# Patient Record
Sex: MALE | Race: WHITE | NOT HISPANIC OR LATINO
[De-identification: names, ages, dates, MRNs, and addresses within clinical notes are randomized per-mention and may not be internally consistent; named-entity substitution may affect disease eponyms.]

---

## 2017-01-19 ENCOUNTER — MEDICATION RENEWAL (OUTPATIENT)
Age: 77
End: 2017-01-19

## 2017-04-19 ENCOUNTER — APPOINTMENT (OUTPATIENT)
Dept: CARDIOLOGY | Facility: CLINIC | Age: 77
End: 2017-04-19

## 2017-04-19 VITALS
DIASTOLIC BLOOD PRESSURE: 78 MMHG | HEART RATE: 65 BPM | BODY MASS INDEX: 26.04 KG/M2 | WEIGHT: 186 LBS | SYSTOLIC BLOOD PRESSURE: 140 MMHG | HEIGHT: 71 IN

## 2017-06-06 ENCOUNTER — APPOINTMENT (OUTPATIENT)
Dept: VASCULAR SURGERY | Facility: CLINIC | Age: 77
End: 2017-06-06

## 2017-06-06 VITALS — WEIGHT: 185 LBS | HEIGHT: 71 IN | BODY MASS INDEX: 25.9 KG/M2

## 2017-09-14 ENCOUNTER — APPOINTMENT (OUTPATIENT)
Dept: CARDIOLOGY | Facility: CLINIC | Age: 77
End: 2017-09-14

## 2017-09-14 VITALS
HEART RATE: 69 BPM | DIASTOLIC BLOOD PRESSURE: 80 MMHG | SYSTOLIC BLOOD PRESSURE: 146 MMHG | BODY MASS INDEX: 26.04 KG/M2 | WEIGHT: 186 LBS | HEIGHT: 71 IN

## 2017-09-27 ENCOUNTER — APPOINTMENT (OUTPATIENT)
Dept: CARDIOLOGY | Facility: CLINIC | Age: 77
End: 2017-09-27

## 2017-10-02 ENCOUNTER — OUTPATIENT (OUTPATIENT)
Dept: OUTPATIENT SERVICES | Facility: HOSPITAL | Age: 77
LOS: 1 days | Discharge: HOME | End: 2017-10-02

## 2017-10-02 DIAGNOSIS — I25.10 ATHEROSCLEROTIC HEART DISEASE OF NATIVE CORONARY ARTERY WITHOUT ANGINA PECTORIS: ICD-10-CM

## 2017-11-27 ENCOUNTER — RX RENEWAL (OUTPATIENT)
Age: 77
End: 2017-11-27

## 2017-12-19 ENCOUNTER — APPOINTMENT (OUTPATIENT)
Dept: VASCULAR SURGERY | Facility: CLINIC | Age: 77
End: 2017-12-19
Payer: MEDICARE

## 2017-12-19 PROCEDURE — 99213 OFFICE O/P EST LOW 20 MIN: CPT

## 2017-12-19 PROCEDURE — 93925 LOWER EXTREMITY STUDY: CPT

## 2017-12-19 PROCEDURE — 93978 VASCULAR STUDY: CPT

## 2018-01-02 ENCOUNTER — RX RENEWAL (OUTPATIENT)
Age: 78
End: 2018-01-02

## 2018-01-18 ENCOUNTER — APPOINTMENT (OUTPATIENT)
Dept: CARDIOLOGY | Facility: CLINIC | Age: 78
End: 2018-01-18

## 2018-01-18 VITALS
BODY MASS INDEX: 26.46 KG/M2 | HEART RATE: 84 BPM | DIASTOLIC BLOOD PRESSURE: 84 MMHG | SYSTOLIC BLOOD PRESSURE: 156 MMHG | WEIGHT: 189 LBS | HEIGHT: 71 IN

## 2018-01-25 ENCOUNTER — OUTPATIENT (OUTPATIENT)
Dept: OUTPATIENT SERVICES | Facility: HOSPITAL | Age: 78
LOS: 1 days | Discharge: HOME | End: 2018-01-25

## 2018-01-25 DIAGNOSIS — Z01.818 ENCOUNTER FOR OTHER PREPROCEDURAL EXAMINATION: ICD-10-CM

## 2018-01-30 ENCOUNTER — OUTPATIENT (OUTPATIENT)
Dept: OUTPATIENT SERVICES | Facility: HOSPITAL | Age: 78
LOS: 1 days | Discharge: HOME | End: 2018-01-30

## 2018-01-30 DIAGNOSIS — I10 ESSENTIAL (PRIMARY) HYPERTENSION: ICD-10-CM

## 2018-01-30 DIAGNOSIS — I25.10 ATHEROSCLEROTIC HEART DISEASE OF NATIVE CORONARY ARTERY WITHOUT ANGINA PECTORIS: ICD-10-CM

## 2018-01-30 DIAGNOSIS — E11.9 TYPE 2 DIABETES MELLITUS WITHOUT COMPLICATIONS: ICD-10-CM

## 2018-02-07 DIAGNOSIS — E78.4 OTHER HYPERLIPIDEMIA: ICD-10-CM

## 2018-02-07 DIAGNOSIS — I25.118 ATHEROSCLEROTIC HEART DISEASE OF NATIVE CORONARY ARTERY WITH OTHER FORMS OF ANGINA PECTORIS: ICD-10-CM

## 2018-02-07 DIAGNOSIS — I25.82 CHRONIC TOTAL OCCLUSION OF CORONARY ARTERY: ICD-10-CM

## 2018-02-07 DIAGNOSIS — I20.8 OTHER FORMS OF ANGINA PECTORIS: ICD-10-CM

## 2018-02-07 DIAGNOSIS — I50.9 HEART FAILURE, UNSPECIFIED: ICD-10-CM

## 2018-02-07 DIAGNOSIS — I10 ESSENTIAL (PRIMARY) HYPERTENSION: ICD-10-CM

## 2018-03-01 ENCOUNTER — APPOINTMENT (OUTPATIENT)
Dept: CARDIOLOGY | Facility: CLINIC | Age: 78
End: 2018-03-01

## 2018-03-01 VITALS
HEART RATE: 80 BPM | WEIGHT: 192 LBS | SYSTOLIC BLOOD PRESSURE: 160 MMHG | BODY MASS INDEX: 26.88 KG/M2 | HEIGHT: 71 IN | DIASTOLIC BLOOD PRESSURE: 84 MMHG

## 2018-04-10 ENCOUNTER — MEDICATION RENEWAL (OUTPATIENT)
Age: 78
End: 2018-04-10

## 2018-06-05 ENCOUNTER — APPOINTMENT (OUTPATIENT)
Dept: VASCULAR SURGERY | Facility: CLINIC | Age: 78
End: 2018-06-05
Payer: MEDICARE

## 2018-06-05 VITALS
BODY MASS INDEX: 26.18 KG/M2 | DIASTOLIC BLOOD PRESSURE: 72 MMHG | WEIGHT: 187 LBS | SYSTOLIC BLOOD PRESSURE: 130 MMHG | HEIGHT: 71 IN

## 2018-06-05 PROCEDURE — 93925 LOWER EXTREMITY STUDY: CPT

## 2018-06-05 PROCEDURE — 99213 OFFICE O/P EST LOW 20 MIN: CPT

## 2018-06-05 PROCEDURE — 93978 VASCULAR STUDY: CPT

## 2018-06-19 ENCOUNTER — MEDICATION RENEWAL (OUTPATIENT)
Age: 78
End: 2018-06-19

## 2018-06-21 ENCOUNTER — APPOINTMENT (OUTPATIENT)
Dept: CARDIOLOGY | Facility: CLINIC | Age: 78
End: 2018-06-21

## 2018-08-02 ENCOUNTER — APPOINTMENT (OUTPATIENT)
Dept: CARDIOLOGY | Facility: CLINIC | Age: 78
End: 2018-08-02

## 2018-08-02 VITALS
HEIGHT: 71 IN | WEIGHT: 188 LBS | SYSTOLIC BLOOD PRESSURE: 142 MMHG | HEART RATE: 64 BPM | DIASTOLIC BLOOD PRESSURE: 80 MMHG | BODY MASS INDEX: 26.32 KG/M2

## 2018-08-13 ENCOUNTER — OUTPATIENT (OUTPATIENT)
Dept: OUTPATIENT SERVICES | Facility: HOSPITAL | Age: 78
LOS: 1 days | Discharge: HOME | End: 2018-08-13

## 2018-08-14 DIAGNOSIS — G47.33 OBSTRUCTIVE SLEEP APNEA (ADULT) (PEDIATRIC): ICD-10-CM

## 2018-10-29 ENCOUNTER — TRANSCRIPTION ENCOUNTER (OUTPATIENT)
Age: 78
End: 2018-10-29

## 2018-10-30 ENCOUNTER — TRANSCRIPTION ENCOUNTER (OUTPATIENT)
Age: 78
End: 2018-10-30

## 2018-10-30 ENCOUNTER — APPOINTMENT (OUTPATIENT)
Dept: SURGERY | Facility: CLINIC | Age: 78
End: 2018-10-30
Payer: MEDICARE

## 2018-10-30 VITALS — WEIGHT: 185 LBS | HEIGHT: 71 IN | BODY MASS INDEX: 25.9 KG/M2

## 2018-10-30 PROCEDURE — 99203 OFFICE O/P NEW LOW 30 MIN: CPT

## 2018-11-21 ENCOUNTER — TRANSCRIPTION ENCOUNTER (OUTPATIENT)
Age: 78
End: 2018-11-21

## 2018-12-04 ENCOUNTER — APPOINTMENT (OUTPATIENT)
Dept: VASCULAR SURGERY | Facility: CLINIC | Age: 78
End: 2018-12-04
Payer: MEDICARE

## 2018-12-04 VITALS — SYSTOLIC BLOOD PRESSURE: 140 MMHG | DIASTOLIC BLOOD PRESSURE: 70 MMHG

## 2018-12-04 PROCEDURE — 93925 LOWER EXTREMITY STUDY: CPT

## 2018-12-04 PROCEDURE — 99213 OFFICE O/P EST LOW 20 MIN: CPT

## 2018-12-04 PROCEDURE — 93978 VASCULAR STUDY: CPT

## 2018-12-21 ENCOUNTER — OUTPATIENT (OUTPATIENT)
Dept: OUTPATIENT SERVICES | Facility: HOSPITAL | Age: 78
LOS: 1 days | Discharge: HOME | End: 2018-12-21
Payer: MEDICARE

## 2018-12-21 VITALS
HEART RATE: 84 BPM | OXYGEN SATURATION: 97 % | WEIGHT: 184.97 LBS | HEIGHT: 71 IN | RESPIRATION RATE: 16 BRPM | TEMPERATURE: 98 F | SYSTOLIC BLOOD PRESSURE: 171 MMHG | DIASTOLIC BLOOD PRESSURE: 94 MMHG

## 2018-12-21 DIAGNOSIS — K40.20 BILATERAL INGUINAL HERNIA, WITHOUT OBSTRUCTION OR GANGRENE, NOT SPECIFIED AS RECURRENT: ICD-10-CM

## 2018-12-21 DIAGNOSIS — Z98.890 OTHER SPECIFIED POSTPROCEDURAL STATES: Chronic | ICD-10-CM

## 2018-12-21 DIAGNOSIS — Z01.818 ENCOUNTER FOR OTHER PREPROCEDURAL EXAMINATION: ICD-10-CM

## 2018-12-21 LAB
ALBUMIN SERPL ELPH-MCNC: 4.2 G/DL — SIGNIFICANT CHANGE UP (ref 3.5–5.2)
ALP SERPL-CCNC: 96 U/L — SIGNIFICANT CHANGE UP (ref 30–115)
ALT FLD-CCNC: 17 U/L — SIGNIFICANT CHANGE UP (ref 0–41)
ANION GAP SERPL CALC-SCNC: 15 MMOL/L — HIGH (ref 7–14)
APPEARANCE UR: CLEAR — SIGNIFICANT CHANGE UP
APTT BLD: 33.2 SEC — SIGNIFICANT CHANGE UP (ref 27–39.2)
AST SERPL-CCNC: 17 U/L — SIGNIFICANT CHANGE UP (ref 0–41)
BASOPHILS # BLD AUTO: 0.07 K/UL — SIGNIFICANT CHANGE UP (ref 0–0.2)
BASOPHILS NFR BLD AUTO: 1 % — SIGNIFICANT CHANGE UP (ref 0–1)
BILIRUB SERPL-MCNC: 0.7 MG/DL — SIGNIFICANT CHANGE UP (ref 0.2–1.2)
BILIRUB UR-MCNC: NEGATIVE — SIGNIFICANT CHANGE UP
BUN SERPL-MCNC: 16 MG/DL — SIGNIFICANT CHANGE UP (ref 10–20)
CALCIUM SERPL-MCNC: 9.3 MG/DL — SIGNIFICANT CHANGE UP (ref 8.5–10.1)
CHLORIDE SERPL-SCNC: 101 MMOL/L — SIGNIFICANT CHANGE UP (ref 98–110)
CO2 SERPL-SCNC: 26 MMOL/L — SIGNIFICANT CHANGE UP (ref 17–32)
COLOR SPEC: YELLOW — SIGNIFICANT CHANGE UP
CREAT SERPL-MCNC: 1.1 MG/DL — SIGNIFICANT CHANGE UP (ref 0.7–1.5)
DIFF PNL FLD: NEGATIVE — SIGNIFICANT CHANGE UP
EOSINOPHIL # BLD AUTO: 0.13 K/UL — SIGNIFICANT CHANGE UP (ref 0–0.7)
EOSINOPHIL NFR BLD AUTO: 1.9 % — SIGNIFICANT CHANGE UP (ref 0–8)
ESTIMATED AVERAGE GLUCOSE: 177 MG/DL — HIGH (ref 68–114)
GLUCOSE SERPL-MCNC: 166 MG/DL — HIGH (ref 70–99)
GLUCOSE UR QL: NEGATIVE MG/DL — SIGNIFICANT CHANGE UP
HBA1C BLD-MCNC: 7.8 % — HIGH (ref 4–5.6)
HCT VFR BLD CALC: 41.6 % — LOW (ref 42–52)
HGB BLD-MCNC: 14.1 G/DL — SIGNIFICANT CHANGE UP (ref 14–18)
IMM GRANULOCYTES NFR BLD AUTO: 0.1 % — SIGNIFICANT CHANGE UP (ref 0.1–0.3)
INR BLD: 0.92 RATIO — SIGNIFICANT CHANGE UP (ref 0.65–1.3)
KETONES UR-MCNC: NEGATIVE — SIGNIFICANT CHANGE UP
LEUKOCYTE ESTERASE UR-ACNC: NEGATIVE — SIGNIFICANT CHANGE UP
LYMPHOCYTES # BLD AUTO: 1.59 K/UL — SIGNIFICANT CHANGE UP (ref 1.2–3.4)
LYMPHOCYTES # BLD AUTO: 23.8 % — SIGNIFICANT CHANGE UP (ref 20.5–51.1)
MCHC RBC-ENTMCNC: 29.5 PG — SIGNIFICANT CHANGE UP (ref 27–31)
MCHC RBC-ENTMCNC: 33.9 G/DL — SIGNIFICANT CHANGE UP (ref 32–37)
MCV RBC AUTO: 87 FL — SIGNIFICANT CHANGE UP (ref 80–94)
MONOCYTES # BLD AUTO: 1.16 K/UL — HIGH (ref 0.1–0.6)
MONOCYTES NFR BLD AUTO: 17.3 % — HIGH (ref 1.7–9.3)
NEUTROPHILS # BLD AUTO: 3.73 K/UL — SIGNIFICANT CHANGE UP (ref 1.4–6.5)
NEUTROPHILS NFR BLD AUTO: 55.9 % — SIGNIFICANT CHANGE UP (ref 42.2–75.2)
NITRITE UR-MCNC: NEGATIVE — SIGNIFICANT CHANGE UP
NRBC # BLD: 0 /100 WBCS — SIGNIFICANT CHANGE UP (ref 0–0)
PH UR: 5.5 — SIGNIFICANT CHANGE UP (ref 5–8)
PLATELET # BLD AUTO: 266 K/UL — SIGNIFICANT CHANGE UP (ref 130–400)
POTASSIUM SERPL-MCNC: 4.6 MMOL/L — SIGNIFICANT CHANGE UP (ref 3.5–5)
POTASSIUM SERPL-SCNC: 4.6 MMOL/L — SIGNIFICANT CHANGE UP (ref 3.5–5)
PROT SERPL-MCNC: 7 G/DL — SIGNIFICANT CHANGE UP (ref 6–8)
PROT UR-MCNC: NEGATIVE MG/DL — SIGNIFICANT CHANGE UP
PROTHROM AB SERPL-ACNC: 10.6 SEC — SIGNIFICANT CHANGE UP (ref 9.95–12.87)
RBC # BLD: 4.78 M/UL — SIGNIFICANT CHANGE UP (ref 4.7–6.1)
RBC # FLD: 13.2 % — SIGNIFICANT CHANGE UP (ref 11.5–14.5)
SODIUM SERPL-SCNC: 142 MMOL/L — SIGNIFICANT CHANGE UP (ref 135–146)
SP GR SPEC: 1.02 — SIGNIFICANT CHANGE UP (ref 1.01–1.03)
UROBILINOGEN FLD QL: 0.2 MG/DL — SIGNIFICANT CHANGE UP (ref 0.2–0.2)
WBC # BLD: 6.69 K/UL — SIGNIFICANT CHANGE UP (ref 4.8–10.8)
WBC # FLD AUTO: 6.69 K/UL — SIGNIFICANT CHANGE UP (ref 4.8–10.8)

## 2018-12-21 PROCEDURE — 93010 ELECTROCARDIOGRAM REPORT: CPT

## 2018-12-21 NOTE — H&P PST ADULT - HISTORY OF PRESENT ILLNESS
77 yr old male with a history of bilateral inguinal hernias scheduled for repair with mesh on 1/7/19 with dr song.  denies cp sob cough uri palp dysuria  2 fos [id number] sob or cp 77 yr old male with a history of bilateral inguinal hernias scheduled for repair with mesh on 1/7/19 with dr song.  denies cp sob cough uri palp dysuria  2 fos without sob or cp

## 2018-12-21 NOTE — H&P PST ADULT - FAMILY HISTORY
Father  Still living? Unknown  Family history of heart disease, Age at diagnosis: Age Unknown  DM (diabetes mellitus), Age at diagnosis: Age Unknown     Mother  Still living? Unknown  DM (diabetes mellitus), Age at diagnosis: Age Unknown

## 2018-12-21 NOTE — H&P PST ADULT - PMH
Cancer  prostate  DM (diabetes mellitus)    HTN (hypertension)    OA (osteoarthritis) CAD (coronary artery disease)  s/p 5 stents  2009  Cancer  prostate  DM (diabetes mellitus)    HTN (hypertension)    OA (osteoarthritis) AAA (abdominal aortic aneurysm)  monitored by dr barker  stable x 10 yrs  CAD (coronary artery disease)  s/p 5 stents  2009  Cancer  prostate  DM (diabetes mellitus)    HTN (hypertension)    OA (osteoarthritis) AAA (abdominal aortic aneurysm)  monitored by dr barker  stable x 10 yrs  Abnormal chest xray  monitored by fire dept  CAD (coronary artery disease)  s/p 5 stents  2009  Cancer  prostate  DM (diabetes mellitus)    HTN (hypertension)    OA (osteoarthritis)

## 2018-12-21 NOTE — H&P PST ADULT - NSANTHOSAYNRD_GEN_A_CORE
No. BHARATHI screening performed.  STOP BANG Legend: 0-2 = LOW Risk; 3-4 = INTERMEDIATE Risk; 5-8 = HIGH Risk

## 2018-12-27 ENCOUNTER — APPOINTMENT (OUTPATIENT)
Dept: CARDIOLOGY | Facility: CLINIC | Age: 78
End: 2018-12-27

## 2018-12-27 VITALS
HEIGHT: 71 IN | OXYGEN SATURATION: 97 % | HEART RATE: 80 BPM | SYSTOLIC BLOOD PRESSURE: 136 MMHG | WEIGHT: 187 LBS | DIASTOLIC BLOOD PRESSURE: 80 MMHG | BODY MASS INDEX: 26.18 KG/M2

## 2018-12-27 PROBLEM — I71.4 ABDOMINAL AORTIC ANEURYSM, WITHOUT RUPTURE: Chronic | Status: ACTIVE | Noted: 2018-12-21

## 2018-12-27 PROBLEM — E11.9 TYPE 2 DIABETES MELLITUS WITHOUT COMPLICATIONS: Chronic | Status: ACTIVE | Noted: 2018-12-21

## 2018-12-27 PROBLEM — I25.10 ATHEROSCLEROTIC HEART DISEASE OF NATIVE CORONARY ARTERY WITHOUT ANGINA PECTORIS: Chronic | Status: ACTIVE | Noted: 2018-12-21

## 2018-12-27 PROBLEM — I10 ESSENTIAL (PRIMARY) HYPERTENSION: Chronic | Status: ACTIVE | Noted: 2018-12-21

## 2018-12-27 PROBLEM — M19.90 UNSPECIFIED OSTEOARTHRITIS, UNSPECIFIED SITE: Chronic | Status: ACTIVE | Noted: 2018-12-21

## 2018-12-27 PROBLEM — C80.1 MALIGNANT (PRIMARY) NEOPLASM, UNSPECIFIED: Chronic | Status: ACTIVE | Noted: 2018-12-21

## 2018-12-27 PROBLEM — R93.89 ABNORMAL FINDINGS ON DIAGNOSTIC IMAGING OF OTHER SPECIFIED BODY STRUCTURES: Chronic | Status: ACTIVE | Noted: 2018-12-21

## 2018-12-27 NOTE — PHYSICAL EXAM
[General Appearance - Well Developed] : well developed [Normal Appearance] : normal appearance [Well Groomed] : well groomed [General Appearance - Well Nourished] : well nourished [No Deformities] : no deformities [General Appearance - In No Acute Distress] : no acute distress [Normal Conjunctiva] : the conjunctiva exhibited no abnormalities [Eyelids - No Xanthelasma] : the eyelids demonstrated no xanthelasmas [Normal Oral Mucosa] : normal oral mucosa [No Oral Pallor] : no oral pallor [No Oral Cyanosis] : no oral cyanosis [Normal Jugular Venous A Waves Present] : normal jugular venous A waves present [Normal Jugular Venous V Waves Present] : normal jugular venous V waves present [No Jugular Venous Knight A Waves] : no jugular venous knight A waves [Respiration, Rhythm And Depth] : normal respiratory rhythm and effort [Exaggerated Use Of Accessory Muscles For Inspiration] : no accessory muscle use [Auscultation Breath Sounds / Voice Sounds] : lungs were clear to auscultation bilaterally [Heart Rate And Rhythm] : heart rate and rhythm were normal [Heart Sounds] : normal S1 and S2 [Murmurs] : no murmurs present [Abdomen Soft] : soft [Abdomen Tenderness] : non-tender [Abdomen Mass (___ Cm)] : no abdominal mass palpated [Abnormal Walk] : normal gait [Gait - Sufficient For Exercise Testing] : the gait was sufficient for exercise testing [Nail Clubbing] : no clubbing of the fingernails [Cyanosis, Localized] : no localized cyanosis [Petechial Hemorrhages (___cm)] : no petechial hemorrhages [Skin Color & Pigmentation] : normal skin color and pigmentation [] : no rash [No Venous Stasis] : no venous stasis [Skin Lesions] : no skin lesions [No Skin Ulcers] : no skin ulcer [No Xanthoma] : no  xanthoma was observed [Oriented To Time, Place, And Person] : oriented to person, place, and time [Affect] : the affect was normal [Mood] : the mood was normal [No Anxiety] : not feeling anxious

## 2018-12-27 NOTE — REASON FOR VISIT
[Follow-Up - Clinic] : a clinic follow-up of [Coronary Artery Disease] : coronary artery disease [FreeTextEntry2] : and pre-op for hernia repair [FreeTextEntry1] : cad\par pci of lad and diagonal in 2009\par 2/2006\par echo showed ef of 60%\par stress test 2014 was negative\par class 1 angina \par class 1 sob\par  no chf\par \par no new complaints\par no sob\par no chest pain\par douing well\par stable\par doing well\par \par thallium shows a perfusion defect of the rca\par cath showed mild non obstructive cad inn all arteries\par medical therapy\par patent stents\par \par he is pre-op for hernia repair\par no further testing is necessary and this is low risk procedure\par class1 angina, no chf or malignant ventricular arrythmias

## 2018-12-27 NOTE — DISCUSSION/SUMMARY
[FreeTextEntry1] : cleared for hernia surgery at low risk\par d/c plavix seven days before surgery\par rv 6 mos.

## 2019-01-04 NOTE — ASU PATIENT PROFILE, ADULT - PMH
AAA (abdominal aortic aneurysm)  monitored by dr barker  stable x 10 yrs  Abnormal chest xray  monitored by fire dept  CAD (coronary artery disease)  s/p 5 stents  2009  Cancer  prostate  DM (diabetes mellitus)    HTN (hypertension)    OA (osteoarthritis)

## 2019-01-07 ENCOUNTER — OUTPATIENT (OUTPATIENT)
Dept: OUTPATIENT SERVICES | Facility: HOSPITAL | Age: 79
LOS: 1 days | Discharge: HOME | End: 2019-01-07

## 2019-01-07 ENCOUNTER — APPOINTMENT (OUTPATIENT)
Dept: SURGERY | Facility: AMBULATORY SURGERY CENTER | Age: 79
End: 2019-01-07
Payer: MEDICARE

## 2019-01-07 VITALS
WEIGHT: 184.97 LBS | SYSTOLIC BLOOD PRESSURE: 144 MMHG | OXYGEN SATURATION: 97 % | HEART RATE: 91 BPM | DIASTOLIC BLOOD PRESSURE: 78 MMHG | TEMPERATURE: 98 F | HEIGHT: 71 IN | RESPIRATION RATE: 18 BRPM

## 2019-01-07 VITALS
RESPIRATION RATE: 16 BRPM | HEART RATE: 65 BPM | SYSTOLIC BLOOD PRESSURE: 150 MMHG | DIASTOLIC BLOOD PRESSURE: 83 MMHG | OXYGEN SATURATION: 98 %

## 2019-01-07 DIAGNOSIS — Z98.890 OTHER SPECIFIED POSTPROCEDURAL STATES: Chronic | ICD-10-CM

## 2019-01-07 PROCEDURE — 49505 PRP I/HERN INIT REDUC >5 YR: CPT | Mod: 50

## 2019-01-07 RX ORDER — HYDROMORPHONE HYDROCHLORIDE 2 MG/ML
0.5 INJECTION INTRAMUSCULAR; INTRAVENOUS; SUBCUTANEOUS
Qty: 0 | Refills: 0 | Status: DISCONTINUED | OUTPATIENT
Start: 2019-01-07 | End: 2019-01-07

## 2019-01-07 RX ORDER — ONDANSETRON 8 MG/1
4 TABLET, FILM COATED ORAL ONCE
Qty: 0 | Refills: 0 | Status: DISCONTINUED | OUTPATIENT
Start: 2019-01-07 | End: 2019-01-22

## 2019-01-07 RX ORDER — SODIUM CHLORIDE 9 MG/ML
1000 INJECTION, SOLUTION INTRAVENOUS
Qty: 0 | Refills: 0 | Status: DISCONTINUED | OUTPATIENT
Start: 2019-01-07 | End: 2019-01-22

## 2019-01-07 RX ORDER — OXYCODONE AND ACETAMINOPHEN 5; 325 MG/1; MG/1
1 TABLET ORAL ONCE
Qty: 0 | Refills: 0 | Status: DISCONTINUED | OUTPATIENT
Start: 2019-01-07 | End: 2019-01-07

## 2019-01-07 RX ORDER — TRAMADOL HYDROCHLORIDE 50 MG/1
1 TABLET ORAL
Qty: 30 | Refills: 0
Start: 2019-01-07 | End: 2019-01-11

## 2019-01-07 RX ADMIN — SODIUM CHLORIDE 100 MILLILITER(S): 9 INJECTION, SOLUTION INTRAVENOUS at 14:06

## 2019-01-07 RX ADMIN — SODIUM CHLORIDE 100 MILLILITER(S): 9 INJECTION, SOLUTION INTRAVENOUS at 14:13

## 2019-01-07 RX ADMIN — SODIUM CHLORIDE 100 MILLILITER(S): 9 INJECTION, SOLUTION INTRAVENOUS at 14:08

## 2019-01-07 NOTE — PRE-ANESTHESIA EVALUATION ADULT - NSANTHSNORERD_ENT_A_CORE
[facility] Progress Note    Date: 2017    Name: Boom Moore    MRN: [id number]         : 1947     Procrit/labs    Mr. Kenia Young arrived to [facility] at [id number]. Mr. Kenia Young was assessed and education was provided. Mr. Davila Scales vitals were reviewed. Visit Vitals    /87 (BP 1 Location: Right arm, BP Patient Position: Sitting)    Pulse 73    Temp 98 °F (36.7 °C)    Resp 18    SpO2 97%       Blood drawn for labs via right antecubital venipuncture x1 attempt. Lab results were obtained and reviewed. HGB 8.1 and HCT 25.3    Procrit 79450 units was administered as ordered SQ in patient's right upper arm. Mr. Kenia Young tolerated well without complaints. Mr. Kenia Young was discharged from [place] [address] in stable condition at 1515. He is to return on 17 at  for his next appointment.     Daren Saint, RN  2017 No

## 2019-01-07 NOTE — CHART NOTE - NSCHARTNOTEFT_GEN_A_CORE
PACU ANESTHESIA ADMISSION NOTE      Procedure: Repair of bilateral inguinal hernias with mesh    Post op diagnosis:  Bilateral inguinal hernia      ____  Intubated  TV:______       Rate: ______      FiO2: ______    _x___  Patent Airway    _x___  Full return of protective reflexes    __x__  Full recovery from anesthesia / back to baseline     Vitals:   T:  97.6         R:   16               BP:   120/67               Sat:   97               P: 82      Mental Status:  ___x_ Awake   ____x_ Alert   _____ Drowsy   _____ Sedated    Nausea/Vomiting:  __x__ NO  ______Yes,   See Post - Op Orders          Pain Scale (0-10):  _0___    Treatment: ____ None    ____ See Post - Op/PCA Orders    Post - Operative Fluids:   ____ Oral   _x___ See Post - Op Orders    Plan: Discharge:   __x__Home       _____Floor     _____Critical Care    _____  Other:_________________    Comments: No anesthesia complications noted

## 2019-01-07 NOTE — BRIEF OPERATIVE NOTE - PROCEDURE
<<-----Click on this checkbox to enter Procedure Repair of bilateral inguinal hernias with mesh  01/07/2019    Active  Syed Lacy

## 2019-01-10 DIAGNOSIS — I25.10 ATHEROSCLEROTIC HEART DISEASE OF NATIVE CORONARY ARTERY WITHOUT ANGINA PECTORIS: ICD-10-CM

## 2019-01-10 DIAGNOSIS — I10 ESSENTIAL (PRIMARY) HYPERTENSION: ICD-10-CM

## 2019-01-10 DIAGNOSIS — K40.20 BILATERAL INGUINAL HERNIA, WITHOUT OBSTRUCTION OR GANGRENE, NOT SPECIFIED AS RECURRENT: ICD-10-CM

## 2019-01-10 DIAGNOSIS — E11.9 TYPE 2 DIABETES MELLITUS WITHOUT COMPLICATIONS: ICD-10-CM

## 2019-01-15 ENCOUNTER — APPOINTMENT (OUTPATIENT)
Dept: SURGERY | Facility: CLINIC | Age: 79
End: 2019-01-15
Payer: MEDICARE

## 2019-01-15 PROCEDURE — 99024 POSTOP FOLLOW-UP VISIT: CPT

## 2019-01-15 NOTE — ASSESSMENT
[FreeTextEntry1] : Tyrel underwent the repair of his very large bilateral direct inguinal hernias on January 7, 2019 under local with IV sedation without any problems or complications. His wound is clean, dry and intact. There is no evidence of erythema, seroma formation or infection. He has no ecchymosis despite his perioperative Plavix use. He is tolerating a diet and having normal bowel movements. He denies any significant postoperative pain or discomfort after his procedure and he did not take any narcotic medication.\par \par Tyrel and his wife were counseled and reassured. He was discharged from the office with no specific followup necessary, but he knows to avoid any heavy lifting or strenuous activity for the next several weeks.

## 2019-01-15 NOTE — CONSULT LETTER
[FreeTextEntry1] : Dear Dr. Marlyn Peralta, \par \par I had the pleasure of seeing your patient, WILFREDO RIVAS, in my office today. Please see my note below. \par \par Thank you very much for allowing me to participate in the care of this patient. If you have any questions, please do not hesitate to contact me. \par \par \par Respectfully,\par \par Syed Lacy M.D., FACS\par  \par \par \par cc: Dr. Adriano Chairez \par Dr. Gary Begum

## 2019-02-04 ENCOUNTER — TRANSCRIPTION ENCOUNTER (OUTPATIENT)
Age: 79
End: 2019-02-04

## 2019-03-02 ENCOUNTER — TRANSCRIPTION ENCOUNTER (OUTPATIENT)
Age: 79
End: 2019-03-02

## 2019-03-21 ENCOUNTER — APPOINTMENT (OUTPATIENT)
Dept: SURGERY | Facility: CLINIC | Age: 79
End: 2019-03-21
Payer: MEDICARE

## 2019-03-21 VITALS — BODY MASS INDEX: 26.18 KG/M2 | HEIGHT: 71 IN | WEIGHT: 187 LBS

## 2019-03-21 DIAGNOSIS — K40.20 BILATERAL INGUINAL HERNIA, W/OUT OBSTRUCTION OR GANGRENE, NOT SPECIFIED AS RECURRENT: ICD-10-CM

## 2019-03-21 PROCEDURE — 99024 POSTOP FOLLOW-UP VISIT: CPT

## 2019-03-21 NOTE — ASSESSMENT
[FreeTextEntry1] : Tyrel presents back to the office with his wife approximately 2-1/2 months after the repair of his very large direct bilateral inguinal hernias with mesh with intermittent discomfort in the right groin causing him some concern. He believes his symptoms had been resolving over time but are still present and he was concerned enough to followup with me for a reevaluation.\par \par Physical examination demonstrates well healed scars in both groins with no evidence of hernia recurrence or delayed wound complications.\par \par Tyrel and his wife were counseled and reassured. I believe the majority of his symptoms will resolve within the next several weeks to months and he was encouraged to avoid activities which exacerbate his symptoms and to use ice and anti-inflammatories whenever necessary. He may return to me in the future if his symptoms persist or worsen, of course.

## 2019-04-03 ENCOUNTER — MEDICATION RENEWAL (OUTPATIENT)
Age: 79
End: 2019-04-03

## 2019-06-04 ENCOUNTER — APPOINTMENT (OUTPATIENT)
Dept: VASCULAR SURGERY | Facility: CLINIC | Age: 79
End: 2019-06-04
Payer: MEDICARE

## 2019-06-04 VITALS — BODY MASS INDEX: 25.8 KG/M2 | SYSTOLIC BLOOD PRESSURE: 140 MMHG | WEIGHT: 185 LBS | DIASTOLIC BLOOD PRESSURE: 80 MMHG

## 2019-06-04 PROCEDURE — 93925 LOWER EXTREMITY STUDY: CPT

## 2019-06-04 PROCEDURE — 99213 OFFICE O/P EST LOW 20 MIN: CPT

## 2019-06-04 PROCEDURE — 93978 VASCULAR STUDY: CPT

## 2019-06-04 NOTE — ASSESSMENT
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 He offers no new complaints today. I performed the aortic and duplex of his lower extremities. He has stable aneurysmal dilatation of his aorta common femoral superficial femoral and popliteal arteries. His abdominal aorta measures 4.2 cm right common femorals 2.0 cm  SFA 1.2 cm and  popliteal artery 1.9 cm. Left common femoral artery is 2.0cm superficial femoral artery 1.2 cm popliteal artery  is 1.8 cm in size. His aneurysmal disease is minimally unchanged from his previous duplex exam 6 months ago. I would like to see him back in my office in 6 months time or sooner if any new symptoms develop.

## 2019-06-04 NOTE — HISTORY OF PRESENT ILLNESS
[FreeTextEntry1] : the patient is a 76-year-old gentleman with a known history for a 4 cm abdominal aortic aneurysm and bilateral common femoral popliteal artery aneurysms. Today he presents for followup and offers no new complaints.

## 2019-06-04 NOTE — DATA REVIEWED
[FreeTextEntry1] : duplex abdominal aorta 4.2 cm right common iliac artery 1.6 cm left common iliac artery 1.1 cm\par Arterial duplex \par right common femoral artery 1.8 cm of superficial femoral artery 1.5 cm popliteal artery 1.9 cm\par Left common femoral artery 1.7 cm left superficial femoral artery 1.5 cm left popliteal artery 1.8 cm

## 2019-06-04 NOTE — REASON FOR VISIT
[Follow-Up: _____] : a [unfilled] follow-up visit [FreeTextEntry1] : for an AAA and bilateral popliteal artery aneurysms.

## 2019-06-04 NOTE — CONSULT LETTER
[Courtesy Letter:] : I had the pleasure of seeing your patient, [unfilled], in my office today. [Dear  ___] : Dear  [unfilled], [Please see my note below.] : Please see my note below.

## 2019-06-05 ENCOUNTER — MEDICATION RENEWAL (OUTPATIENT)
Age: 79
End: 2019-06-05

## 2019-06-27 ENCOUNTER — APPOINTMENT (OUTPATIENT)
Dept: CARDIOLOGY | Facility: CLINIC | Age: 79
End: 2019-06-27
Payer: MEDICARE

## 2019-06-27 VITALS
SYSTOLIC BLOOD PRESSURE: 150 MMHG | WEIGHT: 189 LBS | HEIGHT: 71 IN | HEART RATE: 90 BPM | DIASTOLIC BLOOD PRESSURE: 78 MMHG | BODY MASS INDEX: 26.46 KG/M2

## 2019-06-27 PROCEDURE — 93000 ELECTROCARDIOGRAM COMPLETE: CPT

## 2019-06-27 PROCEDURE — 99214 OFFICE O/P EST MOD 30 MIN: CPT

## 2019-06-27 NOTE — PHYSICAL EXAM
[General Appearance - Well Developed] : well developed [Well Groomed] : well groomed [Normal Appearance] : normal appearance [No Deformities] : no deformities [General Appearance - Well Nourished] : well nourished [General Appearance - In No Acute Distress] : no acute distress [Eyelids - No Xanthelasma] : the eyelids demonstrated no xanthelasmas [Normal Conjunctiva] : the conjunctiva exhibited no abnormalities [Normal Oral Mucosa] : normal oral mucosa [No Oral Pallor] : no oral pallor [No Oral Cyanosis] : no oral cyanosis [Normal Jugular Venous A Waves Present] : normal jugular venous A waves present [No Jugular Venous Knight A Waves] : no jugular venous knight A waves [Normal Jugular Venous V Waves Present] : normal jugular venous V waves present [Heart Sounds] : normal S1 and S2 [Heart Rate And Rhythm] : heart rate and rhythm were normal [Murmurs] : no murmurs present [Respiration, Rhythm And Depth] : normal respiratory rhythm and effort [Exaggerated Use Of Accessory Muscles For Inspiration] : no accessory muscle use [Auscultation Breath Sounds / Voice Sounds] : lungs were clear to auscultation bilaterally [Abdomen Tenderness] : non-tender [Abdomen Soft] : soft [Abnormal Walk] : normal gait [Abdomen Mass (___ Cm)] : no abdominal mass palpated [Nail Clubbing] : no clubbing of the fingernails [Gait - Sufficient For Exercise Testing] : the gait was sufficient for exercise testing [Petechial Hemorrhages (___cm)] : no petechial hemorrhages [Cyanosis, Localized] : no localized cyanosis [Skin Color & Pigmentation] : normal skin color and pigmentation [] : no rash [Skin Lesions] : no skin lesions [No Venous Stasis] : no venous stasis [No Xanthoma] : no  xanthoma was observed [No Skin Ulcers] : no skin ulcer [Mood] : the mood was normal [No Anxiety] : not feeling anxious [Oriented To Time, Place, And Person] : oriented to person, place, and time [Affect] : the affect was normal

## 2019-06-27 NOTE — REASON FOR VISIT
[Follow-Up - Clinic] : a clinic follow-up of [Coronary Artery Disease] : coronary artery disease [FreeTextEntry2] : and pre-op for hernia repair [FreeTextEntry1] : cad\par pci of lad and diagonal in 2009\par 2/2006\par echo showed ef of 60%\par stress test 2014 was negative\par class 1 angina \par class 1 sob\par  no chf\par \par no new complaints\par no sob\par no chest pain\par douing well\par stable\par doing well\par \par thallium shows a perfusion defect of the rca\par cath showed mild non obstructive cad inn all arteries\par medical therapy\par patent stents\par \par he is pre-op for hernia repair\par no further testing is necessary and this is low risk procedure\par class1 angina, no chf or malignant ventricular arrythmias\par \par doing well\par no new complaints

## 2019-09-04 ENCOUNTER — APPOINTMENT (OUTPATIENT)
Dept: VASCULAR SURGERY | Facility: CLINIC | Age: 79
End: 2019-09-04
Payer: MEDICARE

## 2019-09-04 PROCEDURE — 93880 EXTRACRANIAL BILAT STUDY: CPT

## 2019-12-20 ENCOUNTER — APPOINTMENT (OUTPATIENT)
Dept: VASCULAR SURGERY | Facility: CLINIC | Age: 79
End: 2019-12-20
Payer: MEDICARE

## 2019-12-20 VITALS
SYSTOLIC BLOOD PRESSURE: 138 MMHG | DIASTOLIC BLOOD PRESSURE: 78 MMHG | WEIGHT: 189 LBS | BODY MASS INDEX: 26.46 KG/M2 | HEIGHT: 71 IN

## 2019-12-20 PROCEDURE — 93978 VASCULAR STUDY: CPT

## 2019-12-20 PROCEDURE — 93925 LOWER EXTREMITY STUDY: CPT

## 2019-12-20 PROCEDURE — 99213 OFFICE O/P EST LOW 20 MIN: CPT

## 2019-12-20 NOTE — HISTORY OF PRESENT ILLNESS
[FreeTextEntry1] : 79 year-old gentleman with a known history for a 4 cm abdominal aortic aneurysm and bilateral common femoral popliteal artery aneurysms. Today he presents for followup and offers no new complaints.

## 2019-12-20 NOTE — DATA REVIEWED
[FreeTextEntry1] : Duplex abdominal aorta 4.2 cm right common iliac artery 2.1 cm left common iliac artery 2.0 cm\par Arterial duplex \par Right common femoral artery 2 cm and popliteal artery 1.9 cm\par Left common femoral artery 1.8 cm and left popliteal artery 1.5 cm

## 2019-12-20 NOTE — ASSESSMENT
[FreeTextEntry1] : 79 year-old gentleman with a known history for a 4 cm abdominal aortic aneurysm and bilateral common femoral popliteal artery aneurysms. Today he presents for followup and offers no new complaints.\par Duplex abdominal aorta 4.2 cm right common iliac artery 2.1 cm left common iliac artery 2.0 cm\par Arterial duplex: Right common femoral artery 2 cm and popliteal artery 1.9 cm. Left common femoral artery 1.8 cm and left popliteal artery 1.5 cm. His aneurysmal disease is minimally unchanged from his previous duplex exam 6 months ago. \par I would like to see him back in my office in 6 months time or sooner if any new symptoms develop.

## 2019-12-26 ENCOUNTER — APPOINTMENT (OUTPATIENT)
Dept: CARDIOLOGY | Facility: CLINIC | Age: 79
End: 2019-12-26
Payer: MEDICARE

## 2019-12-26 VITALS
HEIGHT: 71 IN | SYSTOLIC BLOOD PRESSURE: 130 MMHG | BODY MASS INDEX: 26.6 KG/M2 | DIASTOLIC BLOOD PRESSURE: 70 MMHG | HEART RATE: 83 BPM | WEIGHT: 190 LBS

## 2019-12-26 PROCEDURE — 99214 OFFICE O/P EST MOD 30 MIN: CPT

## 2019-12-26 PROCEDURE — 93000 ELECTROCARDIOGRAM COMPLETE: CPT

## 2019-12-26 NOTE — PHYSICAL EXAM
[General Appearance - Well Developed] : well developed [Normal Appearance] : normal appearance [Well Groomed] : well groomed [General Appearance - Well Nourished] : well nourished [No Deformities] : no deformities [General Appearance - In No Acute Distress] : no acute distress [Normal Conjunctiva] : the conjunctiva exhibited no abnormalities [Eyelids - No Xanthelasma] : the eyelids demonstrated no xanthelasmas [Normal Oral Mucosa] : normal oral mucosa [No Oral Pallor] : no oral pallor [No Oral Cyanosis] : no oral cyanosis [Normal Jugular Venous A Waves Present] : normal jugular venous A waves present [Normal Jugular Venous V Waves Present] : normal jugular venous V waves present [No Jugular Venous Knight A Waves] : no jugular venous knight A waves [Heart Rate And Rhythm] : heart rate and rhythm were normal [Heart Sounds] : normal S1 and S2 [Murmurs] : no murmurs present [Respiration, Rhythm And Depth] : normal respiratory rhythm and effort [Auscultation Breath Sounds / Voice Sounds] : lungs were clear to auscultation bilaterally [Exaggerated Use Of Accessory Muscles For Inspiration] : no accessory muscle use [Abdomen Soft] : soft [Abdomen Tenderness] : non-tender [Abnormal Walk] : normal gait [Abdomen Mass (___ Cm)] : no abdominal mass palpated [Gait - Sufficient For Exercise Testing] : the gait was sufficient for exercise testing [Nail Clubbing] : no clubbing of the fingernails [Cyanosis, Localized] : no localized cyanosis [Petechial Hemorrhages (___cm)] : no petechial hemorrhages [Skin Color & Pigmentation] : normal skin color and pigmentation [] : no rash [Skin Lesions] : no skin lesions [No Venous Stasis] : no venous stasis [No Skin Ulcers] : no skin ulcer [No Xanthoma] : no  xanthoma was observed [Oriented To Time, Place, And Person] : oriented to person, place, and time [Mood] : the mood was normal [Affect] : the affect was normal [No Anxiety] : not feeling anxious

## 2020-04-23 ENCOUNTER — APPOINTMENT (OUTPATIENT)
Dept: CARDIOLOGY | Facility: CLINIC | Age: 80
End: 2020-04-23

## 2020-06-19 ENCOUNTER — APPOINTMENT (OUTPATIENT)
Dept: VASCULAR SURGERY | Facility: CLINIC | Age: 80
End: 2020-06-19

## 2020-06-26 ENCOUNTER — APPOINTMENT (OUTPATIENT)
Dept: VASCULAR SURGERY | Facility: CLINIC | Age: 80
End: 2020-06-26
Payer: MEDICARE

## 2020-06-26 VITALS — DIASTOLIC BLOOD PRESSURE: 74 MMHG | SYSTOLIC BLOOD PRESSURE: 133 MMHG

## 2020-06-26 PROCEDURE — 93925 LOWER EXTREMITY STUDY: CPT

## 2020-06-26 PROCEDURE — 93978 VASCULAR STUDY: CPT

## 2020-06-26 PROCEDURE — 99213 OFFICE O/P EST LOW 20 MIN: CPT

## 2020-06-26 NOTE — DATA REVIEWED
[FreeTextEntry1] : Duplex abdominal aorta 4.1 cm right common iliac artery 1.9 cm left common iliac artery 1.8 cm\par Arterial duplex \par Right common femoral artery 1.8 cm and popliteal artery 1.8 cm\par Left common femoral artery 1.8 cm and left popliteal artery 1.5 cm

## 2020-06-26 NOTE — ASSESSMENT
[FreeTextEntry1] : 79 year-old gentleman with a known history for a 4 cm abdominal aortic aneurysm and bilateral common femoral popliteal artery aneurysms. Today he presents for followup and offers no new complaints. He has arteriomegaly.\par Duplex abdominal aorta 4.1 cm right common iliac artery 1.9 cm left common iliac artery 1.8 cm\par Arterial duplex \par Right common femoral artery 1.8 cm and popliteal artery 1.8 cm\par Left common femoral artery 1.8 cm and left popliteal artery 1.5 cm\par I would like to see him back in my office in 6 months time or sooner if any new symptoms develop.

## 2020-09-10 ENCOUNTER — APPOINTMENT (OUTPATIENT)
Dept: CARDIOLOGY | Facility: CLINIC | Age: 80
End: 2020-09-10
Payer: MEDICARE

## 2020-09-10 VITALS
WEIGHT: 187 LBS | TEMPERATURE: 96.8 F | HEART RATE: 82 BPM | BODY MASS INDEX: 26.08 KG/M2 | DIASTOLIC BLOOD PRESSURE: 70 MMHG | SYSTOLIC BLOOD PRESSURE: 132 MMHG

## 2020-09-10 PROCEDURE — 93000 ELECTROCARDIOGRAM COMPLETE: CPT

## 2020-09-10 PROCEDURE — 99214 OFFICE O/P EST MOD 30 MIN: CPT

## 2020-09-29 ENCOUNTER — APPOINTMENT (OUTPATIENT)
Dept: UROLOGY | Facility: CLINIC | Age: 80
End: 2020-09-29
Payer: MEDICARE

## 2020-09-29 DIAGNOSIS — C61 MALIGNANT NEOPLASM OF PROSTATE: ICD-10-CM

## 2020-09-29 DIAGNOSIS — R31.0 GROSS HEMATURIA: ICD-10-CM

## 2020-09-29 LAB
BILIRUB UR QL STRIP: NORMAL
COLLECTION METHOD: NORMAL
GLUCOSE UR-MCNC: 150
HCG UR QL: NORMAL EU/DL
HGB UR QL STRIP.AUTO: NORMAL
KETONES UR-MCNC: NORMAL
LEUKOCYTE ESTERASE UR QL STRIP: NORMAL
NITRITE UR QL STRIP: NORMAL
PH UR STRIP: 6
PROT UR STRIP-MCNC: NORMAL
SP GR UR STRIP: 1.01

## 2020-09-29 PROCEDURE — 99204 OFFICE O/P NEW MOD 45 MIN: CPT

## 2020-09-29 PROCEDURE — 81003 URINALYSIS AUTO W/O SCOPE: CPT | Mod: QW

## 2020-09-29 NOTE — REVIEW OF SYSTEMS
[Fever] : no fever [Feeling Poorly] : not feeling poorly [Feeling Tired] : not feeling tired [Nasal Discharge] : no nasal discharge [Chest Pain] : no chest pain [Cough] : no cough [Constipation] : no constipation [Diarrhea] : no diarrhea [Dysuria] : no dysuria [Arthralgias] : no arthralgias [Skin Wound] : no skin wound [Confused] : no confusion [Change In Personality] : no personality change [Muscle Weakness] : no muscle weakness [Easy Bleeding] : no tendency for easy bleeding

## 2020-09-29 NOTE — HISTORY OF PRESENT ILLNESS
[FreeTextEntry1] : 79-year-old with history of 3 episodes of hematuria recently. He saw , and CT scan showed no significant urinary lesion. Cystoscopy showed multiple small papillary lesions. He also has a known AAA been followed by vascular.  Cytology shows atypia.\par \par He has a history of prostate cancer status post seed implantation and radiation in 2000 by doctors Gabriella and Ondina. PSA < .1.\par \par He is on Plavix by cardiology who okayed stopping in one week prior to any TURBT.\par \par He has nocturia x1, rare urgency, occasional frequency but normal stream. Symptom score is 4/35\par \par No significant family history

## 2020-09-29 NOTE — PHYSICAL EXAM
[General Appearance - In No Acute Distress] : no acute distress [] : no respiratory distress [Respiration, Rhythm And Depth] : normal respiratory rhythm and effort [Normal Station and Gait] : the gait and station were normal for the patient's age [Oriented To Time, Place, And Person] : oriented to person, place, and time

## 2020-09-29 NOTE — ASSESSMENT
[FreeTextEntry1] : Recent gross hematuria\par Cystoscopy bladder tumors and history of prostate cancer. Recommend TURBT. Risks benefits and alternatives discussed including bleeding, perforation, catheter, sepsis. We'll need to stop Plavix one week prior

## 2020-10-01 ENCOUNTER — TRANSCRIPTION ENCOUNTER (OUTPATIENT)
Age: 80
End: 2020-10-01

## 2020-10-01 NOTE — DISCUSSION/SUMMARY
[FreeTextEntry1] : cleared for  surgery at low risk\par d/c plavix seven days before surgery\par rv 6 mos.\par no further cardiac work up needed

## 2020-10-01 NOTE — REASON FOR VISIT
[Follow-Up - Clinic] : a clinic follow-up of [Coronary Artery Disease] : coronary artery disease [FreeTextEntry2] : and cystoscopy for bladder polps [FreeTextEntry1] : cad\par pci of lad and diagonal in 2009\par 2/2006\par echo showed ef of 60%\par stress test 2014 was negative\par class 1 angina \par class 1 sob\par  no chf\par \par no new complaints\par patient may d/c plavix 5-7 days before cystoscopy\par no sob\par no chest pain\par douing well\par stable\par doing well\par \par thallium shows a perfusion defect of the rca\par cath showed mild non obstructive cad inn all arteries\par medical therapy\par patent stents\par \par he is pre-op for hernia repair\par no further testing is necessary and this is low risk procedure\par class1 angina, no chf or malignant ventricular arrythmias\par \par doing well\par no new complaints\par \par patient now scheduled for repeat cystoscopy\par no new cardiac symptoms\par low risk procedure

## 2020-10-08 ENCOUNTER — RESULT REVIEW (OUTPATIENT)
Age: 80
End: 2020-10-08

## 2020-10-08 ENCOUNTER — OUTPATIENT (OUTPATIENT)
Dept: OUTPATIENT SERVICES | Facility: HOSPITAL | Age: 80
LOS: 1 days | Discharge: HOME | End: 2020-10-08
Payer: MEDICARE

## 2020-10-08 VITALS
RESPIRATION RATE: 20 BRPM | SYSTOLIC BLOOD PRESSURE: 178 MMHG | DIASTOLIC BLOOD PRESSURE: 80 MMHG | HEIGHT: 71 IN | OXYGEN SATURATION: 98 % | WEIGHT: 186.07 LBS | HEART RATE: 66 BPM | TEMPERATURE: 97 F

## 2020-10-08 DIAGNOSIS — Z98.890 OTHER SPECIFIED POSTPROCEDURAL STATES: Chronic | ICD-10-CM

## 2020-10-08 DIAGNOSIS — C67.9 MALIGNANT NEOPLASM OF BLADDER, UNSPECIFIED: ICD-10-CM

## 2020-10-08 DIAGNOSIS — Z01.818 ENCOUNTER FOR OTHER PREPROCEDURAL EXAMINATION: ICD-10-CM

## 2020-10-08 DIAGNOSIS — C61 MALIGNANT NEOPLASM OF PROSTATE: ICD-10-CM

## 2020-10-08 LAB
ALBUMIN SERPL ELPH-MCNC: 4.5 G/DL — SIGNIFICANT CHANGE UP (ref 3.5–5.2)
ALP SERPL-CCNC: 86 U/L — SIGNIFICANT CHANGE UP (ref 30–115)
ALT FLD-CCNC: 17 U/L — SIGNIFICANT CHANGE UP (ref 0–41)
ANION GAP SERPL CALC-SCNC: 14 MMOL/L — SIGNIFICANT CHANGE UP (ref 7–14)
APPEARANCE UR: CLEAR — SIGNIFICANT CHANGE UP
APTT BLD: 32.6 SEC — SIGNIFICANT CHANGE UP (ref 27–39.2)
AST SERPL-CCNC: 17 U/L — SIGNIFICANT CHANGE UP (ref 0–41)
BASOPHILS # BLD AUTO: 0.07 K/UL — SIGNIFICANT CHANGE UP (ref 0–0.2)
BASOPHILS NFR BLD AUTO: 1 % — SIGNIFICANT CHANGE UP (ref 0–1)
BILIRUB SERPL-MCNC: 0.8 MG/DL — SIGNIFICANT CHANGE UP (ref 0.2–1.2)
BILIRUB UR-MCNC: NEGATIVE — SIGNIFICANT CHANGE UP
BUN SERPL-MCNC: 16 MG/DL — SIGNIFICANT CHANGE UP (ref 10–20)
CALCIUM SERPL-MCNC: 9.5 MG/DL — SIGNIFICANT CHANGE UP (ref 8.5–10.1)
CHLORIDE SERPL-SCNC: 103 MMOL/L — SIGNIFICANT CHANGE UP (ref 98–110)
CO2 SERPL-SCNC: 26 MMOL/L — SIGNIFICANT CHANGE UP (ref 17–32)
COLOR SPEC: YELLOW — SIGNIFICANT CHANGE UP
CREAT SERPL-MCNC: 1.2 MG/DL — SIGNIFICANT CHANGE UP (ref 0.7–1.5)
DIFF PNL FLD: NEGATIVE — SIGNIFICANT CHANGE UP
EOSINOPHIL # BLD AUTO: 0.15 K/UL — SIGNIFICANT CHANGE UP (ref 0–0.7)
EOSINOPHIL NFR BLD AUTO: 2.2 % — SIGNIFICANT CHANGE UP (ref 0–8)
GLUCOSE SERPL-MCNC: 126 MG/DL — HIGH (ref 70–99)
GLUCOSE UR QL: ABNORMAL
HCT VFR BLD CALC: 42.2 % — SIGNIFICANT CHANGE UP (ref 42–52)
HGB BLD-MCNC: 13.8 G/DL — LOW (ref 14–18)
IMM GRANULOCYTES NFR BLD AUTO: 0.1 % — SIGNIFICANT CHANGE UP (ref 0.1–0.3)
INR BLD: 0.95 RATIO — SIGNIFICANT CHANGE UP (ref 0.65–1.3)
KETONES UR-MCNC: NEGATIVE — SIGNIFICANT CHANGE UP
LEUKOCYTE ESTERASE UR-ACNC: NEGATIVE — SIGNIFICANT CHANGE UP
LYMPHOCYTES # BLD AUTO: 2.17 K/UL — SIGNIFICANT CHANGE UP (ref 1.2–3.4)
LYMPHOCYTES # BLD AUTO: 32.3 % — SIGNIFICANT CHANGE UP (ref 20.5–51.1)
MCHC RBC-ENTMCNC: 30.2 PG — SIGNIFICANT CHANGE UP (ref 27–31)
MCHC RBC-ENTMCNC: 32.7 G/DL — SIGNIFICANT CHANGE UP (ref 32–37)
MCV RBC AUTO: 92.3 FL — SIGNIFICANT CHANGE UP (ref 80–94)
MONOCYTES # BLD AUTO: 0.9 K/UL — HIGH (ref 0.1–0.6)
MONOCYTES NFR BLD AUTO: 13.4 % — HIGH (ref 1.7–9.3)
NEUTROPHILS # BLD AUTO: 3.41 K/UL — SIGNIFICANT CHANGE UP (ref 1.4–6.5)
NEUTROPHILS NFR BLD AUTO: 51 % — SIGNIFICANT CHANGE UP (ref 42.2–75.2)
NITRITE UR-MCNC: NEGATIVE — SIGNIFICANT CHANGE UP
NRBC # BLD: 0 /100 WBCS — SIGNIFICANT CHANGE UP (ref 0–0)
PH UR: 5.5 — SIGNIFICANT CHANGE UP (ref 5–8)
PLATELET # BLD AUTO: 317 K/UL — SIGNIFICANT CHANGE UP (ref 130–400)
POTASSIUM SERPL-MCNC: 4.6 MMOL/L — SIGNIFICANT CHANGE UP (ref 3.5–5)
POTASSIUM SERPL-SCNC: 4.6 MMOL/L — SIGNIFICANT CHANGE UP (ref 3.5–5)
PROT SERPL-MCNC: 7.1 G/DL — SIGNIFICANT CHANGE UP (ref 6–8)
PROT UR-MCNC: SIGNIFICANT CHANGE UP
PROTHROM AB SERPL-ACNC: 10.9 SEC — SIGNIFICANT CHANGE UP (ref 9.95–12.87)
RBC # BLD: 4.57 M/UL — LOW (ref 4.7–6.1)
RBC # FLD: 13.2 % — SIGNIFICANT CHANGE UP (ref 11.5–14.5)
SODIUM SERPL-SCNC: 143 MMOL/L — SIGNIFICANT CHANGE UP (ref 135–146)
SP GR SPEC: 1.02 — SIGNIFICANT CHANGE UP (ref 1.01–1.03)
UROBILINOGEN FLD QL: SIGNIFICANT CHANGE UP
WBC # BLD: 6.71 K/UL — SIGNIFICANT CHANGE UP (ref 4.8–10.8)
WBC # FLD AUTO: 6.71 K/UL — SIGNIFICANT CHANGE UP (ref 4.8–10.8)

## 2020-10-08 PROCEDURE — 71046 X-RAY EXAM CHEST 2 VIEWS: CPT | Mod: 26

## 2020-10-08 PROCEDURE — 93010 ELECTROCARDIOGRAM REPORT: CPT

## 2020-10-08 NOTE — H&P PST ADULT - NSICDXPASTMEDICALHX_GEN_ALL_CORE_FT
PAST MEDICAL HISTORY:  AAA (abdominal aortic aneurysm) monitored by dr barker  stable x 10 yrs    Abnormal chest xray monitored by fire dept    CAD (coronary artery disease) s/p 5 stents  2009    Cancer prostate    DM (diabetes mellitus)     HTN (hypertension)     OA (osteoarthritis)

## 2020-10-08 NOTE — H&P PST ADULT - NSICDXFAMILYHX_GEN_ALL_CORE_FT
FAMILY HISTORY:  Father  Still living? Unknown  DM (diabetes mellitus), Age at diagnosis: Age Unknown  Family history of heart disease, Age at diagnosis: Age Unknown    Mother  Still living? Unknown  DM (diabetes mellitus), Age at diagnosis: Age Unknown

## 2020-10-08 NOTE — H&P PST ADULT - NSICDXPASTSURGICALHX_GEN_ALL_CORE_FT
PAST SURGICAL HISTORY:  H/O heart surgery 5 stents placed    H/O hernia repair x's 2 2019    History of surgery s/p radiation and seed implants 2000

## 2020-10-08 NOTE — H&P PST ADULT - NS PRO TALK SOMEONE YN
Patient asking for refill of Atorvastatin until her appointment in February.  She only wants 30 day supply in case there is a change in dose.  Refill per MD protocol.  LV:9-15-16  Next visit:2-6-17   no

## 2020-10-08 NOTE — H&P PST ADULT - HISTORY OF PRESENT ILLNESS
80 yo male presents to [facility] for cystoscopy, transurethral resection of bladder tumor. Pt reports having "polyps and tumors in my bladder for about a month and half". Had hematuria for three days prior to diagnosis. Denies dysuria, hematuria, urinary frequency, pain.     PATIENT CURRENTLY DENIES CHEST PAIN  SHORTNESS OF BREATH  PALPITATIONS,  DYSURIA, OR UPPER RESPIRATORY INFECTION IN PAST 2 WEEKS  EXERCISE  TOLERANCE  1-2 FLIGHT OF STAIRS  WITHOUT SHORTNESS OF BREATH     denies travel outside the [place] in the past 30 days     pt denies any covid s/s, or tested positive in the past 2 weeks     Anesthesia Alert  NO--Difficult Airway  NO--History of neck surgery or radiation  NO--Limited ROM of neck  NO--History of Malignant hyperthermia  NO--Personal or family history of Pseudocholinesterase deficiency  NO--Prior Anesthesia Complication  NO--Latex Allergy  NO--Loose teeth  NO--History of Rheumatoid Arthritis  NO--BHARATHI  NO--Other_____

## 2020-10-08 NOTE — H&P PST ADULT - ATTENDING COMMENTS
For cysto, turbt.  risks, benefits, alternatives discussed including bleeding, infection, catheter, perforation

## 2020-10-08 NOTE — H&P PST ADULT - HEIGHT IN INCHES
What Type Of Note Output Would You Prefer (Optional)?: Standard Output
What Is The Reason For Today's Visit?: Full Body Skin Examination with No Concerns
What Is The Reason For Today's Visit? (Being Monitored For X): surveillance against the recurrence of atypical nevi
How Severe Are Your Spot(S)?: mild
11

## 2020-10-09 LAB
A1C WITH ESTIMATED AVERAGE GLUCOSE RESULT: 8.3 % — HIGH (ref 4–5.6)
ESTIMATED AVERAGE GLUCOSE: 192 MG/DL — HIGH (ref 68–114)

## 2020-10-14 ENCOUNTER — APPOINTMENT (OUTPATIENT)
Dept: CARDIOLOGY | Facility: CLINIC | Age: 80
End: 2020-10-14
Payer: MEDICARE

## 2020-10-14 DIAGNOSIS — I71.2 THORACIC AORTIC ANEURYSM, W/OUT RUPTURE: ICD-10-CM

## 2020-10-14 PROCEDURE — 93306 TTE W/DOPPLER COMPLETE: CPT

## 2020-10-19 ENCOUNTER — LABORATORY RESULT (OUTPATIENT)
Age: 80
End: 2020-10-19

## 2020-10-19 ENCOUNTER — OUTPATIENT (OUTPATIENT)
Dept: OUTPATIENT SERVICES | Facility: HOSPITAL | Age: 80
LOS: 1 days | Discharge: HOME | End: 2020-10-19

## 2020-10-19 DIAGNOSIS — Z98.890 OTHER SPECIFIED POSTPROCEDURAL STATES: Chronic | ICD-10-CM

## 2020-10-19 DIAGNOSIS — Z11.59 ENCOUNTER FOR SCREENING FOR OTHER VIRAL DISEASES: ICD-10-CM

## 2020-10-21 NOTE — ASU PATIENT PROFILE, ADULT - FALL HARM RISK
Received Reimbursement Support Program form for Contour Next strips. Form release signed by patient.     Form filled out, signed by Dr. Ricardo and faxed back to [phone].   anesthesia/other

## 2020-10-21 NOTE — ASU PATIENT PROFILE, ADULT - PMH
AAA (abdominal aortic aneurysm)  monitored by dr barkre  stable x 10 yrs  Abnormal chest xray  monitored by fire dept  CAD (coronary artery disease)  s/p 5 stents  2009  Cancer  prostate  DM (diabetes mellitus)    HTN (hypertension)    OA (osteoarthritis)     AAA (abdominal aortic aneurysm)  monitored by dr barker  stable x 10 yrs  Abnormal chest xray  monitored by fire dept  CAD (coronary artery disease)  s/p 5 stents  2009  Cancer  prostate  DM (diabetes mellitus)    History of medical problems  hypercholesteremia, b/l inguinal hernias, pvd, popliteal artery aneruysm, b/l catararacts.  HTN (hypertension)    OA (osteoarthritis)

## 2020-10-21 NOTE — ASU PATIENT PROFILE, ADULT - PSH
H/O heart surgery  5 stents placed  H/O hernia repair  x's 2 2019  History of surgery  s/p radiation and seed implants 2000   H/O cataract extraction  b/l with iol's  H/O heart surgery  5 stents placed 2009  H/O hernia repair  x's 2 2019 b/l inguinal  History of surgery  s/p radiation and seed implants 2000

## 2020-10-22 ENCOUNTER — APPOINTMENT (OUTPATIENT)
Dept: UROLOGY | Facility: HOSPITAL | Age: 80
End: 2020-10-22

## 2020-10-22 ENCOUNTER — RESULT REVIEW (OUTPATIENT)
Age: 80
End: 2020-10-22

## 2020-10-22 ENCOUNTER — OUTPATIENT (OUTPATIENT)
Dept: OUTPATIENT SERVICES | Facility: HOSPITAL | Age: 80
LOS: 1 days | Discharge: HOME | End: 2020-10-22
Payer: MEDICARE

## 2020-10-22 VITALS — HEART RATE: 72 BPM | DIASTOLIC BLOOD PRESSURE: 78 MMHG | RESPIRATION RATE: 18 BRPM | SYSTOLIC BLOOD PRESSURE: 175 MMHG

## 2020-10-22 VITALS
SYSTOLIC BLOOD PRESSURE: 180 MMHG | HEART RATE: 78 BPM | WEIGHT: 182.1 LBS | HEIGHT: 71 IN | DIASTOLIC BLOOD PRESSURE: 82 MMHG | TEMPERATURE: 98 F | OXYGEN SATURATION: 98 % | RESPIRATION RATE: 18 BRPM

## 2020-10-22 DIAGNOSIS — Z98.890 OTHER SPECIFIED POSTPROCEDURAL STATES: Chronic | ICD-10-CM

## 2020-10-22 DIAGNOSIS — Z98.49 CATARACT EXTRACTION STATUS, UNSPECIFIED EYE: Chronic | ICD-10-CM

## 2020-10-22 PROCEDURE — 52234 CYSTOSCOPY AND TREATMENT: CPT

## 2020-10-22 PROCEDURE — 88307 TISSUE EXAM BY PATHOLOGIST: CPT | Mod: 26

## 2020-10-22 RX ORDER — SODIUM CHLORIDE 9 MG/ML
1000 INJECTION, SOLUTION INTRAVENOUS
Refills: 0 | Status: DISCONTINUED | OUTPATIENT
Start: 2020-10-22 | End: 2020-11-05

## 2020-10-22 RX ORDER — HYDROMORPHONE HYDROCHLORIDE 2 MG/ML
1 INJECTION INTRAMUSCULAR; INTRAVENOUS; SUBCUTANEOUS
Refills: 0 | Status: DISCONTINUED | OUTPATIENT
Start: 2020-10-22 | End: 2020-10-22

## 2020-10-22 RX ORDER — ONDANSETRON 8 MG/1
4 TABLET, FILM COATED ORAL ONCE
Refills: 0 | Status: DISCONTINUED | OUTPATIENT
Start: 2020-10-22 | End: 2020-11-05

## 2020-10-22 RX ORDER — OXYCODONE HYDROCHLORIDE 5 MG/1
5 TABLET ORAL ONCE
Refills: 0 | Status: DISCONTINUED | OUTPATIENT
Start: 2020-10-22 | End: 2020-10-22

## 2020-10-22 RX ORDER — ACETAMINOPHEN 500 MG
325 TABLET ORAL ONCE
Refills: 0 | Status: DISCONTINUED | OUTPATIENT
Start: 2020-10-22 | End: 2020-11-05

## 2020-10-22 RX ORDER — PHENAZOPYRIDINE HCL 100 MG
100 TABLET ORAL ONCE
Refills: 0 | Status: COMPLETED | OUTPATIENT
Start: 2020-10-22 | End: 2020-10-22

## 2020-10-22 RX ORDER — CEPHALEXIN 500 MG
1 CAPSULE ORAL
Qty: 12 | Refills: 0
Start: 2020-10-22 | End: 2020-10-24

## 2020-10-22 RX ORDER — HYDROMORPHONE HYDROCHLORIDE 2 MG/ML
0.5 INJECTION INTRAMUSCULAR; INTRAVENOUS; SUBCUTANEOUS
Refills: 0 | Status: DISCONTINUED | OUTPATIENT
Start: 2020-10-22 | End: 2020-10-22

## 2020-10-22 RX ORDER — PHENAZOPYRIDINE HCL 100 MG
1 TABLET ORAL
Qty: 21 | Refills: 0
Start: 2020-10-22 | End: 2020-10-28

## 2020-10-22 RX ADMIN — SODIUM CHLORIDE 100 MILLILITER(S): 9 INJECTION, SOLUTION INTRAVENOUS at 09:48

## 2020-10-22 RX ADMIN — Medication 100 MILLIGRAM(S): at 09:47

## 2020-10-22 NOTE — ASU PREOP CHECKLIST - INTERNAL PROSTHESES
yes(specify)/5 cardiac stents, prostate seeds, b/l inguinal mesh, b/l iol, vascular stents/ clip Lad

## 2020-10-22 NOTE — ASU DISCHARGE PLAN (ADULT/PEDIATRIC) - CARE PROVIDER_API CALL
Skyler Thomas  UROLOGY  [address] [place] [address], [facility] [address]  [place] [address]  Phone: [phone]  Fax: [phone]  Follow Up Time: 1-3 days

## 2020-10-22 NOTE — BRIEF OPERATIVE NOTE - ASSISTANT(S)
None [FreeTextEntry1] : Plan:\par 1) HTN: BP acceptable today on current regimen and therefore will not adjust patient's antihypertensive medications. Will reassess pressure and regimen at next evaluation. \par 2) CRF: last eGFR of 55; Have evaluated patient's renal function, blood pressure, and fluid volume status which do not necessitate changes to medications at this time and will thus maintain current therapy. Will continue to monitor function with CMP due to risk for progression of renal failure.\par 3) Hypothyroidism: thyroid panel within normal limits on recent labs and well-controlled on Synthroid 25mcg QD. In view of tolerance of current dosage and acceptability of lab results we will not alter therapy but will continue to monitor on blood work today.\par 4) HLD: Recent lipid profile elevated on current therapy of atorvastatin 20mg QD. Will not adjust medication at this time, but will continue to monitor with lipid profile on blood work today. \par 5) Rheumatology: Patient will f/u with Dr. Rizzo next week, and have advised patient to ask him about Plaquenil.\par 6) Referred patient to Dr. Harrison for a urogynecology evaluation for her report of ovarian cyst and urinary incontinence. \par \par Changes to Medications: none\par \par Labs were drawn and patient will return in 6 weeks for a follow-up appointment.

## 2020-10-22 NOTE — BRIEF OPERATIVE NOTE - NSICDXBRIEFPROCEDURE_GEN_ALL_CORE_FT
PROCEDURES:  Cystoscopy with fulguration and resection of bladder tumor 22-Oct-2020 09:16:44  Skyler Thomas

## 2020-10-23 ENCOUNTER — APPOINTMENT (OUTPATIENT)
Dept: UROLOGY | Facility: CLINIC | Age: 80
End: 2020-10-23
Payer: MEDICARE

## 2020-10-23 PROBLEM — Z87.898 PERSONAL HISTORY OF OTHER SPECIFIED CONDITIONS: Chronic | Status: ACTIVE | Noted: 2020-10-22

## 2020-10-23 LAB
GLUCOSE BLDC GLUCOMTR-MCNC: 174 MG/DL — HIGH (ref 70–99)
SURGICAL PATHOLOGY STUDY: SIGNIFICANT CHANGE UP

## 2020-10-23 PROCEDURE — 99213 OFFICE O/P EST LOW 20 MIN: CPT

## 2020-10-23 NOTE — HISTORY OF PRESENT ILLNESS
[FreeTextEntry1] : One day post removal of bladder tumors. Pathology pending. Urine remains clear via Lancaster. Lancaster removed for trial of voiding. [Flank Pain] : no flank pain

## 2020-10-23 NOTE — PHYSICAL EXAM
[General Appearance - In No Acute Distress] : no acute distress [Urethral Meatus] : meatus normal [Scrotum] : the scrotum was normal [Edema] : no peripheral edema [] : no respiratory distress [Respiration, Rhythm And Depth] : normal respiratory rhythm and effort [Oriented To Time, Place, And Person] : oriented to person, place, and time [Not Anxious] : not anxious [Normal Station and Gait] : the gait and station were normal for the patient's age

## 2020-10-26 DIAGNOSIS — Z79.84 LONG TERM (CURRENT) USE OF ORAL HYPOGLYCEMIC DRUGS: ICD-10-CM

## 2020-10-26 DIAGNOSIS — C67.2 MALIGNANT NEOPLASM OF LATERAL WALL OF BLADDER: ICD-10-CM

## 2020-10-26 DIAGNOSIS — I50.9 HEART FAILURE, UNSPECIFIED: ICD-10-CM

## 2020-10-26 DIAGNOSIS — N32.89 OTHER SPECIFIED DISORDERS OF BLADDER: ICD-10-CM

## 2020-10-26 DIAGNOSIS — N40.0 BENIGN PROSTATIC HYPERPLASIA WITHOUT LOWER URINARY TRACT SYMPTOMS: ICD-10-CM

## 2020-10-26 DIAGNOSIS — E11.9 TYPE 2 DIABETES MELLITUS WITHOUT COMPLICATIONS: ICD-10-CM

## 2020-10-26 DIAGNOSIS — M19.90 UNSPECIFIED OSTEOARTHRITIS, UNSPECIFIED SITE: ICD-10-CM

## 2020-10-26 DIAGNOSIS — I11.0 HYPERTENSIVE HEART DISEASE WITH HEART FAILURE: ICD-10-CM

## 2020-10-26 DIAGNOSIS — D49.4 NEOPLASM OF UNSPECIFIED BEHAVIOR OF BLADDER: ICD-10-CM

## 2020-10-26 DIAGNOSIS — I71.4 ABDOMINAL AORTIC ANEURYSM, WITHOUT RUPTURE: ICD-10-CM

## 2020-10-26 DIAGNOSIS — E78.00 PURE HYPERCHOLESTEROLEMIA, UNSPECIFIED: ICD-10-CM

## 2020-10-26 DIAGNOSIS — Z85.46 PERSONAL HISTORY OF MALIGNANT NEOPLASM OF PROSTATE: ICD-10-CM

## 2020-10-26 DIAGNOSIS — I25.10 ATHEROSCLEROTIC HEART DISEASE OF NATIVE CORONARY ARTERY WITHOUT ANGINA PECTORIS: ICD-10-CM

## 2020-10-26 DIAGNOSIS — Z95.5 PRESENCE OF CORONARY ANGIOPLASTY IMPLANT AND GRAFT: ICD-10-CM

## 2020-10-28 ENCOUNTER — APPOINTMENT (OUTPATIENT)
Dept: UROLOGY | Facility: CLINIC | Age: 80
End: 2020-10-28
Payer: MEDICARE

## 2020-10-28 PROCEDURE — 99213 OFFICE O/P EST LOW 20 MIN: CPT

## 2020-10-28 NOTE — PHYSICAL EXAM
[General Appearance - In No Acute Distress] : no acute distress [Abdomen Soft] : soft [Abdomen Tenderness] : non-tender [] : no respiratory distress [Respiration, Rhythm And Depth] : normal respiratory rhythm and effort [Oriented To Time, Place, And Person] : oriented to person, place, and time [Affect] : the affect was normal [Mood] : the mood was normal [Not Anxious] : not anxious [Normal Station and Gait] : the gait and station were normal for the patient's age

## 2020-10-28 NOTE — HISTORY OF PRESENT ILLNESS
[FreeTextEntry1] : 6 days post removal bladder tumors. No dysuria, no hematuria, no abdominal pain. Mild urinary frequency as expected. Pathology showed TA grade 1

## 2020-10-28 NOTE — REVIEW OF SYSTEMS
[Feeling Poorly] : not feeling poorly [Feeling Tired] : not feeling tired [Nasal Discharge] : no nasal discharge [Sore Throat] : no sore throat [Cough] : no cough [Constipation] : no constipation [Diarrhea] : no diarrhea [Dysuria] : no dysuria

## 2020-10-28 NOTE — ASSESSMENT
[FreeTextEntry1] : Discussed fully with the patient low-grade superficial bladder cancer low risk for progression but risk for recurrence. Recommend flexible cystoscopy surveillance in 3 months

## 2021-01-05 ENCOUNTER — APPOINTMENT (OUTPATIENT)
Dept: VASCULAR SURGERY | Facility: CLINIC | Age: 81
End: 2021-01-05
Payer: MEDICARE

## 2021-01-05 VITALS — SYSTOLIC BLOOD PRESSURE: 130 MMHG | TEMPERATURE: 97.8 F | DIASTOLIC BLOOD PRESSURE: 70 MMHG

## 2021-01-05 PROCEDURE — 99213 OFFICE O/P EST LOW 20 MIN: CPT

## 2021-01-05 PROCEDURE — 93925 LOWER EXTREMITY STUDY: CPT

## 2021-01-05 PROCEDURE — 93978 VASCULAR STUDY: CPT

## 2021-01-29 ENCOUNTER — APPOINTMENT (OUTPATIENT)
Dept: UROLOGY | Facility: CLINIC | Age: 81
End: 2021-01-29
Payer: COMMERCIAL

## 2021-01-29 VITALS — BODY MASS INDEX: 26.18 KG/M2 | HEIGHT: 71 IN | WEIGHT: 187 LBS | TEMPERATURE: 97 F

## 2021-01-29 PROCEDURE — 52000 CYSTOURETHROSCOPY: CPT

## 2021-03-12 NOTE — ASU PREOP CHECKLIST - LOOSE TEETH
PCP Yanique Richmond, DO    Rooming documentation of social history includes tobacco screening only.      Last eye exam - Per pt last year and pt doesn't remember name.     No glucometer    Today's visit was performed with the assistance of  Services.   Name of : Renetta   Service used: Video: Third Party        no

## 2021-03-18 ENCOUNTER — APPOINTMENT (OUTPATIENT)
Dept: CARDIOLOGY | Facility: CLINIC | Age: 81
End: 2021-03-18
Payer: MEDICARE

## 2021-03-18 VITALS
TEMPERATURE: 97.2 F | WEIGHT: 188 LBS | HEIGHT: 71 IN | HEART RATE: 82 BPM | SYSTOLIC BLOOD PRESSURE: 136 MMHG | DIASTOLIC BLOOD PRESSURE: 70 MMHG | BODY MASS INDEX: 26.32 KG/M2

## 2021-03-18 PROCEDURE — 93000 ELECTROCARDIOGRAM COMPLETE: CPT

## 2021-03-18 PROCEDURE — 99214 OFFICE O/P EST MOD 30 MIN: CPT

## 2021-03-18 NOTE — REASON FOR VISIT
[Follow-Up - Clinic] : a clinic follow-up of [Coronary Artery Disease] : coronary artery disease [FreeTextEntry2] : and cystoscopy for bladder polps [FreeTextEntry1] : cad\par pci of lad and diagonal in 2009\par 2/2006\par echo showed ef of 60%\par stress test 2014 was negative\par class 1 angina \par class 1 sob\par  no chf\par \par no new complaints\par patient may d/c plavix 5-7 days before cystoscopy\par no sob\par no chest pain\par douing well\par stable\par doing well\par \par thallium shows a perfusion defect of the rca\par cath showed mild non obstructive cad inn all arteries\par medical therapy\par patent stents\par \par he is pre-op for hernia repair\par no further testing is necessary and this is low risk procedure\par class1 angina, no chf or malignant ventricular arrythmias\par \par doing well\par no new complaints\par \par patient now scheduled for repeat cystoscopy\par no new cardiac symptoms\par low risk procedure\par \par no new complaints\par doing well

## 2021-04-30 ENCOUNTER — APPOINTMENT (OUTPATIENT)
Dept: UROLOGY | Facility: CLINIC | Age: 81
End: 2021-04-30
Payer: MEDICARE

## 2021-04-30 PROCEDURE — 52000 CYSTOURETHROSCOPY: CPT

## 2021-07-04 ENCOUNTER — INPATIENT (INPATIENT)
Facility: HOSPITAL | Age: 81
LOS: 3 days | Discharge: HOME | End: 2021-07-08
Attending: STUDENT IN AN ORGANIZED HEALTH CARE EDUCATION/TRAINING PROGRAM | Admitting: STUDENT IN AN ORGANIZED HEALTH CARE EDUCATION/TRAINING PROGRAM
Payer: MEDICARE

## 2021-07-04 VITALS
OXYGEN SATURATION: 98 % | HEIGHT: 71 IN | WEIGHT: 190.04 LBS | HEART RATE: 96 BPM | SYSTOLIC BLOOD PRESSURE: 184 MMHG | RESPIRATION RATE: 16 BRPM | TEMPERATURE: 99 F | DIASTOLIC BLOOD PRESSURE: 83 MMHG

## 2021-07-04 DIAGNOSIS — Z98.890 OTHER SPECIFIED POSTPROCEDURAL STATES: Chronic | ICD-10-CM

## 2021-07-04 DIAGNOSIS — Z98.49 CATARACT EXTRACTION STATUS, UNSPECIFIED EYE: Chronic | ICD-10-CM

## 2021-07-04 LAB
ALBUMIN SERPL ELPH-MCNC: 4.7 G/DL — SIGNIFICANT CHANGE UP (ref 3.5–5.2)
ALP SERPL-CCNC: 79 U/L — SIGNIFICANT CHANGE UP (ref 30–115)
ALT FLD-CCNC: 17 U/L — SIGNIFICANT CHANGE UP (ref 0–41)
ANION GAP SERPL CALC-SCNC: 11 MMOL/L — SIGNIFICANT CHANGE UP (ref 7–14)
AST SERPL-CCNC: 19 U/L — SIGNIFICANT CHANGE UP (ref 0–41)
BASOPHILS # BLD AUTO: 0.05 K/UL — SIGNIFICANT CHANGE UP (ref 0–0.2)
BASOPHILS NFR BLD AUTO: 0.7 % — SIGNIFICANT CHANGE UP (ref 0–1)
BILIRUB SERPL-MCNC: 1.2 MG/DL — SIGNIFICANT CHANGE UP (ref 0.2–1.2)
BUN SERPL-MCNC: 15 MG/DL — SIGNIFICANT CHANGE UP (ref 10–20)
CALCIUM SERPL-MCNC: 9.6 MG/DL — SIGNIFICANT CHANGE UP (ref 8.5–10.1)
CHLORIDE SERPL-SCNC: 103 MMOL/L — SIGNIFICANT CHANGE UP (ref 98–110)
CO2 SERPL-SCNC: 25 MMOL/L — SIGNIFICANT CHANGE UP (ref 17–32)
CREAT SERPL-MCNC: 1.1 MG/DL — SIGNIFICANT CHANGE UP (ref 0.7–1.5)
EOSINOPHIL # BLD AUTO: 0.05 K/UL — SIGNIFICANT CHANGE UP (ref 0–0.7)
EOSINOPHIL NFR BLD AUTO: 0.7 % — SIGNIFICANT CHANGE UP (ref 0–8)
GLUCOSE BLDC GLUCOMTR-MCNC: 78 MG/DL — SIGNIFICANT CHANGE UP (ref 70–99)
GLUCOSE SERPL-MCNC: 206 MG/DL — HIGH (ref 70–99)
HCT VFR BLD CALC: 41.5 % — LOW (ref 42–52)
HGB BLD-MCNC: 14 G/DL — SIGNIFICANT CHANGE UP (ref 14–18)
IMM GRANULOCYTES NFR BLD AUTO: 0.1 % — SIGNIFICANT CHANGE UP (ref 0.1–0.3)
LYMPHOCYTES # BLD AUTO: 1.64 K/UL — SIGNIFICANT CHANGE UP (ref 1.2–3.4)
LYMPHOCYTES # BLD AUTO: 22.9 % — SIGNIFICANT CHANGE UP (ref 20.5–51.1)
MCHC RBC-ENTMCNC: 30 PG — SIGNIFICANT CHANGE UP (ref 27–31)
MCHC RBC-ENTMCNC: 33.7 G/DL — SIGNIFICANT CHANGE UP (ref 32–37)
MCV RBC AUTO: 88.9 FL — SIGNIFICANT CHANGE UP (ref 80–94)
MONOCYTES # BLD AUTO: 0.79 K/UL — HIGH (ref 0.1–0.6)
MONOCYTES NFR BLD AUTO: 11 % — HIGH (ref 1.7–9.3)
NEUTROPHILS # BLD AUTO: 4.62 K/UL — SIGNIFICANT CHANGE UP (ref 1.4–6.5)
NEUTROPHILS NFR BLD AUTO: 64.6 % — SIGNIFICANT CHANGE UP (ref 42.2–75.2)
NRBC # BLD: 0 /100 WBCS — SIGNIFICANT CHANGE UP (ref 0–0)
PLATELET # BLD AUTO: 301 K/UL — SIGNIFICANT CHANGE UP (ref 130–400)
POTASSIUM SERPL-MCNC: 4.2 MMOL/L — SIGNIFICANT CHANGE UP (ref 3.5–5)
POTASSIUM SERPL-SCNC: 4.2 MMOL/L — SIGNIFICANT CHANGE UP (ref 3.5–5)
PROT SERPL-MCNC: 7.2 G/DL — SIGNIFICANT CHANGE UP (ref 6–8)
RBC # BLD: 4.67 M/UL — LOW (ref 4.7–6.1)
RBC # FLD: 13.2 % — SIGNIFICANT CHANGE UP (ref 11.5–14.5)
SARS-COV-2 RNA SPEC QL NAA+PROBE: SIGNIFICANT CHANGE UP
SODIUM SERPL-SCNC: 139 MMOL/L — SIGNIFICANT CHANGE UP (ref 135–146)
TROPONIN T SERPL-MCNC: <0.01 NG/ML — SIGNIFICANT CHANGE UP
WBC # BLD: 7.16 K/UL — SIGNIFICANT CHANGE UP (ref 4.8–10.8)
WBC # FLD AUTO: 7.16 K/UL — SIGNIFICANT CHANGE UP (ref 4.8–10.8)

## 2021-07-04 PROCEDURE — 70498 CT ANGIOGRAPHY NECK: CPT | Mod: 26,MA

## 2021-07-04 PROCEDURE — 70450 CT HEAD/BRAIN W/O DYE: CPT | Mod: 26,59,MA

## 2021-07-04 PROCEDURE — 99285 EMERGENCY DEPT VISIT HI MDM: CPT

## 2021-07-04 PROCEDURE — 0042T: CPT

## 2021-07-04 PROCEDURE — 70496 CT ANGIOGRAPHY HEAD: CPT | Mod: 26,MA

## 2021-07-04 PROCEDURE — 71045 X-RAY EXAM CHEST 1 VIEW: CPT | Mod: 26

## 2021-07-04 PROCEDURE — 93010 ELECTROCARDIOGRAM REPORT: CPT

## 2021-07-04 RX ORDER — INSULIN LISPRO 100/ML
VIAL (ML) SUBCUTANEOUS
Refills: 0 | Status: DISCONTINUED | OUTPATIENT
Start: 2021-07-04 | End: 2021-07-08

## 2021-07-04 RX ORDER — LOSARTAN POTASSIUM 100 MG/1
100 TABLET, FILM COATED ORAL DAILY
Refills: 0 | Status: DISCONTINUED | OUTPATIENT
Start: 2021-07-04 | End: 2021-07-08

## 2021-07-04 RX ORDER — ATORVASTATIN CALCIUM 80 MG/1
80 TABLET, FILM COATED ORAL AT BEDTIME
Refills: 0 | Status: DISCONTINUED | OUTPATIENT
Start: 2021-07-04 | End: 2021-07-08

## 2021-07-04 RX ORDER — DEXTROSE 50 % IN WATER 50 %
15 SYRINGE (ML) INTRAVENOUS ONCE
Refills: 0 | Status: DISCONTINUED | OUTPATIENT
Start: 2021-07-04 | End: 2021-07-08

## 2021-07-04 RX ORDER — HEPARIN SODIUM 5000 [USP'U]/ML
5000 INJECTION INTRAVENOUS; SUBCUTANEOUS EVERY 8 HOURS
Refills: 0 | Status: DISCONTINUED | OUTPATIENT
Start: 2021-07-04 | End: 2021-07-08

## 2021-07-04 RX ORDER — CLOPIDOGREL BISULFATE 75 MG/1
75 TABLET, FILM COATED ORAL DAILY
Refills: 0 | Status: DISCONTINUED | OUTPATIENT
Start: 2021-07-04 | End: 2021-07-08

## 2021-07-04 RX ORDER — DEXTROSE 50 % IN WATER 50 %
12.5 SYRINGE (ML) INTRAVENOUS ONCE
Refills: 0 | Status: DISCONTINUED | OUTPATIENT
Start: 2021-07-04 | End: 2021-07-08

## 2021-07-04 RX ORDER — INSULIN LISPRO 100/ML
4 VIAL (ML) SUBCUTANEOUS
Refills: 0 | Status: DISCONTINUED | OUTPATIENT
Start: 2021-07-04 | End: 2021-07-08

## 2021-07-04 RX ORDER — SODIUM CHLORIDE 9 MG/ML
1000 INJECTION, SOLUTION INTRAVENOUS
Refills: 0 | Status: DISCONTINUED | OUTPATIENT
Start: 2021-07-04 | End: 2021-07-08

## 2021-07-04 RX ORDER — INSULIN GLARGINE 100 [IU]/ML
12 INJECTION, SOLUTION SUBCUTANEOUS AT BEDTIME
Refills: 0 | Status: DISCONTINUED | OUTPATIENT
Start: 2021-07-04 | End: 2021-07-08

## 2021-07-04 RX ORDER — DEXTROSE 50 % IN WATER 50 %
25 SYRINGE (ML) INTRAVENOUS ONCE
Refills: 0 | Status: DISCONTINUED | OUTPATIENT
Start: 2021-07-04 | End: 2021-07-08

## 2021-07-04 RX ORDER — GLUCAGON INJECTION, SOLUTION 0.5 MG/.1ML
1 INJECTION, SOLUTION SUBCUTANEOUS ONCE
Refills: 0 | Status: DISCONTINUED | OUTPATIENT
Start: 2021-07-04 | End: 2021-07-08

## 2021-07-04 RX ORDER — ASPIRIN/CALCIUM CARB/MAGNESIUM 324 MG
81 TABLET ORAL DAILY
Refills: 0 | Status: DISCONTINUED | OUTPATIENT
Start: 2021-07-04 | End: 2021-07-08

## 2021-07-04 RX ADMIN — Medication 81 MILLIGRAM(S): at 19:06

## 2021-07-04 NOTE — ED ADULT NURSE NOTE - OBJECTIVE STATEMENT
pt reports that last night at approx 2200 he stood up from couch and fell, had weakness to left leg. denies any head trauma. wife noticed left sided facial droop. Earlier in the day patient was on hands and knees sanding the deck. denies any confusion, slurred speech. no arm drift noticed. hand grasp 3+ on left and 4+ on right. pt reports that last night at approx 2200 he stood up from couch and fell, had weakness to left leg. denies any head trauma. wife noticed left sided facial droop this am. Earlier in the day patient was on hands and knees sanding the deck. denies any confusion, slurred speech. no arm drift noticed. decrease strength to left arm during hand grasp. pt c/o weakness to bilat LE currently

## 2021-07-04 NOTE — H&P ADULT - NSHPPHYSICALEXAM_GEN_ALL_CORE
GENERAL: NAD, speaks in full sentences, no signs of respiratory distress  EYES: EOMI, PERRLA,   CHEST/LUNG: Clear to auscultation bilaterally; No wheeze; No crackles; No accessory muscles used  HEART: Regular rate and rhythm; No murmurs;   ABDOMEN: Soft, Nontender, Nondistended; Bowel sounds present; No guarding  EXTREMITIES:  2+ Peripheral Pulses, No cyanosis or edema  PSYCH: AAOx3  NEUROLOGY: GENERAL: NAD, speaks in full sentences, no signs of respiratory distress  CHEST/LUNG: Clear to auscultation bilaterally; No wheeze; No crackles; No accessory muscles used  HEART: Regular rate and rhythm; No murmurs;   ABDOMEN: Soft, Nontender, Nondistended; Bowel sounds present; No guarding  EXTREMITIES:  2+ Peripheral Pulses, No cyanosis or edema  PSYCH: AAOx3  NEUROLOGY: No focal signs

## 2021-07-04 NOTE — H&P ADULT - NSICDXPASTSURGICALHX_GEN_ALL_CORE_FT
PAST SURGICAL HISTORY:  H/O cataract extraction b/l with iol's    H/O heart surgery 5 stents placed 2009    H/O hernia repair x's 2 2019 b/l inguinal    History of surgery s/p radiation and seed implants 2000

## 2021-07-04 NOTE — ED ADULT TRIAGE NOTE - CHIEF COMPLAINT QUOTE
at 10 PM last night, pt fell to his knee d/t left leg weakness. Upon waking this morning, pt friend noticed left facial droop. TOday, pt continues to have left sided weakness. As per MD Delaney, no stroke code d/t outside 12 hours since first onset of continuous stroke symptoms.

## 2021-07-04 NOTE — H&P ADULT - NSHPSOCIALHISTORY_GEN_ALL_CORE
Marital Status:  (   )    (   ) Single    (   )    (  )   Lives with: (  ) alone  (  ) children   (  ) spouse   (  ) parents  (  ) other  Recent Travel:  Occupation:    Substance Use (street drugs): (  ) never used  (  ) other:  Tobacco Usage:  (  ) never smoked   (   ) former smoker   (   ) current smoker  (     ) pack year  Alcohol Usage: Tobacco Usage:  ( x ) never smoked   (   ) former smoker   (   ) current smoker  (     ) pack year  Alcohol Usage: Denies

## 2021-07-04 NOTE — CONSULT NOTE ADULT - ATTENDING COMMENTS
I have personally seen and examined this patient on 7/5. I have fully participated in the care of this patient.  I have reviewed all pertinent clinical information, including history, physical exam, plan and note. Patient with multiple cerebrovascular risk factors presents with acute left arm and leg weakness and numbness. Acute stroke code managed by CTC. No tpa or intervention. This morning reports most of the numbness has improved. Has left facial weakness and mild left arm on my exam. MRI showed acute punctate infarct in the right medulla. CTA showed short segment stenosis in right V4, AICA, right M2 and moderate in left ICA, Stroke Likely A-A thromboembolism. Endovascular consult for diagnostic angiogram. High dose statin, plavix and ASA 81.  Follow up on TTE. Cardiac monitoring. I have reviewed all pertinent clinical information and reviewed all relevant imaging and diagnostic studies personally.  Recommendations as above.  Agree with above assessment except as noted.

## 2021-07-04 NOTE — H&P ADULT - NSICDXPASTMEDICALHX_GEN_ALL_CORE_FT
PAST MEDICAL HISTORY:  AAA (abdominal aortic aneurysm) monitored by dr barker  stable x 10 yrs    Abnormal chest xray monitored by fire dept    Basal cell carcinoma     Bladder cancer     CAD (coronary artery disease) s/p 5 stents  2009    Cancer prostate    DM (diabetes mellitus)     History of medical problems hypercholesteremia, b/l inguinal hernias, pvd, popliteal artery aneruysm, b/l catararacts.    HTN (hypertension)     OA (osteoarthritis)     Prostate cancer

## 2021-07-04 NOTE — ED PROVIDER NOTE - PROGRESS NOTE DETAILS
no ans on neuro spectra, will reattempt once imaging is back 2 attempts to reach PMD myself, once through - no response. admit to hospitalist.

## 2021-07-04 NOTE — ED PROVIDER NOTE - OBJECTIVE STATEMENT
pt with pmhx CAD/stents, DM, HTN, bladder and prostate CA, stable AAA presents to ED for eval of left sided weakness. reports was working outdoors yesterday and around returned home to have dinner, shower, etc. when getting ready for bed, around 8pm fell on his left side presumed 2/2 left leg weakness. he thought this was due to muscle fatigue from working outdoors, so did not come in for eval. could not sleep at night and when his wife woke, she noticed his left face was drooping. he admits his left side has felt weak since his fall last night. denies head injury, loc or other complaints. Denies fever/chill/HA/dizziness/chest pain/palpitation/sob/abd pain/n/v/d/ black stool/bloody stool/urinary sxs

## 2021-07-04 NOTE — ED ADULT NURSE REASSESSMENT NOTE - NS ED NURSE REASSESS COMMENT FT1
pt verbalized that he developed left hand weakness at 0100. stroke code activated. pt brought to CT. on cardiac monitor. VSS/NAD

## 2021-07-04 NOTE — ED ADULT NURSE REASSESSMENT NOTE - NS ED NURSE REASSESS COMMENT FT1
received pt at change of shift. pt assessed. denies pain or discomfort at this time. vitals recorded. cardiac monitoring continued. will continue to monitor.

## 2021-07-04 NOTE — ED PROVIDER NOTE - NS ED ROS FT
Constitutional: no fever, chills, no recent weight loss, change in appetite or malaise  Eyes: no redness/discharge/pain/vision changes  ENT: no rhinorrhea/ear pain/sore throat  Cardiac: No chest pain, SOB or edema.  Respiratory: No cough or respiratory distress  GI: No nausea, vomiting, diarrhea or abdominal pain.  : No dysuria, frequency, urgency or hematuria  MS: no pain to back or extremities, no loss of ROM  Neuro: No headache. No LOC.  Skin: No skin rash.  Except as documented in the HPI, all other systems are negative.

## 2021-07-04 NOTE — CONSULT NOTE ADULT - ASSESSMENT
Assessment: 80y Male with PMHx of HTN, DM, HLD, CAD s/p stents, stable AAA (4cm), prostate/bladder CA presents for left sided weakness. LKW 1930 yesterday evening. CTH negative. CTA showed severe stenosis R vertebral artery. No perfusion abnormality on CTP.    Plan:  - Admit to Stroke Unit  - Closely follow neuro checks every 4 hours   - Repeat HCT for acute changes in neuro exam  - Obtain MRI brain without contrast  - Obtain TTE with bubble  - Consult neuroendovascular regarding vertebral artery stenosis  - Consult vascular surgery for AAA surveillance  - Goal -180  - PT/OT/SLP   - Obtain Hemoglobin A1C, Lipid Profile Panel, and TSH  - Check Plavix sensitivity assay (PRU)  - Start Aspirin 81 mg  - Continue Plavix 75 mg once daily  - Start Atorvastatin 80 mg once daily

## 2021-07-04 NOTE — ED PROVIDER NOTE - CLINICAL SUMMARY MEDICAL DECISION MAKING FREE TEXT BOX
79 yo M presented to Ed for L sided weakness. Pt was found to have L sided facial droop and L arm and L leg weakness. Stoke code called and pt admitted to stroke unit

## 2021-07-04 NOTE — H&P ADULT - ATTENDING COMMENTS
HPI as above.  Interval history: Pt seen and examined at bedside. No cp or sob.  weakness improved  Vital Signs (24 Hrs):  T(C): 36.1 (07-05-21 @ 08:20), Max: 37.4 (07-04-21 @ 17:09)  HR: 73 (07-05-21 @ 08:20) (62 - 96)  BP: 154/72 (07-05-21 @ 08:20) (144/67 - 184/83)  RR: 18 (07-05-21 @ 08:20) (16 - 18)  SpO2: 97% (07-05-21 @ 08:20) (97% - 99%)  Wt(kg): --  Daily Height in cm: 180.34 (04 Jul 2021 15:27)    Daily     I&O's Summary    PHYSICAL EXAM:  GENERAL: NAD, well-developed  HEAD:  Atraumatic, Normocephalic  EYES: EOMI, PERRLA, conjunctiva and sclera clear  NECK: Supple, No JVD  CHEST/LUNG: Clear to auscultation bilaterally; No wheeze  HEART: Regular rate and rhythm; No murmurs, rubs, or gallops  ABDOMEN: Soft, Nontender, Nondistended; Bowel sounds present  EXTREMITIES:  2+ Peripheral Pulses, No clubbing, cyanosis, or edema  PSYCH: AAOx3  NEUROLOGY: left sided power 3/5  SKIN: No rashes or lesions    Labs reviewed  Imaging reviewed: < from: CT Angio Head w/ IV Cont (07.04.21 @ 17:19) >    ATTENDING ADDENDUM:    CT PERFUSION:  There are regions of hypoperfusion measuring approximately 28 mL in the bilateral cerebellum and left occipital lobe utilizing standard threshold. No perfusion evidence of core infarct.    CTA HEAD/NECK:  The body of the report has been modified.    High-grade short segmental atherosclerotic stenosis in the V4 segment of the right vertebral artery. Stenosis in the right anterior inferior cerebellar artery.    Moderate atherosclerotic stenosis in the cavernous and supraclinoid segments of the left ICA. Mild atherosclerotic stenosis in the right supraclinoid ICA.    Short segmental stenosis of the proximal inferior division of the right MCA.    < end of copied text >      EKG reviewed: < from: 12 Lead ECG (07.04.21 @ 16:11) >    Diagnosis Line Normal sinus rhythm  Voltage criteria for left ventricular hypertrophy  Inferior infarct , age undetermined  Abnormal ECG    < end of copied text >        Plan  #left sided weakness 2/2 High-grade short segmental atherosclerotic stenosis in the V4 segment of the right vertebral artery- neuo onboard, follow up echo, MRI, keep -180, ASA and lipitor, also on plavix, Stroke unit    #rest as above    #Progress Note Handoff  Pending (specify):  MRI, Echo, neuro w/u,   Family discussion: lisa pt and agreed to plan   Disposition: stroke unit

## 2021-07-04 NOTE — ED PROVIDER NOTE - PHYSICAL EXAMINATION
CONSTITUTIONAL: Well-appearing; well-nourished; in no apparent distress.   EYES: PERRL; EOM intact.   CARDIOVASCULAR: Normal S1, S2; no murmurs, rubs, or gallops.   RESPIRATORY: Normal chest excursion with respiration; breath sounds clear and equal bilaterally; no wheezes, rhonchi, or rales.  MS: Normal ROM in all four extremities; non-tender to palpation; distal pulses are normal. see below for neuro  SKIN: Normal for age and race; warm; dry; good turgor; no apparent lesions or exudate.   NEURO/PSYCH: left side of mouth drooping, +left sided weakness/decreased strength as compared to right both upper and lower extremities; speaking coherently. no nuchal rigidity. negative Kernig’s and Brudzinski’s signs. mood and manner are appropriate. Grooming and personal hygiene are appropriate. No apparent thoughts of harm to self or others.

## 2021-07-04 NOTE — H&P ADULT - CONVERSATION DETAILS
Pt has new CVA. Goals of Care Conversation done with pt. Discussed FULL CODE VS DNR/DNI. CPR and intubation discussed with Pt and agreed to Both. Pt wishes to be FULL code.  All questions answered.

## 2021-07-04 NOTE — ED PROVIDER NOTE - ATTENDING CONTRIBUTION TO CARE
81 yo M with PMH of DM, CAD sp stents, HTN presents to ED for L sided weakness. Pt states around 10pm last night he noticed that his L leg was weak and it caused him to fall. Then around 1am pt noticed his L arm was weak. This morning when he got up his wife noticed that the L side of his face was droopy. No numbness, tingling, SOB, CP, palpitations.     Const: Well nourished, well developed, appears stated age  Eyes: PERRL, no conjunctival injection  HENT:  Neck supple without meningismus   CV: RRR, Warm, well-perfused extremities  RESP: CTA B/L, no tachypnea   GI: soft, non-tender, non-distended  MSK: No gross deformities appreciated  Skin: Warm, dry. No rashes  Neuro: Alert, CNs II-XII grossly intact. L sided facial droop. R UE 5/5. L UE 4/5, R LE 5/5, L UE 4/5  Psych: Appropriate mood and affect.    Code stroke

## 2021-07-04 NOTE — ED ADULT NURSE NOTE - PSH
H/O cataract extraction  b/l with iol's  H/O heart surgery  5 stents placed 2009  H/O hernia repair  x's 2 2019 b/l inguinal  History of surgery  s/p radiation and seed implants 2000

## 2021-07-04 NOTE — ED ADULT NURSE NOTE - PMH
AAA (abdominal aortic aneurysm)  monitored by dr barker  stable x 10 yrs  Abnormal chest xray  monitored by fire dept  Basal cell carcinoma    Bladder cancer    CAD (coronary artery disease)  s/p 5 stents  2009  Cancer  prostate  DM (diabetes mellitus)    History of medical problems  hypercholesteremia, b/l inguinal hernias, pvd, popliteal artery aneruysm, b/l catararacts.  HTN (hypertension)    OA (osteoarthritis)    Prostate cancer

## 2021-07-04 NOTE — H&P ADULT - NSHPLABSRESULTS_GEN_ALL_CORE
14.0   7.16  )-----------( 301      ( 04 Jul 2021 15:50 )             41.5       07-04    139  |  103  |  15  ----------------------------<  206<H>  4.2   |  25  |  1.1    Ca    9.6      04 Jul 2021 15:50    TPro  7.2  /  Alb  4.7  /  TBili  1.2  /  DBili  x   /  AST  19  /  ALT  17  /  AlkPhos  79  07-04                      Lactate Trend      CARDIAC MARKERS ( 04 Jul 2021 15:50 )  x     / <0.01 ng/mL / x     / x     / x            CAPILLARY BLOOD GLUCOSE  212 (04 Jul 2021 17:23)      POCT Blood Glucose.: 212 mg/dL (04 Jul 2021 15:39)

## 2021-07-04 NOTE — H&P ADULT - HISTORY OF PRESENT ILLNESS
80 Y M PMHx of  CAD/stents, DM, HTN, bladder and prostate CA, stable AAA presents to ED for eval of left sided weakness. reports was working outdoors yesterday and around returned home to have dinner, shower, etc. when getting ready for bed, around 8pm fell on his left side presumed 2/2 left leg weakness. he thought this was due to muscle fatigue from working outdoors, so did not come in for eval. could not sleep at night and when his wife woke, she noticed his left face was drooping. he admits his left side has felt weak since his fall last night. denies head injury, loc or other complaints. Denies fever/chill/HA/dizziness/chest pain/palpitation/sob/abd pain/n/v/d/ black stool/bloody stool/urinary sxs    CTH performed did not show any acute changes. CTA showed severe stenosis of V4 segment of R vertebral artery. No perfusion abnormality on CTP.    T(C): 37.4 (07-04-21 @ 17:09), Max: 37.4 (07-04-21 @ 17:09)  HR: 91 (07-04-21 @ 17:09) (91 - 96)  BP: 163/74 (07-04-21 @ 17:09) (163/74 - 184/83)  RR: 16 (07-04-21 @ 17:09) (16 - 16)  SpO2: 98% (07-04-21 @ 17:09) (98% - 98%)   80 Y M PMHx of  CAD/stents, DM, HTN, bladder and prostate CA, stable AAA presents to ED for eval of left sided weakness. Pt reports that he was working outdoors yesterday and he was fine, he got home, and few hours later tried to get up from his couch, he felt his left leg become weak and fell on his left side. He thought this was due to muscle fatigue from working outdoors, so did not come in for eval. His wife noticed this AM that his left face was drooping. Pt also noted that his left arm started feeling numb. Pt admits to feeling weak for the last week, always having to support himself when getting up. On my interview, all focal symptoms now resolved and pt back to baseline.  Denies head injury, loc or other complaints. Denies fever/chill/HA/dizziness/chest pain/palpitation/sob/abd pain/n/v/d/ black stool/bloody stool/urinary sxs.    S/P stroke code in the ED. CTH performed did not show any acute changes. CTA showed severe stenosis of V4 segment of R vertebral artery. No perfusion abnormality on CTP.    pt hemodynamically stable, labs wnl

## 2021-07-04 NOTE — H&P ADULT - ASSESSMENT
80 Y M PMHx of  CAD/stents, DM, HTN, bladder and prostate CA, stable AAA presents to ED for eval of left sided weakness.    - Admit to Stroke Unit  - Closely follow neuro checks every 4 hours   -CTH  -CTA  - Repeat HCT for acute changes in neuro exam  - Obtain MRI brain without contrast  - Obtain TTE with bubble  - Consult neuroendovascular regarding vertebral artery stenosis  - Consult vascular surgery for AAA surveillance  - Goal -180  - PT/OT/SLP   - Obtain Hemoglobin A1C, Lipid Profile Panel, and TSH  - Check Plavix sensitivity assay (PRU)  - Start Aspirin 81 mg  - Continue Plavix 75 mg once daily  - Start Atorvastatin 80 mg once daily      #CAD s/p Stent      #HTN      #Bladder and prostate cancer       #AAA Stable       #DVT PPX   #Diet  #GI PPX  #Activity  CODE STATUS 80 Y M PMHx of  CAD/stents, DM, HTN, bladder and prostate CA, stable AAA presents to ED for eval of left sided weakness.      #Left upper and lower extremity weakness  Resolved   - Admit to Stroke Unit  -CTH performed did not show any acute changes. CTA showed severe stenosis of V4 segment of R vertebral artery. No perfusion abnormality on CTP.  -Repeat HCT for acute changes in neuro exam  -neuro checks q4h  -Neuro on board- recs noted   -f/u MRI brain without contrast  -f/u TTE with bubble  -f/u neuroendovascular regarding vertebral artery stenosis  - Goal -180  - PT/OT/SLP   - Obtain Hemoglobin A1C, Lipid Profile Panel, and TSH  - Check Plavix sensitivity assay (PRU)  - Start Aspirin 81 mg  - Continue Plavix 75 mg once daily  - Start Atorvastatin 80 mg once daily      #CAD s/p Stent  cont plavix     #HTN  cont Losartan 100mg qD    #DM   on Metformin 1000mg @ home and Glipizide 5mg BID   Hold home DM meds   start lantus + Lispro + SS  F/U HbA1c in AM      #Bladder and prostate cancer       #AAA Stable (diagnosed 20 years ago)  - Consult vascular surgery for AAA surveillance  -f/u with Dr. Begum for q2xpfuh eval  -has an appt on Teusday with Dr. Teran      #DVT PPX   #Diet:DASH/TLC  #GI PPX: Not indicated  #Activity:Ambulate as tolerated  PT/OT eval   CODE STATUS 80 Y M PMHx of  CAD/stents, DM, HTN, bladder and prostate CA, stable AAA presents to ED for eval of left sided weakness.      #Left upper and lower extremity weakness  w/ numbness in LUE and left facial droop  Resolved   - Admit to Stroke Unit  -CTH performed did not show any acute changes. CTA showed severe stenosis of V4 segment of R vertebral artery. No perfusion abnormality on CTP.  -Repeat HCT for acute changes in neuro exam  -neuro checks q4h  -Neuro on board- recs noted   -f/u MRI brain without contrast  -f/u TTE with bubble  -f/u neuroendovascular regarding vertebral artery stenosis  - Goal -180  - PT/OT/SLP   - Obtain Hemoglobin A1C, Lipid Profile Panel, and TSH  - Check Plavix sensitivity assay (PRU)  - Start Aspirin 81 mg  - Continue Plavix 75 mg once daily  - Start Atorvastatin 80 mg once daily      #CAD s/p Stent  cont plavix     #HTN  cont Losartan 100mg qD    #DM   on Metformin 1000mg @ home and Glipizide 5mg BID   Hold home DM meds   start lantus + Lispro + SS  F/U HbA1c in AM      #Bladder cancer diagnosed in Nov 2020  -s/p cystoscopy, transurethral resection   -f/u with Dr Thomas on [facility] [id number]      #prostate cancer 2009 s/p radiation      #AAA Stable (diagnosed 20 years ago)  - Consult vascular surgery for AAA surveillance  -f/u with Dr. Begum for [id number] eval  -has an appt on Teusday with Dr. Teran      #DVT PPX : Hep SubQ  #Diet:DASH/TLC  #GI PPX: Not indicated  #Activity:Ambulate as tolerated  PT/OT eval   FULL CODE  80 Y M PMHx of  CAD/stents, DM, HTN, bladder and prostate CA, stable AAA presents to ED for eval of left sided weakness.      #Left upper and lower extremity weakness  w/ numbness in LUE and left facial droop  Resolved   - Admit to Stroke Unit  -CTH performed did not show any acute changes. CTA showed severe stenosis of V4 segment of R vertebral artery. No perfusion abnormality on CTP.  -Repeat HCT for acute changes in neuro exam  -neuro checks q4h  -Neuro on board- recs noted   -f/u MRI brain without contrast  -f/u TTE with bubble  -f/u neuroendovascular regarding vertebral artery stenosis  - Goal -180  - PT/OT/SLP   - Obtain Hemoglobin A1C, Lipid Profile Panel, and TSH  - Check Plavix sensitivity assay (PRU)  - Start Aspirin 81 mg  - Continue Plavix 75 mg once daily  - Start Atorvastatin 80 mg once daily        #CAD s/p Stent  cont plavix     #HTN  cont Losartan 100mg qD    #DM   on Metformin 1000mg @ home and Glipizide 5mg BID   Hold home DM meds   start lantus + Lispro + SS  F/U HbA1c in AM      #Bladder cancer diagnosed in Nov 2020  -s/p cystoscopy, transurethral resection   -f/u with Dr Thomas on [facility] [id number]      #prostate cancer 2009 s/p radiation      #AAA Stable (diagnosed 20 years ago)  - Consult vascular surgery for AAA surveillance  -f/u with Dr. Begum for [id number] eval  -has an appt on Teusday with Dr. Teran      #DVT PPX : Hep SubQ  #Diet:DASH/TLC  #GI PPX: Not indicated  #Activity:Ambulate as tolerated  PT/OT eval   FULL CODE

## 2021-07-05 LAB
A1C WITH ESTIMATED AVERAGE GLUCOSE RESULT: 7.8 % — HIGH (ref 4–5.6)
ALBUMIN SERPL ELPH-MCNC: 4 G/DL — SIGNIFICANT CHANGE UP (ref 3.5–5.2)
ALBUMIN SERPL ELPH-MCNC: 4.4 G/DL — SIGNIFICANT CHANGE UP (ref 3.5–5.2)
ALP SERPL-CCNC: 69 U/L — SIGNIFICANT CHANGE UP (ref 30–115)
ALP SERPL-CCNC: 75 U/L — SIGNIFICANT CHANGE UP (ref 30–115)
ALT FLD-CCNC: 15 U/L — SIGNIFICANT CHANGE UP (ref 0–41)
ALT FLD-CCNC: 17 U/L — SIGNIFICANT CHANGE UP (ref 0–41)
ANION GAP SERPL CALC-SCNC: 10 MMOL/L — SIGNIFICANT CHANGE UP (ref 7–14)
ANION GAP SERPL CALC-SCNC: 11 MMOL/L — SIGNIFICANT CHANGE UP (ref 7–14)
AST SERPL-CCNC: 18 U/L — SIGNIFICANT CHANGE UP (ref 0–41)
AST SERPL-CCNC: 21 U/L — SIGNIFICANT CHANGE UP (ref 0–41)
BILIRUB SERPL-MCNC: 1 MG/DL — SIGNIFICANT CHANGE UP (ref 0.2–1.2)
BILIRUB SERPL-MCNC: 1.2 MG/DL — SIGNIFICANT CHANGE UP (ref 0.2–1.2)
BUN SERPL-MCNC: 12 MG/DL — SIGNIFICANT CHANGE UP (ref 10–20)
BUN SERPL-MCNC: 13 MG/DL — SIGNIFICANT CHANGE UP (ref 10–20)
CALCIUM SERPL-MCNC: 9.2 MG/DL — SIGNIFICANT CHANGE UP (ref 8.5–10.1)
CALCIUM SERPL-MCNC: 9.7 MG/DL — SIGNIFICANT CHANGE UP (ref 8.5–10.1)
CHLORIDE SERPL-SCNC: 104 MMOL/L — SIGNIFICANT CHANGE UP (ref 98–110)
CHLORIDE SERPL-SCNC: 104 MMOL/L — SIGNIFICANT CHANGE UP (ref 98–110)
CHOLEST SERPL-MCNC: 191 MG/DL — SIGNIFICANT CHANGE UP
CO2 SERPL-SCNC: 24 MMOL/L — SIGNIFICANT CHANGE UP (ref 17–32)
CO2 SERPL-SCNC: 28 MMOL/L — SIGNIFICANT CHANGE UP (ref 17–32)
COVID-19 SPIKE DOMAIN AB INTERP: POSITIVE
COVID-19 SPIKE DOMAIN ANTIBODY RESULT: 175 U/ML — HIGH
CREAT SERPL-MCNC: 1 MG/DL — SIGNIFICANT CHANGE UP (ref 0.7–1.5)
CREAT SERPL-MCNC: 1.1 MG/DL — SIGNIFICANT CHANGE UP (ref 0.7–1.5)
ESTIMATED AVERAGE GLUCOSE: 177 MG/DL — HIGH (ref 68–114)
GLUCOSE BLDC GLUCOMTR-MCNC: 142 MG/DL — HIGH (ref 70–99)
GLUCOSE BLDC GLUCOMTR-MCNC: 191 MG/DL — HIGH (ref 70–99)
GLUCOSE BLDC GLUCOMTR-MCNC: 215 MG/DL — HIGH (ref 70–99)
GLUCOSE BLDC GLUCOMTR-MCNC: 98 MG/DL — SIGNIFICANT CHANGE UP (ref 70–99)
GLUCOSE SERPL-MCNC: 149 MG/DL — HIGH (ref 70–99)
GLUCOSE SERPL-MCNC: 70 MG/DL — SIGNIFICANT CHANGE UP (ref 70–99)
HCT VFR BLD CALC: 41 % — LOW (ref 42–52)
HDLC SERPL-MCNC: 39 MG/DL — LOW
HGB BLD-MCNC: 13.6 G/DL — LOW (ref 14–18)
LIPID PNL WITH DIRECT LDL SERPL: 137 MG/DL — HIGH
MCHC RBC-ENTMCNC: 29.1 PG — SIGNIFICANT CHANGE UP (ref 27–31)
MCHC RBC-ENTMCNC: 33.2 G/DL — SIGNIFICANT CHANGE UP (ref 32–37)
MCV RBC AUTO: 87.8 FL — SIGNIFICANT CHANGE UP (ref 80–94)
NON HDL CHOLESTEROL: 152 MG/DL — HIGH
NRBC # BLD: 0 /100 WBCS — SIGNIFICANT CHANGE UP (ref 0–0)
PLATELET # BLD AUTO: 260 K/UL — SIGNIFICANT CHANGE UP (ref 130–400)
POTASSIUM SERPL-MCNC: 4 MMOL/L — SIGNIFICANT CHANGE UP (ref 3.5–5)
POTASSIUM SERPL-MCNC: 4.2 MMOL/L — SIGNIFICANT CHANGE UP (ref 3.5–5)
POTASSIUM SERPL-SCNC: 4 MMOL/L — SIGNIFICANT CHANGE UP (ref 3.5–5)
POTASSIUM SERPL-SCNC: 4.2 MMOL/L — SIGNIFICANT CHANGE UP (ref 3.5–5)
PROT SERPL-MCNC: 6.3 G/DL — SIGNIFICANT CHANGE UP (ref 6–8)
PROT SERPL-MCNC: 6.8 G/DL — SIGNIFICANT CHANGE UP (ref 6–8)
RBC # BLD: 4.67 M/UL — LOW (ref 4.7–6.1)
RBC # FLD: 13.2 % — SIGNIFICANT CHANGE UP (ref 11.5–14.5)
SARS-COV-2 IGG+IGM SERPL QL IA: 175 U/ML — HIGH
SARS-COV-2 IGG+IGM SERPL QL IA: POSITIVE
SODIUM SERPL-SCNC: 139 MMOL/L — SIGNIFICANT CHANGE UP (ref 135–146)
SODIUM SERPL-SCNC: 142 MMOL/L — SIGNIFICANT CHANGE UP (ref 135–146)
TRIGL SERPL-MCNC: 120 MG/DL — SIGNIFICANT CHANGE UP
TSH SERPL-MCNC: 0.64 UIU/ML — SIGNIFICANT CHANGE UP (ref 0.27–4.2)
WBC # BLD: 6.44 K/UL — SIGNIFICANT CHANGE UP (ref 4.8–10.8)
WBC # FLD AUTO: 6.44 K/UL — SIGNIFICANT CHANGE UP (ref 4.8–10.8)

## 2021-07-05 PROCEDURE — 99497 ADVNCD CARE PLAN 30 MIN: CPT | Mod: 25

## 2021-07-05 PROCEDURE — 99223 1ST HOSP IP/OBS HIGH 75: CPT

## 2021-07-05 PROCEDURE — 99221 1ST HOSP IP/OBS SF/LOW 40: CPT

## 2021-07-05 PROCEDURE — 70551 MRI BRAIN STEM W/O DYE: CPT | Mod: 26

## 2021-07-05 RX ADMIN — HEPARIN SODIUM 5000 UNIT(S): 5000 INJECTION INTRAVENOUS; SUBCUTANEOUS at 12:44

## 2021-07-05 RX ADMIN — Medication 81 MILLIGRAM(S): at 12:41

## 2021-07-05 RX ADMIN — HEPARIN SODIUM 5000 UNIT(S): 5000 INJECTION INTRAVENOUS; SUBCUTANEOUS at 21:22

## 2021-07-05 RX ADMIN — CLOPIDOGREL BISULFATE 75 MILLIGRAM(S): 75 TABLET, FILM COATED ORAL at 12:41

## 2021-07-05 RX ADMIN — ATORVASTATIN CALCIUM 80 MILLIGRAM(S): 80 TABLET, FILM COATED ORAL at 21:23

## 2021-07-05 RX ADMIN — Medication 4 UNIT(S): at 17:08

## 2021-07-05 RX ADMIN — Medication 4: at 12:42

## 2021-07-05 RX ADMIN — Medication 2: at 08:02

## 2021-07-05 RX ADMIN — INSULIN GLARGINE 12 UNIT(S): 100 INJECTION, SOLUTION SUBCUTANEOUS at 21:22

## 2021-07-05 RX ADMIN — Medication 4 UNIT(S): at 12:42

## 2021-07-05 RX ADMIN — Medication 4 UNIT(S): at 08:01

## 2021-07-05 NOTE — PROGRESS NOTE ADULT - ASSESSMENT
80 Y M PMHx of  CAD/stents, DM, HTN, bladder and prostate CA, stable AAA presents to ED for eval of left sided weakness.      #Left upper and lower extremity weakness  w/ numbness in LUE and left facial droop  Resolved   -Admit to Stroke Unit  -CTH performed did not show any acute changes. CTA showed severe stenosis of V4 segment of R vertebral artery. No perfusion abnormality on CTP.  -Repeat HCT for acute changes in neuro exam  -neuro checks q4h  -Neuro on board- recs noted   -f/u MRI brain without contrast  -f/u TTE with bubble  -f/u neuroendovascular regarding vertebral artery stenosis  -Goal -180  -PT/OT/SLP   -follow Hemoglobin A1C and TSH. lipid profile noted.  -Check Plavix sensitivity assay (PRU)  -c/w Aspirin 81 mg  -Continue Plavix 75 mg once daily  -c/w Atorvastatin 80 mg once daily        #CAD s/p Stent  cont plavix     #HTN  cont Losartan 100mg qD.  Goal  - 180 for now.    #DM   on Metformin 1000mg @ home and Glipizide 5mg BID   Hold home DM meds   c/w lantus + Lispro + SS  F/U HbA1c in AM  monitor FS      #Bladder cancer diagnosed in Nov 2020  -s/p cystoscopy, transurethral resection   -f/u with Dr Thomas on [facility] [id number]      #prostate cancer 2009 s/p radiation      #AAA Stable (diagnosed 20 years ago)  - Consult vascular surgery for AAA surveillance  -f/u with Dr. Begum for r0ibmgd eval  -has an appt on Teusday with Dr. Teran      #DVT PPX : Hep SubQ  #Diet: DASH/TLC  #GI PPX: Not indicated  #Activity: Ambulate as tolerated  PT/OT eval   FULL CODE

## 2021-07-05 NOTE — PROGRESS NOTE ADULT - SUBJECTIVE AND OBJECTIVE BOX
SUBJECTIVE:    Patient is a 80y old Male who presents with a chief complaint of Left sided weakness and Facial Droop (04 Jul 2021 20:39)    Currently admitted to medicine with the primary diagnosis of Stroke    Today is hospital day 1d. Overnight events noted.    Admit Diagnosis:  STROKE        PAST MEDICAL & SURGICAL HISTORY  DM (diabetes mellitus)    HTN (hypertension)    OA (osteoarthritis)    Cancer  prostate    CAD (coronary artery disease)  s/p 5 stents  2009    AAA (abdominal aortic aneurysm)  monitored by dr barker  stable x 10 yrs    Abnormal chest xray  monitored by fire dept    History of medical problems  hypercholesteremia, b/l inguinal hernias, pvd, popliteal artery aneruysm, b/l catararacts.    Bladder cancer    Prostate cancer    Basal cell carcinoma    History of surgery  s/p radiation and seed implants 2000    H/O hernia repair  x&#x27;s 2 2019 b/l inguinal    H/O heart surgery  5 stents placed 2009    H/O cataract extraction  b/l with iol&#x27;s    DM (diabetes mellitus)    HTN (hypertension)    OA (osteoarthritis)    Cancer    CAD (coronary artery disease)    AAA (abdominal aortic aneurysm)    Abnormal chest xray    History of medical problems    Bladder cancer    Prostate cancer    Basal cell carcinoma    History of surgery    H/O hernia repair    H/O heart surgery    H/O cataract extraction        SOCIAL HISTORY:  Negative for smoking/alcohol/drug use.     ALLERGIES:  No Known Allergies    MEDICATIONS:  STANDING MEDICATIONS  aspirin enteric coated 81 milliGRAM(s) Oral daily  atorvastatin 80 milliGRAM(s) Oral at bedtime  clopidogrel Tablet 75 milliGRAM(s) Oral daily  dextrose 40% Gel 15 Gram(s) Oral once  dextrose 5%. 1000 milliLiter(s) IV Continuous <Continuous>  dextrose 5%. 1000 milliLiter(s) IV Continuous <Continuous>  dextrose 50% Injectable 25 Gram(s) IV Push once  dextrose 50% Injectable 12.5 Gram(s) IV Push once  dextrose 50% Injectable 25 Gram(s) IV Push once  glucagon  Injectable 1 milliGRAM(s) IntraMuscular once  heparin   Injectable 5000 Unit(s) SubCutaneous every 8 hours  insulin glargine Injectable (LANTUS) 12 Unit(s) SubCutaneous at bedtime  insulin lispro (ADMELOG) corrective regimen sliding scale   SubCutaneous Before meals and at bedtime  insulin lispro Injectable (ADMELOG) 4 Unit(s) SubCutaneous three times a day before meals  losartan 100 milliGRAM(s) Oral daily    PRN MEDICATIONS    VITALS:   T(F): 97, Max: 99.3 (07-04-21 @ 17:09)  HR: 73 (62 - 96)  BP: 154/72 (144/67 - 184/83)  RR: 18 (16 - 18)  SpO2: 97% (97% - 99%)    I&Os:    PHYSICAL EXAM:  GEN: No acute distress  LUNGS: Clear to auscultation bilaterally   HEART: S1/S2  ABD: Soft, NT/ND. BS +  EXT: no cyanosis/edema  NEURO: AAOX3. non-focal. moving all extremities.    LABS:                        13.6   6.44  )-----------( 260      ( 05 Jul 2021 05:59 )             41.0     Hgb: 13.6 <--, 14.0 <--    07-05    139  |  104  |  12  ----------------------------<  149<H>  4.0   |  24  |  1.0    Ca    9.2      05 Jul 2021 05:59    TPro  6.3  /  Alb  4.0  /  TBili  1.2  /  DBili  x   /  AST  18  /  ALT  15  /  AlkPhos  69  07-05    Creatinine trend: 1.0<--, 1.1<--      CARDIAC MARKERS ( 04 Jul 2021 15:50 )  x     / <0.01 ng/mL / x     / x     / x          COVID-19 PCR: NotDetec (07-04-21 @ 18:46)      RADIOLOGY:  < from: Xray Chest 1 View- PORTABLE-Urgent (07.04.21 @ 18:22) >    No radiographic evidence of acute cardiopulmonary disease.    < end of copied text >    < from: CT Angio Neck w/ IV Cont (07.04.21 @ 17:20) >    CT brain perfusion: 28 mL penumbra involving the bilateral cerebellar hemispheres.    CTA neck: Right the V4 segment severe stenosis. No dissection.    CTA brain: No stenosis or aneurysm.    Right thyroid lobe 1.7 cm nodule. Nonemergent follow-up with ultrasound may be obtained for further evaluation.    < end of copied text >    < from: CT Brain Stroke Protocol (07.04.21 @ 16:41) >    No CT evidence for acute intracranial pathology.    Motion artifact and streak artifact limit evaluation of the skull base.    < end of copied text >

## 2021-07-05 NOTE — OCCUPATIONAL THERAPY INITIAL EVALUATION ADULT - SPECIFY REASON(S)
Attempt made to see pt for OT eval. OT eval on hold 2/2 pt with no activity orders placed at this time. Will follow up.

## 2021-07-06 ENCOUNTER — APPOINTMENT (OUTPATIENT)
Dept: VASCULAR SURGERY | Facility: CLINIC | Age: 81
End: 2021-07-06

## 2021-07-06 LAB
ANION GAP SERPL CALC-SCNC: 11 MMOL/L — SIGNIFICANT CHANGE UP (ref 7–14)
BUN SERPL-MCNC: 14 MG/DL — SIGNIFICANT CHANGE UP (ref 10–20)
CALCIUM SERPL-MCNC: 9.1 MG/DL — SIGNIFICANT CHANGE UP (ref 8.5–10.1)
CHLORIDE SERPL-SCNC: 105 MMOL/L — SIGNIFICANT CHANGE UP (ref 98–110)
CO2 SERPL-SCNC: 23 MMOL/L — SIGNIFICANT CHANGE UP (ref 17–32)
CREAT SERPL-MCNC: 1.1 MG/DL — SIGNIFICANT CHANGE UP (ref 0.7–1.5)
GLUCOSE BLDC GLUCOMTR-MCNC: 126 MG/DL — HIGH (ref 70–99)
GLUCOSE BLDC GLUCOMTR-MCNC: 149 MG/DL — HIGH (ref 70–99)
GLUCOSE BLDC GLUCOMTR-MCNC: 165 MG/DL — HIGH (ref 70–99)
GLUCOSE BLDC GLUCOMTR-MCNC: 240 MG/DL — HIGH (ref 70–99)
GLUCOSE SERPL-MCNC: 156 MG/DL — HIGH (ref 70–99)
HCT VFR BLD CALC: 42.1 % — SIGNIFICANT CHANGE UP (ref 42–52)
HGB BLD-MCNC: 14.2 G/DL — SIGNIFICANT CHANGE UP (ref 14–18)
MCHC RBC-ENTMCNC: 29.8 PG — SIGNIFICANT CHANGE UP (ref 27–31)
MCHC RBC-ENTMCNC: 33.7 G/DL — SIGNIFICANT CHANGE UP (ref 32–37)
MCV RBC AUTO: 88.4 FL — SIGNIFICANT CHANGE UP (ref 80–94)
NRBC # BLD: 0 /100 WBCS — SIGNIFICANT CHANGE UP (ref 0–0)
PLATELET # BLD AUTO: 287 K/UL — SIGNIFICANT CHANGE UP (ref 130–400)
POTASSIUM SERPL-MCNC: 4.3 MMOL/L — SIGNIFICANT CHANGE UP (ref 3.5–5)
POTASSIUM SERPL-SCNC: 4.3 MMOL/L — SIGNIFICANT CHANGE UP (ref 3.5–5)
RBC # BLD: 4.76 M/UL — SIGNIFICANT CHANGE UP (ref 4.7–6.1)
RBC # FLD: 13.2 % — SIGNIFICANT CHANGE UP (ref 11.5–14.5)
SODIUM SERPL-SCNC: 139 MMOL/L — SIGNIFICANT CHANGE UP (ref 135–146)
WBC # BLD: 7.62 K/UL — SIGNIFICANT CHANGE UP (ref 4.8–10.8)
WBC # FLD AUTO: 7.62 K/UL — SIGNIFICANT CHANGE UP (ref 4.8–10.8)

## 2021-07-06 PROCEDURE — 99231 SBSQ HOSP IP/OBS SF/LOW 25: CPT

## 2021-07-06 PROCEDURE — 93306 TTE W/DOPPLER COMPLETE: CPT | Mod: 26

## 2021-07-06 RX ADMIN — Medication 81 MILLIGRAM(S): at 11:44

## 2021-07-06 RX ADMIN — ATORVASTATIN CALCIUM 80 MILLIGRAM(S): 80 TABLET, FILM COATED ORAL at 21:17

## 2021-07-06 RX ADMIN — HEPARIN SODIUM 5000 UNIT(S): 5000 INJECTION INTRAVENOUS; SUBCUTANEOUS at 13:27

## 2021-07-06 RX ADMIN — Medication 2: at 08:05

## 2021-07-06 RX ADMIN — Medication 4: at 11:43

## 2021-07-06 RX ADMIN — HEPARIN SODIUM 5000 UNIT(S): 5000 INJECTION INTRAVENOUS; SUBCUTANEOUS at 05:39

## 2021-07-06 RX ADMIN — HEPARIN SODIUM 5000 UNIT(S): 5000 INJECTION INTRAVENOUS; SUBCUTANEOUS at 21:17

## 2021-07-06 RX ADMIN — CLOPIDOGREL BISULFATE 75 MILLIGRAM(S): 75 TABLET, FILM COATED ORAL at 11:44

## 2021-07-06 RX ADMIN — INSULIN GLARGINE 12 UNIT(S): 100 INJECTION, SOLUTION SUBCUTANEOUS at 21:17

## 2021-07-06 RX ADMIN — Medication 4 UNIT(S): at 08:05

## 2021-07-06 RX ADMIN — Medication 4 UNIT(S): at 17:11

## 2021-07-06 RX ADMIN — LOSARTAN POTASSIUM 100 MILLIGRAM(S): 100 TABLET, FILM COATED ORAL at 05:39

## 2021-07-06 RX ADMIN — Medication 4 UNIT(S): at 11:43

## 2021-07-06 NOTE — PHYSICAL THERAPY INITIAL EVALUATION ADULT - PERTINENT HX OF CURRENT PROBLEM, REHAB EVAL
80 Y M PMHx of  CAD/stents, DM, HTN, bladder and prostate CA, stable AAA presents to ED for eval of left sided weakness. Pt reports that he was working outdoors yesterday and he was fine, he got home, and few hours later tried to get up from his couch, he felt his left leg become weak and fell on his left side.

## 2021-07-06 NOTE — OCCUPATIONAL THERAPY INITIAL EVALUATION ADULT - RANGE OF MOTION EXAMINATION, UPPER EXTREMITY
left shoulder flexion 0-165/Left UE Active ROM was WFL (within functional limits)/Right UE Active ROM was WNL (within normal limits)

## 2021-07-06 NOTE — OCCUPATIONAL THERAPY INITIAL EVALUATION ADULT - PERTINENT HX OF CURRENT PROBLEM, REHAB EVAL
Pt is an 79yo, right hand dominant man with PMHx of HTN, DM, HLD, CAD s/p stents, stable AAA (4cm), prostate/bladder CA presents for left sided weakness. CTH negative. CTA showed severe stenosis R vertebral artery. No perfusion abnormality on CTP.

## 2021-07-06 NOTE — OCCUPATIONAL THERAPY INITIAL EVALUATION ADULT - GENERAL OBSERVATIONS, REHAB EVAL
OT eval: 13:20-13:52 Pt received seated in b/s chair in NAD +BP cuff +tele +pulse ox monitor. /70 HR 72 Pt pleasant and agreeable to OT eval.

## 2021-07-06 NOTE — PROGRESS NOTE ADULT - ASSESSMENT
79 y/o M with PMH of CAD s/p stents, DM, HTN, Ca bladder and prostate, Stable AAA presented to ED for left sided weakness started day before the admission. s/p stroke code in ED. CT head showed no acute changes. CTA head and neck:  CT PERFUSION: There are regions of hypoperfusion measuring approximately 28 mL in the bilateral cerebellum and left occipital lobe utilizing standard threshold. No perfusion evidence of core infarct.  CTA HEAD/NECK:The body of the report has been modified.High-grade short segmental atherosclerotic stenosis in the V4 segment of the right vertebral artery. Stenosis in the right anterior inferior cerebellar artery.Moderate atherosclerotic stenosis in the cavernous and supraclinoid segments of the left ICA. Mild atherosclerotic stenosis in the right supraclinoid ICA.Short segmental stenosis of the proximal inferior division of the right MCA.  MRI of brain showed:Punctate acute infarct in the ventral right medulla. No intracranial hemorrhage, midline shift, or hydrocephalus.Mild chronic microvascular ischemic changes.  Clinically patient's symptom has improved. Patient is on DAPT ( ASA+Clopidogrel), LIpitor 80 mg. LDL:152, HBA1c:7.8,   Plan:  ·	Patient will be evaluated by endovascular team tomorrow for abnormal CTA findings as mentioned above.  ·	F/U Transthoracic Echocardiogram with bubble study  ·	F/U Rehab consult, disposition pending. Order for OOB has been placed.  ·	Monitor BP and blood sugar.            79 y/o M with PMH of CAD s/p stents, DM, HTN, Ca bladder and prostate, Stable AAA presented to ED for left sided weakness started day before the admission. s/p stroke code in ED. CT head showed no acute changes. CTA head and neck:  CT PERFUSION: There are regions of hypoperfusion measuring approximately 28 mL in the bilateral cerebellum and left occipital lobe utilizing standard threshold. No perfusion evidence of core infarct.  CTA HEAD/NECK:The body of the report has been modified.High-grade short segmental atherosclerotic stenosis in the V4 segment of the right vertebral artery. Stenosis in the right anterior inferior cerebellar artery.Moderate atherosclerotic stenosis in the cavernous and supraclinoid segments of the left ICA. Mild atherosclerotic stenosis in the right supraclinoid ICA.Short segmental stenosis of the proximal inferior division of the right MCA.  MRI of brain showed:Punctate acute infarct in the ventral right medulla. No intracranial hemorrhage, midline shift, or hydrocephalus.Mild chronic microvascular ischemic changes.  Clinically patient's symptom has improved. Patient is on DAPT ( ASA+Clopidogrel), LIpitor 80 mg. LDL:152, HBA1c:7.8,   Plan:  ·	Patient will be evaluated by endovascular team tomorrow for abnormal CTA findings as mentioned above.  ·	F/U Transthoracic Echocardiogram with bubble study  ·	F/U Rehab consult, disposition pending. Order for OOB has been placed.  ·	Monitor BP and blood sugar.    Case has been discussed with attending who agreed to the plan           81 y/o M with PMH of CAD s/p stents, DM, HTN, Ca bladder and prostate, Stable AAA presented to ED for left sided weakness started day before the admission. s/p stroke code in ED. CT head showed no acute changes. CTA head and neck:  CT PERFUSION: There are regions of hypoperfusion measuring approximately 28 mL in the bilateral cerebellum and left occipital lobe utilizing standard threshold. No perfusion evidence of core infarct.  CTA HEAD/NECK:The body of the report has been modified. High-grade short segmental atherosclerotic stenosis in the V4 segment of the right vertebral artery. Stenosis in the right anterior inferior cerebellar artery.Moderate atherosclerotic stenosis in the cavernous and supraclinoid segments of the left ICA. Mild atherosclerotic stenosis in the right supraclinoid ICA.Short segmental stenosis of the proximal inferior division of the right MCA.  MRI of brain showed:Punctate acute infarct in the ventral right medulla. No intracranial hemorrhage, midline shift, or hydrocephalus.Mild chronic microvascular ischemic changes.  Clinically patient's symptom has improved. Patient is on DAPT ( ASA+Clopidogrel), LIpitor 80 mg. LDL:152, HBA1c:7.8,   Plan:    ·	Patient will be evaluated by endovascular team tomorrow for abnormal CTA findings as mentioned above.  ·	ASA and Plavix.  ·	F/U Transthoracic Echocardiogram with bubble study  ·	F/U Rehab consult, disposition pending. Order for OOB has been placed.  ·	Monitor BP and blood sugar.     #CAD s/p Stent  cont plavix     #HTN  cont Losartan 100mg qD.  Goal  - 180 for now.    #DM   on Metformin 1000mg @ home and Glipizide 5mg BID   Hold home DM meds   c/w lantus + Lispro + SS  monitor FS    #DVT PPX : Hep SubQ  #Diet: DASH/TLC  #GI PPX: Not indicated  #Activity: Ambulate as tolerated  DISPO; Home with outpatient PT after finishing the work up.

## 2021-07-06 NOTE — PROGRESS NOTE ADULT - SUBJECTIVE AND OBJECTIVE BOX
Neurology Progress Note    Interval History:  Patient has been feeling better since came to stroke unit.    HPI:  80 Y M PMHx of  CAD/stents, DM, HTN, bladder and prostate CA, stable AAA presents to ED for eval of left sided weakness. Pt reports that he was working outdoors yesterday and he was fine, he got home, and few hours later tried to get up from his couch, he felt his left leg become weak and fell on his left side. He thought this was due to muscle fatigue from working outdoors, so did not come in for eval. His wife noticed this AM that his left face was drooping. Pt also noted that his left arm started feeling numb. Pt admits to feeling weak for the last week, always having to support himself when getting up. On my interview, all focal symptoms now resolved and pt back to baseline.  Denies head injury, loc or other complaints. Denies fever/chill/HA/dizziness/chest pain/palpitation/sob/abd pain/n/v/d/ black stool/bloody stool/urinary sxs.    S/P stroke code in the ED. CTH performed did not show any acute changes. CTA showed severe stenosis of V4 segment of R vertebral artery. No perfusion abnormality on CTP.    pt hemodynamically stable, labs wnl           (04 Jul 2021 20:39)      PAST MEDICAL & SURGICAL HISTORY:  DM (diabetes mellitus)    HTN (hypertension)    OA (osteoarthritis)    Cancer  prostate    CAD (coronary artery disease)  s/p 5 stents  2009    AAA (abdominal aortic aneurysm)  monitored by dr barker  stable x 10 yrs    Abnormal chest xray  monitored by fire dept    History of medical problems  hypercholesteremia, b/l inguinal hernias, pvd, popliteal artery aneruysm, b/l catararacts.    Bladder cancer    Prostate cancer    Basal cell carcinoma    History of surgery  s/p radiation and seed implants 2000    H/O hernia repair  x&#x27;s 2 2019 b/l inguinal    H/O heart surgery  5 stents placed 2009    H/O cataract extraction  b/l with iol&#x27;s            Medications:  aspirin enteric coated 81 milliGRAM(s) Oral daily  atorvastatin 80 milliGRAM(s) Oral at bedtime  clopidogrel Tablet 75 milliGRAM(s) Oral daily  dextrose 40% Gel 15 Gram(s) Oral once  dextrose 5%. 1000 milliLiter(s) IV Continuous <Continuous>  dextrose 5%. 1000 milliLiter(s) IV Continuous <Continuous>  dextrose 50% Injectable 25 Gram(s) IV Push once  dextrose 50% Injectable 12.5 Gram(s) IV Push once  dextrose 50% Injectable 25 Gram(s) IV Push once  glucagon  Injectable 1 milliGRAM(s) IntraMuscular once  heparin   Injectable 5000 Unit(s) SubCutaneous every 8 hours  insulin glargine Injectable (LANTUS) 12 Unit(s) SubCutaneous at bedtime  insulin lispro (ADMELOG) corrective regimen sliding scale   SubCutaneous Before meals and at bedtime  insulin lispro Injectable (ADMELOG) 4 Unit(s) SubCutaneous three times a day before meals  losartan 100 milliGRAM(s) Oral daily      Vital Signs Last 24 Hrs  T(C): 35.9 (06 Jul 2021 14:03), Max: 36.8 (05 Jul 2021 21:30)  T(F): 96.7 (06 Jul 2021 14:03), Max: 98.2 (05 Jul 2021 21:30)  HR: 75 (06 Jul 2021 14:03) (66 - 85)  BP: 140/70 (06 Jul 2021 14:03) (135/68 - 163/72)  BP(mean): 94 (06 Jul 2021 11:46) (83 - 104)  RR: 18 (06 Jul 2021 14:03) (18 - 18)  SpO2: 98% (06 Jul 2021 14:03) (97% - 99%)  Neurologic:  -Mental status: Awake, alert, oriented to person, place, and time. Speech is fluent, no dysarthria. Follows commands. Attention/concentration intact.  -Cranial nerves:   II: Visual fields are full to confrontation.  III, IV, VI: Extraocular movements are intact without nystagmus. Pupils equally round and reactive to light  V:  Facial sensation V1-V3 equal and intact   VII: Slight flattening L nasolabial fold  XII: Tongue protrudes midline  Motor: pronation without drift :LUE, LLE. 5/5 strength RUE, RLE  Sensation: Intact to light touch bilaterally. No neglect or extinction on double simultaneous testing.  Coordination: No dysmetria on finger-to-nose and heel-to-shin bilaterally    ·  1a Level of Consciousness	 0  ·  1b Level of Consciousness	 0	  ·  1c Level of Consciousness	 0	  ·  2 Best Gaze	 	 0    ·  3 Visual	 	                  0  ·  4 Facial Palsy	 	 1  ·  5a Motor Left Arm	                  0  ·  5b Motor Right Arm	 0	  ·  6a Motor Left Leg	                   1  ·  6b Motor Right Leg	 0   ·  7 Limb Ataxia	 	0   ·  8 Sensory	 	0   ·  9 Best language	 	0  ·  10 Dysarthria	 	0   ·  11 Extinction and Inattention 0 	    Total NIHSS Score = 2  Labs:  CBC Full  -  ( 06 Jul 2021 06:18 )  WBC Count : 7.62 K/uL  RBC Count : 4.76 M/uL  Hemoglobin : 14.2 g/dL  Hematocrit : 42.1 %  Platelet Count - Automated : 287 K/uL  Mean Cell Volume : 88.4 fL  Mean Cell Hemoglobin : 29.8 pg  Mean Cell Hemoglobin Concentration : 33.7 g/dL  Auto Neutrophil # : x  Auto Lymphocyte # : x  Auto Monocyte # : x  Auto Eosinophil # : x  Auto Basophil # : x  Auto Neutrophil % : x  Auto Lymphocyte % : x  Auto Monocyte % : x  Auto Eosinophil % : x  Auto Basophil % : x    07-06    139  |  105  |  14  ----------------------------<  156<H>  4.3   |  23  |  1.1    Ca    9.1      06 Jul 2021 06:18    TPro  6.8  /  Alb  4.4  /  TBili  1.0  /  DBili  x   /  AST  21  /  ALT  17  /  AlkPhos  75  07-05    LIVER FUNCTIONS - ( 05 Jul 2021 16:07 )  Alb: 4.4 g/dL / Pro: 6.8 g/dL / ALK PHOS: 75 U/L / ALT: 17 U/L / AST: 21 U/L / GGT: x

## 2021-07-07 ENCOUNTER — TRANSCRIPTION ENCOUNTER (OUTPATIENT)
Age: 81
End: 2021-07-07

## 2021-07-07 LAB
ALBUMIN SERPL ELPH-MCNC: 3.8 G/DL — SIGNIFICANT CHANGE UP (ref 3.5–5.2)
ALP SERPL-CCNC: 72 U/L — SIGNIFICANT CHANGE UP (ref 30–115)
ALT FLD-CCNC: 18 U/L — SIGNIFICANT CHANGE UP (ref 0–41)
ANION GAP SERPL CALC-SCNC: 9 MMOL/L — SIGNIFICANT CHANGE UP (ref 7–14)
AST SERPL-CCNC: 22 U/L — SIGNIFICANT CHANGE UP (ref 0–41)
BASOPHILS # BLD AUTO: 0.06 K/UL — SIGNIFICANT CHANGE UP (ref 0–0.2)
BASOPHILS NFR BLD AUTO: 0.8 % — SIGNIFICANT CHANGE UP (ref 0–1)
BILIRUB SERPL-MCNC: 1.5 MG/DL — HIGH (ref 0.2–1.2)
BUN SERPL-MCNC: 15 MG/DL — SIGNIFICANT CHANGE UP (ref 10–20)
CALCIUM SERPL-MCNC: 9.1 MG/DL — SIGNIFICANT CHANGE UP (ref 8.5–10.1)
CHLORIDE SERPL-SCNC: 105 MMOL/L — SIGNIFICANT CHANGE UP (ref 98–110)
CO2 SERPL-SCNC: 25 MMOL/L — SIGNIFICANT CHANGE UP (ref 17–32)
CREAT SERPL-MCNC: 1.2 MG/DL — SIGNIFICANT CHANGE UP (ref 0.7–1.5)
EOSINOPHIL # BLD AUTO: 0.29 K/UL — SIGNIFICANT CHANGE UP (ref 0–0.7)
EOSINOPHIL NFR BLD AUTO: 3.7 % — SIGNIFICANT CHANGE UP (ref 0–8)
GLUCOSE BLDC GLUCOMTR-MCNC: 113 MG/DL — HIGH (ref 70–99)
GLUCOSE BLDC GLUCOMTR-MCNC: 139 MG/DL — HIGH (ref 70–99)
GLUCOSE BLDC GLUCOMTR-MCNC: 160 MG/DL — HIGH (ref 70–99)
GLUCOSE BLDC GLUCOMTR-MCNC: 162 MG/DL — HIGH (ref 70–99)
GLUCOSE BLDC GLUCOMTR-MCNC: 216 MG/DL — HIGH (ref 70–99)
GLUCOSE SERPL-MCNC: 167 MG/DL — HIGH (ref 70–99)
HCT VFR BLD CALC: 42.3 % — SIGNIFICANT CHANGE UP (ref 42–52)
HGB BLD-MCNC: 14.2 G/DL — SIGNIFICANT CHANGE UP (ref 14–18)
IMM GRANULOCYTES NFR BLD AUTO: 0.3 % — SIGNIFICANT CHANGE UP (ref 0.1–0.3)
LYMPHOCYTES # BLD AUTO: 2.27 K/UL — SIGNIFICANT CHANGE UP (ref 1.2–3.4)
LYMPHOCYTES # BLD AUTO: 28.8 % — SIGNIFICANT CHANGE UP (ref 20.5–51.1)
MAGNESIUM SERPL-MCNC: 2 MG/DL — SIGNIFICANT CHANGE UP (ref 1.8–2.4)
MCHC RBC-ENTMCNC: 29.5 PG — SIGNIFICANT CHANGE UP (ref 27–31)
MCHC RBC-ENTMCNC: 33.6 G/DL — SIGNIFICANT CHANGE UP (ref 32–37)
MCV RBC AUTO: 87.8 FL — SIGNIFICANT CHANGE UP (ref 80–94)
MONOCYTES # BLD AUTO: 0.95 K/UL — HIGH (ref 0.1–0.6)
MONOCYTES NFR BLD AUTO: 12.1 % — HIGH (ref 1.7–9.3)
NEUTROPHILS # BLD AUTO: 4.29 K/UL — SIGNIFICANT CHANGE UP (ref 1.4–6.5)
NEUTROPHILS NFR BLD AUTO: 54.3 % — SIGNIFICANT CHANGE UP (ref 42.2–75.2)
NRBC # BLD: 0 /100 WBCS — SIGNIFICANT CHANGE UP (ref 0–0)
PLATELET # BLD AUTO: 292 K/UL — SIGNIFICANT CHANGE UP (ref 130–400)
POTASSIUM SERPL-MCNC: 4.6 MMOL/L — SIGNIFICANT CHANGE UP (ref 3.5–5)
POTASSIUM SERPL-SCNC: 4.6 MMOL/L — SIGNIFICANT CHANGE UP (ref 3.5–5)
PROT SERPL-MCNC: 6.3 G/DL — SIGNIFICANT CHANGE UP (ref 6–8)
RBC # BLD: 4.82 M/UL — SIGNIFICANT CHANGE UP (ref 4.7–6.1)
RBC # FLD: 13.2 % — SIGNIFICANT CHANGE UP (ref 11.5–14.5)
SODIUM SERPL-SCNC: 139 MMOL/L — SIGNIFICANT CHANGE UP (ref 135–146)
WBC # BLD: 7.88 K/UL — SIGNIFICANT CHANGE UP (ref 4.8–10.8)
WBC # FLD AUTO: 7.88 K/UL — SIGNIFICANT CHANGE UP (ref 4.8–10.8)

## 2021-07-07 PROCEDURE — 99239 HOSP IP/OBS DSCHRG MGMT >30: CPT

## 2021-07-07 PROCEDURE — 99231 SBSQ HOSP IP/OBS SF/LOW 25: CPT

## 2021-07-07 RX ADMIN — HEPARIN SODIUM 5000 UNIT(S): 5000 INJECTION INTRAVENOUS; SUBCUTANEOUS at 21:24

## 2021-07-07 RX ADMIN — Medication 81 MILLIGRAM(S): at 11:18

## 2021-07-07 RX ADMIN — Medication 4 UNIT(S): at 16:28

## 2021-07-07 RX ADMIN — Medication 4: at 08:40

## 2021-07-07 RX ADMIN — HEPARIN SODIUM 5000 UNIT(S): 5000 INJECTION INTRAVENOUS; SUBCUTANEOUS at 05:43

## 2021-07-07 RX ADMIN — LOSARTAN POTASSIUM 100 MILLIGRAM(S): 100 TABLET, FILM COATED ORAL at 05:42

## 2021-07-07 RX ADMIN — Medication 2: at 16:28

## 2021-07-07 RX ADMIN — INSULIN GLARGINE 12 UNIT(S): 100 INJECTION, SOLUTION SUBCUTANEOUS at 21:24

## 2021-07-07 RX ADMIN — HEPARIN SODIUM 5000 UNIT(S): 5000 INJECTION INTRAVENOUS; SUBCUTANEOUS at 14:00

## 2021-07-07 RX ADMIN — ATORVASTATIN CALCIUM 80 MILLIGRAM(S): 80 TABLET, FILM COATED ORAL at 21:24

## 2021-07-07 RX ADMIN — Medication 4 UNIT(S): at 08:39

## 2021-07-07 RX ADMIN — CLOPIDOGREL BISULFATE 75 MILLIGRAM(S): 75 TABLET, FILM COATED ORAL at 11:18

## 2021-07-07 NOTE — PROGRESS NOTE ADULT - REASON FOR ADMISSION
Left sided weakness and Facial Droop

## 2021-07-07 NOTE — DISCHARGE NOTE PROVIDER - HOSPITAL COURSE
80 Y M PMHx of  CAD/stents, DM, HTN, bladder and prostate CA, stable AAA presents to ED for eval of left sided weakness. Pt reports that he was working outdoors yesterday and he was fine, he got home, and few hours later tried to get up from his couch, he felt his left leg become weak and fell on his left side. He thought this was due to muscle fatigue from working outdoors, so did not come in for eval. His wife noticed this AM that his left face was drooping. Pt also noted that his left arm started feeling numb. Pt admits to feeling weak for the last week, always having to support himself when getting up. s/p stroke code in ED. CT head showed no acute changes.   CT PERFUSION: There are regions of hypoperfusion measuring approximately 28 mL in the bilateral cerebellum and left occipital lobe utilizing standard threshold. No perfusion evidence of core infarct.  CTA HEAD/NECK:The body of the report has been modified. High-grade short segmental atherosclerotic stenosis in the V4 segment of the right vertebral artery. Stenosis in the right anterior inferior cerebellar artery.Moderate atherosclerotic stenosis in the cavernous and supraclinoid segments of the left ICA. Mild atherosclerotic stenosis in the right supraclinoid ICA.Short segmental stenosis of the proximal inferior division of the right MCA.  MRI of brain showed:Punctate acute infarct in the ventral right medulla. No intracranial hemorrhage, midline shift, or hydrocephalus.Mild chronic microvascular ischemic changes.  Clinically patient's symptom has improved. Patient is on DAPT ( ASA+Clopidogrel), LIpitor 80 mg. LDL:152, HBA1c:7.8, Echo shows EF 46%, GIDD, aortic root dilation, no PFO  Patient evaluated by Endovascular, who recommended.....       80 Y M PMHx of  CAD/stents, DM, HTN, bladder and prostate CA, stable AAA presents to ED for eval of left sided weakness. Pt reports that he was working outdoors yesterday and he was fine, he got home, and few hours later tried to get up from his couch, he felt his left leg become weak and fell on his left side. He thought this was due to muscle fatigue from working outdoors, so did not come in for eval. His wife noticed this AM that his left face was drooping. Pt also noted that his left arm started feeling numb. Pt admits to feeling weak for the last week, always having to support himself when getting up. s/p stroke code in ED. CT head showed no acute changes.   CT PERFUSION: There are regions of hypoperfusion measuring approximately 28 mL in the bilateral cerebellum and left occipital lobe utilizing standard threshold. No perfusion evidence of core infarct.  CTA HEAD/NECK:The body of the report has been modified. High-grade short segmental atherosclerotic stenosis in the V4 segment of the right vertebral artery. Stenosis in the right anterior inferior cerebellar artery.Moderate atherosclerotic stenosis in the cavernous and supraclinoid segments of the left ICA. Mild atherosclerotic stenosis in the right supraclinoid ICA.Short segmental stenosis of the proximal inferior division of the right MCA.  MRI of brain showed:Punctate acute infarct in the ventral right medulla. No intracranial hemorrhage, midline shift, or hydrocephalus.Mild chronic microvascular ischemic changes.  Clinically patient's symptom has improved. Patient is on DAPT ( ASA+Clopidogrel), LIpitor 80 mg. LDL:152, HBA1c:7.8, Echo shows EF 46%, GIDD, aortic root dilation, no PFO  Patient evaluated by Endovascular, who recommended no acute neurointervention at this time, recommendation is for medical management.

## 2021-07-07 NOTE — DISCHARGE NOTE PROVIDER - NSDCCPCAREPLAN_GEN_ALL_CORE_FT
PRINCIPAL DISCHARGE DIAGNOSIS  Diagnosis: Stroke  Assessment and Plan of Treatment: You were admitted for likely stroke. Your brain imaging had showed hypoperfusion of the brain. Imaging of your vessels shows severe to moderate narrowing of the vessels. You were evaulated by endovascular surgery. Please continue to take your dual anti-platelet therapy and statin, this are mortality reducing medications that are important in preventing future strokes. You will need to follow up with Neurology and your primary care doctor as an outpatient       PRINCIPAL DISCHARGE DIAGNOSIS  Diagnosis: Stroke  Assessment and Plan of Treatment: You were admitted for likely stroke. Your brain imaging had showed hypoperfusion of the brain. Imaging of your vessels shows severe to moderate narrowing of the vessels. You were evaulated by endovascular surgery which recommended no intervention at this time. Please continue to take your dual anti-platelet therapy and statin, this are mortality reducing medications that are important in preventing future strokes. Please continue to make lifestyle changes to lower your cholesterol and blood sugar levels as this will lower your risks of reoccurence. You will need to follow up with Neurology and your primary care doctor as an outpatient as indicated in these documents

## 2021-07-07 NOTE — DISCHARGE NOTE PROVIDER - CARE PROVIDERS DIRECT ADDRESSES
,DirectAddress_Unknown,[email or web address] ,DirectAddress_Unknown,DirectAddress_Unknown

## 2021-07-07 NOTE — PROGRESS NOTE ADULT - ASSESSMENT
Patient is a 80y old Male with PMHx of  CAD/stents, DM, HTN, bladder and prostate CA, stable AAA who presents with a chief complaint of Left sided weakness and Facial Droop (07 Jul 2021 14:55).   Currently admitted to medicine with the primary diagnosis of Stroke  Today is day 3 of admission    1. Acute Stroke  - most likely secondary to plaque burden & small vessel disease  - LDL:152, HBA1c:7.8  - Symptoms improving, NIHSS 1 (flattening of nasolabial fold with slight left deviation)  - Currently on DAPT, Aspirin 81mg, Plavix 75mg  - Also on high dose statin, Atorvastatin 80mg  - Currently on Insulin for high A1c  - Endovascular consult tommorow  - Echo: EF 46%    2. HTN  - controlled, currently on losartan 100mg  - continue to monitor   Patient is a 80y old Male with PMHx of  CAD/stents, DM, HTN, bladder and prostate CA, stable AAA who presents with a chief complaint of Left sided weakness and Facial Droop (07 Jul 2021 14:55).   Currently admitted to medicine with the primary diagnosis of Stroke  Today is day 3 of admission    1. Acute Stroke  - most likely secondary to plaque burden & small vessel disease  - LDL:152, HBA1c:7.8  - Symptoms improving, NIHSS 1 (flattening of nasolabial fold with slight left deviation)  - Currently on DAPT, Aspirin 81mg, Plavix 75mg  - Also on high dose statin, Atorvastatin 80mg  - Currently on Insulin for high A1c  - Endovascular consult tommorow  - Echo: EF 46%  - PT/OT    2. HTN  - controlled, currently on losartan 100mg    Activity: Increase As tolerated, PT/OT  Diet: DASH  DVT ppx: Heparin   GI ppx:  Code Status:  DISPO: From home

## 2021-07-07 NOTE — DISCHARGE NOTE PROVIDER - CARE PROVIDER_API CALL
Wayne Colvin)  Neurology  [address] [place] [address]  Phone: [phone]  Fax: [phone]  Follow Up Time: 2 weeks    Ike Barrios)  EEGEpilepsy; Neurology  [address], [address]  [place] [address]  Phone: [phone]  Fax: [phone]  Follow Up Time: 1 week   Wayne Colvin)  Neurology  [address] [place] [address]  Phone: [phone]  Fax: [phone]  Follow Up Time: 2 weeks    Ike Barrios  [address] [place] [address]  Phone: [phone]  Fax: (   )    -  Follow Up Time: 2 weeks   Wayne Colvin)  Neurology  [address] [place] [address]  Phone: [phone]  Fax: [phone]  Follow Up Time: 2 weeks    Ike Barrios  [address] [place] [address]  Phone: [phone]  Fax: (   )    -  Follow Up Time: 1 month   Ike Barrios  [address] [place] [address]  Phone: [phone]  Fax: (   )    -  Follow Up Time: 1 month    Marlyn Peralta  INTERNAL MEDICINE  [address] [place] [address]  Phone: [phone]  Fax: [phone]  Follow Up Time: 1 month

## 2021-07-07 NOTE — DISCHARGE NOTE PROVIDER - NSDCMRMEDTOKEN_GEN_ALL_CORE_FT
clopidogrel 75 mg oral tablet: 1 tab(s) orally once a day  glipiZIDE 5 mg oral tablet, extended release: 1 tab(s) orally 2 times a day  losartan 100 mg oral tablet: 1 tab(s) orally once a day  metFORMIN 1000 mg oral tablet: 1 tab(s) orally 2 times a day  Pyridium 100 mg oral tablet: 1 tab(s) orally 3 times a day   simvastatin 10 mg oral tablet: 1 tab(s) orally once a day (at bedtime)   aspirin 81 mg oral delayed release tablet: 1 tab(s) orally once a day  atorvastatin 80 mg oral tablet: 1 tab(s) orally once a day (at bedtime)  clopidogrel 75 mg oral tablet: 1 tab(s) orally once a day  glipiZIDE 5 mg oral tablet, extended release: 1 tab(s) orally 2 times a day  losartan 100 mg oral tablet: 1 tab(s) orally once a day  Pyridium 100 mg oral tablet: 1 tab(s) orally 3 times a day

## 2021-07-07 NOTE — DISCHARGE NOTE PROVIDER - NSDCFUSCHEDAPPT_GEN_ALL_CORE_FT
WILFREDO RIVAS ; 07/15/2021 ; NPP Cardio [facility]  WILFREDO RIVAS ; 07/30/2021 ; NPP Urology [facility]

## 2021-07-07 NOTE — PROGRESS NOTE ADULT - ATTENDING COMMENTS
I have personally seen and examined this patient on 7/6.    I have fully participated in the care of this patient.  I have reviewed all pertinent clinical information, including history, physical exam, plan and note. Acute right medulla infarct. Right vert stenosis. Multilevel plaques. DAPT. Endovascular consult.   I have reviewed all pertinent clinical information and reviewed all relevant imaging and diagnostic studies personally.  Recommendations as above.  Agree with above assessment except as noted.
I have personally seen and examined this patient.  I have fully participated in the care of this patient.  I have reviewed all pertinent clinical information, including history, physical exam, plan and note. Patient is stable. Exam shows left nasolabial flattening and mild left arm pronation with no drift. Pending discharge after endovascular consult tomorrow.   I have reviewed all pertinent clinical information and reviewed all relevant imaging and diagnostic studies personally.  Recommendations as above.  Agree with above assessment except as noted.

## 2021-07-07 NOTE — PROGRESS NOTE ADULT - SUBJECTIVE AND OBJECTIVE BOX
WILFREDO RIVAS 80y Male  MRN#: [id number]   Hospital Day: 3d    SUBJECTIVE  Patient is a 80y old Male with PMHx of  CAD/stents, DM, HTN, bladder and prostate CA, stable AAA who presents with a chief complaint of Left sided weakness and Facial Droop (07 Jul 2021 14:55).   Currently admitted to medicine with the primary diagnosis of Stroke  Today is day 3 of admission    INTERVAL HPI AND OVERNIGHT EVENTS:  Patient was examined and seen at bedside. This morning he is resting comfortably in bed and reports no issues or overnight events. Denies any residual left sided weakness. Patient feels he's back to his baseline. He denies any vision changes, chest pain, SOB, abdominal pain. Patient is ambulating and moving his bowel.     OBJECTIVE  PAST MEDICAL & SURGICAL HISTORY  DM (diabetes mellitus)    HTN (hypertension)    OA (osteoarthritis)    Cancer  prostate    CAD (coronary artery disease)  s/p 5 stents  2009    AAA (abdominal aortic aneurysm)  monitored by dr barker  stable x 10 yrs    Abnormal chest xray  monitored by fire dept    History of medical problems  hypercholesteremia, b/l inguinal hernias, pvd, popliteal artery aneruysm, b/l catararacts.    Bladder cancer    Prostate cancer    Basal cell carcinoma    History of surgery  s/p radiation and seed implants 2000    H/O hernia repair  x&#x27;s 2 2019 b/l inguinal    H/O heart surgery  5 stents placed 2009    H/O cataract extraction  b/l with iol&#x27;s      ALLERGIES:  No Known Allergies    MEDICATIONS:  STANDING MEDICATIONS  aspirin enteric coated 81 milliGRAM(s) Oral daily  atorvastatin 80 milliGRAM(s) Oral at bedtime  clopidogrel Tablet 75 milliGRAM(s) Oral daily  dextrose 40% Gel 15 Gram(s) Oral once  dextrose 5%. 1000 milliLiter(s) IV Continuous <Continuous>  dextrose 5%. 1000 milliLiter(s) IV Continuous <Continuous>  dextrose 50% Injectable 25 Gram(s) IV Push once  dextrose 50% Injectable 12.5 Gram(s) IV Push once  dextrose 50% Injectable 25 Gram(s) IV Push once  glucagon  Injectable 1 milliGRAM(s) IntraMuscular once  heparin   Injectable 5000 Unit(s) SubCutaneous every 8 hours  insulin glargine Injectable (LANTUS) 12 Unit(s) SubCutaneous at bedtime  insulin lispro (ADMELOG) corrective regimen sliding scale   SubCutaneous Before meals and at bedtime  insulin lispro Injectable (ADMELOG) 4 Unit(s) SubCutaneous three times a day before meals  losartan 100 milliGRAM(s) Oral daily    PRN MEDICATIONS      VITAL SIGNS: Last 24 Hours  T(C): 36.5 (07 Jul 2021 14:15), Max: 36.8 (06 Jul 2021 21:30)  T(F): 97.7 (07 Jul 2021 14:15), Max: 98.2 (06 Jul 2021 21:30)  HR: 79 (07 Jul 2021 14:15) (67 - 88)  BP: 139/61 (07 Jul 2021 14:15) (121/69 - 168/68)  BP(mean): 84 (07 Jul 2021 12:54) (83 - 112)  RR: 18 (07 Jul 2021 14:15) (17 - 18)  SpO2: 96% (07 Jul 2021 12:54) (96% - 98%)    LABS:                        14.2   7.88  )-----------( 292      ( 07 Jul 2021 06:59 )             42.3     07-07    139  |  105  |  15  ----------------------------<  167<H>  4.6   |  25  |  1.2    Ca    9.1      07 Jul 2021 06:59  Mg     2.0     07-07    TPro  6.3  /  Alb  3.8  /  TBili  1.5<H>  /  DBili  x   /  AST  22  /  ALT  18  /  AlkPhos  72  07-07      RADIOLOGY:  < from: MR Head No Cont (07.05.21 @ 10:04) >  Punctate acute infarct in the ventral right medulla. No intracranial hemorrhage, midline shift, or hydrocephalus.    Mild chronic microvascular ischemic changes.    < end of copied text >  < from: Xray Chest 1 View- PORTABLE-Urgent (07.04.21 @ 18:22) >  No radiographic evidence of acute cardiopulmonary disease.    < end of copied text >  < from: CT Angio Neck w/ IV Cont (07.04.21 @ 17:20) >  CT PERFUSION:  There are regions of hypoperfusion measuring approximately 28 mL in the bilateral cerebellum and left occipital lobe utilizing standard threshold. No perfusion evidence of core infarct.    CTA HEAD/NECK:  The body of the report has been modified.    High-grade short segmental atherosclerotic stenosis in the V4 segment of the right vertebral artery. Stenosis in the right anterior inferior cerebellar artery.    Moderate atherosclerotic stenosis in the cavernous and supraclinoid segments of the left ICA. Mild atherosclerotic stenosis in the right supraclinoid ICA.    Short segmental stenosis of the proximal inferior division of the right MCA.    < end of copied text >        PHYSICAL EXAM:  CONSTITUTIONAL: No acute distress, well-developed, well-groomed, AAOx3  HEAD: Atraumatic, normocephalic  EYES: EOM intact, PERRLA, conjunctiva and sclera clear  ENT: Supple, no masses, no thyromegaly, no bruits, no JVD; moist mucous membranes  PULMONARY: Clear to auscultation bilaterally; no wheezes, rales, or rhonchi  CARDIOVASCULAR: Regular rate and rhythm; no murmurs, rubs, or gallops  GASTROINTESTINAL: Soft, non-tender, non-distended; bowel sounds present  MUSCULOSKELETAL: 2+ peripheral pulses; no clubbing, no cyanosis, no edema  NEUROLOGY: non-focal  SKIN: No rashes or lesions; warm and dry    ASSESSMENT & PLAN  #    PAST MEDICAL & SURGICAL HISTORY:  DM (diabetes mellitus)    HTN (hypertension)    OA (osteoarthritis)    Cancer  prostate    CAD (coronary artery disease)  s/p 5 stents  2009    AAA (abdominal aortic aneurysm)  monitored by dr barker  stable x 10 yrs    Abnormal chest xray  monitored by fire dept    History of medical problems  hypercholesteremia, b/l inguinal hernias, pvd, popliteal artery aneruysm, b/l catararacts.    Bladder cancer    Prostate cancer    Basal cell carcinoma    History of surgery  s/p radiation and seed implants 2000    H/O hernia repair  x&#x27;s 2 2019 b/l inguinal    H/O heart surgery  5 stents placed 2009    H/O cataract extraction  b/l with iol&#x27;s        #Misc  - DVT Prophylaxis:  - Diet:  - GI Prophylaxis:  - Activity:  - IV Fluids:  - Code Status:    Dispo: WILFREDO RIVAS 80y Male  MRN#: [id number]   Hospital Day: 3d    SUBJECTIVE  Patient is a 80y old Male with PMHx of  CAD/stents, DM, HTN, bladder and prostate CA, stable AAA who presents with a chief complaint of Left sided weakness and Facial Droop (07 Jul 2021 14:55).   Currently admitted to medicine with the primary diagnosis of Stroke  Today is day 3 of admission    INTERVAL HPI AND OVERNIGHT EVENTS:  Patient was examined and seen at bedside. This morning he is resting comfortably in bed and reports no issues or overnight events. Denies any residual left sided weakness. Patient feels he's back to his baseline. He denies any vision changes, chest pain, SOB, abdominal pain. Patient is ambulating and moving his bowel.     OBJECTIVE  PAST MEDICAL & SURGICAL HISTORY  DM (diabetes mellitus)    HTN (hypertension)    OA (osteoarthritis)    Cancer  prostate    CAD (coronary artery disease)  s/p 5 stents  2009    AAA (abdominal aortic aneurysm)  monitored by dr barker  stable x 10 yrs    Abnormal chest xray  monitored by fire dept    History of medical problems  hypercholesteremia, b/l inguinal hernias, pvd, popliteal artery aneruysm, b/l catararacts.    Bladder cancer    Prostate cancer    Basal cell carcinoma    History of surgery  s/p radiation and seed implants 2000    H/O hernia repair  x&#x27;s 2 2019 b/l inguinal    H/O heart surgery  5 stents placed 2009    H/O cataract extraction  b/l with iol&#x27;s      ALLERGIES:  No Known Allergies    VITAL SIGNS: Last 24 Hours  T(C): 36.5 (07 Jul 2021 14:15), Max: 36.8 (06 Jul 2021 21:30)  T(F): 97.7 (07 Jul 2021 14:15), Max: 98.2 (06 Jul 2021 21:30)  HR: 79 (07 Jul 2021 14:15) (67 - 88)  BP: 139/61 (07 Jul 2021 14:15) (121/69 - 168/68)  BP(mean): 84 (07 Jul 2021 12:54) (83 - 112)  RR: 18 (07 Jul 2021 14:15) (17 - 18)  SpO2: 96% (07 Jul 2021 12:54) (96% - 98%)    LABS:                        14.2   7.88  )-----------( 292      ( 07 Jul 2021 06:59 )             42.3     07-07    139  |  105  |  15  ----------------------------<  167<H>  4.6   |  25  |  1.2    Ca    9.1      07 Jul 2021 06:59  Mg     2.0     07-07    TPro  6.3  /  Alb  3.8  /  TBili  1.5<H>  /  DBili  x   /  AST  22  /  ALT  18  /  AlkPhos  72  07-07      RADIOLOGY:  1. MR Head No Cont (07.05.21 @ 10:04)  Punctate acute infarct in the ventral right medulla. No intracranial hemorrhage, midline shift, or hydrocephalus.    Mild chronic microvascular ischemic changes.    2. Xray Chest 1 View- PORTABLE-Urgent (07.04.21 @ 18:22)   No radiographic evidence of acute cardiopulmonary disease.    3. CTA HEAD/NECK:< from: CT Angio Neck w/ IV Cont (07.04.21 @ 17:20)   CT PERFUSION:  - There are regions of hypoperfusion measuring approximately 28 mL in the bilateral cerebellum and left occipital lobe utilizing standard threshold. No perfusion evidence of core infarct.    CTA HEAD/NECK:  The body of the report has been modified.    -High-grade short segmental atherosclerotic stenosis in the V4 segment of the right vertebral artery. Stenosis in the right anterior inferior cerebellar artery.  -Moderate atherosclerotic stenosis in the cavernous and supraclinoid segments of the left ICA. Mild atherosclerotic stenosis in the right supraclinoid ICA.  -Short segmental stenosis of the proximal inferior division of the right MCA.    4. Echo 7.6.21  - Mildly decreased global left ventricular systolic function.   -Multiple left ventricular regional wall motion abnormalities exist. See wall motion findings.   -LV Ejection Fraction by Garcia's Method with a biplane EF of 46 %.   -Mild eccentric left ventricular hypertrophy.   -Mild to moderately increased left ventricular internal cavity size.   -Spectral Doppler shows impaired relaxation pattern of left ventricular myocardial filling (Grade I diastolic dysfunction).  -Mild mitral valve regurgitation.   -Mild tricuspid regurgitation.   -Mild aortic regurgitation.  -Mild pulmonic valve regurgitation.  -The aortic root and ascending aorta are dilated. The aortic root at the level of the Sinuses of Valsalva measures 5.0 cm in diameter. The ascending aorta is 4.0 cm. in diameter.  -Color flow doppler and intravenous injection of agitated saline demonstrates the presence of an intact intra atrial septum.      PHYSICAL EXAM:  CONSTITUTIONAL: No acute distress,AAOx3  HEAD: Atraumatic, normocephalic  PULMONARY: Clear to auscultation bilaterally; no wheezes  CARDIOVASCULAR: Regular rate and rhythm; no murmurs  GASTROINTESTINAL: Soft, non-tender, non-distended; bowel sounds present  MUSCULOSKELETAL: no edema  NEUROLOGY: Strength +5 BL UE+LE, Sensory BL UE +LE intact, Cerebellar exam normal, (NIHSS 1 flattening of nasolabial fold with slight left deviation)

## 2021-07-07 NOTE — DISCHARGE NOTE PROVIDER - PROVIDER TOKENS
PROVIDER:[TOKEN:[[id number]],FOLLOWUP:[2 weeks]],PROVIDER:[TOKEN:[[id number]],FOLLOWUP:[1 week]] PROVIDER:[TOKEN:[[id number]],FOLLOWUP:[2 weeks]],FREE:[LAST:[ElSherif],FIRST:[Ike],PHONE:[phone]],FAX:[(   )    -],ADDRESS:[[address] [place] [address]],FOLLOWUP:[2 weeks]] PROVIDER:[TOKEN:[[id number]],FOLLOWUP:[2 weeks]],FREE:[LAST:[ElSherif],FIRST:[Ike],PHONE:[phone]],FAX:[(   )    -],ADDRESS:[[address] [place] [address]],FOLLOWUP:[1 month]] FREE:[LAST:[ElSherif],FIRST:[Ike],PHONE:[phone]],FAX:[(   )    -],ADDRESS:[[address] [place] [address]],FOLLOWUP:[1 month]],PROVIDER:[TOKEN:[[id number]],FOLLOWUP:[1 month]]

## 2021-07-07 NOTE — DISCHARGE NOTE PROVIDER - NSDCQMSTROKERISK_NEU_ALL_CORE
Diabetes/High blood pressure/High cholesterol/History of a stroke or TIA Diabetes/High blood pressure/High cholesterol

## 2021-07-08 ENCOUNTER — TRANSCRIPTION ENCOUNTER (OUTPATIENT)
Age: 81
End: 2021-07-08

## 2021-07-08 VITALS
SYSTOLIC BLOOD PRESSURE: 141 MMHG | TEMPERATURE: 97 F | HEART RATE: 73 BPM | RESPIRATION RATE: 18 BRPM | DIASTOLIC BLOOD PRESSURE: 67 MMHG | OXYGEN SATURATION: 98 %

## 2021-07-08 LAB
GLUCOSE BLDC GLUCOMTR-MCNC: 170 MG/DL — HIGH (ref 70–99)
GLUCOSE BLDC GLUCOMTR-MCNC: 216 MG/DL — HIGH (ref 70–99)

## 2021-07-08 PROCEDURE — 99223 1ST HOSP IP/OBS HIGH 75: CPT

## 2021-07-08 PROCEDURE — 99239 HOSP IP/OBS DSCHRG MGMT >30: CPT

## 2021-07-08 RX ORDER — SIMVASTATIN 20 MG/1
1 TABLET, FILM COATED ORAL
Qty: 0 | Refills: 0 | DISCHARGE

## 2021-07-08 RX ORDER — METFORMIN HYDROCHLORIDE 850 MG/1
1 TABLET ORAL
Qty: 0 | Refills: 0 | DISCHARGE

## 2021-07-08 RX ORDER — ATORVASTATIN CALCIUM 80 MG/1
1 TABLET, FILM COATED ORAL
Qty: 30 | Refills: 0
Start: 2021-07-08 | End: 2021-08-06

## 2021-07-08 RX ORDER — ASPIRIN/CALCIUM CARB/MAGNESIUM 324 MG
1 TABLET ORAL
Qty: 30 | Refills: 0
Start: 2021-07-08 | End: 2021-08-06

## 2021-07-08 RX ADMIN — Medication 2: at 11:43

## 2021-07-08 RX ADMIN — HEPARIN SODIUM 5000 UNIT(S): 5000 INJECTION INTRAVENOUS; SUBCUTANEOUS at 06:09

## 2021-07-08 RX ADMIN — Medication 4 UNIT(S): at 07:58

## 2021-07-08 RX ADMIN — HEPARIN SODIUM 5000 UNIT(S): 5000 INJECTION INTRAVENOUS; SUBCUTANEOUS at 13:27

## 2021-07-08 RX ADMIN — Medication 81 MILLIGRAM(S): at 11:43

## 2021-07-08 RX ADMIN — Medication 4 UNIT(S): at 11:42

## 2021-07-08 RX ADMIN — Medication 4: at 07:58

## 2021-07-08 RX ADMIN — LOSARTAN POTASSIUM 100 MILLIGRAM(S): 100 TABLET, FILM COATED ORAL at 06:09

## 2021-07-08 RX ADMIN — CLOPIDOGREL BISULFATE 75 MILLIGRAM(S): 75 TABLET, FILM COATED ORAL at 11:43

## 2021-07-08 NOTE — CONSULT NOTE ADULT - SUBJECTIVE AND OBJECTIVE BOX
Neuroendovascular consult:     HPI:  80 Y M PMHx of  CAD/stents, DM, HTN, bladder and prostate CA, stable AAA presents to ED for eval of left sided weakness. Pt reports that he was working outdoors yesterday and he was fine, he got home, and few hours later tried to get up from his couch, he felt his left leg become weak and fell on his left side. He thought this was due to muscle fatigue from working outdoors, so did not come in for eval. His wife noticed this AM that his left face was drooping. Pt also noted that his left arm started feeling numb. Pt admits to feeling weak for the last week, always having to support himself when getting up. On my interview, all focal symptoms now resolved and pt back to baseline.  Denies head injury, loc or other complaints. Denies fever/chill/HA/dizziness/chest pain/palpitation/sob/abd pain/n/v/d/ black stool/bloody stool/urinary sxs.  S/P stroke code in the ED. CTH performed did not show any acute changes. CTA showed severe stenosis of V4 segment of R vertebral artery. No perfusion abnormality on CTP.  pt hemodynamically stable, labs wnl  (04 Jul 2021 20:39)    Neuroendovascular team consulted for evaluation of severe R vertebral artery stenosis. Patient seen sitting upright in chair this AM, fully responsive and cooperative to questions and commands. Patient is without complaints.   Denies history of smoking, alcohol consumption. Denies history of stroke in the past, denies family history of stroke.     PAST MEDICAL & SURGICAL HISTORY:  DM (diabetes mellitus)  HTN (hypertension)  OA (osteoarthritis)  Cancer  prostate  CAD (coronary artery disease)  s/p 5 stents  2009  AAA (abdominal aortic aneurysm)  monitored by dr barker  stable x 10 yrs  Abnormal chest xray  monitored by fire dept  hypercholesteremia  b/l inguinal hernias  pvd  popliteal artery aneruysm  b/l catararacts.  Bladder cancer  Prostate cancer  Basal cell carcinoma    History of surgery  s/p radiation and seed implants 2000  H/O hernia repair  x&#x27;s 2 2019 b/l inguinal  H/O heart surgery  5 stents placed 2009  H/O cataract extraction  b/l with iol&#x27;s    MEDICATIONS  (STANDING):  aspirin enteric coated 81 milliGRAM(s) Oral daily  atorvastatin 80 milliGRAM(s) Oral at bedtime  clopidogrel Tablet 75 milliGRAM(s) Oral daily  dextrose 40% Gel 15 Gram(s) Oral once  dextrose 5%. 1000 milliLiter(s) (50 mL/Hr) IV Continuous <Continuous>  dextrose 5%. 1000 milliLiter(s) (100 mL/Hr) IV Continuous <Continuous>  dextrose 50% Injectable 25 Gram(s) IV Push once  dextrose 50% Injectable 12.5 Gram(s) IV Push once  dextrose 50% Injectable 25 Gram(s) IV Push once  glucagon  Injectable 1 milliGRAM(s) IntraMuscular once  heparin   Injectable 5000 Unit(s) SubCutaneous every 8 hours  insulin glargine Injectable (LANTUS) 12 Unit(s) SubCutaneous at bedtime  insulin lispro (ADMELOG) corrective regimen sliding scale   SubCutaneous Before meals and at bedtime  insulin lispro Injectable (ADMELOG) 4 Unit(s) SubCutaneous three times a day before meals  losartan 100 milliGRAM(s) Oral daily    Allergies  No Known Allergies  Intolerances    Social History:   Smoking: Yes [ ]  No [x ]   ETOH  Yes [ ]  No [x ]      FAMILY HISTORY:  Family history of heart disease (Father)  DM (diabetes mellitus) (Father, Mother)    Physical Exam:   Vital Signs Last 24 Hrs  T(C): 35.8 (08 Jul 2021 03:54), Max: 36.5 (07 Jul 2021 14:15)  T(F): 96.4 (08 Jul 2021 03:54), Max: 97.7 (07 Jul 2021 14:15)  HR: 73 (08 Jul 2021 09:35) (59 - 89)  BP: 141/67 (08 Jul 2021 09:35) (119/65 - 159/89)  BP(mean): 88 (08 Jul 2021 09:35) (84 - 111)  RR: 18 (08 Jul 2021 09:35) (18 - 19)  SpO2: 98% (08 Jul 2021 09:35) (96% - 98%)    General : NAD   Neuro : NIHSS 1 for mild L facial; AAOx3, no motor or sensory deficits, holding all extremities to antigravity, no neglect, no aphasisa or dysarthria, no dysmetria.     Labs:                         14.2   7.88  )-----------( 292      ( 07 Jul 2021 06:59 )             42.3     07-07    139  |  105  |  15  ----------------------------<  167<H>  4.6   |  25  |  1.2    Ca    9.1      07 Jul 2021 06:59  Mg     2.0     07-07    TPro  6.3  /  Alb  3.8  /  TBili  1.5<H>  /  DBili  x   /  AST  22  /  ALT  18  /  AlkPhos  72  07-07    Radiology & Additional Studies:   Radiology imaging reviewed.     < from: CT Angio Neck w/ IV Cont (07.04.21 @ 17:20) >    IMPRESSION:    CT brain perfusion: 28 mL penumbra involving the bilateral cerebellar hemispheres.    CTA neck: Right the V4 segment severe stenosis. No dissection.    CTA brain: No stenosis or aneurysm.    Right thyroid lobe 1.7 cm nodule. Nonemergent follow-up with ultrasound may be obtained for further evaluation.      < end of copied text >  < from: CT Angio Neck w/ IV Cont (07.04.21 @ 17:20) >  There is high-grade focal stenosis in the V4 segment of the right vertebral artery due to underlying noncalcified/calcified plaques. The intradural left vertebral artery is patent. The basilar is patent. There is stenosis of the right anterior inferior cerebellar artery.    The bilateral posterior cerebral arteries are patent.    < end of copied text >      ASSESSMENT/ PLAN:     Patient is with R small medullary infarct, with R vertebral atherosclerotic plaque, L vertebral and L PCOM are patent.     No acute neurointervention is warranted, recommending medical management.   Management per stroke team and medicine.   Patient advised to avoid dehydration and hypotension. Also recommending healthy lifestyle of 30 min light aerobic exercise per day, Mediterranean diet, increased PO hydration, avoiding caffeinated beverages such as coffee or energy drinks, avoidance of tobacco products and alcohol.     Risks, benefits, and alternatives to treatment discussed. All questions answered with understanding.    Thank you for the courtesy of this consult, please call KATIA LYON [phone] with any further questions.   
 **STROKE CODE CONSULT NOTE**    Last known well time/Time of onset of symptoms: LKW 7/3 1930    HPI: 80y Male with PMHx of HTN, DM, HLD, CAD s/p stents, stable AAA (4cm), prostate/bladder CA presents for left sided weakness. Pt states yesterday evening he sat down to watch TV at 1930 after doing yard work all day, was at his baseline at that time. Later he stood up at 2200 and his left leg gave out from under him. States that he attributed this to straining muscle during yard work and went to bed. Today he noticed that his L hand was numb, also noticed that his LUE was weak. States he was told by his friend and his wife that he had a left facial droop, prompting him to come to ED. Pt denies HA, dizziness, speech changes, vision changes. Pt is on Plavix, which he did take today. CTH performed did not show any acute changes. CTA showed severe stenosis of V4 segment of R vertebral artery. No perfusion abnormality on CTP.      T(C): 37.4 (07-04-21 @ 17:09), Max: 37.4 (07-04-21 @ 17:09)  HR: 91 (07-04-21 @ 17:09) (91 - 96)  BP: 163/74 (07-04-21 @ 17:09) (163/74 - 184/83)  RR: 16 (07-04-21 @ 17:09) (16 - 16)  SpO2: 98% (07-04-21 @ 17:09) (98% - 98%)    PAST MEDICAL & SURGICAL HISTORY:  DM (diabetes mellitus)    HTN (hypertension)    OA (osteoarthritis)    Cancer  prostate    CAD (coronary artery disease)  s/p 5 stents  2009    AAA (abdominal aortic aneurysm)  monitored by dr barker  stable x 10 yrs    Abnormal chest xray  monitored by fire dept    History of medical problems  hypercholesteremia, b/l inguinal hernias, pvd, popliteal artery aneruysm, b/l catararacts.    Bladder cancer    Prostate cancer    Basal cell carcinoma    History of surgery  s/p radiation and seed implants 2000    H/O hernia repair  x&#x27;s 2 2019 b/l inguinal    H/O heart surgery  5 stents placed 2009    H/O cataract extraction  b/l with iol&#x27;s        FAMILY HISTORY:  Family history of heart disease (Father)    DM (diabetes mellitus) (Father, Mother)        SOCIAL HISTORY:    ROS: 10-point ROS reviewed and negative except as noted above.      MEDICATIONS  (STANDING):  aspirin enteric coated 81 milliGRAM(s) Oral daily    MEDICATIONS  (PRN):    Allergies    No Known Allergies    Intolerances      Vital Signs Last 24 Hrs  T(C): 37.4 (04 Jul 2021 17:09), Max: 37.4 (04 Jul 2021 17:09)  T(F): 99.3 (04 Jul 2021 17:09), Max: 99.3 (04 Jul 2021 17:09)  HR: 91 (04 Jul 2021 17:09) (91 - 96)  BP: 163/74 (04 Jul 2021 17:09) (163/74 - 184/83)  BP(mean): --  RR: 16 (04 Jul 2021 17:09) (16 - 16)  SpO2: 98% (04 Jul 2021 17:09) (98% - 98%)    Physical exam:  Constitutional: WN/WD elderly male resting comfortably in bed.    Neurologic:  -Mental status: Awake, alert, oriented to person, place, and time. Speech is fluent with intact naming, repetition, and comprehension, no dysarthria. Follows commands. Attention/concentration intact.  -Cranial nerves:   II: Visual fields are full to confrontation.  III, IV, VI: Extraocular movements are intact without nystagmus. Pupils equally round and reactive to light  V:  Facial sensation V1-V3 equal and intact   VII: Slight flattening L nasolabial fold  XII: Tongue protrudes midline  Motor: Mild drift LUE, LLE. 5/5 strength RUE, RLE  Sensation: Intact to light touch bilaterally. No neglect or extinction on double simultaneous testing.  Coordination: No dysmetria on finger-to-nose and heel-to-shin bilaterally    ·  1a Level of Consciousness	 0  ·  1b Level of Consciousness	 0	  ·  1c Level of Consciousness	 0	  ·  2 Best Gaze	 	 0    ·  3 Visual	 	                  0  ·  4 Facial Palsy	 	 1  ·  5a Motor Left Arm	                  1  ·  5b Motor Right Arm	 0	  ·  6a Motor Left Leg	                   1  ·  6b Motor Right Leg	 0   ·  7 Limb Ataxia	 	0   ·  8 Sensory	 	0   ·  9 Best language	 	0  ·  10 Dysarthria	 	0   ·  11 Extinction and Inattention 0 	    Total NIHSS Score = 3      Fingerstick Blood Glucose: CAPILLARY BLOOD GLUCOSE  212 (04 Jul 2021 17:23)      POCT Blood Glucose.: 212 mg/dL (04 Jul 2021 15:39)    LABS:                        14.0   7.16  )-----------( 301      ( 04 Jul 2021 15:50 )             41.5     07-04    139  |  103  |  15  ----------------------------<  206<H>  4.2   |  25  |  1.1    Ca    9.6      04 Jul 2021 15:50    TPro  7.2  /  Alb  4.7  /  TBili  1.2  /  DBili  x   /  AST  19  /  ALT  17  /  AlkPhos  79  07-04      CARDIAC MARKERS ( 04 Jul 2021 15:50 )  x     / <0.01 ng/mL / x     / x     / x              RADIOLOGY & ADDITIONAL STUDIES:  EXAM:  CT BRAIN STROKE PROTOCOL          PROCEDURE DATE:  07/04/2021      IMPRESSION:    No CT evidence for acute intracranial pathology.    Motion artifact and streak artifact limit evaluation of the skull base.      Dr. Claudio Michele discussed preliminary findings with MYRA MARTINEZ on 7/4/20214:56 PM with readback.      ******PRELIMINARY REPORT******    ******PRELIMINARY REPORT******          EXAM:  CT ANGIO BRAIN (W)AW IC        EXAM:  CT ANGIO NECK (W)AW IC        EXAM:  CT PERFUSION W MAPS IC          PROCEDURE DATE:  07/04/2021      ******PRELIMINARY REPORT******    ******PRELIMINARY REPORT******         IMPRESSION:    CT brain perfusion: 28 mL penumbra involving the bilateral cerebellar hemispheres.    CTA neck: Right the V4 segment severe stenosis. No dissection.    CTA brain: No stenosis or aneurysm.    Right thyroid lobe 1.7 cm nodule. Nonemergent follow-up with ultrasound may be obtained for further evaluation.      -----------------------------------------------------------------------------------------------------------------  IV-tPA (Y/N):   N                           Reason IV-tPA not given: Out of window

## 2021-07-09 ENCOUNTER — APPOINTMENT (OUTPATIENT)
Dept: CARE COORDINATION | Facility: HOME HEALTH | Age: 81
End: 2021-07-09
Payer: MEDICARE

## 2021-07-09 DIAGNOSIS — E11.9 TYPE 2 DIABETES MELLITUS W/OUT COMPLICATIONS: ICD-10-CM

## 2021-07-09 PROBLEM — C61 MALIGNANT NEOPLASM OF PROSTATE: Chronic | Status: ACTIVE | Noted: 2021-07-04

## 2021-07-09 PROBLEM — C44.91 BASAL CELL CARCINOMA OF SKIN, UNSPECIFIED: Chronic | Status: ACTIVE | Noted: 2021-07-04

## 2021-07-09 PROBLEM — C67.9 MALIGNANT NEOPLASM OF BLADDER, UNSPECIFIED: Chronic | Status: ACTIVE | Noted: 2021-07-04

## 2021-07-09 PROCEDURE — 99348 HOME/RES VST EST LOW MDM 30: CPT | Mod: 95

## 2021-07-09 RX ORDER — SULFAMETHOXAZOLE AND TRIMETHOPRIM 800; 160 MG/1; MG/1
800-160 TABLET ORAL
Qty: 6 | Refills: 0 | Status: DISCONTINUED | COMMUNITY
Start: 2021-01-29 | End: 2021-07-09

## 2021-07-09 RX ORDER — SULFAMETHOXAZOLE AND TRIMETHOPRIM 800; 160 MG/1; MG/1
800-160 TABLET ORAL
Qty: 6 | Refills: 0 | Status: DISCONTINUED | COMMUNITY
Start: 2021-04-30 | End: 2021-07-09

## 2021-07-09 RX ORDER — SIMVASTATIN 10 MG/1
10 TABLET, FILM COATED ORAL DAILY
Qty: 90 | Refills: 3 | Status: DISCONTINUED | COMMUNITY
End: 2021-07-09

## 2021-07-09 NOTE — PLAN
[FreeTextEntry1] : CVA- c/w ASA, statin, plavix , heart healthy diet , neurology follow up \par HTN- c/w current regimen\par DM- low carb diet , bs monitoring \par neurology /pcp follow up

## 2021-07-09 NOTE — PHYSICAL EXAM
[No Acute Distress] : no acute distress [Well-Appearing] : well-appearing [No Respiratory Distress] : no respiratory distress  [No Accessory Muscle Use] : no accessory muscle use [Non-distended] : non-distended [Normal Gait] : normal gait [Alert and Oriented x3] : oriented to person, place, and time [de-identified] : left sided facial droop noted

## 2021-07-09 NOTE — HISTORY OF PRESENT ILLNESS
[Home] : at home, [unfilled] , at the time of the visit. [Other Location: e.g. Home (Enter Location, City,State)___] : at [unfilled] [Verbal consent obtained from patient] : the patient, [unfilled] [FreeTextEntry1] : follow up  discharge CVA  [de-identified] : Patient is an 79 y/o M PMH enrolled in the [facility] program  with a PMH of, DM, HTN, bladder and prostate CA, stable AAA presented to ED for evaluation of left sided weakness, MRI of brain showed:Punctate acute infarct in the ventral right medulla. No intracranial hemorrhage, midline shift, or hydrocephalus, left sided residual deficit to hand and left sided droop. Patient observed via tele health alert in NAD denies c/p, palpitations , headache, dizziness , NVD , or confusion .

## 2021-07-09 NOTE — COUNSELING
[de-identified] :  Continue to optimize  medical treatment for stroke prevention, including all prescribed medication regimen, provider follow up, and continue to maximize behavioral modifications, such as heart healthy, low fat diet rich in fruits and vegetables. TCM reviewed and all yellow card contact provided. Patient encouraged to reach out to NP with any complaints or concerns, will continue to monitor. \par \par

## 2021-07-12 ENCOUNTER — INPATIENT (INPATIENT)
Facility: HOSPITAL | Age: 81
LOS: 1 days | Discharge: NURSING, REHAB OR LONG-TERM CARE | End: 2021-07-14
Attending: INTERNAL MEDICINE | Admitting: INTERNAL MEDICINE
Payer: MEDICARE

## 2021-07-12 VITALS
SYSTOLIC BLOOD PRESSURE: 152 MMHG | OXYGEN SATURATION: 100 % | DIASTOLIC BLOOD PRESSURE: 62 MMHG | RESPIRATION RATE: 17 BRPM | TEMPERATURE: 98 F | HEIGHT: 71 IN | HEART RATE: 89 BPM

## 2021-07-12 DIAGNOSIS — Z98.890 OTHER SPECIFIED POSTPROCEDURAL STATES: Chronic | ICD-10-CM

## 2021-07-12 DIAGNOSIS — Z85.46 PERSONAL HISTORY OF MALIGNANT NEOPLASM OF PROSTATE: ICD-10-CM

## 2021-07-12 DIAGNOSIS — Z85.51 PERSONAL HISTORY OF MALIGNANT NEOPLASM OF BLADDER: ICD-10-CM

## 2021-07-12 DIAGNOSIS — Z79.4 LONG TERM (CURRENT) USE OF INSULIN: ICD-10-CM

## 2021-07-12 DIAGNOSIS — Z95.5 PRESENCE OF CORONARY ANGIOPLASTY IMPLANT AND GRAFT: ICD-10-CM

## 2021-07-12 DIAGNOSIS — I10 ESSENTIAL (PRIMARY) HYPERTENSION: ICD-10-CM

## 2021-07-12 DIAGNOSIS — I63.411 CEREBRAL INFARCTION DUE TO EMBOLISM OF RIGHT MIDDLE CEREBRAL ARTERY: ICD-10-CM

## 2021-07-12 DIAGNOSIS — Z98.49 CATARACT EXTRACTION STATUS, UNSPECIFIED EYE: Chronic | ICD-10-CM

## 2021-07-12 DIAGNOSIS — I25.10 ATHEROSCLEROTIC HEART DISEASE OF NATIVE CORONARY ARTERY WITHOUT ANGINA PECTORIS: ICD-10-CM

## 2021-07-12 DIAGNOSIS — G81.94 HEMIPLEGIA, UNSPECIFIED AFFECTING LEFT NONDOMINANT SIDE: ICD-10-CM

## 2021-07-12 DIAGNOSIS — R29.715 NIHSS SCORE 15: ICD-10-CM

## 2021-07-12 DIAGNOSIS — E11.9 TYPE 2 DIABETES MELLITUS WITHOUT COMPLICATIONS: ICD-10-CM

## 2021-07-12 DIAGNOSIS — I71.4 ABDOMINAL AORTIC ANEURYSM, WITHOUT RUPTURE: ICD-10-CM

## 2021-07-12 LAB
ALBUMIN SERPL ELPH-MCNC: 4.4 G/DL — SIGNIFICANT CHANGE UP (ref 3.5–5.2)
ALP SERPL-CCNC: 90 U/L — SIGNIFICANT CHANGE UP (ref 30–115)
ALT FLD-CCNC: 24 U/L — SIGNIFICANT CHANGE UP (ref 0–41)
ANION GAP SERPL CALC-SCNC: 12 MMOL/L — SIGNIFICANT CHANGE UP (ref 7–14)
APTT BLD: 32.2 SEC — SIGNIFICANT CHANGE UP (ref 27–39.2)
AST SERPL-CCNC: 29 U/L — SIGNIFICANT CHANGE UP (ref 0–41)
BASOPHILS # BLD AUTO: 0.05 K/UL — SIGNIFICANT CHANGE UP (ref 0–0.2)
BASOPHILS NFR BLD AUTO: 0.6 % — SIGNIFICANT CHANGE UP (ref 0–1)
BILIRUB SERPL-MCNC: 1.3 MG/DL — HIGH (ref 0.2–1.2)
BUN SERPL-MCNC: 20 MG/DL — SIGNIFICANT CHANGE UP (ref 10–20)
CALCIUM SERPL-MCNC: 9.6 MG/DL — SIGNIFICANT CHANGE UP (ref 8.5–10.1)
CHLORIDE SERPL-SCNC: 105 MMOL/L — SIGNIFICANT CHANGE UP (ref 98–110)
CO2 SERPL-SCNC: 22 MMOL/L — SIGNIFICANT CHANGE UP (ref 17–32)
CREAT SERPL-MCNC: 1.2 MG/DL — SIGNIFICANT CHANGE UP (ref 0.7–1.5)
EOSINOPHIL # BLD AUTO: 0.05 K/UL — SIGNIFICANT CHANGE UP (ref 0–0.7)
EOSINOPHIL NFR BLD AUTO: 0.6 % — SIGNIFICANT CHANGE UP (ref 0–8)
GLUCOSE SERPL-MCNC: 117 MG/DL — HIGH (ref 70–99)
HCT VFR BLD CALC: 43.4 % — SIGNIFICANT CHANGE UP (ref 42–52)
HGB BLD-MCNC: 14.6 G/DL — SIGNIFICANT CHANGE UP (ref 14–18)
IMM GRANULOCYTES NFR BLD AUTO: 0.2 % — SIGNIFICANT CHANGE UP (ref 0.1–0.3)
INR BLD: 1.03 RATIO — SIGNIFICANT CHANGE UP (ref 0.65–1.3)
LYMPHOCYTES # BLD AUTO: 1.97 K/UL — SIGNIFICANT CHANGE UP (ref 1.2–3.4)
LYMPHOCYTES # BLD AUTO: 24.1 % — SIGNIFICANT CHANGE UP (ref 20.5–51.1)
MCHC RBC-ENTMCNC: 30 PG — SIGNIFICANT CHANGE UP (ref 27–31)
MCHC RBC-ENTMCNC: 33.6 G/DL — SIGNIFICANT CHANGE UP (ref 32–37)
MCV RBC AUTO: 89.3 FL — SIGNIFICANT CHANGE UP (ref 80–94)
MONOCYTES # BLD AUTO: 1.01 K/UL — HIGH (ref 0.1–0.6)
MONOCYTES NFR BLD AUTO: 12.4 % — HIGH (ref 1.7–9.3)
NEUTROPHILS # BLD AUTO: 5.06 K/UL — SIGNIFICANT CHANGE UP (ref 1.4–6.5)
NEUTROPHILS NFR BLD AUTO: 62.1 % — SIGNIFICANT CHANGE UP (ref 42.2–75.2)
NRBC # BLD: 0 /100 WBCS — SIGNIFICANT CHANGE UP (ref 0–0)
PLATELET # BLD AUTO: 304 K/UL — SIGNIFICANT CHANGE UP (ref 130–400)
POTASSIUM SERPL-MCNC: 4.9 MMOL/L — SIGNIFICANT CHANGE UP (ref 3.5–5)
POTASSIUM SERPL-SCNC: 4.9 MMOL/L — SIGNIFICANT CHANGE UP (ref 3.5–5)
PROT SERPL-MCNC: 7 G/DL — SIGNIFICANT CHANGE UP (ref 6–8)
PROTHROM AB SERPL-ACNC: 11.8 SEC — SIGNIFICANT CHANGE UP (ref 9.95–12.87)
RBC # BLD: 4.86 M/UL — SIGNIFICANT CHANGE UP (ref 4.7–6.1)
RBC # FLD: 13.4 % — SIGNIFICANT CHANGE UP (ref 11.5–14.5)
SARS-COV-2 RNA SPEC QL NAA+PROBE: SIGNIFICANT CHANGE UP
SODIUM SERPL-SCNC: 139 MMOL/L — SIGNIFICANT CHANGE UP (ref 135–146)
WBC # BLD: 8.16 K/UL — SIGNIFICANT CHANGE UP (ref 4.8–10.8)
WBC # FLD AUTO: 8.16 K/UL — SIGNIFICANT CHANGE UP (ref 4.8–10.8)

## 2021-07-12 PROCEDURE — 99222 1ST HOSP IP/OBS MODERATE 55: CPT

## 2021-07-12 PROCEDURE — 70496 CT ANGIOGRAPHY HEAD: CPT | Mod: 26,MA

## 2021-07-12 PROCEDURE — 70450 CT HEAD/BRAIN W/O DYE: CPT | Mod: 26,MA

## 2021-07-12 PROCEDURE — 70498 CT ANGIOGRAPHY NECK: CPT | Mod: 26,MA

## 2021-07-12 PROCEDURE — 99285 EMERGENCY DEPT VISIT HI MDM: CPT

## 2021-07-12 PROCEDURE — 70551 MRI BRAIN STEM W/O DYE: CPT | Mod: 26

## 2021-07-12 PROCEDURE — 93010 ELECTROCARDIOGRAM REPORT: CPT

## 2021-07-12 RX ORDER — PHENAZOPYRIDINE HCL 100 MG
100 TABLET ORAL EVERY 8 HOURS
Refills: 0 | Status: DISCONTINUED | OUTPATIENT
Start: 2021-07-12 | End: 2021-07-12

## 2021-07-12 RX ORDER — LOSARTAN POTASSIUM 100 MG/1
100 TABLET, FILM COATED ORAL DAILY
Refills: 0 | Status: DISCONTINUED | OUTPATIENT
Start: 2021-07-12 | End: 2021-07-14

## 2021-07-12 RX ORDER — TICAGRELOR 90 MG/1
90 TABLET ORAL EVERY 12 HOURS
Refills: 0 | Status: DISCONTINUED | OUTPATIENT
Start: 2021-07-12 | End: 2021-07-14

## 2021-07-12 RX ORDER — ENOXAPARIN SODIUM 100 MG/ML
40 INJECTION SUBCUTANEOUS DAILY
Refills: 0 | Status: DISCONTINUED | OUTPATIENT
Start: 2021-07-12 | End: 2021-07-14

## 2021-07-12 RX ORDER — ASPIRIN/CALCIUM CARB/MAGNESIUM 324 MG
81 TABLET ORAL DAILY
Refills: 0 | Status: DISCONTINUED | OUTPATIENT
Start: 2021-07-12 | End: 2021-07-12

## 2021-07-12 RX ORDER — AMLODIPINE BESYLATE 2.5 MG/1
5 TABLET ORAL DAILY
Refills: 0 | Status: DISCONTINUED | OUTPATIENT
Start: 2021-07-12 | End: 2021-07-13

## 2021-07-12 RX ORDER — LABETALOL HCL 100 MG
10 TABLET ORAL ONCE
Refills: 0 | Status: COMPLETED | OUTPATIENT
Start: 2021-07-12 | End: 2021-07-12

## 2021-07-12 RX ORDER — ASPIRIN/CALCIUM CARB/MAGNESIUM 324 MG
81 TABLET ORAL ONCE
Refills: 0 | Status: DISCONTINUED | OUTPATIENT
Start: 2021-07-12 | End: 2021-07-13

## 2021-07-12 RX ORDER — CHLORHEXIDINE GLUCONATE 213 G/1000ML
1 SOLUTION TOPICAL
Refills: 0 | Status: DISCONTINUED | OUTPATIENT
Start: 2021-07-12 | End: 2021-07-14

## 2021-07-12 RX ORDER — ATORVASTATIN CALCIUM 80 MG/1
80 TABLET, FILM COATED ORAL AT BEDTIME
Refills: 0 | Status: DISCONTINUED | OUTPATIENT
Start: 2021-07-12 | End: 2021-07-14

## 2021-07-12 RX ADMIN — ATORVASTATIN CALCIUM 80 MILLIGRAM(S): 80 TABLET, FILM COATED ORAL at 21:42

## 2021-07-12 RX ADMIN — ENOXAPARIN SODIUM 40 MILLIGRAM(S): 100 INJECTION SUBCUTANEOUS at 21:42

## 2021-07-12 NOTE — ED ADULT NURSE NOTE - OBJECTIVE STATEMENT
Pt c/o worsening L sided numbness and weakness x 4days. Pt recently was admitted to stroke unit for L facial droop and L sided numbness and was d'cd on 7/8. Denies pain or any other symptoms.

## 2021-07-12 NOTE — H&P ADULT - ASSESSMENT
80 Y M PMHx of  CAD/stents, DM, HTN, bladder and prostate CA, stable AAA, recent acute stroke (discharged 7/7/2021) presents to [facility] with recurrent and transient left sided upper and lower extremity weakness.     #) Left upper and lower extremity weakness in setting of recent acute stroke   - Neurology following; likely  80 Y M PMHx of  CAD/stents, DM, HTN, bladder and prostate CA, stable AAA, recent acute stroke (discharged 7/7/2021) presents to [facility] with recurrent and transient left sided upper and lower extremity weakness.     #) Left upper and lower extremity weakness in setting of recent acute stroke   - Neurology following; symptoms likely related to vertebral artery disease  - Stopped plavix, started Brilinta (90mg tonight, then BID)   - Will stop aspirin after today   - Continuing Atorvastatin 80mg  - BP goal 130-160; will give stat labetalol 10mg; continue losartan and will add amlodipine 5mg for now   - Will order MRI Brain (non-con)   - PT/OT/Speech eval    #) HTN   - BP goal as above (130-160 systolic)        --> Ordering stat labetalol as BP >180 systolic        --> Continue losartan        --> adding amlodipine 5mg  - Adjust medication as necessary     #) DM  - Hemoglobin A1C 7.8 on 7/5  - Follow-up finger-stick glucose   - Add insulin regimen as needed (goal 140-180)     #) Bladder/prostate CA  - Outpatient management     #) AAA  - Stable at this time  - Outpatient management    #) Diet - DASH/consistent carbohydrate  #) DVT prophylaxis- lovenox 40mg sub-Q QD  #) Disposition- stroke unit   #) Activity- increase as tolerated  #) Code status - Full  80 Y M PMHx of CAD/stents, DM, HTN, bladder and prostate CA, stable AAA, recent acute stroke (discharged 7/7/2021) presents to [facility] with recurrent and transient left sided upper and lower extremity weakness.     #) Left upper and lower extremity weakness in setting of recent acute stroke   - Neurology following; symptoms likely related to vertebral artery disease  - Stopped plavix, started Brilinta (90mg tonight, then BID)   - Will stop aspirin after today   - Continuing Atorvastatin 80mg  - BP goal 130-160; will give stat labetalol 10mg; continue losartan and will add amlodipine 5mg for now   - Will order MRI Brain (non-con)   - PT/OT/Speech eval    #) HTN   - BP goal as above (130-160 systolic)        --> Ordering stat labetalol as BP >180 systolic        --> Continue losartan        --> adding amlodipine 5mg  - Adjust medication as necessary     #) DM  - Hemoglobin A1C 7.8 on 7/5  - Follow-up finger-stick glucose   - Add insulin regimen as needed (goal 140-180)     #) Bladder/prostate CA  - Outpatient management     #) AAA  - Stable at this time  - Outpatient management    #) HFrEF  - Echo taken 7/6/2021 shows decreased left ventricular systolic function   - EF 46%  - No overt symptoms at this time, can follow-up outpatient.     #) Diet - DASH/consistent carbohydrate  #) DVT prophylaxis- lovenox 40mg sub-Q QD  #) Disposition- stroke unit   #) Activity- increase as tolerated  #) Code status - Full

## 2021-07-12 NOTE — ED PROVIDER NOTE - ATTENDING CONTRIBUTION TO CARE
79 y/o male h/o HTN, DM, CAD, AAA, DC 7/8 s/p dx of CVA with sx of left facial droop (persisted) and LLE weakness (resolved) now p/w RUE and LLE weakness and numbness x 7/9, persistent, denies modifying factors, denies associated complaints at present.    Old chart reviewed.  I have reviewed and agree with the initial nursing note, except as documented in my note.    VSS, awake, alert, non-toxic appearing, lying comfortably in stretcher, in NAD, oropharynx clear, mmm, no JVD or bruit, no skin rash or lesions, chest CTAB, non-labored breathing, no w/r/r, +S1/S2, RRR, no m/r/g, abdomen soft, NT, ND, +BS, no peripheral edema or deformities, alert and oriented to  person, place and time, clear speech, left facial droop, otherwise cranial nerves II-XII are intact, upper and lower extremity strength is 4/5 on left, normal on rt.

## 2021-07-12 NOTE — H&P ADULT - NSHPLABSRESULTS_GEN_ALL_CORE
LABS:                        14.6   8.16  )-----------( 304      ( 12 Jul 2021 14:31 )             43.4     07-12    139  |  105  |  20  ----------------------------<  117<H>  4.9   |  22  |  1.2    Ca    9.6      12 Jul 2021 14:31    TPro  7.0  /  Alb  4.4  /  TBili  1.3<H>  /  DBili  x   /  AST  29  /  ALT  24  /  AlkPhos  90  07-12    PT/INR - ( 12 Jul 2021 14:31 )   PT: 11.80 sec;   INR: 1.03 ratio         PTT - ( 12 Jul 2021 14:31 )  PTT:32.2 sec      RADIOLOGY:  7/12 CT Head non-con  IMPRESSION:    In comparison with the prior CT scan of the brain dated July 4, 2021:    No acute intracranial hemorrhage or acute large territorial infarct, stableexamination.    Small acute infarct was reported on the MRI of the brain dated July 5, 2021, please see that report for further information.    If symptoms persist, follow-up MRI of the brain is suggested if not contraindicated in this patient.    7/12 CTA Head/Neck   IMPRESSION:    No significant change since recent CTA of the head and neck dated 7/4/2021.    CTA HEAD:  Severe stenoses involving the right V4 segment of the vertebral artery due to calcified and noncalcified atherosclerotic plaque.    Moderate stenosis of the supraclinoid segment of the left ICA due to atherosclerotic calcification.    Short segment mild/moderate stenosis involving proximal inferior division of the right MCA.    CTA NECK:  No evidence of carotid or vertebral artery stenosis.    Similar bilateral heterogeneous thyroid lobe nodules which can be further evaluated with dedicated nonemergent thyroid ultrasound.

## 2021-07-12 NOTE — H&P ADULT - ATTENDING COMMENTS
HPI: agree with past medical history taken by redsient below   80 Y gentleman PMHx of  CAD/stents, DM, HTN, bladder and prostate CA, stable AAA, recent acute stroke (discharged 7/7/2021) presents to [facility] with left sided upper and lower extremity weakness. 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 Patient had been evaluated by neuroendovascular who recommended no acute intervention. He was discharged for outpatient management. The day after discharge, patient started to feel recurrent left upper extremity weakness. He did not seek medical attention and his weakness continued to worsen through the weekend. Patient decided to come in today for further management as he can hardly move his left arm and his left leg is very weak. Patient does note, however, that his left arm was quite strong intermittently today, but 30 minutes later his weakness returned. Repeat CTH shows same territory infarct. CTA head and neck with redemonstration of moderate stenosis involving the proximal M2 inferior division of the right MCA and severe multifocal stenoses involving the V4 segment of the right vertebral artery due to calcified atherosclerotic plaque. Seen by neurology who suspect fluctuating symptoms may be due to high grade vertebral artery vessel disease. Being admitted for further management. Patient denies any fevers, chills, chest pain, SOB, nausea, vomiting, abdominal pain.       REVIEW OF SYSTEMS:  CONSTITUTIONAL:  No weakness, fevers, chills, night sweats, weight loss  EYES/ENT: No visual changes. No vertigo or dysphagia  NECK: No neck pain or stiffness  RESPIRATORY: No cough, wheezing, hemoptysis. No shortness of breath  CARDIOVASCULAR: No chest pain or palpitations. No lower extremity edema  GASTROINTESTINAL: No abdominal pain. No nausea, vomiting, diarrhea, or hematemesis  GENITOURINARY: No dysuria or hematuria   NEUROLOGICAL: No focal numbness. + L upper and lower extremity weakness  SKIN: No rashes or itching  HEMATOLOGIC: No easy bruising or prolonged bleeding.      PHYSICAL EXAM:  GENERAL: NAD, well-developed, Non-toxic, stated age   HEAD:  Atraumatic, Normocephalic  EYES: EOMI, Sclera White   NECK: Supple, No JVD  CHEST/LUNG: Clear to auscultation bilaterally; No wheezing, rhonchi, or crackles  HEART: Regular rate and rhythm; s1, s2, No murmurs, rubs, or gallops  ABDOMEN: Soft, Nontender, Nondistended; Bowel sounds present, No rebound or guarding noted   EXTREMITIES:  No lower extremity edema or calf tenderness to palpation.  No clubbing or cyanosis  PSYCH: AAOx3, pleasant, cooperative, not anxious  NEUROLOGY: non-focal  SKIN: No rashes or lesions      ASSESSMENT AND PLAN:        My note supersedes the residents in the event of a discrepancy. HPI: agree with past medical history taken by redsient below   80 Y gentleman PMHx of  CAD/stents, DM, HTN, bladder and prostate CA, stable AAA, recent acute stroke (discharged 7/7/2021) presents to [facility] with left sided upper and lower extremity weakness. 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 Patient had been evaluated by neuroendovascular who recommended no acute intervention. He was discharged for outpatient management. The day after discharge, patient started to feel recurrent left upper extremity weakness. He did not seek medical attention and his weakness continued to worsen through the weekend. Patient decided to come in today for further management as he can hardly move his left arm and his left leg is very weak. Patient does note, however, that his left arm was quite strong intermittently today, but 30 minutes later his weakness returned. Repeat CTH shows same territory infarct. CTA head and neck with redemonstration of moderate stenosis involving the proximal M2 inferior division of the right MCA and severe multifocal stenoses involving the V4 segment of the right vertebral artery due to calcified atherosclerotic plaque. Seen by neurology who suspect fluctuating symptoms may be due to high grade vertebral artery vessel disease. Being admitted for further management. Patient denies any fevers, chills, chest pain, SOB, nausea, vomiting, abdominal pain.       REVIEW OF SYSTEMS:  CONSTITUTIONAL:  No weakness, fevers, chills, night sweats, weight loss  EYES/ENT: No visual changes. No vertigo or dysphagia  NECK: No neck pain or stiffness  RESPIRATORY: No cough, wheezing, hemoptysis. No shortness of breath  CARDIOVASCULAR: No chest pain or palpitations. No lower extremity edema  GASTROINTESTINAL: No abdominal pain. No nausea, vomiting, diarrhea, or hematemesis  GENITOURINARY: No dysuria or hematuria   NEUROLOGICAL: No focal numbness. + L upper and lower extremity weakness  SKIN: No rashes or itching  HEMATOLOGIC: No easy bruising or prolonged bleeding.      PHYSICAL EXAM:  GENERAL: NAD, well-developed, Non-toxic, stated age   HEAD:  Atraumatic, Normocephalic  EYES: EOMI, Sclera White   NECK: Supple, No JVD  CHEST/LUNG: Clear to auscultation bilaterally; No wheezing, rhonchi, or crackles  HEART: Regular rate and rhythm; s1, s2, No murmurs, rubs, or gallops  ABDOMEN: Soft, Nontender, Nondistended; Bowel sounds present, No rebound or guarding noted   EXTREMITIES:  No lower extremity edema or calf tenderness to palpation.  No clubbing or cyanosis  PSYCH: AAOx3, pleasant, cooperative, not anxious  NEUROLOGY: left sided facial droop, left uper extremity and lower extremity weakness. Otherwise exam unremarkable   SKIN: No rashes or lesions      ASSESSMENT AND PLAN:  Recurrent L sided weakness, L facial droop:  Neuro following: Check MRI H non-contrast  -On high dose statin, starting brilinta  BID. d/c asa and Plavix   -PT eval/OT eval     HTN: cont home medications   -Avoid hypotension, keep 130-160     CAD s/p PCI: COnt with brilinta, lipitor, and losartan     Diabetes: Basal bolus insulin, keep fingerstick glucose <180.     CKD III at baseline     AAA and bladder cancer: out patient follow up     DVT ppx: Lovenox/Heparin  GI ppx: Not indicated  GOC: Full code.      My note supersedes the residents in the event of a discrepancy.

## 2021-07-12 NOTE — ED CLERICAL - NS ED CLERK NOTE PRE-ARRIVAL INFORMATION; ADDITIONAL PRE-ARRIVAL INFORMATION
This patient is enrolled in the STARS readmission reduction initiative and has active care navigation. This patient can be followed up by the care navigation team within 24 hours.  To arrange close follow-up or to obtain additional clinical information, please call the contact number above. The on call [facility] Hospitalist has been notified and will coordinate care in concert with the ED Physician including consults as necessary. Call [phone] (North), [phone] (South) to reach the hospitalist. You may also call the Hospitalist Division at  at either site. Consider CDU for management per guidelines

## 2021-07-12 NOTE — CONSULT NOTE ADULT - ATTENDING COMMENTS
Patient seen and examined and agree with above except as noted.  Patients history, notes, labs, imaging, vitals and meds reviewed personally.  Patient presents with worsening of left sided symptoms starting 1 day following discharge last week for right medullary stroke.  Found to have high grade stenosis in the right vertebral artery thought ot be the etiology of the stroke.  He was on aspirin and plavix and compliant however symptoms progressed.  Plan was to switch to Brilinta (Thales trial data) due too progression of disease.    Plan as above

## 2021-07-12 NOTE — H&P ADULT - NSHPPHYSICALEXAM_GEN_ALL_CORE
VITALS:   T(F): 98.2  HR: 93  BP: 180/79  RR: 18  SpO2: 100%    PHYSICAL EXAM:  GENERAL: NAD, speaks in full sentences, no signs of respiratory distress  HEAD: Atraumatic  NECK: Supple  CHEST/LUNG: Clear to auscultation bilaterally; No wheeze or crackles  HEART: S1, S2; RRR; No murmurs, rubs, or gallops  ABDOMEN: BS+; Soft, Non-tender, Non-distended  EXTREMITIES:  2+ Peripheral Pulses, No clubbing, cyanosis, or edema  PSYCH: AAOx3  NEUROLOGY: Left facial droop. Left upper and lower extremity weakness.   SKIN: No rashes or lesions

## 2021-07-12 NOTE — ED PROVIDER NOTE - OBJECTIVE STATEMENT
80 year old male with pmhx of CAD with stents, on aspirin and plavix,  DM, HTN, bladder and prostate CA, stable AAA, recent acute stroke (discharged 7/7/2021) presents with left sided upper and lower extremity weakness. Pt admits was discharged on Thursday after having an acute infarct in the ventral right medulla. Pt admits has been having intermittent weakness since discharge, did not want to come to hospital. pt went to outpatient rehab today and was sent to ED. pt denies ha, dizziness, chest pain, sob, abd pain, or nausea, vomiting, diarrhea. no fever or chills.

## 2021-07-12 NOTE — ED PROVIDER NOTE - PHYSICAL EXAMINATION
CONST: Well appearing in NAD  EYES: PERRL, EOMI, Sclera and conjunctiva clear.   ENT: No nasal discharge. Oropharynx normal appearing  NECK: Non-tender, no meningeal signs. normal ROM. supple   CARD: Normal S1 S2; Normal rate and rhythm  RESP: Equal BS B/L, No wheezes, rhonchi or rales. No distress  GI: Soft, non-tender, non-distended. no cva tenderness. normal BS  MS: Normal ROM in all extremities. No midline spinal tenderness. pulses 2 +. no calf tenderness or swelling  SKIN: Warm, dry, no acute rashes. Good turgor  NEURO: A&Ox3,  Left facial droop. Left upper and lower extremity weakness

## 2021-07-12 NOTE — ED ADULT TRIAGE NOTE - CHIEF COMPLAINT QUOTE
Pt was d/c from stroke unit Thursday. Pt at rehab facility and states he has been experiencing numbness to left arm and leg since Friday.

## 2021-07-12 NOTE — ED ADULT NURSE NOTE - AS SC BRADEN FRICTION
Attending Note   The Resident's history was reviewed and patient interviewed.  The History is confirmed    Brief history: Pt presents seeking opiate detox.     The Resident's physical exam was reviewed and patient examined.  Physical confirmed    Brief Physical: Anxious and restless. HRRR    The Assessment and plan were reviewed with the resident.  I confirm the clinical impression.  I have reviewed the residents care plan and agree with the planned approach.  I was physically present during the key portions of the patients History, Physical and Procedures.      EKG Interpretation  Rate: 92  Rhythm: NSR  Abnormality: Normal ECG  When compared with ECG of 11/21/16, no significant change, other than rate.    EKG interpreted by ED physician      Vitals:    01/24/18 1422 01/24/18 1537 01/24/18 1811 01/24/18 1930   BP: (!) 136/100 (!) 138/98 (!) 144/98 134/77   Pulse: 102 84 101 92   Resp: 18 18 16   Temp:       TempSrc:       SpO2: 98% 96% 98% 98%   Weight:       Height:         Results for orders placed or performed during the hospital encounter of 01/24/18   CBC & Auto Differential   Result Value    WBC 13.0 (H)    RBC 4.48    HGB 11.4 (L)    HCT 35.5 (L)    MCV 79.2    MCH 25.4 (L)    MCHC 32.1    RDW-CV 16.9 (H)        DIFF TYPE AUTOMATED DIFFERENTIAL    Neutrophil 81    LYMPH 13    MONO 5    EOSIN 1    BASO 0    Absolute Neutrophil 10.6 (H)    Absolute Lymph 1.7    Absolute Mono 0.6    Absolute Eos 0.1    Absolute Baso 0.0   Comprehensive Metabolic Panel   Result Value    Sodium 140    Potassium 3.9    Chloride 107    Carbon Dioxide 21    Anion Gap 16    Glucose 100 (H)    BUN 12    Creatinine 0.82    GFR Estimate,  >90     Comment: eGFR results = or >90 mL/min/1.73m2 = Normal kidney function.    GFR Estimate, Non  >90     Comment: eGFR results = or >90 mL/min/1.73m2 = Normal kidney function.    BUN/Creatinine Ratio 15    CALCIUM 9.1    TOTAL BILIRUBIN 0.3    AST/SGOT 15     ALT/SGPT 17    ALK PHOSPHATASE 83    TOTAL PROTEIN 7.0    Albumin 3.9    GLOBULIN 3.1    A/G Ratio, Serum 1.3   Drug Abuse Screen Basic Urine   Result Value    U-Amphetamines NEGATIVE     Comment: Cutoff = 500ng/ml    U-Barbiturates NEGATIVE     Comment: Cutoff = 200 ng/mL    U-Benzodiazepines NEGATIVE     Comment: Cutoff = 200 ng/mL    Cannabinoids (THC) UA NEGATIVE     Comment: cutoff = 50 ng/mL    U-Cocaine/Metabolite POSITIVE (A)     Comment: cutoff = 150 ng/ml    Opiates UA POSITIVE (A)     Comment: cutoff = 300 ng/mL    U-PHENCYCLIDINE NEGATIVE     Comment: Cutoff = 25 ng/mL  Drug screening is for medical purposes only.  A confirmation by GC/MS may be ordered to verify clinically questionable false positive results.     Alcohol Level Serum   Result Value    Alcohol Ethyl None detected.   Urinalysis & Reflex Micro with Culture if Indicated   Result Value    COLOR YELLOW    APPEARANCE CLEAR    GLUCOSE(URINE) NEGATIVE    BILIRUBIN NEGATIVE    KETONES NEGATIVE    SPECIFIC GRAVITY 1.020    BLOOD SMALL (A)    pH 7.0    PROTEIN(URINE) TRACE (A)    UROBILINOGEN 0.2    NITRITE NEGATIVE    LEUKOCYTE ESTERASE NEGATIVE    SPECIMEN TYPE URINE, CLEAN CATCH/MIDSTREAM   Urinalysis Microscopic   Result Value    Squamous EPI'S 6 to 10    RBC 11 to 25    WBC 1 to 5    BACTERIA NONE SEEN    Hyaline Casts NONE SEEN   Urine pregnancy POC   Result Value    URINE PREGNANCY,QUAL negative     Imaging Results          CT Head Brain (Final result)  Result time 01/24/18 15:53:54    Final result                 Impression:    IMPRESSION:  No acute intracranial findings. Incidental small retention cyst or polyp in  the right maxillary sinus.               Narrative:    CT HEAD WO CONTRAST    HISTORY:  HEAD INJURY MILD OR MODERATE ACUTE, NO NEUROLOGICAL DEFICIT.         COMPARISON:  February 3, 2013    TECHNIQUE:  Axial, nonenhanced images. Sagittal and coronal reformats.    FINDINGS:  No intracranial hemorrhage, mass effect, or midline  shift. The  gray/white junction is well preserved. No focal extraaxial fluid  collection. No CT evidence for evolving infarct in a major intracranial  vascular territory, although MRI is more sensitive for detection of acute  ischemia.         No calvarial fracture. Small retention cyst or polyp in  the right maxillary sinus. No other significant sinus disease at skull  base. Mastoid air cells are well aerated.                                   ED Medication Orders     Start Ordered     Status Ordering Provider    01/24/18 1428 01/24/18 1427  DIAZepam (VALIUM) tablet 5 mg  ONCE      Last MAR action:  Given ÓSCAR BOYD    01/24/18 1318 01/24/18 1317  cloNIDine (CATAPRES) tablet 0.1 mg  ONCE      Last MAR action:  Given LEVY THACKER    01/24/18 1318 01/24/18 1317  acetaminophen (TYLENOL) tablet 1,000 mg  ONCE      Last MAR action:  Given LEVY THACKER          ED COURSE    3:39 PM I spoke with the . They state the pt is interested in inpatient treatment and will require labs.     5:20 PM I called and spoke with the . Discussed that the pt is medically cleared.     7:43 PM I saw and examined the pt and discussed that I am Dr. Thacker's attending physician. Pt provided a brief history and I performed a brief exam as noted above.     MDM    Critical Care time spent on this patient outside of billable procedures:  None       ED Diagnoses        Final diagnoses    Syncope, unspecified syncope type     Opiate abuse, continuous     Cocaine abuse           I have reviewed the information recorded by the scribe for accuracy and agree with its contents.     Arthur Larios acting as scribe for Óscar Boyd MD.     Physician: Óscar Boyd MD  Dictation #: [id number]  Scribe: Arthur Boyd MD  01/24/18 4587     (2) potential problem

## 2021-07-12 NOTE — ED ADULT NURSE NOTE - NSIMPLEMENTINTERV_GEN_ALL_ED
Implemented All Fall with Harm Risk Interventions:  [place] to call system. Call bell, personal items and telephone within reach. Instruct patient to call for assistance. Room bathroom lighting operational. Non-slip footwear when patient is off stretcher. Physically safe environment: no spills, clutter or unnecessary equipment. Stretcher in lowest position, wheels locked, appropriate side rails in place. Provide visual cue, wrist band, yellow gown, etc. Monitor gait and stability. Monitor for mental status changes and reorient to person, place, and time. Review medications for side effects contributing to fall risk. Reinforce activity limits and safety measures with patient and family. Provide visual clues: red socks.

## 2021-07-12 NOTE — ED PROVIDER NOTE - PROGRESS NOTE DETAILS
Neuro recommends admission to stroke unit, approved by dr fercho diallo BH: The patient was reassessed prior to admission and the deficits remain stable.

## 2021-07-12 NOTE — H&P ADULT - HISTORY OF PRESENT ILLNESS
80 Y M PMHx of  CAD/stents, DM, HTN, bladder and prostate CA, stable AAA, recent acute stroke (discharged 7/7/2021) presents to [facility] with left sided upper and lower extremity weakness. 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  80 Y M PMHx of  CAD/stents, DM, HTN, bladder and prostate CA, stable AAA, recent acute stroke (discharged 7/7/2021) presents to [facility] with left sided upper and lower extremity weakness. 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 Patient had been evaluated by neuroendovascular who recommended no acute intervention. He was discharged for outpatient management. The day after discharge, patient started to feel recurrent left upper extremity weakness. He did not seek medical attention and his weakness continued to worsen through the weekend. Patient decided to come in today for further management as he can hardly move his left arm and his left leg is very weak. Patient does note, however, that his left arm was quite strong intermittently today, but 30 minutes later his weakness returned. Repeat CTH shows same territory infarct. CTA head and neck with redemonstration of moderate stenosis involving the proximal M2 inferior division of the right MCA and severe multifocal stenoses involving the V4 segment of the right vertebral artery due to calcified atherosclerotic plaque. Seen by neurology who suspect fluctuating symptoms may be due to high grade vertebral artery vessel disease. Being admitted for further management. Patient denies any fevers, chills, chest pain, SOB, nausea, vomiting, abdominal pain.  80 Y M PMHx of  CAD/stents, DM, HTN, bladder and prostate CA, stable AAA, recent acute stroke (discharged 7/7/2021) presents to [facility] with left sided upper and lower extremity weakness. 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 Patient had been evaluated by neuroendovascular who recommended no acute intervention. He was discharged for outpatient management. The day after discharge, patient started to feel recurrent left upper extremity weakness. He did not seek medical attention and his weakness continued to worsen through the weekend. Patient decided to come in today for further management as he can hardly move his left arm and his left leg is very weak. Patient does note, however, that his left arm was quite strong intermittently today, but 30 minutes later his weakness returned. Repeat CTH shows same territory infarct. CTA head and neck with redemonstration of moderate stenosis involving the proximal M2 inferior division of the right MCA and severe multifocal stenoses involving the V4 segment of the right vertebral artery due to calcified atherosclerotic plaque. Seen by neurology who suspect fluctuating symptoms may be due to high grade vertebral artery vessel disease. Being admitted for further management. Patient denies any fevers, chills, chest pain, SOB, nausea, vomiting, abdominal pain.

## 2021-07-13 LAB
ALBUMIN SERPL ELPH-MCNC: 4 G/DL — SIGNIFICANT CHANGE UP (ref 3.5–5.2)
ALP SERPL-CCNC: 82 U/L — SIGNIFICANT CHANGE UP (ref 30–115)
ALT FLD-CCNC: 20 U/L — SIGNIFICANT CHANGE UP (ref 0–41)
ANION GAP SERPL CALC-SCNC: 10 MMOL/L — SIGNIFICANT CHANGE UP (ref 7–14)
AST SERPL-CCNC: 21 U/L — SIGNIFICANT CHANGE UP (ref 0–41)
BASOPHILS # BLD AUTO: 0.07 K/UL — SIGNIFICANT CHANGE UP (ref 0–0.2)
BASOPHILS NFR BLD AUTO: 1 % — SIGNIFICANT CHANGE UP (ref 0–1)
BILIRUB SERPL-MCNC: 1.3 MG/DL — HIGH (ref 0.2–1.2)
BUN SERPL-MCNC: 17 MG/DL — SIGNIFICANT CHANGE UP (ref 10–20)
CALCIUM SERPL-MCNC: 9.1 MG/DL — SIGNIFICANT CHANGE UP (ref 8.5–10.1)
CHLORIDE SERPL-SCNC: 106 MMOL/L — SIGNIFICANT CHANGE UP (ref 98–110)
CO2 SERPL-SCNC: 24 MMOL/L — SIGNIFICANT CHANGE UP (ref 17–32)
COVID-19 SPIKE DOMAIN AB INTERP: POSITIVE
COVID-19 SPIKE DOMAIN ANTIBODY RESULT: 174 U/ML — HIGH
CREAT SERPL-MCNC: 1.3 MG/DL — SIGNIFICANT CHANGE UP (ref 0.7–1.5)
EOSINOPHIL # BLD AUTO: 0.14 K/UL — SIGNIFICANT CHANGE UP (ref 0–0.7)
EOSINOPHIL NFR BLD AUTO: 1.9 % — SIGNIFICANT CHANGE UP (ref 0–8)
GLUCOSE SERPL-MCNC: 130 MG/DL — HIGH (ref 70–99)
HCT VFR BLD CALC: 41.1 % — LOW (ref 42–52)
HGB BLD-MCNC: 13.7 G/DL — LOW (ref 14–18)
IMM GRANULOCYTES NFR BLD AUTO: 0.1 % — SIGNIFICANT CHANGE UP (ref 0.1–0.3)
LYMPHOCYTES # BLD AUTO: 2.64 K/UL — SIGNIFICANT CHANGE UP (ref 1.2–3.4)
LYMPHOCYTES # BLD AUTO: 35.9 % — SIGNIFICANT CHANGE UP (ref 20.5–51.1)
MAGNESIUM SERPL-MCNC: 1.8 MG/DL — SIGNIFICANT CHANGE UP (ref 1.8–2.4)
MCHC RBC-ENTMCNC: 29.8 PG — SIGNIFICANT CHANGE UP (ref 27–31)
MCHC RBC-ENTMCNC: 33.3 G/DL — SIGNIFICANT CHANGE UP (ref 32–37)
MCV RBC AUTO: 89.3 FL — SIGNIFICANT CHANGE UP (ref 80–94)
MONOCYTES # BLD AUTO: 0.98 K/UL — HIGH (ref 0.1–0.6)
MONOCYTES NFR BLD AUTO: 13.3 % — HIGH (ref 1.7–9.3)
NEUTROPHILS # BLD AUTO: 3.52 K/UL — SIGNIFICANT CHANGE UP (ref 1.4–6.5)
NEUTROPHILS NFR BLD AUTO: 47.8 % — SIGNIFICANT CHANGE UP (ref 42.2–75.2)
NRBC # BLD: 0 /100 WBCS — SIGNIFICANT CHANGE UP (ref 0–0)
PLATELET # BLD AUTO: 297 K/UL — SIGNIFICANT CHANGE UP (ref 130–400)
POTASSIUM SERPL-MCNC: 4.4 MMOL/L — SIGNIFICANT CHANGE UP (ref 3.5–5)
POTASSIUM SERPL-SCNC: 4.4 MMOL/L — SIGNIFICANT CHANGE UP (ref 3.5–5)
PROT SERPL-MCNC: 6.4 G/DL — SIGNIFICANT CHANGE UP (ref 6–8)
RBC # BLD: 4.6 M/UL — LOW (ref 4.7–6.1)
RBC # FLD: 13.6 % — SIGNIFICANT CHANGE UP (ref 11.5–14.5)
SARS-COV-2 IGG+IGM SERPL QL IA: 174 U/ML — HIGH
SARS-COV-2 IGG+IGM SERPL QL IA: POSITIVE
SODIUM SERPL-SCNC: 140 MMOL/L — SIGNIFICANT CHANGE UP (ref 135–146)
WBC # BLD: 7.36 K/UL — SIGNIFICANT CHANGE UP (ref 4.8–10.8)
WBC # FLD AUTO: 7.36 K/UL — SIGNIFICANT CHANGE UP (ref 4.8–10.8)

## 2021-07-13 PROCEDURE — 99223 1ST HOSP IP/OBS HIGH 75: CPT

## 2021-07-13 PROCEDURE — 99232 SBSQ HOSP IP/OBS MODERATE 35: CPT

## 2021-07-13 RX ORDER — AMLODIPINE BESYLATE 2.5 MG/1
10 TABLET ORAL DAILY
Refills: 0 | Status: DISCONTINUED | OUTPATIENT
Start: 2021-07-14 | End: 2021-07-14

## 2021-07-13 RX ADMIN — AMLODIPINE BESYLATE 5 MILLIGRAM(S): 2.5 TABLET ORAL at 06:37

## 2021-07-13 RX ADMIN — CHLORHEXIDINE GLUCONATE 1 APPLICATION(S): 213 SOLUTION TOPICAL at 06:36

## 2021-07-13 RX ADMIN — LOSARTAN POTASSIUM 100 MILLIGRAM(S): 100 TABLET, FILM COATED ORAL at 06:37

## 2021-07-13 RX ADMIN — ATORVASTATIN CALCIUM 80 MILLIGRAM(S): 80 TABLET, FILM COATED ORAL at 21:11

## 2021-07-13 RX ADMIN — TICAGRELOR 90 MILLIGRAM(S): 90 TABLET ORAL at 17:32

## 2021-07-13 RX ADMIN — TICAGRELOR 90 MILLIGRAM(S): 90 TABLET ORAL at 06:37

## 2021-07-13 RX ADMIN — ENOXAPARIN SODIUM 40 MILLIGRAM(S): 100 INJECTION SUBCUTANEOUS at 12:09

## 2021-07-13 NOTE — SWALLOW BEDSIDE ASSESSMENT ADULT - SWALLOW EVAL: DIAGNOSIS
suspected pharyngeal dysphagia for thin liquids; +improved toleration for nectar-thick liquids, puree, and soft consistencies w/o overt s/s aspiration/penetration

## 2021-07-13 NOTE — OCCUPATIONAL THERAPY INITIAL EVALUATION ADULT - DIAGNOSIS, OT EVAL
[facility] - SAUL Rojas / SAUL Denton / YEN Montero/ SAUL Villegas/ SAUL Beard/ KARON Mullen / PINO Felix / SHAKEEL Webster  ---------------------------------------------------------------------------------------------------------------  seen and examined today for PHU on CKD  Interval : NAD  VITALS:  T(F): 97.1 (03-23-19 @ 05:20), Max: 97.5 (03-22-19 @ 17:56)  HR: 71 (03-23-19 @ 05:20)  BP: 109/59 (03-23-19 @ 05:20)  RR: 20 (03-23-19 @ 05:20)  SpO2: 99% (03-23-19 @ 05:20)  Wt(kg): --    03-22 @ 07:01  -  03-23 @ 07:00  --------------------------------------------------------  IN: 950 mL / OUT: 1350 mL / NET: -400 mL      Physical Exam :-  Constitutional: NAD  Neck: Supple.  Respiratory: Bilateral equal breath sounds,  Cardiovascular: S1, S2 normal,  Gastrointestinal: Bowel Sounds present, soft, non tender.  Extremities: No edema  Neurological: Alert and Oriented x 1, no focal deficits  Psychiatric: Normal mood, normal affect  Data:-  Allergies :   No Known Allergies    Hospital Medications:   MEDICATIONS  (STANDING):  ampicillin/sulbactam  IVPB      ampicillin/sulbactam  IVPB 3 Gram(s) IV Intermittent every 12 hours  enoxaparin Injectable 30 milliGRAM(s) SubCutaneous daily  ergocalciferol 53087 Unit(s) Oral every week  methylPREDNISolone sodium succinate Injectable 60 milliGRAM(s) IV Push daily  sodium chloride 0.45%. 1000 milliLiter(s) (50 mL/Hr) IV Continuous <Continuous>  sodium chloride 0.9%. 1000 milliLiter(s) (100 mL/Hr) IV Continuous <Continuous>  ursodiol Capsule 300 milliGRAM(s) Oral two times a day    03-23    144  |  116<H>  |  27<H>  ----------------------------<  78  3.5   |  17<L>  |  2.07<H>    Ca    8.8      23 Mar 2019 05:33  Phos  3.4     03-22  Mg     1.7     03-23    TPro  5.7<L>  /  Alb  2.2<L>  /  TBili  20.2<H>  /  DBili      /  AST  287<H>  /  ALT  194<H>  /  AlkPhos  255<H>  03-23    Creatinine Trend: 2.07 <--, 1.91 <--, 1.89 <--, 1.97 <--, 1.88 <--, 1.64 <--, 1.57 <--                        8.5    5.74  )-----------( 110      ( 23 Mar 2019 05:33 )             23.6 rehab of r/o CVA

## 2021-07-13 NOTE — PHYSICAL THERAPY INITIAL EVALUATION ADULT - GAIT DEVIATIONS NOTED, PT EVAL
decreased L  hip flexion / knee flexion - decreased foot clearance , decreased even weight shifting/decreased step length

## 2021-07-13 NOTE — OCCUPATIONAL THERAPY INITIAL EVALUATION ADULT - LUE MMT, REHAB EVAL
shoulder flexion 2-/5, abduction 2+/5, elbow flexion: 2+/5, extension 3-/5, wrist: 3-/5, gross grasp 3+/5 MMT

## 2021-07-13 NOTE — OCCUPATIONAL THERAPY INITIAL EVALUATION ADULT - LEVEL OF INDEPENDENCE: EATING, OT EVAL
Pt bottom dentures misplaced in ER which makes eating difficult for pt. RN Devaughn informed and diet changed

## 2021-07-13 NOTE — PHYSICAL THERAPY INITIAL EVALUATION ADULT - GENERAL OBSERVATIONS, REHAB EVAL
11:20-11:40 Pt encountered reclined in b/s chair in NAD. Agreeable for PT.  Pt c/o of LUE/ LLE weakness. Denies blurry vision/double vision, no c/o of decreased sensation.

## 2021-07-13 NOTE — OCCUPATIONAL THERAPY INITIAL EVALUATION ADULT - ADDITIONAL COMMENTS
Pt resides in  with 1 step to enter from back door, full flight to bedroom and walk-in shower. Prior to recent stroke pt was fully indep and active (enjoyed working on house projects). Pt reports d/c home from hospital on 7/8 pt felt "back to normal" and on 7/9 pt reports the left sided weakness returned and worsened.

## 2021-07-13 NOTE — CONSULT NOTE ADULT - ASSESSMENT
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 R/O dysphagia, dysphonia.    PRECAUTIONS: [  ] Cardiac  [  ] Respiratory  [  ] Seizures [  ] Contact Isolation  [  ] Droplet Isolation  [  ] Other    Weight Bearing Status: wbat    RECOMMENDATION:    Out of Bed to Chair     DVT/Decubiti Prophylaxis    REHAB PLAN:     [  x ] Bedside P/T 3-5 times a week   [ x  ]   Bedside O/T  2-3 times a week             [   ] No Rehab Therapy Indicated                   [ x  ]  Speech Therapy   Conditioning/ROM                                    ADL  Bed Mobility                                               Conditioning/ROM  Transfers                                                     Bed Mobility  Sitting /Standing Balance                         Transfers                                        Gait Training                                               Sitting/Standing Balance  Stair Training [   ]Applicable                    Home equipment Eval                                                                        Splinting  [   ] Only      GOALS:   ADL   [x   ]   Independent                    Transfers  [ x  ] Independent                          Ambulation  [ x  ] Independent     [  x  ] With device                            [   ]  CG                                                         [   ]  CG                                                                  [   ] CG                            [    ] Min A                                                   [   ] Min A                                                              [   ] Min  A          DISCHARGE PLAN:   [ x  ]  Good candidate for Intensive Rehabilitation/Hospital based-[facility]                                             Will tolerate 3hrs Intensive Rehab Daily                                       [    ]  Short Term Rehab in Skilled Nursing Facility                                       [    ]  Home with Outpatient or VN services                                         [    ]  Possible Candidate for Intensive Hospital based Rehab

## 2021-07-13 NOTE — OCCUPATIONAL THERAPY INITIAL EVALUATION ADULT - PLANNED THERAPY INTERVENTIONS, OT EVAL
ADL retraining/balance training/fine motor coordination training/motor coordination training/neuromuscular re-education/parent/caregiver training.../ROM/strengthening/transfer training

## 2021-07-13 NOTE — CONSULT NOTE ADULT - SUBJECTIVE AND OBJECTIVE BOX
HPI:  80 Y M PMHx of  CAD/stents, DM, HTN, bladder and prostate CA, stable AAA, recent acute stroke (discharged 7/7/2021) presents to [facility] with left sided upper and lower extremity weakness. 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 Patient had been evaluated by neuroendovascular who recommended no acute intervention. He was discharged for outpatient management. The day after discharge, patient started to feel recurrent left upper extremity weakness. He did not seek medical attention and his weakness continued to worsen through the weekend. Patient decided to come in today for further management as he can hardly move his left arm and his left leg is very weak. Patient does note, however, that his left arm was quite strong intermittently today, but 30 minutes later his weakness returned. Repeat CTH shows same territory infarct. CTA head and neck with redemonstration of moderate stenosis involving the proximal M2 inferior division of the right MCA and severe multifocal stenoses involving the V4 segment of the right vertebral artery due to calcified atherosclerotic plaque. Seen by neurology who suspect fluctuating symptoms may be due to high grade vertebral artery vessel disease. Being admitted for further management. Patient denies any fevers, chills, chest pain, SOB, nausea, vomiting, abdominal pain.  (12 Jul 2021 18:05)      PAST MEDICAL & SURGICAL HISTORY:  DM (diabetes mellitus)    HTN (hypertension)    OA (osteoarthritis)    Cancer  prostate    CAD (coronary artery disease)  s/p 5 stents  2009    AAA (abdominal aortic aneurysm)  monitored by dr barker  stable x 10 yrs    Abnormal chest xray  monitored by fire dept    History of medical problems  hypercholesteremia, b/l inguinal hernias, pvd, popliteal artery aneruysm, b/l catararacts.    Bladder cancer    Prostate cancer    Basal cell carcinoma    History of surgery  s/p radiation and seed implants 2000    H/O hernia repair  x&#x27;s 2 2019 b/l inguinal    H/O heart surgery  5 stents placed 2009    H/O cataract extraction  b/l with iol&#x27;s        Hospital Course: admitted for w/u. Stable. Seen by SLP. Placed on dysphagia 3 with nectar thick liquids. Seen by PT. Requires min assist for transfers and ambulation.    TODAY'S SUBJECTIVE & REVIEW OF SYMPTOMS:     Constitutional WNL   Cardio WNL   Resp WNL   GI WNL  Heme WNL  Endo WNL  Skin WNL  MSK WNL  Neuro per HPI  Cognitive WNL  Psych WNL      MEDICATIONS  (STANDING):  atorvastatin 80 milliGRAM(s) Oral at bedtime  chlorhexidine 4% Liquid 1 Application(s) Topical <User Schedule>  enoxaparin Injectable 40 milliGRAM(s) SubCutaneous daily  losartan 100 milliGRAM(s) Oral daily  ticagrelor 90 milliGRAM(s) Oral every 12 hours    MEDICATIONS  (PRN):      FAMILY HISTORY:  Family history of heart disease (Father)    DM (diabetes mellitus) (Father, Mother)        Allergies    No Known Allergies    Intolerances        SOCIAL HISTORY:    [ x] Etoh social  [  ] Smoking  [  ] Substance abuse     Home Environment:  [  ] Home Alone  [ x] Lives with Family- wife  [  ] Home Health Aid    Dwelling:  [  ] Apartment  [x ] Private House  [  ] Adult Home  [  ] Skilled Nursing Facility      [  ] Short Term  [  ] Long Term  [ x] Stairs       Elevator [  ]    FUNCTIONAL STATUS PTA: (Check all that apply)  Ambulation: [ x ]Independent   [   ] Requires Assistance   [  ] Dependent     [  ] Non-Ambulatory       Assistive Device: [  ] SA Cane  [  ]  Q Cane  [ x] Walker  [  ]  Wheelchair  ADL : [x ] Independent    [   ] Requires Assistance    [  ]  Dependent       Vital Signs Last 24 Hrs  T(C): 36.7 (13 Jul 2021 11:32), Max: 36.8 (12 Jul 2021 19:00)  T(F): 98.1 (13 Jul 2021 11:32), Max: 98.3 (12 Jul 2021 19:00)  HR: 83 (13 Jul 2021 11:32) (64 - 99)  BP: 141/67 (13 Jul 2021 11:32) (124/66 - 189/81)  BP(mean): --  RR: 16 (13 Jul 2021 11:32) (16 - 18)  SpO2: 100% (13 Jul 2021 11:32) (97% - 100%)      PHYSICAL EXAM: Alert & Oriented X3  GENERAL: NAD, well-groomed, well-developed  HEAD:  Atraumatic, Normocephalic  EYES: EOMI, PERRL  NECK: Supple  CHEST/LUNG: Clear to auscultation  HEART: Regular rate and rhythm  ABDOMEN: Soft, Nontender, Nondistended  EXTREMITIES:  No clubbing, cyanosis, or edema  PROM:  [ x ] WFL all extremities  [  ] Abnormal    NERVOUS SYSTEM:   Cranial Nerves 2-12: [   ] intact  [x  ] Abnormal - mild left facial weakness  Motor Strength: [   ] WFL all extremities   [ x ] Abnormal - LUE: 2+/5 prox, 3+/5 grasp, LLE: 4/5 prox, 3-/5 DF  Sensation: [  x ] intact to light touch  [  ] Abnormal   Reflexes: [  x ] Symmetric  [  ]  Abnormal     FUNCTIONAL STATUS:  Bed Mobility: [   ]Independent  [  ] Supervision  [  ] Needs Assistance   [  ] N/A   Transfers: [   ] Independent  [  ] Supervision  [ x ] Needs Assistance  [  ]  N/A   Ambulation: [   ] Independent  [  ] Supervision  [x  ] Needs Assistance  [  ] N/A   ADL: [   ] Independent  [x  ] Requires Assistance  [  ] N/A       LABS:                        13.7   7.36  )-----------( 297      ( 13 Jul 2021 05:33 )             41.1     07-13    140  |  106  |  17  ----------------------------<  130<H>  4.4   |  24  |  1.3    Ca    9.1      13 Jul 2021 05:33  Mg     1.8     07-13    TPro  6.4  /  Alb  4.0  /  TBili  1.3<H>  /  DBili  x   /  AST  21  /  ALT  20  /  AlkPhos  82  07-13    PT/INR - ( 12 Jul 2021 14:31 )   PT: 11.80 sec;   INR: 1.03 ratio         PTT - ( 12 Jul 2021 14:31 )  PTT:32.2 sec      RADIOLOGY & ADDITIONAL STUDIES:  < from: MR Head No Cont (07.12.21 @ 21:34) >  Redemonstrated punctate infarct in the right ventral medulla, minimally enlarged possibly due to difference in technique compared to the prior exam from 7/5/2021. Slightly increased mild edema.    No additional acute infarcts are identified. No intracranial hemorrhage or mass effect.    Stable mild chronic microvascular ischemic changes.      < end of copied text >            Assesment:
  Stroke Consult Note:    WILFREDO RIVAS    1. Chief Complaint: left sided weakness    HPI: 81 yo male with PMH of recent small R medullary stroke, CAD, HTN, DM, Bladder Ca, on aspirin and statin presents with worsening of his left sided weakness. As per patient symptoms started to fluctuate and get worse since Friday and today he cannot move his left arm, has more pronounced left facial droop and some dysarthria. NIHSS is 4. Repeat CTH shows same territory infarct. CTA head and neck with redemonstration of moderate stenosis involving the proximal M2 inferior division of the right MCA and severe multifocal stenoses involving the V4 segment of the right vertebral artery due to calcified atherosclerotic plaque.      2. Relevant PMH:   Prior ischemic stroke/TIA[x ], Afib [ ], CAD [x ], HTN [x ], DLD [ ], DM [x ], PVD [ ], Obesity [ ],   Sedentary lifestyle [ ], CHF [ ], BHARATHI [ ], Cancer Hx [x ].    3. Social History: Smoking [ ], Drug Use [ ], Alcohol Use [ ], Other [ ]    4. Possible Location of Stroke:    5. Relevant Brain Tissue Imaging: < from: CT Head No Cont (21 @ 14:58) >    EXAM:  CT BRAIN            PROCEDURE DATE:  2021            INTERPRETATION:  Clinical History / Reason for exam: Left leg and left arm numbness.    CT SCAN OF THE BRAIN WITHOUT CONTRAST    TECHNIQUE:    Multiple transaxial noncontrast CT images of the brain were obtained from base to vertex. Sagittal and coronal reformatted images were obtained.    Comparison:    Noncontrast CT scan of the brain dated 2021 and noncontrast MRI of the brain dated 2021.    FINDINGS:    The third and lateral ventricles are mildly enlarged as are the cortical sulci consistent with a mild degree of cortical atrophy.  The fourth ventricle is normal in size and position.    There is no shift of the midline structures, evidence of acute intracranial hemorrhage, or depressed skull fracture.    Vascular calcifications are noted.    IMPRESSION:    In comparison with the prior CT scan of the brain dated 2021:    No acute intracranial hemorrhage or acute large territorial infarct, stableexamination.    Small acute infarct was reported on the MRI of the brain dated 2021, please see that report for further information.    If symptoms persist, follow-up MRI of the brain is suggested if not contraindicated in this patient.    --- End of Report ---        < end of copied text >      6. Relevant Cerebrovascular Imaging:   CT Angio Neck w/ IV Cont:   EXAM:  CT ANGIO NECK (W)AW IC        EXAM:  CT ANGIO BRAIN (W)AW IC            PROCEDURE DATE:  2021            INTERPRETATION:  CLINICAL INDICATION: CVA. .    TECHNIQUE: CT angiography of the intracranial (head) and extracranial (neck) circulation was performed after contrast administration    Maximal intensity projection images were additionally included and reviewed.    100 mls of Omnipaque 350 was administered intravenously without complication and clear mls were discarded.    Using Blend workstation, 3-D shaded surface reformations were made of vasculature. 3-D reformations were reviewed and included in interpretation of the official report.    CAROTID STENOSIS REFERENCE: (NASCET = 100x1-(N/D)). N=greatest narrowing. D=normal distal diameter - MILD = <50% stenosis. - MODERATE = 50-69% stenosis. - SEVERE = 70-89% stenosis. - HAIRLINE/CRITICAL = 90-99% stenosis. - OCCLUDED = 100% stenosis.    COMPARISON: CTA of the head and neck dated 2021.    FINDINGS:      HEAD CTA:    The internal carotid arteries demonstrate normal enhancement to the intracranial circulation and [place]. There is atherosclerotic calcification of the bilateral cavernous, clinoid and supraclinoid segments of the ICA. There is mild stenosis in the right clinoid segment as well as moderate stenosis involving the left coronary segment.    Anterior and middle cerebral arteries demonstrate normal enhancement and symmetric arborization without intraluminal filling defect, , occlusion, aneurysm or vascular malformation. Redemonstration of moderate stenosis involving the proximal M2 inferior division of the right MCA. Distal branch vessels are otherwise patent.    There is severe multifocal stenoses involving the V4 segment of the right vertebral artery due to calcified atherosclerotic plaque. Branch vasculature of the posterior circulation are within normal limits. The posterior cerebral arteries demonstrate symmetric enhancement and arborization without evidence for aneurysm, stenosis, occlusion or vascular malformation.    Visualized dural venous sinuses and deep cerebral venous structures demonstrate normal enhancement without evidence for filling defect.    NECK CTA:    The aortic arch and origins of the great vessels are within normal limits.    Right:  The origin of the right common carotid artery is normal. The right common carotid artery is normal in course and caliber to the carotid bifurcation. There is calcified and noncalcified atherosclerotic plaque involving the carotid bulb and proximal internal carotid artery without stenosis. Origins of the internal and external carotid arteries are normal by NASCET criteria. The right internal carotid artery has normal course and caliber to the intracranial circulation.    The origin of the right vertebral artery is normal. The right vertebral artery is normal in course and caliber to the intracranial circulation.    Left: The origin of the left common carotid artery is normal. The left common carotid artery is normal in course and caliber to the carotid bifurcation. There is calcified and noncalcified atherosclerotic plaque involving the carotid bulb and proximal internal carotid artery without stenosis. The origins of the internal and external carotid arteriesare normal by NASCET criteria. The left internal carotid artery has normal course and caliber to the intracranial circulation.    The origin of the left vertebral artery is normal. The left vertebral artery is normal in course and caliber to the intracranial circulation.    Similar heterogeneous bilateral thyroid lobe nodules    IMPRESSION:    No significant change since recent CTA of the head and neck dated 2021.    CTA HEAD:  Severe stenoses involving the right V4 segment of the vertebral artery due to calcified and noncalcified atherosclerotic plaque.    Moderate stenosis of the supraclinoid segment of the left ICA due to atherosclerotic calcification.    Short segment mild/moderate stenosis involving proximal inferior division of the right MCA.    CTA NECK:  No evidence of carotid or vertebral artery stenosis.    Similar bilateral heterogeneous thyroid lobe nodules which can be further evaluated with dedicated nonemergent thyroid ultrasound.      --- End of Report ---              ANDRÉS CRUZ MD; Attending Radiologist  This document has been electronically signed. 2021  4:09PM (21 @ 15:25)     CT Angio Head w/ IV Cont:   EXAM:  CT ANGIO NECK (W)AW IC        EXAM:  CT ANGIO BRAIN (W)AW IC            PROCEDURE DATE:  2021            INTERPRETATION:  CLINICAL INDICATION: CVA. .    TECHNIQUE: CT angiography of the intracranial (head) and extracranial (neck) circulation was performed after contrast administration    Maximal intensity projection images were additionally included and reviewed.    100 mls of Omnipaque 350 was administered intravenously without complication and clear mls were discarded.    Using Blend workstation, 3-D shaded surface reformations were made of vasculature. 3-D reformations were reviewed and included in interpretation of the official report.    CAROTID STENOSIS REFERENCE: (NASCET = 100x1-(N/D)). N=greatest narrowing. D=normal distal diameter - MILD = <50% stenosis. - MODERATE = 50-69% stenosis. - SEVERE = 70-89% stenosis. - HAIRLINE/CRITICAL = 90-99% stenosis. - OCCLUDED = 100% stenosis.    COMPARISON: CTA of the head and neck dated 2021.    FINDINGS:      HEAD CTA:    The internal carotid arteries demonstrate normal enhancement to the intracranial circulation and [place] of [place]. There is atherosclerotic calcification of the bilateral cavernous, clinoid and supraclinoid segments of the ICA. There is mild stenosis in the right clinoid segment as well as moderate stenosis involving the left coronary segment.    Anterior and middle cerebral arteries demonstrate normal enhancement and symmetric arborization without intraluminal filling defect, , occlusion, aneurysm or vascular malformation. Redemonstration of moderate stenosis involving the proximal M2 inferior division of the right MCA. Distal branch vessels are otherwise patent.    There is severe multifocal stenoses involving the V4 segment of the right vertebral artery due to calcified atherosclerotic plaque. Branch vasculature of the posterior circulation are within normal limits. The posterior cerebral arteries demonstrate symmetric enhancement and arborization without evidence for aneurysm, stenosis, occlusion or vascular malformation.    Visualized dural venous sinuses and deep cerebral venous structures demonstrate normal enhancement without evidence for filling defect.    NECK CTA:    The aortic arch and origins of the great vessels are within normal limits.    Right:  The origin of the right common carotid artery is normal. The right common carotid artery is normal in course and caliber to the carotid bifurcation. There is calcified and noncalcified atherosclerotic plaque involving the carotid bulb and proximal internal carotid artery without stenosis. Origins of the internal and external carotid arteries are normal by NASCET criteria. The right internal carotid artery has normal course and caliber to the intracranial circulation.    The origin of the right vertebral artery is normal. The right vertebral artery is normal in course and caliber to the intracranial circulation.    Left: The origin of the left common carotid artery is normal. The left common carotid artery is normal in course and caliber to the carotid bifurcation. There is calcified and noncalcified atherosclerotic plaque involving the carotid bulb and proximal internal carotid artery without stenosis. The origins of the internal and external carotid arteriesare normal by NASCET criteria. The left internal carotid artery has normal course and caliber to the intracranial circulation.    The origin of the left vertebral artery is normal. The left vertebral artery is normal in course and caliber to the intracranial circulation.    Similar heterogeneous bilateral thyroid lobe nodules    IMPRESSION:    No significant change since recent CTA of the head and neck dated 2021.    CTA HEAD:  Severe stenoses involving the right V4 segment of the vertebral artery due to calcified and noncalcified atherosclerotic plaque.    Moderate stenosis of the supraclinoid segment of the left ICA due to atherosclerotic calcification.    Short segment mild/moderate stenosis involving proximal inferior division of the right MCA.    CTA NECK:  No evidence of carotid or vertebral artery stenosis.    Similar bilateral heterogeneous thyroid lobe nodules which can be further evaluated with dedicated nonemergent thyroid ultrasound.      --- End of Report ---          ANDRÉS CRUZ MD; Attending Radiologist  This document has been electronically signed. 2021  4:09PM (21 @ 15:25)     CT Perfusion w/ Maps w/ IV Cont:   EXAM:  CT ANGIO BRAIN (W)AW IC        EXAM:  CT ANGIO NECK (W)AW IC        EXAM:  CT PERFUSION W MAPS IC            PROCEDURE DATE:  2021            INTERPRETATION:  HISTORY: Stroke code. Left-sided weakness and facial droop.    TECHNIQUE: A CT angiogram study of the neck and brain were performed. The CT angiogram study of the neck and brain were performed with thin axial slices. Sagittal and coronal maximum intensity projection reformats were provided. CT brain perfusion study with RAPIDvolumetric infarct analysis was also performed. 50  cc intravenous Omnipaque 350 contrast was administered and 50  cc contrast was discarded for the perfusion study. 100 cc intravenous Omnipaque 350 contrast was administered and 0 cc contrast was discarded for the CTA head/neck study.    COMPARISON:  None    FINDINGS:    CT brain perfusion:    Tmax > 6s volume: 28 mL.  CBF < 30 percent volume: 0 mL. ?  Mismatch volume: 28 mL.  Mismatch ratio: Infant    There is 28 mL mismatch volume involving thebilateral cerebellum which may be compatible with penumbra in the cerebellum, particularly given stenotic right vertebral artery V4 segment.    CTA neck:  The visualized aorta and great vessels demonstrate no significant stenosis.  The common carotidarteries demonstrate no significant stenosis. There are mild noncalcified/calcified atherosclerotic plaques in the bilateral carotid bulbs without flow-limiting stenosis. The external carotid arteries demonstrate no significant stenosis.    The bilateral extracranial internal carotid arteries are patent without stenosis.    The bilateral cervical vertebral arteries are patent.    All measurements are performed using standard NASCET criteria.      CTA brain:  There is moderate calcified atherosclerotic plaque in the right carotid siphon resulting in mild stenosis in the supraclinoid segment.    There is moderate calcified atherosclerotic plaque in the carotid siphon resulting in moderate stenosis in the cavernous and supraclinoid segments.    The [place] is normal in appearance.    There is mild short segmental stenosis involving the proximal inferior division of the right middle cerebral artery. The remaining right middle cerebral artery branches are patent.    The right anteriorcerebral artery is patent.    The left middle and anterior cerebral arteries are patent.    There is high-grade focal stenosis in the V4 segment of the right vertebral artery due to underlying noncalcified/calcified plaques. The intradural left vertebral artery is patent. The basilar is patent. There is stenosis of the right anterior inferior cerebellar artery.    The bilateral posterior cerebral arteries are patent.      Others:  Right thyroid lobe 1.7 cm nodule.  Punctate granulomatous calcification in the right upper lobe.  There is mild multilevel endplate degenerative changes as evidenced by disc space narrowing, uncovertebral hypertrophy and facet arthropathy.        IMPRESSION:    CT brain perfusion: 28 mL penumbra involving the bilateral cerebellar hemispheres.    CTA neck: Right the V4 segment severe stenosis. No dissection.    CTA brain: No stenosis or aneurysm.    Right thyroid lobe 1.7 cm nodule. Nonemergent follow-up with ultrasound may be obtained for further evaluation.    COMMENT:  Reference per NASCET criteria for degree of stenosis: Mild: less than 50% stenosis. Moderate: 50-69% stenosis. Severe: 70-94% stenosis. Near occlusion: 95-99% stenosis.    Per RAPID assessment for acute infarct, threshold for ischemic tissuevolume is Tmax > 6 sec. Threshold for infarct core volume estimate is CBF < 30 percent. Threshold for poor collateral flow or severe delayed perfusion is Tmax > 10 sec.        Dr. Claudio Tellez discussed preliminary findings with TATO BURNS on 2021 6:08 PM with readback.      ATTENDING ADDENDUM:    CT PERFUSION:  There are regions of hypoperfusion measuring approximately 28 mL in the bilateral cerebellum and left occipital lobe utilizing standard threshold. No perfusion evidence of core infarct.    CTA HEAD/NECK:  The body of the report has been modified.    High-grade short segmental atherosclerotic stenosis in the V4 segment of the right vertebral artery. Stenosis in the right anterior inferior cerebellar artery.    Moderate atherosclerotic stenosis in the cavernous and supraclinoid segments of the left ICA. Mild atherosclerotic stenosis in the right supraclinoid ICA.    Short segmental stenosis of the proximal inferior division of the right MCA.            CLAUDIO TELLEZ MD; Resident Radiologist  This document has been electronically signed.  EB SALINAS MD; Attending Radiologist  This document has been electronically signed. 2021  8:45AM (21 @ 16:48)         7. Relevant blood tests:      8. Relevant cardiac rhythm monitorin. Relevant Cardiac Structure: (TTE/[place] +/-):[ ]No intracardiac thrombus/[ ] no vegetation/[ ]no [place]/EF:    Home Medications:  clopidogrel 75 mg oral tablet: 1 tab(s) orally once a day (22 Oct 2020 08:08)  glipiZIDE 5 mg oral tablet, extended release: 1 tab(s) orally 2 times a day (22 Oct 2020 08:08)  losartan 100 mg oral tablet: 1 tab(s) orally once a day (22 Oct 2020 08:08)      MEDICATIONS  (STANDING):      10. PT/OT/Speech/Rehab/S&Sw/ Cognitive eval results and recommendations:    11. Exam:    Vital Signs Last 24 Hrs  T(C): 36.8 (2021 13:04), Max: 36.8 (2021 13:04)  T(F): 98.2 (2021 13:04), Max: 98.2 (2021 13:04)  HR: 89 (2021 13:04) (89 - 89)  BP: 152/62 (2021 13:04) (152/62 - 152/62)  BP(mean): --  RR: 17 (2021 13:04) (17 - 17)  SpO2: 100% (2021 13:04) (100% - 100%)    12.   NIH STROKE SCALE  Item	                                                        Score  1 a.	Level of Consciousness	               	0  1 b. LOC Questions	                                0  1 c.	LOC Commands	                               	0  2.	Best Gaze	                                        0  3.	Visual	                                                0  4.	Facial Palsy	                                        1  5 a.	Motor Arm - Left	                                2  5 b.	Motor Arm - Right	                        0  6 a.	Motor Leg - Left	                                0  6 b.	Motor Leg - Right	                                0  7.	Limb Ataxia	                                        0  8.	Sensory	                                                0  9.	Language	                                        0  10.	Dysarthria	                                        1  11.	Extinction and Inattention  	        0  ______________________________________  TOTAL	                                                        0    Total NIHSS on admission:      NIHSS yesterday:      NIHSS today: 4    mRS:  0 No symptoms at all  1 No significant disability despite symptoms; able to carry out all usual duties and activities without assistance  2 Slight disability; unable to carry out all previous activities, but able to look after own affairs  3 Moderate disability; requiring some help, but able to walk without assistance  4 Moderately severe disability; unable to walk without assistance and unable to attend to own bodily needs without assistance  5 Severe disability; bedridden, incontinent and requiring constant nursing care and attention  6 Dead      13. Impression: 81 yo male with PMH of recent small R medullary stroke, CAD, HTN, DM, Bladder Ca, on aspirin and statin presents with worsening of his left sided weakness. As per patient symptoms started to fluctuate and get worse since Friday and today he cannot move his left arm, has more pronounced left facial droop and some dysarthria. NIHSS is 4. Repeat CTH shows same territory infarct. CTA head and neck with redemonstration of moderate stenosis involving the proximal M2 inferior division of the right MCA and severe multifocal stenoses involving the V4 segment of the right vertebral artery due to calcified atherosclerotic plaque.      15. Suggestions:   1. Stop plavix   2. Add Brilinta 90mg BID first dose tonight   3. Continue aspirin today and then can stop as well  4. Continue high intensity statin  5. Continue glycemic control  6. Keep -160  7. PT/OT/Rehab evaluation  8. Swallow evaluation  9. Repeat MRI brain w/o JENIFER.

## 2021-07-13 NOTE — PHYSICAL THERAPY INITIAL EVALUATION ADULT - PERTINENT HX OF CURRENT PROBLEM, REHAB EVAL
pt is an 80 Y M R handed male with PMHx of  CAD/stents, DM, HTN, bladder and prostate CA, stable AAA, recent acute stroke (discharged 7/7/2021) presents to [facility] with left sided upper and lower extremity weakness. Patient was determined to have acute infarct in the ventral right medulla on his last admission that resulted in left sided weakness and facial droop- ruled likely to be due to plaque burden and small vessel disease

## 2021-07-13 NOTE — OCCUPATIONAL THERAPY INITIAL EVALUATION ADULT - SITTING BALANCE: DYNAMIC
"Diabetes Self-Management Education & Support    Diabetes Education Self Management & Training    SUBJECTIVE/OBJECTIVE:  Presents for: Follow-up  Accompanied by: Self  Diabetes education in the past 24mo: Yes  Focus of Visit: Taking Medication, Being Active  Diabetes type: Type 2  Disease course: Improving  Transportation concerns: No  Other concerns:: None  Cultural Influences/Ethnic Background:  American      Diabetes Symptoms & Complications  Blurred vision: No  Fatigue: No  Neuropathy: No  Foot ulcerations: No  Polydipsia: No  Polyphagia: No  Polyuria: No  Visual change: No  Weakness: No  Weight loss: No  Slow healing wounds: No  Recent Infection(s): No  Symptom course: Stable  Weight trend: Stable  Autonomic neuropathy: No  CVA: No  Heart disease: No  Nephropathy: No  Peripheral neuropathy: No  Peripheral Vascular Disease: No  Retinopathy: No  Sexual dysfunction: No    Patient Problem List and Family Medical History reviewed for relevant medical history, current medical status, and diabetes risk factors.    Vitals:  There were no vitals taken for this visit.  Estimated body mass index is 35.5 kg/m  as calculated from the following:    Height as of 2/21/19: 1.775 m (5' 9.88\").    Weight as of 2/21/19: 111.9 kg (246 lb 9.6 oz).   Last 3 BP:   BP Readings from Last 3 Encounters:   12/18/18 127/82   10/19/18 132/72   10/08/18 153/80       History   Smoking Status     Never Smoker   Smokeless Tobacco     Never Used       Labs:  Lab Results   Component Value Date    A1C 8.0 03/11/2019     Lab Results   Component Value Date     10/01/2018     Lab Results   Component Value Date    LDL 77 10/01/2018     HDL Cholesterol   Date Value Ref Range Status   10/01/2018 42 >39 mg/dL Final   ]  GFR Estimate   Date Value Ref Range Status   10/01/2018 84 >60 mL/min/1.7m2 Final     Comment:     Non  GFR Calc     GFR Estimate If Black   Date Value Ref Range Status   10/01/2018 >90 >60 mL/min/1.7m2 Final     " Comment:      GFR Calc     Lab Results   Component Value Date    CR 0.95 10/01/2018     No results found for: MICROALBUMIN    Healthy Eating  Healthy Eating Assessed Today: Yes  Cultural/Nondenominational diet restrictions?: No  Patient on a regular basis: Eats 3 meals a day  Meal planning: Smaller portions  Meals include: Breakfast, Lunch, Dinner, Snacks  Breakfast: burrittos breakfast type with diet coke or water  Lunch: burgers, oranges or blueberries  Dinner: meat and carbs,   Snacks: not to often bedtime snack, cheese and crackers  Beverages: Water, Diet soda  Has patient met with a dietitian in the past?: No    Being Active  Being Active Assessed Today: Yes  Exercise:: Yes  Days per week of moderate to strenuous exercise (like a brisk walk): 5(steps)  How intense was your typical exercise? : Light (like stretching or slow walking)  Barrier to exercise: Physical limitation    Monitoring  Monitoring Assessed Today: Yes  Did patient bring glucose meter to appointment? : Yes  Blood Glucose Meter: Accu-check  Home Glucose (Sugar) Monitorin-2 times per day  Blood glucose trend: Decreasing steadily  Low Glucose Range (mg/dL): <70  High Glucose Range (mg/dL): 140-180  Overall Range (mg/dL): 130-140                  Taking Medications  Diabetes Medication(s)     Insulin       insulin degludec (TRESIBA) 100 UNIT/ML pen    14 units every morning     insulin glargine U-300 (TOUJEO MAX SOLOSTAR) 300 UNIT/ML injection    Inject 10 Units Subcutaneous At Bedtime    Incretin Mimetic Agents (GLP-1 Receptor Agonists)       semaglutide (OZEMPIC) 1 MG/DOSE pen    Inject 1 mg Subcutaneous every 7 days          Current Treatments: Non-insulin Injectables, Oral Agent (monotherapy)(, Ozempic 1.0 mg weekly, toujeo 14 units daily, will switch to Tresiba U100 at 14 units daily)  Problems taking diabetes medications regularly?: No  Diabetes medication side effects?: Yes(does get some diarrhea with Metformin)  Treatment  Compliance: All of the time    Problem Solving  Problem Solving Assessed Today: Yes  Hypoglycemia Frequency: Rarely  Hypoglycemia Treatment: Candy  Patient carries a carbohydrate source: No  Medical alert: No  Severe weather/disaster plan for diabetes management?: No  DKA prevention plan?: No    Hypoglycemia symptoms  Confusion: No  Dizziness or Light-Headedness: Yes  Headaches: No  Hunger: No  Mood changes: No  Nervousness/Anxiety: No  Sleepiness: No  Speech difficulty: No  Sweats: Yes  Tremors: No    Hypoglycemia Complications  Blackouts: No  Hospitalization: No  Nocturnal hypoglycemia: Yes  Required assistance: No  Required glucagon injection: No  Seizures: No    Reducing Risks  Reducing Risks Assessed Today: Yes  Diabetes Risks: Age over 45 years  CAD Risks: Diabetes Mellitus, Male sex  Has dilated eye exam at least once a year?: Yes  Sees dentist every 6 months?: Yes  Sees podiatrist (foot doctor)?: No    Healthy Coping  Healthy Coping Assessed Today: Yes  Emotional response to diabetes: Ready to learn  Informal Support system:: Spouse  Stage of change: ACTION (Actively working towards change)  Difficulty affording diabetes management supplies?: Yes  Patient Activation Measure Survey Score:  ANA Score (Last Two) 9/27/2011   ANA Raw Score 39   Activation Score 56.4   ANA Level 3       ASSESSMENT:  Wei is not tolerating Metformin, will discontinue this medication.  Tolerating Ozempic at 1 mg weekly and Toujeo at 12 units daily, will increase to 14 units, and then switch to Tresiba U100 at 14 units.  He is to send me his numbers to see if needing to decrease tresiba.    Goals Addressed as of 9/9/2019 at 9:03 AM       Medication 1 (pt-stated)     Added 3/11/19 by Lorena Borrego RN     My Goal: I will change my medications that are safe    What I need to meet my goal: 1.  Toujeo 14 units daily,   3. When finished with Toujeo then switch to Tresiba U100 @ 14 units  2. Ozempic 1.0 mg weekly  4. 2 wks after  switching to Tresiba, send me your numbers.   5. If having too low of mornings #'s then lower the dose to 12 units     I plan to meet my goal by this date: 2 months            Patient's most recent   Lab Results   Component Value Date    A1C 8.0 03/11/2019    is not meeting goal of <7.0    INTERVENTION:   Diabetes knowledge and skills assessment:     Patient is knowledgeable in diabetes management concepts related to: Healthy Eating, Being Active, Monitoring and Taking Medication    Patient needs further education on the following diabetes management concepts: Monitoring and Taking Medication    Based on learning assessment above, most appropriate setting for further diabetes education would be: Individual setting.    Education provided today on:  AADE Self-Care Behaviors:  Monitoring: purpose, proper technique, log and interpret results, individual blood glucose targets and frequency of monitoring  Taking Medication: action of prescribed medication, drawing up, administering and storing injectable diabetes medications, proper site selection and rotation for injections, side effects of prescribed medications and when to take medications  GLP-1 administration technique taught today. Patient verbalized understanding and was able to perform an accurate return demonstration of administration technique. Side effects were discussed, if patient has any abdominal pain, with or without nausea and/or vomiting, stop medication, call provider, clinic or go to the emergency room.    Opportunities for ongoing education and support in diabetes-self management were discussed.    Pt verbalized understanding of concepts discussed and recommendations provided today.       Education Materials Provided:  No new materials provided today    PLAN:  See Patient Instructions for co-developed, patient-stated behavior change goals.  AVS printed and provided to patient today. See Follow-Up section for recommended follow-up.    Luciana Borrego  RN/MALIE  Bob Diabetes Educator    Time Spent: 30 minutes  Encounter Type: Individual    Any diabetes medication dose changes were made via the CDE Protocol and Collaborative Practice Agreement with the patient's primary care provider. A copy of this encounter was shared with the provider.     fair balance

## 2021-07-13 NOTE — OCCUPATIONAL THERAPY INITIAL EVALUATION ADULT - TRANSFER TRAINING, PT EVAL
Pt will transfer from bed to chair and toilet with contact guard assist by discharge to work towards returning to PLOF

## 2021-07-13 NOTE — SWALLOW BEDSIDE ASSESSMENT ADULT - SLP PERTINENT HISTORY OF CURRENT PROBLEM
pt is a 81 y/o M w/ PMHx: CAD/stents, DM, HTN, bladder and prostate CA, stable AAA, recent acute stroke (discharged 7/7/2021) presents to [facility] with left sided upper and lower extremity weakness. pt is an 79 y/o M w/ PMHx: CAD/stents, DM, HTN, bladder and prostate CA, stable AAA, recent acute infarct in the ventral right medulla, d/c'ed 7/7/2021) presents to [facility] w/ L-sided UE+LE weakness. At that time, no neuroendovascular intervention was recommended. pt was d/c'ed w/ O/P management. Repeat CTH shows same territory infarct. CTA head and neck with redemonstration of moderate stenosis involving the proximal M2 inferior division of the right MCA and severe multifocal stenoses involving the V4 segment of the right vertebral artery due to calcified atherosclerotic plaque. Seen by neurology who suspect fluctuating symptoms may be due to high grade vertebral artery vessel disease and recommended switching from aspirin and Plavix to Brilinta. pt not seen by SLP during recent CVA w/u. pt endorsing some difficulty swallowing thin liquids at home

## 2021-07-13 NOTE — OCCUPATIONAL THERAPY INITIAL EVALUATION ADULT - GENERAL OBSERVATIONS, REHAB EVAL
OT eval: 7:35-8:15 Pt received semi lovett in bed in NAD +BP cuff, tele, pulse ox monitor. Pt pleasant and agreeable to OT eval. Vitals: 186/86 HR 83. RN Devaughn informed.

## 2021-07-13 NOTE — OCCUPATIONAL THERAPY INITIAL EVALUATION ADULT - RANGE OF MOTION EXAMINATION, UPPER EXTREMITY
left upper extremity  AROM: shoulder 0-35 flexion, 0-90 abduction, elbow 0-90 flexion, full pronation/only slight supination, wrist: 0-45 extension, full flexion, no radial/ulnar deviation, gross grasp/extension WFL/Left UE Passive ROM was WNL (within normal limits)/Right UE Active ROM was WNL (within normal limits)

## 2021-07-13 NOTE — PROGRESS NOTE ADULT - SUBJECTIVE AND OBJECTIVE BOX
Neurology Follow up note    Name  WILFREDO RIVAS    HPI:  80 Y M PMHx of  CAD/stents, DM, HTN, bladder and prostate CA, stable AAA, recent acute stroke (discharged 7/7/2021) presents to [facility] with left sided upper and lower extremity weakness. 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 Patient had been evaluated by neuroendovascular who recommended no acute intervention. He was discharged for outpatient management. The day after discharge, patient started to feel recurrent left upper extremity weakness. He did not seek medical attention and his weakness continued to worsen through the weekend. Patient decided to come in today for further management as he can hardly move his left arm and his left leg is very weak. Patient does note, however, that his left arm was quite strong intermittently today, but 30 minutes later his weakness returned. Repeat CTH shows same territory infarct. CTA head and neck with redemonstration of moderate stenosis involving the proximal M2 inferior division of the right MCA and severe multifocal stenoses involving the V4 segment of the right vertebral artery due to calcified atherosclerotic plaque. Seen by neurology who suspect fluctuating symptoms may be due to high grade vertebral artery vessel disease. Being admitted for further management. Patient denies any fevers, chills, chest pain, SOB, nausea, vomiting, abdominal pain.  (12 Jul 2021 18:05)      Interval History - Patient seen and examined and no new complaints          Vital Signs Last 24 Hrs  T(C): 36.2 (13 Jul 2021 16:21), Max: 36.8 (12 Jul 2021 19:00)  T(F): 97.2 (13 Jul 2021 16:21), Max: 98.3 (12 Jul 2021 19:00)  HR: 88 (13 Jul 2021 16:21) (64 - 99)  BP: 132/64 (13 Jul 2021 16:21) (124/66 - 189/81)  BP(mean): --  RR: 18 (13 Jul 2021 16:21) (16 - 18)  SpO2: 100% (13 Jul 2021 16:21) (97% - 100%)  ICU Vital Signs Last 24 Hrs  T(C): 36.2 (13 Jul 2021 16:21), Max: 36.8 (12 Jul 2021 19:00)  T(F): 97.2 (13 Jul 2021 16:21), Max: 98.3 (12 Jul 2021 19:00)  HR: 88 (13 Jul 2021 16:21) (64 - 99)  BP: 132/64 (13 Jul 2021 16:21) (124/66 - 189/81)  BP(mean): --  ABP: --  ABP(mean): --  RR: 18 (13 Jul 2021 16:21) (16 - 18)  SpO2: 100% (13 Jul 2021 16:21) (97% - 100%)          Neurological Exam:   a+Ox3 language and attention normal  Left facial  LUE 4+/5 proximally and 2/5 distally  LLE 5/5 proximally and 2/5 distally  Sensory symmetric  FTN NL on right and strength limited but not dysmmetric on left    NIHSS 2    Medications  atorvastatin 80 milliGRAM(s) Oral at bedtime  chlorhexidine 4% Liquid 1 Application(s) Topical <User Schedule>  enoxaparin Injectable 40 milliGRAM(s) SubCutaneous daily  losartan 100 milliGRAM(s) Oral daily  ticagrelor 90 milliGRAM(s) Oral every 12 hours      Lab                        13.7   7.36  )-----------( 297      ( 13 Jul 2021 05:33 )             41.1     07-13    140  |  106  |  17  ----------------------------<  130<H>  4.4   |  24  |  1.3    Ca    9.1      13 Jul 2021 05:33  Mg     1.8     07-13    TPro  6.4  /  Alb  4.0  /  TBili  1.3<H>  /  DBili  x   /  AST  21  /  ALT  20  /  AlkPhos  82  07-13    CAPILLARY BLOOD GLUCOSE      POCT Blood Glucose.: 139 mg/dL (13 Jul 2021 16:19)  POCT Blood Glucose.: 160 mg/dL (13 Jul 2021 11:12)    LIVER FUNCTIONS - ( 13 Jul 2021 05:33 )  Alb: 4.0 g/dL / Pro: 6.4 g/dL / ALK PHOS: 82 U/L / ALT: 20 U/L / AST: 21 U/L / GGT: x           PT/INR - ( 12 Jul 2021 14:31 )   PT: 11.80 sec;   INR: 1.03 ratio         PTT - ( 12 Jul 2021 14:31 )  PTT:32.2 sec    Radiology  < from: MR Head No Cont (07.12.21 @ 21:34) >  IMPRESSION:    Redemonstrated punctate infarct in the right ventral medulla, minimally enlarged possibly due to difference in technique compared to the prior exam from 7/5/2021. Slightly increased mild edema.    No additional acute infarcts are identified. No intracranial hemorrhage or mass effect.    Stable mild chronic microvascular ischemic changes.    < end of copied text >      < from: CT Angio Neck w/ IV Cont (07.12.21 @ 15:25) >  IMPRESSION:    No significant change since recent CTA of the head and neck dated 7/4/2021.    CTA HEAD:  Severe stenoses involving the right V4 segment of the vertebral artery due to calcified and noncalcified atherosclerotic plaque.    Moderate stenosis of the supraclinoid segment of the left ICA due to atherosclerotic calcification.    Short segment mild/moderate stenosis involving proximal inferior division of the right MCA.    CTA NECK:  No evidence of carotid or vertebral artery stenosis.    Similar bilateral heterogeneous thyroid lobe nodules which can be further evaluated with dedicated nonemergent thyroid ultrasound.    < end of copied text >      Other studies:     Assessment- This is a 80y year old Male presenting with worsening stroke symptoms 1 day post discharge from last week.  Likely completion of recent stroke as imaging shows same stroke location.  Meds changed to Brilinta due too progression.    Plan  1. Continue Brilinta 90mg BID and statin  2. Glycemic control  3. Rehab assessment for acute rehab  4. f/u in stroke clinic in 1-2 weeks

## 2021-07-13 NOTE — OCCUPATIONAL THERAPY INITIAL EVALUATION ADULT - LEVEL OF INDEPENDENCE: DRESS LOWER BODY, OT EVAL
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

## 2021-07-13 NOTE — OCCUPATIONAL THERAPY INITIAL EVALUATION ADULT - PERTINENT HX OF CURRENT PROBLEM, REHAB EVAL
Pt is an 81 yo, right hand dominant, man with PMH of recent small R medullary stroke (7/5/21), CAD, HTN, DM, Bladder Ca, on aspirin and statin presents with worsening of his left sided weakness. As per patient symptoms started to fluctuate and get worse since Friday and today he cannot move his left arm, has more pronounced left facial droop and some dysarthria. NIHSS is 4. Repeat CTH shows same territory infarct.

## 2021-07-13 NOTE — PROGRESS NOTE ADULT - SUBJECTIVE AND OBJECTIVE BOX
WILFREDO RIVAS 80y Male  MRN#: [id number]   CODE STATUS:________    Hospital Day: 1d    Pt is currently admitted with the primary diagnosis of CVA .    SUBJECTIVE  Hospital Course : 80 Y M PMHx of  CAD/stents, DM, HTN, bladder and prostate CA, stable AAA, recent acute stroke (discharged 7/7/2021) presents to [facility] with left sided upper and lower extremity weakness. 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 Patient had been evaluated by neuroendovascular who recommended no acute intervention. He was discharged for outpatient management.   The day after discharge, patient started to feel recurrent left upper extremity weakness. He did not seek medical attention and his weakness continued to worsen through the weekend.   Patient decided to come in today for further management as he can hardly move his left arm and his left leg is very weak. Patient does note, however, that his left arm was quite strong intermittently today, but 30 minutes later his weakness returned. Repeat CTH shows same territory infarct. CTA head and neck with redemonstration of moderate stenosis involving the proximal M2 inferior division of the right MCA and severe multifocal stenoses involving the V4 segment of the right vertebral artery due to calcified atherosclerotic plaque. Seen by neurology who suspect fluctuating symptoms may be due to high grade vertebral artery vessel disease. Being admitted for further management. Patient denies any fevers, chills, chest pain, SOB, nausea, vomiting, abdominal pain.     The patient is lying comfortable in bed ,denies pain , headache ,dizziness or vision changes.  Reports after his discharge his left arm was stronger , he noticed increased weakness before admission.     Present Today:   - [place]:  No [  ], Yes [   ] : Indication:     - Type of IV Access:       .. CVC/Piccline:  No [  ], Yes [   ] : Indication:       .. Midline: No [  ], Yes [   ] : Indication:                                             ----------------------------------------------------------  OBJECTIVE  PAST MEDICAL & SURGICAL HISTORY  DM (diabetes mellitus)    HTN (hypertension)    OA (osteoarthritis)    Cancer  prostate    CAD (coronary artery disease)  s/p 5 stents  2009    AAA (abdominal aortic aneurysm)  monitored by dr barker  stable x 10 yrs    Abnormal chest xray  monitored by fire dept    History of medical problems  hypercholesteremia, b/l inguinal hernias, pvd, popliteal artery aneruysm, b/l catararacts.    Bladder cancer    Prostate cancer    Basal cell carcinoma    History of surgery  s/p radiation and seed implants 2000    H/O hernia repair  x&#x27;s 2 2019 b/l inguinal    H/O heart surgery  5 stents placed 2009    H/O cataract extraction  b/l with iol&#x27;s                                              -----------------------------------------------------------  ALLERGIES:  No Known Allergies                                            ------------------------------------------------------------    HOME MEDICATIONS  Home Medications:  clopidogrel 75 mg oral tablet: 1 tab(s) orally once a day (12 Jul 2021 18:04)  glipiZIDE 5 mg oral tablet, extended release: 1 tab(s) orally 2 times a day (12 Jul 2021 18:04)  losartan 100 mg oral tablet: 1 tab(s) orally once a day (12 Jul 2021 18:04)                           MEDICATIONS:  STANDING MEDICATIONS  atorvastatin 80 milliGRAM(s) Oral at bedtime  chlorhexidine 4% Liquid 1 Application(s) Topical <User Schedule>  enoxaparin Injectable 40 milliGRAM(s) SubCutaneous daily  losartan 100 milliGRAM(s) Oral daily  ticagrelor 90 milliGRAM(s) Oral every 12 hours    PRN MEDICATIONS                                            ------------------------------------------------------------  VITAL SIGNS: Last 24 Hours  T(C): 36.7 (13 Jul 2021 11:32), Max: 36.8 (12 Jul 2021 19:00)  T(F): 98.1 (13 Jul 2021 11:32), Max: 98.3 (12 Jul 2021 19:00)  HR: 83 (13 Jul 2021 11:32) (64 - 99)  BP: 141/67 (13 Jul 2021 11:32) (124/66 - 189/81)  BP(mean): --  RR: 16 (13 Jul 2021 11:32) (16 - 18)  SpO2: 100% (13 Jul 2021 11:32) (97% - 100%)      07-12-21 @ 07:01  -  07-13-21 @ 07:00  --------------------------------------------------------  IN: 0 mL / OUT: 300 mL / NET: -300 mL    07-13-21 @ 07:01  -  07-13-21 @ 13:48  --------------------------------------------------------  IN: 0 mL / OUT: 300 mL / NET: -300 mL                                             --------------------------------------------------------------  LABS:                        13.7   7.36  )-----------( 297      ( 13 Jul 2021 05:33 )             41.1     07-13    140  |  106  |  17  ----------------------------<  130<H>  4.4   |  24  |  1.3    Ca    9.1      13 Jul 2021 05:33  Mg     1.8     07-13    TPro  6.4  /  Alb  4.0  /  TBili  1.3<H>  /  DBili  x   /  AST  21  /  ALT  20  /  AlkPhos  82  07-13    PT/INR - ( 12 Jul 2021 14:31 )   PT: 11.80 sec;   INR: 1.03 ratio         PTT - ( 12 Jul 2021 14:31 )  PTT:32.2 sec                                                          -------------------------------------------------------------  RADIOLOGY:                                            --------------------------------------------------------------    PHYSICAL EXAM:  General:   HEENT:  LUNGS:  HEART:  ABDOMEN:  EXT:  NEURO:  SKIN:                                           --------------------------------------------------------------    ASSESSMENT & PLAN    Past medical history and hospital course                                  WILFREDO RIVAS 80y Male  MRN#: [id number]   CODE STATUS:________    Hospital Day: 1d    Pt is currently admitted with the primary diagnosis of CVA .    SUBJECTIVE  Hospital Course : 80 Y M PMHx of  CAD/stents, DM, HTN, bladder and prostate CA, stable AAA, recent acute stroke (discharged 7/7/2021) presents to [facility] with left sided upper and lower extremity weakness. 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 Patient had been evaluated by neuroendovascular who recommended no acute intervention. He was discharged for outpatient management.   The day after discharge, patient started to feel recurrent left upper extremity weakness. He did not seek medical attention and his weakness continued to worsen through the weekend.   Patient decided to come in today for further management as he can hardly move his left arm and his left leg is very weak. Patient does note, however, that his left arm was quite strong intermittently today, but 30 minutes later his weakness returned. Repeat CTH shows same territory infarct. CTA head and neck with redemonstration of moderate stenosis involving the proximal M2 inferior division of the right MCA and severe multifocal stenoses involving the V4 segment of the right vertebral artery due to calcified atherosclerotic plaque. Seen by neurology who suspect fluctuating symptoms may be due to high grade vertebral artery vessel disease. Being admitted for further management. Patient denies any fevers, chills, chest pain, SOB, nausea, vomiting, abdominal pain.     The patient is lying comfortable in bed ,denies pain , headache ,dizziness or vision changes.  Reports after discharge his left arm was stronger , he noticed increased weakness .    No significant change since recent CTA of the head and neck dated 7/4/2021.  Rehab , physiatry .                                                ----------------------------------------------------------  OBJECTIVE  PAST MEDICAL & SURGICAL HISTORY  DM (diabetes mellitus)    HTN (hypertension)    OA (osteoarthritis)    Cancer  prostate    CAD (coronary artery disease)  s/p 5 stents  2009    AAA (abdominal aortic aneurysm)  monitored by dr barker  stable x 10 yrs    Abnormal chest xray  monitored by fire dept    History of medical problems  hypercholesteremia, b/l inguinal hernias, pvd, popliteal artery aneruysm, b/l catararacts.    Bladder cancer    Prostate cancer    Basal cell carcinoma    History of surgery  s/p radiation and seed implants 2000    H/O hernia repair  x&#x27;s 2 2019 b/l inguinal    H/O heart surgery  5 stents placed 2009    H/O cataract extraction  b/l with iol&#x27;s                                              -----------------------------------------------------------  ALLERGIES:  No Known Allergies                                            ------------------------------------------------------------    HOME MEDICATIONS  Home Medications:  clopidogrel 75 mg oral tablet: 1 tab(s) orally once a day (12 Jul 2021 18:04)  glipiZIDE 5 mg oral tablet, extended release: 1 tab(s) orally 2 times a day (12 Jul 2021 18:04)  losartan 100 mg oral tablet: 1 tab(s) orally once a day (12 Jul 2021 18:04)                           MEDICATIONS:  STANDING MEDICATIONS  atorvastatin 80 milliGRAM(s) Oral at bedtime  chlorhexidine 4% Liquid 1 Application(s) Topical <User Schedule>  enoxaparin Injectable 40 milliGRAM(s) SubCutaneous daily  losartan 100 milliGRAM(s) Oral daily  ticagrelor 90 milliGRAM(s) Oral every 12 hours    PRN MEDICATIONS                                            ------------------------------------------------------------  VITAL SIGNS: Last 24 Hours  T(C): 36.7 (13 Jul 2021 11:32), Max: 36.8 (12 Jul 2021 19:00)  T(F): 98.1 (13 Jul 2021 11:32), Max: 98.3 (12 Jul 2021 19:00)  HR: 83 (13 Jul 2021 11:32) (64 - 99)  BP: 141/67 (13 Jul 2021 11:32) (124/66 - 189/81)  BP(mean): --  RR: 16 (13 Jul 2021 11:32) (16 - 18)  SpO2: 100% (13 Jul 2021 11:32) (97% - 100%)      07-12-21 @ 07:01  -  07-13-21 @ 07:00  --------------------------------------------------------  IN: 0 mL / OUT: 300 mL / NET: -300 mL    07-13-21 @ 07:01  -  07-13-21 @ 13:48  --------------------------------------------------------  IN: 0 mL / OUT: 300 mL / NET: -300 mL                                             --------------------------------------------------------------  LABS:                        13.7   7.36  )-----------( 297      ( 13 Jul 2021 05:33 )             41.1     07-13    140  |  106  |  17  ----------------------------<  130<H>  4.4   |  24  |  1.3    Ca    9.1      13 Jul 2021 05:33  Mg     1.8     07-13    TPro  6.4  /  Alb  4.0  /  TBili  1.3<H>  /  DBili  x   /  AST  21  /  ALT  20  /  AlkPhos  82  07-13    PT/INR - ( 12 Jul 2021 14:31 )   PT: 11.80 sec;   INR: 1.03 ratio         PTT - ( 12 Jul 2021 14:31 )  PTT:32.2 sec                                                    -------------------------------------------------------------  RADIOLOGY:< from: CT Angio Neck w/ IV Cont (07.12.21 @ 15:25) >  No significant change since recent CTA of the head and neck dated 7/4/2021.    CTA HEAD:  Severe stenoses involving the right V4 segment of the vertebral artery due to calcified and noncalcified atherosclerotic plaque.    Moderate stenosis of the supraclinoid segment of the left ICA due to atherosclerotic calcification.    Short segment mild/moderate stenosis involving proximal inferior division of the right MCA.    CTA NECK:  No evidence of carotid or vertebral artery stenosis.    Similar bilateral heterogeneous thyroid lobe nodules which can be further evaluated with dedicated nonemergent thyroid ultrasound.    < end of copied text >  < from: CT Head No Cont (07.12.21 @ 14:58) >  In comparison with the prior CT scan of the brain dated July 4, 2021:    No acute intracranial hemorrhage or acute large territorial infarct, stableexamination.    Small acute infarct was reported on the MRI of the brain dated July 5, 2021, please see that report for further information.    If symptoms persist, follow-up MRI of the brain is suggested if not contraindicated in this patient.      < end of copied text >                                              --------------------------------------------------------------    PHYSICAL EXAM:  General: the patient is alert ,orientedx3 ,comfortable in bed  HEENT:  LUNGS: normal bilateral air entry  HEART: normal s1 s2 ,no added sounds.  ABDOMEN: soft ,nontender  EXT: no lower limb edema  NEURO: intact crainal nerves ,sensation intact ,rt upper and lower motor 5/5 , left upper 2/5 ,left lower 4/5  SKIN:                                           --------------------------------------------------------------    ASSESSMENT & PLAN    Past medical history and hospital course                                  WILFREDO RIVAS 80y Male  MRN#: [id number]   CODE STATUS:________    Hospital Day: 1d    Pt is currently admitted with the primary diagnosis of CVA .    SUBJECTIVE  Hospital Course : 80 Y M PMHx of  CAD/stents, DM, HTN, bladder and prostate CA, stable AAA, recent acute stroke (discharged 7/7/2021) presents to [facility] with left sided upper and lower extremity weakness. 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 Patient had been evaluated by neuroendovascular who recommended no acute intervention. He was discharged for outpatient management.   The day after discharge, patient started to feel recurrent left upper extremity weakness. He did not seek medical attention and his weakness continued to worsen through the weekend.   Patient decided to come in today for further management as he can hardly move his left arm and his left leg is very weak. Patient does note, however, that his left arm was quite strong intermittently today, but 30 minutes later his weakness returned. Repeat CTH shows same territory infarct. CTA head and neck with redemonstration of moderate stenosis involving the proximal M2 inferior division of the right MCA and severe multifocal stenoses involving the V4 segment of the right vertebral artery due to calcified atherosclerotic plaque. Seen by neurology who suspect fluctuating symptoms may be due to high grade vertebral artery vessel disease. Being admitted for further management. Patient denies any fevers, chills, chest pain, SOB, nausea, vomiting, abdominal pain.     The patient is lying comfortable in bed ,denies pain , headache ,dizziness or vision changes.  Reports after discharge his left arm was stronger , he noticed increased weakness .    No significant change since recent CTA of the head and neck dated 7/4/2021.  Rehab , physiatry .                                              ----------------------------------------------------------  OBJECTIVE  PAST MEDICAL & SURGICAL HISTORY  DM (diabetes mellitus)    HTN (hypertension)    OA (osteoarthritis)    Cancer  prostate    CAD (coronary artery disease)  s/p 5 stents  2009    AAA (abdominal aortic aneurysm)  monitored by dr barker  stable x 10 yrs    Abnormal chest xray  monitored by fire dept    History of medical problems  hypercholesteremia, b/l inguinal hernias, pvd, popliteal artery aneruysm, b/l catararacts.    Bladder cancer    Prostate cancer    Basal cell carcinoma    History of surgery  s/p radiation and seed implants 2000    H/O hernia repair  x&#x27;s 2 2019 b/l inguinal    H/O heart surgery  5 stents placed 2009    H/O cataract extraction  b/l with iol&#x27;s                                              -----------------------------------------------------------  ALLERGIES:  No Known Allergies                                            ------------------------------------------------------------    HOME MEDICATIONS  Home Medications:  clopidogrel 75 mg oral tablet: 1 tab(s) orally once a day (12 Jul 2021 18:04)  glipiZIDE 5 mg oral tablet, extended release: 1 tab(s) orally 2 times a day (12 Jul 2021 18:04)  losartan 100 mg oral tablet: 1 tab(s) orally once a day (12 Jul 2021 18:04)                           MEDICATIONS:  STANDING MEDICATIONS  atorvastatin 80 milliGRAM(s) Oral at bedtime  chlorhexidine 4% Liquid 1 Application(s) Topical <User Schedule>  enoxaparin Injectable 40 milliGRAM(s) SubCutaneous daily  losartan 100 milliGRAM(s) Oral daily  ticagrelor 90 milliGRAM(s) Oral every 12 hours    PRN MEDICATIONS                                            ------------------------------------------------------------  VITAL SIGNS: Last 24 Hours  T(C): 36.7 (13 Jul 2021 11:32), Max: 36.8 (12 Jul 2021 19:00)  T(F): 98.1 (13 Jul 2021 11:32), Max: 98.3 (12 Jul 2021 19:00)  HR: 83 (13 Jul 2021 11:32) (64 - 99)  BP: 141/67 (13 Jul 2021 11:32) (124/66 - 189/81)  BP(mean): --  RR: 16 (13 Jul 2021 11:32) (16 - 18)  SpO2: 100% (13 Jul 2021 11:32) (97% - 100%)      07-12-21 @ 07:01  -  07-13-21 @ 07:00  --------------------------------------------------------  IN: 0 mL / OUT: 300 mL / NET: -300 mL    07-13-21 @ 07:01  -  07-13-21 @ 13:48  --------------------------------------------------------  IN: 0 mL / OUT: 300 mL / NET: -300 mL                                             --------------------------------------------------------------  LABS:                        13.7   7.36  )-----------( 297      ( 13 Jul 2021 05:33 )             41.1     07-13    140  |  106  |  17  ----------------------------<  130<H>  4.4   |  24  |  1.3    Ca    9.1      13 Jul 2021 05:33  Mg     1.8     07-13    TPro  6.4  /  Alb  4.0  /  TBili  1.3<H>  /  DBili  x   /  AST  21  /  ALT  20  /  AlkPhos  82  07-13    PT/INR - ( 12 Jul 2021 14:31 )   PT: 11.80 sec;   INR: 1.03 ratio         PTT - ( 12 Jul 2021 14:31 )  PTT:32.2 sec                                                    -------------------------------------------------------------  RADIOLOGY:< from: CT Angio Neck w/ IV Cont (07.12.21 @ 15:25) >  No significant change since recent CTA of the head and neck dated 7/4/2021.    CTA HEAD:  Severe stenoses involving the right V4 segment of the vertebral artery due to calcified and noncalcified atherosclerotic plaque.    Moderate stenosis of the supraclinoid segment of the left ICA due to atherosclerotic calcification.    Short segment mild/moderate stenosis involving proximal inferior division of the right MCA.    CTA NECK:  No evidence of carotid or vertebral artery stenosis.    Similar bilateral heterogeneous thyroid lobe nodules which can be further evaluated with dedicated nonemergent thyroid ultrasound.    < end of copied text >  < from: CT Head No Cont (07.12.21 @ 14:58) >  In comparison with the prior CT scan of the brain dated July 4, 2021:    No acute intracranial hemorrhage or acute large territorial infarct, stableexamination.    Small acute infarct was reported on the MRI of the brain dated July 5, 2021, please see that report for further information.    If symptoms persist, follow-up MRI of the brain is suggested if not contraindicated in this patient.      < end of copied text >                                              --------------------------------------------------------------    PHYSICAL EXAM:  General: the patient is alert ,orientedx3 ,comfortable in bed  HEENT:  LUNGS: normal bilateral air entry  HEART: normal s1 s2 ,no added sounds.  ABDOMEN: soft ,nontender  EXT: no lower limb edema  NEURO: intact crainal nerves ,sensation intact ,rt upper and lower motor 5/5 , left upper 2/5 ,left lower 4/5  SKIN:                                           --------------------------------------------------------------    ASSESSMENT & PLAN    Past medical history and hospital course   80 Y M PMHx of CAD/stents, DM, HTN, bladder and prostate CA, stable AAA, recent acute stroke (discharged 7/7/2021) presents to [facility] with recurrent and transient left sided upper and lower extremity weakness.     #) Left upper and lower extremity weakness in setting of recent acute stroke   - Neurology following; symptoms likely related to vertebral artery disease  - Stopped plavix, started Brilinta (90mg tonight, then BID)   - Will stop aspirin after today   - Continuing Atorvastatin 80mg  - BP goal 130-160; will give stat labetalol 10mg; continue losartan and will add amlodipine 5mg for now   - Will order MRI Brain (non-con)   - PT/OT/Speech eval    #) HTN   - BP goal as above (130-160 systolic)        --> Ordering stat labetalol as BP >180 systolic        --> Continue losartan        --> adding amlodipine 5mg  - Adjust medication as necessary     #) DM  - Hemoglobin A1C 7.8 on 7/5  - Follow-up finger-stick glucose   - Add insulin regimen as needed (goal 140-180)     #) Bladder/prostate CA  - Outpatient management     #) AAA  - Stable at this time  - Outpatient management    #) HFrEF  - Echo taken 7/6/2021 shows decreased left ventricular systolic function   - EF 46%  - No overt symptoms at this time, can follow-up outpatient.     #) Diet - DASH/consistent carbohydrate  #) DVT prophylaxis- lovenox 40mg sub-Q QD  #) Disposition- stroke unit   #) Activity- increase as tolerated  #) Code status - Full                                WILFREDO RIVAS 80y Male  MRN#: [id number]   CODE STATUS:________    Hospital Day: 1d    Pt is currently admitted with the primary diagnosis of CVA .    SUBJECTIVE  Hospital Course : 80 Y M PMHx of  CAD/stents, DM, HTN, bladder and prostate CA, stable AAA, recent acute stroke (discharged 7/7/2021) presents to [facility] with left sided upper and lower extremity weakness. 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 Patient had been evaluated by neuroendovascular who recommended no acute intervention. He was discharged for outpatient management.   The day after discharge, patient started to feel recurrent left upper extremity weakness. He did not seek medical attention and his weakness continued to worsen through the weekend.   Patient decided to come in today for further management as he can hardly move his left arm and his left leg is very weak. Patient does note, however, that his left arm was quite strong intermittently today, but 30 minutes later his weakness returned. Repeat CTH shows same territory infarct. CTA head and neck with redemonstration of moderate stenosis involving the proximal M2 inferior division of the right MCA and severe multifocal stenoses involving the V4 segment of the right vertebral artery due to calcified atherosclerotic plaque. Seen by neurology who suspect fluctuating symptoms may be due to high grade vertebral artery vessel disease. Being admitted for further management. Patient denies any fevers, chills, chest pain, SOB, nausea, vomiting, abdominal pain.     The patient is lying comfortable in bed ,denies pain , headache ,dizziness or vision changes.  Reports after discharge his left arm was stronger , he noticed increased weakness .    No significant change since recent CTA of the head and neck dated 7/4/2021.  Rehab , physiatry .                                              ----------------------------------------------------------  OBJECTIVE  PAST MEDICAL & SURGICAL HISTORY  DM (diabetes mellitus)    HTN (hypertension)    OA (osteoarthritis)    Cancer  prostate    CAD (coronary artery disease)  s/p 5 stents  2009    AAA (abdominal aortic aneurysm)  monitored by dr barker  stable x 10 yrs    Abnormal chest xray  monitored by fire dept    History of medical problems  hypercholesteremia, b/l inguinal hernias, pvd, popliteal artery aneruysm, b/l catararacts.    Bladder cancer    Prostate cancer    Basal cell carcinoma    History of surgery  s/p radiation and seed implants 2000    H/O hernia repair  x&#x27;s 2 2019 b/l inguinal    H/O heart surgery  5 stents placed 2009    H/O cataract extraction  b/l with iol&#x27;s                                              -----------------------------------------------------------  ALLERGIES:  No Known Allergies                                            ------------------------------------------------------------    HOME MEDICATIONS  Home Medications:  clopidogrel 75 mg oral tablet: 1 tab(s) orally once a day (12 Jul 2021 18:04)  glipiZIDE 5 mg oral tablet, extended release: 1 tab(s) orally 2 times a day (12 Jul 2021 18:04)  losartan 100 mg oral tablet: 1 tab(s) orally once a day (12 Jul 2021 18:04)                           MEDICATIONS:  STANDING MEDICATIONS  atorvastatin 80 milliGRAM(s) Oral at bedtime  chlorhexidine 4% Liquid 1 Application(s) Topical <User Schedule>  enoxaparin Injectable 40 milliGRAM(s) SubCutaneous daily  losartan 100 milliGRAM(s) Oral daily  ticagrelor 90 milliGRAM(s) Oral every 12 hours    PRN MEDICATIONS                                            ------------------------------------------------------------  VITAL SIGNS: Last 24 Hours  T(C): 36.7 (13 Jul 2021 11:32), Max: 36.8 (12 Jul 2021 19:00)  T(F): 98.1 (13 Jul 2021 11:32), Max: 98.3 (12 Jul 2021 19:00)  HR: 83 (13 Jul 2021 11:32) (64 - 99)  BP: 141/67 (13 Jul 2021 11:32) (124/66 - 189/81)  BP(mean): --  RR: 16 (13 Jul 2021 11:32) (16 - 18)  SpO2: 100% (13 Jul 2021 11:32) (97% - 100%)      07-12-21 @ 07:01  -  07-13-21 @ 07:00  --------------------------------------------------------  IN: 0 mL / OUT: 300 mL / NET: -300 mL    07-13-21 @ 07:01  -  07-13-21 @ 13:48  --------------------------------------------------------  IN: 0 mL / OUT: 300 mL / NET: -300 mL                                             --------------------------------------------------------------  LABS:                        13.7   7.36  )-----------( 297      ( 13 Jul 2021 05:33 )             41.1     07-13    140  |  106  |  17  ----------------------------<  130<H>  4.4   |  24  |  1.3    Ca    9.1      13 Jul 2021 05:33  Mg     1.8     07-13    TPro  6.4  /  Alb  4.0  /  TBili  1.3<H>  /  DBili  x   /  AST  21  /  ALT  20  /  AlkPhos  82  07-13    PT/INR - ( 12 Jul 2021 14:31 )   PT: 11.80 sec;   INR: 1.03 ratio         PTT - ( 12 Jul 2021 14:31 )  PTT:32.2 sec                                                    -------------------------------------------------------------  RADIOLOGY:< from: CT Angio Neck w/ IV Cont (07.12.21 @ 15:25) >  No significant change since recent CTA of the head and neck dated 7/4/2021.    CTA HEAD:  Severe stenoses involving the right V4 segment of the vertebral artery due to calcified and noncalcified atherosclerotic plaque.    Moderate stenosis of the supraclinoid segment of the left ICA due to atherosclerotic calcification.    Short segment mild/moderate stenosis involving proximal inferior division of the right MCA.    CTA NECK:  No evidence of carotid or vertebral artery stenosis.    Similar bilateral heterogeneous thyroid lobe nodules which can be further evaluated with dedicated nonemergent thyroid ultrasound.    < end of copied text >  < from: CT Head No Cont (07.12.21 @ 14:58) >  In comparison with the prior CT scan of the brain dated July 4, 2021:    No acute intracranial hemorrhage or acute large territorial infarct, stableexamination.    Small acute infarct was reported on the MRI of the brain dated July 5, 2021, please see that report for further information.    If symptoms persist, follow-up MRI of the brain is suggested if not contraindicated in this patient.      < end of copied text >                                              --------------------------------------------------------------    PHYSICAL EXAM:  General: the patient is alert ,orientedx3 ,comfortable in bed  HEENT:  LUNGS: normal bilateral air entry  HEART: normal s1 s2 ,no added sounds.  ABDOMEN: soft ,nontender  EXT: no lower limb edema  NEURO: intact crainal nerves ,sensation intact ,rt upper and lower motor 5/5 , left upper 2/5 ,left lower 4/5  SKIN:                                           --------------------------------------------------------------    ASSESSMENT & PLAN    Past medical history and hospital course   80 Y M PMHx of CAD/stents, DM, HTN, bladder and prostate CA, stable AAA, recent acute stroke (discharged 7/7/2021) presents to [facility] with recurrent and transient left sided upper and lower extremity weakness.     #) Acute infaract of rt medulla:  - MRI of brain redemonstrated the punctate infarct in the right ventral medulla, minimally enlarged possibly due to difference in technique compared to the prior exam from 7/5/2021. Slightly increased mild edema.  -Continue with  Brilinta 90 mg PO BID  - Discontinued ASpirin 81 mg for now.   - Continuing Atorvastatin 80mg daily  - BP goal 130-160, increased Amlodpinie to 10 mg PO daily, c/w Losartan  100 mg Daily.  - PT/OT/Speech eval    #) HTN   - BP goal as above (130-160 systolic)        --> Continue losartan 100 mg PO QAM.       --> Increased  amlodipine 10 mg  - Adjust medication as necessary     #) DM  - Hemoglobin A1C 7.8 on 7/5  - Follow-up finger-stick glucose   - Add insulin regimen as needed (goal 140-180)     #) Bladder/prostate CA  - Outpatient management     #) AAA  - Stable at this time  - Outpatient management    #) HFrEF  - Echo taken 7/6/2021 shows decreased left ventricular systolic function   - EF 46%  - No overt symptoms at this time, can follow-up outpatient.     #) Diet - f/u MBS as per speech and swallow, patient on mechanical soft diet as his dentures are lost.  #) DVT prophylaxis- lovenox 40mg sub-Q QD  #) Disposition- stroke unit   #) Activity- increase as tolerated  #) Code status - Full                                WILFREDO RIVAS 80y Male  MRN#: [id number]   CODE STATUS:________    Hospital Day: 1d    Pt is currently admitted with the primary diagnosis of CVA .    SUBJECTIVE  Hospital Course : 80 Y M PMHx of  CAD/stents, DM, HTN, bladder and prostate CA, stable AAA, recent acute stroke (discharged 7/7/2021) presents to [facility] with left sided upper and lower extremity weakness. 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 Patient had been evaluated by neuroendovascular who recommended no acute intervention. He was discharged for outpatient management.   The day after discharge, patient started to feel recurrent left upper extremity weakness. He did not seek medical attention and his weakness continued to worsen through the weekend.   Patient decided to come in today for further management as he can hardly move his left arm and his left leg is very weak. Patient does note, however, that his left arm was quite strong intermittently today, but 30 minutes later his weakness returned. Repeat CTH shows same territory infarct. CTA head and neck with redemonstration of moderate stenosis involving the proximal M2 inferior division of the right MCA and severe multifocal stenoses involving the V4 segment of the right vertebral artery due to calcified atherosclerotic plaque. Seen by neurology who suspect fluctuating symptoms may be due to high grade vertebral artery vessel disease. Being admitted for further management. Patient denies any fevers, chills, chest pain, SOB, nausea, vomiting, abdominal pain.     The patient is lying comfortable in bed ,denies pain , headache ,dizziness or vision changes.  Reports after discharge his left arm was stronger , he noticed increased weakness .    No significant change since recent CTA of the head and neck dated 7/4/2021.  Rehab , physiatry .                                              ----------------------------------------------------------  OBJECTIVE  PAST MEDICAL & SURGICAL HISTORY  DM (diabetes mellitus)    HTN (hypertension)    OA (osteoarthritis)    Cancer  prostate    CAD (coronary artery disease)  s/p 5 stents  2009    AAA (abdominal aortic aneurysm)  monitored by dr barker  stable x 10 yrs    Abnormal chest xray  monitored by fire dept    History of medical problems  hypercholesteremia, b/l inguinal hernias, pvd, popliteal artery aneruysm, b/l catararacts.    Bladder cancer    Prostate cancer    Basal cell carcinoma    History of surgery  s/p radiation and seed implants 2000    H/O hernia repair  x&#x27;s 2 2019 b/l inguinal    H/O heart surgery  5 stents placed 2009    H/O cataract extraction  b/l with iol&#x27;s                                              -----------------------------------------------------------  ALLERGIES:  No Known Allergies                                            ------------------------------------------------------------    HOME MEDICATIONS  Home Medications:  clopidogrel 75 mg oral tablet: 1 tab(s) orally once a day (12 Jul 2021 18:04)  glipiZIDE 5 mg oral tablet, extended release: 1 tab(s) orally 2 times a day (12 Jul 2021 18:04)  losartan 100 mg oral tablet: 1 tab(s) orally once a day (12 Jul 2021 18:04)                           MEDICATIONS:  STANDING MEDICATIONS  atorvastatin 80 milliGRAM(s) Oral at bedtime  chlorhexidine 4% Liquid 1 Application(s) Topical <User Schedule>  enoxaparin Injectable 40 milliGRAM(s) SubCutaneous daily  losartan 100 milliGRAM(s) Oral daily  ticagrelor 90 milliGRAM(s) Oral every 12 hours    PRN MEDICATIONS                                            ------------------------------------------------------------  VITAL SIGNS: Last 24 Hours  T(C): 36.7 (13 Jul 2021 11:32), Max: 36.8 (12 Jul 2021 19:00)  T(F): 98.1 (13 Jul 2021 11:32), Max: 98.3 (12 Jul 2021 19:00)  HR: 83 (13 Jul 2021 11:32) (64 - 99)  BP: 141/67 (13 Jul 2021 11:32) (124/66 - 189/81)  BP(mean): --  RR: 16 (13 Jul 2021 11:32) (16 - 18)  SpO2: 100% (13 Jul 2021 11:32) (97% - 100%)      07-12-21 @ 07:01  -  07-13-21 @ 07:00  --------------------------------------------------------  IN: 0 mL / OUT: 300 mL / NET: -300 mL    07-13-21 @ 07:01  -  07-13-21 @ 13:48  --------------------------------------------------------  IN: 0 mL / OUT: 300 mL / NET: -300 mL                                             --------------------------------------------------------------  LABS:                        13.7   7.36  )-----------( 297      ( 13 Jul 2021 05:33 )             41.1     07-13    140  |  106  |  17  ----------------------------<  130<H>  4.4   |  24  |  1.3    Ca    9.1      13 Jul 2021 05:33  Mg     1.8     07-13    TPro  6.4  /  Alb  4.0  /  TBili  1.3<H>  /  DBili  x   /  AST  21  /  ALT  20  /  AlkPhos  82  07-13    PT/INR - ( 12 Jul 2021 14:31 )   PT: 11.80 sec;   INR: 1.03 ratio         PTT - ( 12 Jul 2021 14:31 )  PTT:32.2 sec                                                    -------------------------------------------------------------  RADIOLOGY:< from: CT Angio Neck w/ IV Cont (07.12.21 @ 15:25) >  No significant change since recent CTA of the head and neck dated 7/4/2021.    CTA HEAD:  Severe stenoses involving the right V4 segment of the vertebral artery due to calcified and noncalcified atherosclerotic plaque.    Moderate stenosis of the supraclinoid segment of the left ICA due to atherosclerotic calcification.    Short segment mild/moderate stenosis involving proximal inferior division of the right MCA.    CTA NECK:  No evidence of carotid or vertebral artery stenosis.    Similar bilateral heterogeneous thyroid lobe nodules which can be further evaluated with dedicated nonemergent thyroid ultrasound.    < end of copied text >  < from: CT Head No Cont (07.12.21 @ 14:58) >  In comparison with the prior CT scan of the brain dated July 4, 2021:    No acute intracranial hemorrhage or acute large territorial infarct, stableexamination.    Small acute infarct was reported on the MRI of the brain dated July 5, 2021, please see that report for further information.    If symptoms persist, follow-up MRI of the brain is suggested if not contraindicated in this patient.      < end of copied text >                                              --------------------------------------------------------------    PHYSICAL EXAM:  General: the patient is alert ,orientedx3 ,comfortable in bed  HEENT:  LUNGS: normal bilateral air entry  HEART: normal s1 s2 ,no added sounds.  ABDOMEN: soft ,nontender  EXT: no lower limb edema  Mental status: Awake, alert and oriented x3.  Recent and remote memory intact.  Naming, repetition and comprehension intact.  Attention/concentration intact.  No dysarthria, no aphasia.  Fund of knowledge appropriate.    Cranial nerves:Left sided facial deviation noted. Pupils equally round and reactive to light, visual fields full, no nystagmus, extraocular muscles intact, hearing intact to finger rub, palate elevation symmetric, tongue was midline.  Motor:  MRC grading 5/5 Rt upper and lower ext. LUE:2/5, LLE:4/5   Normal tone and bulk.  No abnormal movements.    Sensation: Intact to light touch, proprioception, and pinprick.   Coordination: No dysmetria on finger-to-nose rt side, left side impaired due to weakness..  No dysdiadokinesia.  Reflexes: 2+ in bilateral UE/LE, downgoing toes bilaterally. (-) Cueva.  Gait: Narrow and steady. No ataxia.    NIH STROKE SCALE  Item	                                                        Score  1 a.	Level of Consciousness	               	0  1 b. LOC Questions	                                0  1 c.	LOC Commands	                               	0  2.	Best Gaze	                                        0  3.	Visual	                                                0  4.	Facial Palsy	                                        1  5 a.	Motor Arm - Left	                                2  5 b.	Motor Arm - Right	                        0  6 a.	Motor Leg - Left	                                0  6 b.	Motor Leg - Right	                                0  7.	Limb Ataxia	                                        0  8.	Sensory	                                                0  9.	Language	                                        0  10.	Dysarthria	                                        0  11.	Extinction and Inattention  	        0  ______________________________________  TOTAL	                                                        3                                               --------------------------------------------------------------    ASSESSMENT & PLAN    Past medical history and hospital course   80 Y M PMHx of CAD/stents, DM, HTN, bladder and prostate CA, stable AAA, recent acute stroke (discharged 7/7/2021) presents to [facility] with recurrent and transient left sided upper and lower extremity weakness.     #) Acute infaract of rt medulla:  - NIH score 3 today  - MRI of brain redemonstrated the punctate infarct in the right ventral medulla, minimally enlarged possibly due to difference in technique compared to the prior exam from 7/5/2021. Slightly increased mild edema.  -Continue with  Brilinta 90 mg PO BID  - Discontinued ASpirin 81 mg for now.   - Continuing Atorvastatin 80mg daily  - BP goal 130-160, increased Amlodpinie to 10 mg PO daily, c/w Losartan  100 mg Daily.  - PT/OT/Speech eval    #) HTN   - BP goal as above (130-160 systolic)        --> Continue losartan 100 mg PO QAM.       --> Increased  amlodipine 10 mg  - Adjust medication as necessary     #) DM  - Hemoglobin A1C 7.8 on 7/5  - Follow-up finger-stick glucose   - Add insulin regimen as needed (goal 140-180)     #) Bladder/prostate CA  - Outpatient management     #) AAA  - Stable at this time  - Outpatient management    #) HFrEF  - Echo taken 7/6/2021 shows decreased left ventricular systolic function   - EF 46%  - No overt symptoms at this time, can follow-up outpatient.     #) Diet - f/u MBS as per speech and swallow, patient on mechanical soft diet as his dentures are lost.  #) DVT prophylaxis- lovenox 40mg sub-Q QD  #) Disposition- stroke unit   #) Activity- increase as tolerated  #) Code status - Full

## 2021-07-14 ENCOUNTER — INPATIENT (INPATIENT)
Facility: HOSPITAL | Age: 81
LOS: 13 days | Discharge: HOME | End: 2021-07-28
Attending: PHYSICAL MEDICINE & REHABILITATION | Admitting: PHYSICAL MEDICINE & REHABILITATION
Payer: MEDICARE

## 2021-07-14 ENCOUNTER — TRANSCRIPTION ENCOUNTER (OUTPATIENT)
Age: 81
End: 2021-07-14

## 2021-07-14 VITALS
HEART RATE: 80 BPM | HEIGHT: 71 IN | WEIGHT: 178.57 LBS | DIASTOLIC BLOOD PRESSURE: 63 MMHG | TEMPERATURE: 98 F | RESPIRATION RATE: 18 BRPM | SYSTOLIC BLOOD PRESSURE: 143 MMHG

## 2021-07-14 VITALS
SYSTOLIC BLOOD PRESSURE: 133 MMHG | RESPIRATION RATE: 18 BRPM | DIASTOLIC BLOOD PRESSURE: 67 MMHG | HEART RATE: 92 BPM | TEMPERATURE: 97 F

## 2021-07-14 DIAGNOSIS — Z98.49 CATARACT EXTRACTION STATUS, UNSPECIFIED EYE: Chronic | ICD-10-CM

## 2021-07-14 DIAGNOSIS — Z98.890 OTHER SPECIFIED POSTPROCEDURAL STATES: Chronic | ICD-10-CM

## 2021-07-14 LAB
ALBUMIN SERPL ELPH-MCNC: 4 G/DL — SIGNIFICANT CHANGE UP (ref 3.5–5.2)
ALP SERPL-CCNC: 80 U/L — SIGNIFICANT CHANGE UP (ref 30–115)
ALT FLD-CCNC: 23 U/L — SIGNIFICANT CHANGE UP (ref 0–41)
ANION GAP SERPL CALC-SCNC: 11 MMOL/L — SIGNIFICANT CHANGE UP (ref 7–14)
AST SERPL-CCNC: 26 U/L — SIGNIFICANT CHANGE UP (ref 0–41)
BASOPHILS # BLD AUTO: 0.05 K/UL — SIGNIFICANT CHANGE UP (ref 0–0.2)
BASOPHILS NFR BLD AUTO: 0.6 % — SIGNIFICANT CHANGE UP (ref 0–1)
BILIRUB SERPL-MCNC: 1.6 MG/DL — HIGH (ref 0.2–1.2)
BUN SERPL-MCNC: 17 MG/DL — SIGNIFICANT CHANGE UP (ref 10–20)
CALCIUM SERPL-MCNC: 9 MG/DL — SIGNIFICANT CHANGE UP (ref 8.5–10.1)
CHLORIDE SERPL-SCNC: 108 MMOL/L — SIGNIFICANT CHANGE UP (ref 98–110)
CO2 SERPL-SCNC: 23 MMOL/L — SIGNIFICANT CHANGE UP (ref 17–32)
CREAT SERPL-MCNC: 1.1 MG/DL — SIGNIFICANT CHANGE UP (ref 0.7–1.5)
EOSINOPHIL # BLD AUTO: 0.19 K/UL — SIGNIFICANT CHANGE UP (ref 0–0.7)
EOSINOPHIL NFR BLD AUTO: 2.4 % — SIGNIFICANT CHANGE UP (ref 0–8)
GLUCOSE SERPL-MCNC: 149 MG/DL — HIGH (ref 70–99)
HCT VFR BLD CALC: 42.5 % — SIGNIFICANT CHANGE UP (ref 42–52)
HGB BLD-MCNC: 14.2 G/DL — SIGNIFICANT CHANGE UP (ref 14–18)
IMM GRANULOCYTES NFR BLD AUTO: 0.1 % — SIGNIFICANT CHANGE UP (ref 0.1–0.3)
LYMPHOCYTES # BLD AUTO: 2.45 K/UL — SIGNIFICANT CHANGE UP (ref 1.2–3.4)
LYMPHOCYTES # BLD AUTO: 31.1 % — SIGNIFICANT CHANGE UP (ref 20.5–51.1)
MAGNESIUM SERPL-MCNC: 1.8 MG/DL — SIGNIFICANT CHANGE UP (ref 1.8–2.4)
MCHC RBC-ENTMCNC: 29.4 PG — SIGNIFICANT CHANGE UP (ref 27–31)
MCHC RBC-ENTMCNC: 33.4 G/DL — SIGNIFICANT CHANGE UP (ref 32–37)
MCV RBC AUTO: 88 FL — SIGNIFICANT CHANGE UP (ref 80–94)
MONOCYTES # BLD AUTO: 1.06 K/UL — HIGH (ref 0.1–0.6)
MONOCYTES NFR BLD AUTO: 13.5 % — HIGH (ref 1.7–9.3)
NEUTROPHILS # BLD AUTO: 4.11 K/UL — SIGNIFICANT CHANGE UP (ref 1.4–6.5)
NEUTROPHILS NFR BLD AUTO: 52.3 % — SIGNIFICANT CHANGE UP (ref 42.2–75.2)
NRBC # BLD: 0 /100 WBCS — SIGNIFICANT CHANGE UP (ref 0–0)
PLATELET # BLD AUTO: 296 K/UL — SIGNIFICANT CHANGE UP (ref 130–400)
POTASSIUM SERPL-MCNC: 4.3 MMOL/L — SIGNIFICANT CHANGE UP (ref 3.5–5)
POTASSIUM SERPL-SCNC: 4.3 MMOL/L — SIGNIFICANT CHANGE UP (ref 3.5–5)
PROT SERPL-MCNC: 6.4 G/DL — SIGNIFICANT CHANGE UP (ref 6–8)
RBC # BLD: 4.83 M/UL — SIGNIFICANT CHANGE UP (ref 4.7–6.1)
RBC # FLD: 13.3 % — SIGNIFICANT CHANGE UP (ref 11.5–14.5)
SODIUM SERPL-SCNC: 142 MMOL/L — SIGNIFICANT CHANGE UP (ref 135–146)
WBC # BLD: 7.87 K/UL — SIGNIFICANT CHANGE UP (ref 4.8–10.8)
WBC # FLD AUTO: 7.87 K/UL — SIGNIFICANT CHANGE UP (ref 4.8–10.8)

## 2021-07-14 PROCEDURE — 99238 HOSP IP/OBS DSCHRG MGMT 30/<: CPT

## 2021-07-14 PROCEDURE — 74230 X-RAY XM SWLNG FUNCJ C+: CPT | Mod: 26

## 2021-07-14 PROCEDURE — 93010 ELECTROCARDIOGRAM REPORT: CPT

## 2021-07-14 RX ORDER — ATORVASTATIN CALCIUM 80 MG/1
80 TABLET, FILM COATED ORAL AT BEDTIME
Refills: 0 | Status: DISCONTINUED | OUTPATIENT
Start: 2021-07-14 | End: 2021-07-28

## 2021-07-14 RX ORDER — CLOPIDOGREL BISULFATE 75 MG/1
1 TABLET, FILM COATED ORAL
Qty: 0 | Refills: 0 | DISCHARGE

## 2021-07-14 RX ORDER — ACETAMINOPHEN 500 MG
650 TABLET ORAL EVERY 6 HOURS
Refills: 0 | Status: DISCONTINUED | OUTPATIENT
Start: 2021-07-14 | End: 2021-07-28

## 2021-07-14 RX ORDER — AMLODIPINE BESYLATE 2.5 MG/1
1 TABLET ORAL
Qty: 0 | Refills: 0 | DISCHARGE
Start: 2021-07-14

## 2021-07-14 RX ORDER — LANOLIN ALCOHOL/MO/W.PET/CERES
5 CREAM (GRAM) TOPICAL AT BEDTIME
Refills: 0 | Status: DISCONTINUED | OUTPATIENT
Start: 2021-07-14 | End: 2021-07-28

## 2021-07-14 RX ORDER — LOSARTAN POTASSIUM 100 MG/1
1 TABLET, FILM COATED ORAL
Qty: 0 | Refills: 0 | DISCHARGE
Start: 2021-07-14

## 2021-07-14 RX ORDER — AMLODIPINE BESYLATE 2.5 MG/1
10 TABLET ORAL DAILY
Refills: 0 | Status: DISCONTINUED | OUTPATIENT
Start: 2021-07-14 | End: 2021-07-19

## 2021-07-14 RX ORDER — TICAGRELOR 90 MG/1
90 TABLET ORAL EVERY 12 HOURS
Refills: 0 | Status: DISCONTINUED | OUTPATIENT
Start: 2021-07-14 | End: 2021-07-28

## 2021-07-14 RX ORDER — ATORVASTATIN CALCIUM 80 MG/1
1 TABLET, FILM COATED ORAL
Qty: 0 | Refills: 0 | DISCHARGE
Start: 2021-07-14

## 2021-07-14 RX ORDER — MAGNESIUM HYDROXIDE 400 MG/1
30 TABLET, CHEWABLE ORAL DAILY
Refills: 0 | Status: DISCONTINUED | OUTPATIENT
Start: 2021-07-14 | End: 2021-07-28

## 2021-07-14 RX ORDER — TICAGRELOR 90 MG/1
1 TABLET ORAL
Qty: 0 | Refills: 0 | DISCHARGE
Start: 2021-07-14

## 2021-07-14 RX ORDER — LOSARTAN POTASSIUM 100 MG/1
100 TABLET, FILM COATED ORAL DAILY
Refills: 0 | Status: DISCONTINUED | OUTPATIENT
Start: 2021-07-14 | End: 2021-07-28

## 2021-07-14 RX ORDER — ENOXAPARIN SODIUM 100 MG/ML
40 INJECTION SUBCUTANEOUS DAILY
Refills: 0 | Status: DISCONTINUED | OUTPATIENT
Start: 2021-07-14 | End: 2021-07-28

## 2021-07-14 RX ORDER — LOSARTAN POTASSIUM 100 MG/1
1 TABLET, FILM COATED ORAL
Qty: 0 | Refills: 0 | DISCHARGE

## 2021-07-14 RX ADMIN — ENOXAPARIN SODIUM 40 MILLIGRAM(S): 100 INJECTION SUBCUTANEOUS at 12:57

## 2021-07-14 RX ADMIN — ATORVASTATIN CALCIUM 80 MILLIGRAM(S): 80 TABLET, FILM COATED ORAL at 21:56

## 2021-07-14 RX ADMIN — TICAGRELOR 90 MILLIGRAM(S): 90 TABLET ORAL at 05:40

## 2021-07-14 RX ADMIN — LOSARTAN POTASSIUM 100 MILLIGRAM(S): 100 TABLET, FILM COATED ORAL at 05:40

## 2021-07-14 RX ADMIN — AMLODIPINE BESYLATE 10 MILLIGRAM(S): 2.5 TABLET ORAL at 05:40

## 2021-07-14 RX ADMIN — TICAGRELOR 90 MILLIGRAM(S): 90 TABLET ORAL at 17:05

## 2021-07-14 RX ADMIN — CHLORHEXIDINE GLUCONATE 1 APPLICATION(S): 213 SOLUTION TOPICAL at 05:39

## 2021-07-14 NOTE — SWALLOW VFSS/MBS ASSESSMENT ADULT - ROSENBEK'S PENETRATION ASPIRATION SCALE
(8) contrast passes glottis, visible subglottic residue remains, absent patient response (aspiration) (5) contrast contacts vocal cords, visible residue remains (penetration)

## 2021-07-14 NOTE — DISCHARGE NOTE PROVIDER - NSDCFUSCHEDAPPT_GEN_ALL_CORE_FT
WILFREDO RIVAS ; 07/19/2021 ; P Neuro [address] [facility]  WILFREDO RIVAS ; 07/30/2021 ; NPP Urology [address]

## 2021-07-14 NOTE — DISCHARGE NOTE PROVIDER - NSDCMRMEDTOKEN_GEN_ALL_CORE_FT
amLODIPine 10 mg oral tablet: 1 tab(s) orally once a day  atorvastatin 80 mg oral tablet: 1 tab(s) orally once a day (at bedtime)  glipiZIDE 5 mg oral tablet, extended release: 1 tab(s) orally 2 times a day  losartan 100 mg oral tablet: 1 tab(s) orally once a day  Pyridium 100 mg oral tablet: 1 tab(s) orally 3 times a day   ticagrelor 90 mg oral tablet: 1 tab(s) orally every 12 hours

## 2021-07-14 NOTE — SWALLOW VFSS/MBS ASSESSMENT ADULT - ORAL PHASE
Uncontrolled bolus / spillover in hypopharynx Delayed oral transit time/Uncontrolled bolus / spillover in hypopharynx/Aspiration before swallow - silent Residue in oral cavity/Uncontrolled bolus / spillover in hypopharynx

## 2021-07-14 NOTE — DISCHARGE NOTE PROVIDER - CARE PROVIDER_API CALL
Ike Barrios)  EEGEpilepsy; Neurology  [address] [place] [address], [address]  [place] [address]  Phone: [phone]  Fax: [phone]  Follow Up Time: 1 week    Marlyn Peralta  INTERNAL MEDICINE  [address] [place] [address]  Phone: [phone]  Fax: [phone]  Follow Up Time: 1 month

## 2021-07-14 NOTE — DISCHARGE NOTE NURSING/CASE MANAGEMENT/SOCIAL WORK - NSDCFUADDAPPT_GEN_ALL_CORE_FT
Please follow up with Dr Greenfield ,on the following address :   [facility] Candice Guajardo ,

## 2021-07-14 NOTE — DISCHARGE NOTE PROVIDER - PROVIDER TOKENS
PROVIDER:[TOKEN:[[id number]],FOLLOWUP:[1 week]],PROVIDER:[TOKEN:[[id number]],FOLLOWUP:[1 month]]

## 2021-07-14 NOTE — SWALLOW VFSS/MBS ASSESSMENT ADULT - PHARYNGEAL PHASE COMMENTS
pharyngeal impairments characterized by minimal superior movement of the thyroid cartilage w/ minimal approximation of arytenoids to epiglottic petiole; partial anterior hyoid movement, incomplete epiglottic deflection and laryngeal vestibular closure resulting in multiple instances of penetration w/ subsequent silent aspiration. Silent aspiration persisted w/ use of chin tuck and L-head turn combined w/ chin tuck; Diminished pharyngeal stripping wave, partial PES opening w/ wide column of contrast noted b/t tongue base and PPW w/ collection of residue t/o pharyngeal structures (base of tongue, valleculae, posterior pharyngeal wall, aryepiglottic folds, and pyriforms). pharyngeal impairments characterized by minimal superior movement of the thyroid cartilage w/ minimal approximation of arytenoids to epiglottic petiole; partial anterior hyoid movement, incomplete epiglottic deflection and laryngeal vestibular closure resulting in multiple instances of penetration of pharyngeal residue. Diminished pharyngeal stripping wave, partial PES opening w/ wide column of contrast noted b/t tongue base and PPW w/ collection of residue t/o pharyngeal structures (base of tongue, valleculae, posterior pharyngeal wall, aryepiglottic folds, and pyriforms).

## 2021-07-14 NOTE — DISCHARGE NOTE PROVIDER - CARE PROVIDERS DIRECT ADDRESSES
[email or web address]

## 2021-07-14 NOTE — SWALLOW VFSS/MBS ASSESSMENT ADULT - DIAGNOSTIC IMPRESSIONS
moderate oropharyngeal dysphagia for puree, nectar-thick, and mechanical soft consistencies; severe oropharyngeal dysphagia for thin liquids

## 2021-07-14 NOTE — H&P ADULT - ASSESSMENT
80 Y M PMHx of CAD/stents, DM, HTN, bladder and prostate CA, stable AAA, recent acute stroke (discharged 7/7/2021) presents to [facility] with recurrent and transient left sided upper and lower extremity weakness.   Admitted for rehab of  R medullary infarct and subsequent left sided weakness and facial droop.    # R medullary infarct  # Left sided weakness #Left sided facial droop   - MRI of brain redemonstrated the punctate infarct in the right ventral medulla, minimally enlarged possibly due to difference in technique compared to the prior exam from 7/5/2021. Slightly increased mild edema.  - Brilinta 90 mg PO BID and Atorvastatin 80mg daily  - PT/OT/SLT    # HTN   - BP goal as above (130-160 systolic)   - Losartan 100 mg PO QAM.  - Amlodipine 10 mg    # Comorbidities/ Miscellaneous   - IDDM: Hemoglobin A1C 7.8 on 7/5, insulin regimen as needed (goal 140-180)   - Bladder/prostate CA: Outpatient management   - AAA: Stable at this time, Outpatient management  - HFrEF: Echo taken 7/6/2021 shows decreased left ventricular systolic function (LVEF 46%); No overt symptoms at this time  - Pain control: currently not on pin regimen   - GI/Bowel Mgmt: patient currently has regular BM   - Skin: No active issues at this time  - FEN: replete as needed   - Diet: Dysphagia 3 mechanical soft diet     # Precautions / PROPHYLAXIS:    - Falls  - Ortho: Weight bearing status: WBAT   - DVT prophylaxis: SC heparin       MEDICAL PROGNOSIS: GOOD            REHAB POTENTIAL: GOOD             ESTIMATED DISPOSITION: HOME WITH HOME CARE       [ x ]  The goals of the IRF admission were discussed with the patient and or family member, who agreed             ELOS:  [     ] 7-14    [    ]  14-21    [    ]  Other    THERAPY ORDERS and INITIAL INDIVIDUALIZED PLAN OF CARE:  This initial individualized interdisciplinary plan of care, which was established by me (the attending physiatrist), is based on elements from the post admission evaluation. The interdisciplinary therapy program is to be at least 3 hrs a day, at least 5 days per week from from physical, occupational and/ or speech therapies as ordered by me below.      [ x  ] P.T. 90 mins. /day at least 5 out of 7 days:  [  x ] superficial  modalities prn, [ x  ] A/AAROM, [ x  ] PREs, [ x  ] transfer training,            [ x  ] progressive ambulation, [x   ] stairs                                               [ x  ] O.T. 90 mins. /day at least 5 out of 7 days::  [ x  ] modalities prn,  [ x  ]A/AAROM, [ x  ] PREs, [  x ] functional transfer training, [ x  ] ADL training           [   ] cognitive/ perceptual eval and training, [   ] splint eval                                                  [   ] S.L.P:  [   ] speech eval, [   ] swallow eval     [   ] Neuropsychology eval     [ x  ] Individualized rec. therapy      RATIONALE FOR INPATIENT ADMISSION - Patient demonstrates the following: (check all that apply)  [X] Medically appropriate for acute rehabilitation admission. Requires interdisiplinary therapy consisting of at least PT and OT, at least 3 hrs. a day at least 5 days a week  [X] Has attainable rehab goals with an appropriate initial discharge plan  [X] Has rehabilitation potential (expected to make a significant improvement within a reasonable period of time)  [X] Requires close medical management by a rehab physician, rehab nursing care,  and comprehensive interdisciplinary team (including PT, OT)    [X] Requires evaluation by a physiatrist at least 3 days a week to evaluate and manage and coordinate rehab and medical problems   80 Y M PMHx of CAD/stents, DM, HTN, bladder and prostate CA, stable AAA, recent acute stroke (discharged 7/7/2021) presents to [facility] with recurrent and transient left sided upper and lower extremity weakness.   Admitted for rehab of  R medullary infarct and subsequent left sided weakness and facial droop.    # R medullary infarct  # Left sided weakness #Left sided facial droop   - MRI of brain 7/12/21 redemonstrated the punctate infarct in the right ventral medulla, minimally enlarged possibly due to difference in technique compared to the prior exam from 7/5/2021. Slightly increased mild edema. No additional acute infarcts. No intracranial hemorrhage or mass effect. Stable mild chronic microvascular ischemic changes.   - Brilinta 90 mg PO BID and Atorvastatin 80mg daily  - PT/OT/SLT    # HTN   - BP goal as above (130-160 systolic)   - Losartan 100 mg PO QAM.  - Amlodipine 10 mg    # Comorbidities/ Miscellaneous   - IDDM: Hemoglobin A1C 7.8 on 7/5, insulin regimen as needed (goal 140-180)   - Bladder/prostate CA: Outpatient management   - AAA: Stable at this time, Outpatient management  - HFrEF: Echo taken 7/6/2021 shows decreased left ventricular systolic function (LVEF 46%); No overt symptoms at this time  - Pain control: currently not on pin regimen   - GI/Bowel Mgmt: patient currently has regular BM   - Skin: No active issues at this time  - FEN: replete as needed   - Diet: Dysphagia 3 mechanical soft diet     # Precautions / PROPHYLAXIS:    - Falls  - Ortho: Weight bearing status: WBAT   - DVT prophylaxis: SC heparin       MEDICAL PROGNOSIS: GOOD            REHAB POTENTIAL: GOOD             ESTIMATED DISPOSITION: HOME WITH HOME CARE       [ x ]  The goals of the IRF admission were discussed with the patient and or family member, who agreed             ELOS:  [     ] 7-14    [    ]  14-21    [    ]  Other    THERAPY ORDERS and INITIAL INDIVIDUALIZED PLAN OF CARE:  This initial individualized interdisciplinary plan of care, which was established by me (the attending physiatrist), is based on elements from the post admission evaluation. The interdisciplinary therapy program is to be at least 3 hrs a day, at least 5 days per week from from physical, occupational and/ or speech therapies as ordered by me below.      [ x  ] P.T. 90 mins. /day at least 5 out of 7 days:  [  x ] superficial  modalities prn, [ x  ] A/AAROM, [ x  ] PREs, [ x  ] transfer training,            [ x  ] progressive ambulation, [x   ] stairs                                               [ x  ] O.T. 90 mins. /day at least 5 out of 7 days::  [ x  ] modalities prn,  [ x  ]A/AAROM, [ x  ] PREs, [  x ] functional transfer training, [ x  ] ADL training           [   ] cognitive/ perceptual eval and training, [   ] splint eval                                                  [   ] S.L.P:  [   ] speech eval, [   ] swallow eval     [   ] Neuropsychology eval     [ x  ] Individualized rec. therapy      RATIONALE FOR INPATIENT ADMISSION - Patient demonstrates the following: (check all that apply)  [X] Medically appropriate for acute rehabilitation admission. Requires interdisiplinary therapy consisting of at least PT and OT, at least 3 hrs. a day at least 5 days a week  [X] Has attainable rehab goals with an appropriate initial discharge plan  [X] Has rehabilitation potential (expected to make a significant improvement within a reasonable period of time)  [X] Requires close medical management by a rehab physician, rehab nursing care,  and comprehensive interdisciplinary team (including PT, OT)    [X] Requires evaluation by a physiatrist at least 3 days a week to evaluate and manage and coordinate rehab and medical problems   80 Y M PMHx of CAD/stents, DM, HTN, bladder and prostate CA, stable AAA, recent acute stroke (discharged 7/7/2021) presents to [facility] with recurrent and transient left sided upper and lower extremity weakness.   Admitted for rehab of  R medullary infarct and subsequent left sided weakness and facial droop.    # R medullary infarct  # Left sided weakness #Left sided facial droop   - MRI of brain 7/12/21 redemonstrated the punctate infarct in the right ventral medulla, minimally enlarged possibly due to difference in technique compared to the prior exam from 7/5/2021. Slightly increased mild edema. No additional acute infarcts. No intracranial hemorrhage or mass effect. Stable mild chronic microvascular ischemic changes.   - Brilinta 90 mg PO BID and Atorvastatin 80mg daily  - PT/OT/SLT    # HTN   - BP goal as above (130-160 systolic)   - Losartan 100 mg PO QAM.  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: Hemoglobin A1C 7.8 on 7/5, insulin regimen as needed (goal 140-180)   - Bladder/prostate CA: Outpatient management   - AAA: Stable at this time, Outpatient management  - HFrEF: Echo taken 7/6/2021 shows decreased left ventricular systolic function (LVEF 46%); No overt symptoms at this time  - Pain control: currently not on pin regimen   - GI/Bowel Mgmt: patient currently has regular BM   - Skin: No active issues at this time  - FEN: replete as needed   - Diet: Dysphagia 3 mechanical soft diet     # Precautions / PROPHYLAXIS:    - Falls  - Ortho: Weight bearing status: WBAT   - DVT prophylaxis: SC heparin       MEDICAL PROGNOSIS: GOOD            REHAB POTENTIAL: GOOD             ESTIMATED DISPOSITION: HOME WITH HOME CARE       [ x ]  The goals of the IRF admission were discussed with the patient and or family member, who agreed             ELOS:  [     ] 7-14    [    ]  14-21    [    ]  Other    THERAPY ORDERS and INITIAL INDIVIDUALIZED PLAN OF CARE:  This initial individualized interdisciplinary plan of care, which was established by me (the attending physiatrist), is based on elements from the post admission evaluation. The interdisciplinary therapy program is to be at least 3 hrs a day, at least 5 days per week from from physical, occupational and/ or speech therapies as ordered by me below.      [ x  ] P.T. 90 mins. /day at least 5 out of 7 days:  [  x ] superficial  modalities prn, [ x  ] A/AAROM, [ x  ] PREs, [ x  ] transfer training,            [ x  ] progressive ambulation, [x   ] stairs                                               [ x  ] O.T. 90 mins. /day at least 5 out of 7 days::  [ x  ] modalities prn,  [ x  ]A/AAROM, [ x  ] PREs, [  x ] functional transfer training, [ x  ] ADL training           [   ] cognitive/ perceptual eval and training, [   ] splint eval                                                  [   ] S.L.P:  [   ] speech eval, [   ] swallow eval     [   ] Neuropsychology eval     [ x  ] Individualized rec. therapy      RATIONALE FOR INPATIENT ADMISSION - Patient demonstrates the following: (check all that apply)  [X] Medically appropriate for acute rehabilitation admission. Requires interdisiplinary therapy consisting of at least PT and OT, at least 3 hrs. 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   Admitted for rehab of  R medullary infarct and subsequent left sided weakness and facial droop.    # R medullary infarct  # Left sided weakness #Left sided facial droop, nondominant  - MRI of brain 7/12/21 redemonstrated the punctate infarct in the right ventral medulla, minimally enlarged possibly due to difference in technique compared to the prior exam from 7/5/2021. Slightly increased mild edema. No additional acute infarcts. No intracranial hemorrhage or mass effect. Stable mild chronic microvascular ischemic changes.   - Brilinta 90 mg PO BID and Atorvastatin 80mg daily  - PT/OT/SLT    Comorbidities    # HTN   - BP goal as above (130-160 systolic)   - Losartan 100 mg PO QAM.  - Amlodipine 10 mg      -#Type II DM on glypizide: Hemoglobin A1C 7.8 on 7/5, insulin regimen as needed (goal 140-180)     #Bladder/prostate CA: Outpatient management     #AAA: Stable at this time, Outpatient management    #HFrEF/ history of Systolic CHF: Echo taken 7/6/2021 shows decreased left ventricular systolic function (LVEF 46%); No overt symptoms at this time    - GI/Bowel Mgmt: patient currently has regular BM     - Diet: Dysphagia 3 mechanical soft diet     # Precautions / PROPHYLAXIS:    - Falls  - Ortho: Weight bearing status: WBAT   - DVT prophylaxis: SC heparin       MEDICAL PROGNOSIS: GOOD            REHAB POTENTIAL: GOOD             ESTIMATED DISPOSITION: HOME WITH HOME CARE       [ x ]  The goals of the IRF admission were discussed with the patient and or family member, who agreed             ELOS:  [  x   ] 7-14    [    ]  14-21    [    ]  Other    THERAPY ORDERS and INITIAL INDIVIDUALIZED PLAN OF CARE:  This initial individualized interdisciplinary plan of care, which was established by me (the attending physiatrist), is based on elements from the post admission evaluation. The interdisciplinary therapy program is to be at least 3 hrs a day, at least 5 days per week from from physical, occupational and/ or speech therapies as ordered by me below.      [ x  ] P.T. 90 mins. /day at least 5 out of 7 days:  [  x ] superficial  modalities prn, [ x  ] A/AAROM, [ x  ] PREs, [ x  ] transfer training,            [ x  ] progressive ambulation, [x   ] stairs                                               [ x  ] O.T. 90 mins. /day at least 5 out of 7 days::  [ x  ] modalities prn,  [ x  ]A/AAROM, [ x  ] PREs, [  x ] functional transfer training, [ x  ] ADL training           [  x ] cognitive/ perceptual eval and training, [   ] splint eval                                                  [ x  ] S.L.P:  [  x ] speech eval, [   ] swallow eval     [  x ] Neuropsychology eval     [ x  ] Individualized rec. therapy      RATIONALE FOR INPATIENT ADMISSION - Patient demonstrates the following: (check all that apply)  [X] Medically appropriate for acute rehabilitation admission. Requires interdisiplinary therapy consisting of at least PT and OT, at least 3 hrs. a day at least 5 days a week  [X] Has attainable rehab goals with an appropriate initial discharge plan  [X] Has rehabilitation potential (expected to make a significant improvement within a reasonable period of time)  [X] Requires close medical management by a rehab physician, rehab nursing care,  and comprehensive interdisciplinary team (including PT, OT)    [X] Requires evaluation by a physiatrist at least 3 days a week to evaluate and manage and coordinate rehab and medical problems

## 2021-07-14 NOTE — SWALLOW VFSS/MBS ASSESSMENT ADULT - SLP PERTINENT HISTORY OF CURRENT PROBLEM
pt is an 81 y/o M w/ PMHx: CAD/stents, DM, HTN, bladder and prostate CA, stable AAA, recent acute infarct in the ventral right medulla, d/c'ed 7/7/2021) presents to [facility] w/ L-sided UE+LE weakness. At that time, no neuroendovascular intervention was recommended. pt was d/c'ed w/ O/P management. Repeat CTH shows same territory infarct. CTA head and neck with redemonstration of moderate stenosis involving the proximal M2 inferior division of the right MCA and severe multifocal stenoses involving the V4 segment of the right vertebral artery due to calcified atherosclerotic plaque. Seen by neurology who suspect fluctuating symptoms may be due to high grade vertebral artery vessel disease and recommended switching from aspirin and Plavix to Brilinta. pt not seen by SLP during recent CVA w/u. pt endorsing some difficulty swallowing thin liquids at home

## 2021-07-14 NOTE — SWALLOW VFSS/MBS ASSESSMENT ADULT - SUCCESSFUL STRATEGIES TRIALED DURING PROCEDURE
intermittent cough/throat clear combined w/ dry swallow effective in clearing laryngeal vestibule./chin tuck/head turn to the left/productive volitional cough following clinician cue

## 2021-07-14 NOTE — H&P ADULT - NSHPREVIEWOFSYSTEMS_GEN_ALL_CORE
Constiutional:    [   ] WNL           [   ] poor appetite   [   ] insomnia   [   ] tired   Cardio:                [ x ] WNL           [   ] CP   [   ] TATE   [   ] palpitations               Resp:                   [ x ] WNL           [   ] SOB   [   ] cough   [   ] wheezing   GI:                        [ x ] WNL           [   ] constipation   [   ] diarrhea   [   ] abdominal pain   [   ] nausea   [   ] emesis                                :                      [ x ] WNL           [   ] RUFFIN  [   ] dusuria   [   ] difficulty voiding             Endo:                   [ x ] WNL          [   ] po;yuria   [   ] temperature intolerance                 Skin:                     [ x ] WNL          [   ] pain   [   ] wound   [   ] rash   MSK:                    [ x ] WNL          [   ] muscle pain   [   ] joint pain/ stiffness   [   ] muscle tenderness   [   ] swelling   Neuro:                 [   ] WNL          [   ] HA   [   ] change in vision   [   ] tremor   [   ] weakness   [   ]dysphagia   [ x ] dysphonia, dysarthria, left facial droop            Cognitive:           [ x ] WNL           [   ]confusion      Psych:                  [ x ] WNL           [   ] hallucinations   [   ]agitation   [   ] delusion   [   ]depression Constiutional:    [ x  ] WNL           [   ] poor appetite   [   ] insomnia   [   ] tired   Cardio:                [ x ] WNL           [   ] CP   [   ] TATE   [   ] palpitations               Resp:                   [ x ] WNL           [   ] SOB   [   ] cough   [   ] wheezing   GI:                        [ x ] WNL           [   ] constipation   [   ] diarrhea   [   ] abdominal pain   [   ] nausea   [   ] emesis                                :                      [ x ] WNL           [   ] RUFFIN  [   ] dusuria   [   ] difficulty voiding             Endo:                   [ x ] WNL          [   ] po;yuria   [   ] temperature intolerance                 Skin:                     [ x ] WNL          [   ] pain   [   ] wound   [   ] rash   MSK:                    [ x ] WNL          [   ] muscle pain   [   ] joint pain/ stiffness   [   ] muscle tenderness   [   ] swelling   Neuro:                 [   ] WNL          [   ] HA   [   ] change in vision   [   ] tremor   [   ] weakness   [   ]dysphagia   [ x ] dysphonia, dysarthria, left facial droop, left hemiparesis           Cognitive:           [ x ] WNL           [   ]confusion      Psych:                  [ x ] WNL           [   ] hallucinations   [   ]agitation   [   ] delusion   [   ]depression

## 2021-07-14 NOTE — H&P ADULT - ATTENDING COMMENTS
I reviewed the chart and examined the patient with the resident and we discussed the findings and treatment plan. I agree with the findings and treatment plan above, which I modified as indicated. The patient requires 3 hrs a day of acute inpatient rehab.    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   Admitted for rehab of  R medullary infarct and subsequent left sided weakness and facial droop.    # R medullary infarct  # Left sided weakness #Left sided facial droop, nondominant  - MRI of brain 7/12/21 redemonstrated the punctate infarct in the right ventral medulla, minimally enlarged possibly due to difference in technique compared to the prior exam from 7/5/2021. Slightly increased mild edema. No additional acute infarcts. No intracranial hemorrhage or mass effect. Stable mild chronic microvascular ischemic changes.   - Brilinta 90 mg PO BID and Atorvastatin 80mg daily  - PT/OT/SLT    Comorbidities    # HTN - avoid tight control given intracranial vertebral stensis  - BP goal as above (130-160 systolic)   - Losartan 100 mg PO QAM.  - Amlodipine 10 mg      -#Type II DM on glypizide: Hemoglobin A1C 7.8 on 7/5, insulin regimen as needed (goal 140-180)     #Bladder/prostate CA: Outpatient management     #AAA: Stable at this time, Outpatient management    #HFrEF/ history of Systolic CHF: Echo taken 7/6/2021 shows decreased left ventricular systolic function (LVEF 46%); No overt symptoms at this time    - GI/Bowel Mgmt: patient currently has regular BM     - Diet: Dysphagia 3 mechanical soft diet     # Precautions / PROPHYLAXIS:    - Falls  - Ortho: Weight bearing status: WBAT   - DVT prophylaxis: SC heparin I reviewed the chart and examined the patient with the resident and we discussed the findings and treatment plan. I agree with the findings and treatment plan above, which I modified as indicated. The patient requires 3 hrs a day of acute inpatient rehab.    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   Admitted for rehab of  R medullary infarct and subsequent left sided weakness and facial droop.    # R medullary infarct  # Left sided weakness #Left sided facial droop, nondominant  - MRI of brain 7/12/21 redemonstrated the punctate infarct in the right ventral medulla, minimally enlarged possibly due to difference in technique compared to the prior exam from 7/5/2021. Slightly increased mild edema. No additional acute infarcts. No intracranial hemorrhage or mass effect. Stable mild chronic microvascular ischemic changes.   - Brilinta 90 mg PO BID and Atorvastatin 80mg daily  - PT/OT/SLT    Comorbidities    # HTN - avoid tight control given intracranial vertebral stenosis  - BP goal as above (130-160 systolic)   - Losartan 100 mg PO QAM.  - Amlodipine 10 mg      -#Type II DM on Glypizide: Hemoglobin A1C 7.8 on 7/5, insulin regimen as needed (goal 140-180)     #Bladder/prostate CA: Outpatient management     #AAA: Stable at this time, Outpatient management    #HFrEF/ history of Systolic CHF: Echo taken 7/6/2021 shows decreased left ventricular systolic function (LVEF 46%); No overt symptoms at this time    - GI/Bowel Mgmt: patient currently has regular BM     - Diet: Dysphagia 3 mechanical soft diet     # Precautions / PROPHYLAXIS:    - Falls  - Ortho: Weight bearing status: WBAT   - DVT prophylaxis: SC heparin

## 2021-07-14 NOTE — H&P ADULT - NSHPSOCIALHISTORY_GEN_ALL_CORE
Patient lives with his wife in a two-story house. [address] and one 1 flight of stairs inside the house.

## 2021-07-14 NOTE — SWALLOW VFSS/MBS ASSESSMENT ADULT - NS SWALLOW VFSS REC ASPIR MON
changes in CXR or elevated WBC/change of breathing pattern/oral hygiene/position upright (90Y)/cough/gurgly voice/fever/pneumonia/throat clearing/upper respiratory infection

## 2021-07-14 NOTE — DISCHARGE NOTE PROVIDER - NSDCFUADDAPPT_GEN_ALL_CORE_FT
Please follow up with Dr Greenfield ,on the following address :   [facility] Candice Guajardo ,

## 2021-07-14 NOTE — DISCHARGE NOTE PROVIDER - HOSPITAL COURSE
80 Y M PMHx of  CAD/stents, DM, HTN, bladder and prostate CA, stable AAA, recent acute stroke (discharged 7/7/2021) presents to [facility] with left sided upper and lower extremity weakness. 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 Patient had been evaluated by neuroendovascular who recommended no acute intervention. He was discharged for outpatient management. The day after discharge, patient started to feel recurrent left upper extremity weakness. He did not seek medical attention and his weakness continued to worsen through the weekend. Patient decided to come in today for further management as he can hardly move his left arm and his left leg is very weak. Patient does note, however, that his left arm was quite strong intermittently today, but 30 minutes later his weakness returned. Repeat CTH shows same territory infarct. CTA head and neck with redemonstration of moderate stenosis involving the proximal M2 inferior division of the right MCA and severe multifocal stenoses involving the V4 segment of the right vertebral artery due to calcified atherosclerotic plaque. Seen by neurology who suspect fluctuating symptoms may be due to high grade vertebral artery vessel disease. Being admitted for further management. Patient denies any fevers, chills, chest pain, SOB, nausea, vomiting, abdominal pain.  (12 Jul 2021 18:05)  MRI was performed that showed the completion of the previous stroke in the right medulla.  The patient is lying comfortable in bed ,denies pain , headache ,dizziness or vision changes.   During his stay in stroke unit ,the patient felt improvement in left arm strength ,and mobility.   No significant change since recent CTA of the head and neck dated 7/4/2021.  Transfer to Rehab 4A.  Follow up with bilirubin level, as it was mildly elevated.  Follow up with Dr Greenfield outpatient in 1 weeks.  Continue with Brilinta therapy for now. 80 Y M PMHx of  CAD/stents, DM, HTN, bladder and prostate CA, stable AAA, recent acute stroke (discharged 7/7/2021) presents to [facility] with left sided upper and lower extremity weakness. 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 Patient had been evaluated by neuroendovascular who recommended no acute intervention. He was discharged for outpatient management. The day after discharge, patient started to feel recurrent left upper extremity weakness. He did not seek medical attention and his weakness continued to worsen through the weekend. Patient decided to come in today for further management as he can hardly move his left arm and his left leg is very weak. Patient does note, however, that his left arm was quite strong intermittently today, but 30 minutes later his weakness returned. Repeat CTH shows same territory infarct. CTA head and neck with redemonstration of moderate stenosis involving the proximal M2 inferior division of the right MCA and severe multifocal stenoses involving the V4 segment of the right vertebral artery due to calcified atherosclerotic plaque. Seen by neurology who suspect fluctuating symptoms may be due to high grade vertebral artery vessel disease. Being admitted for further management. Patient denies any fevers, chills, chest pain, SOB, nausea, vomiting, abdominal pain.  (12 Jul 2021 18:05)  MRI was performed that showed the completion of the previous stroke in the right medulla.  The patient is lying comfortable in bed ,denies pain , headache ,dizziness or vision changes.   During his stay in stroke unit ,the patient felt improvement in left arm strength ,and mobility.   No significant change since recent CTA of the head and neck dated 7/4/2021.  Transfer to Rehab 4A.  Follow up with bilirubin level, as it was mildly elevated.  Follow up with Dr Dillard outpatient in 1 week.  Continue with Brilinta monotherapy and atorvastatin therapy.     Attending Attestation:  Pt's history, notes, vitals, meds, imaging, labs and results were all reviewed personally by myself and Dr. Dillard. Agree with the resident note, assessment and plan as detailed above.

## 2021-07-14 NOTE — SWALLOW VFSS/MBS ASSESSMENT ADULT - RECOMMENDED FEEDING/EATING TECHNIQUES
intermittent cough/throat clear combined w/ dry swallow t/o meals/allow for swallow between intakes/oral hygiene/position upright (90 degrees)/small sips/bites/tuck chin/turn head left

## 2021-07-14 NOTE — DISCHARGE NOTE PROVIDER - NSDCCPCAREPLAN_GEN_ALL_CORE_FT
PRINCIPAL DISCHARGE DIAGNOSIS  Diagnosis: CVA (cerebral vascular accident)  Assessment and Plan of Treatment: You were admitted to the hospital for increased left sided weakness ,that was worse than your baseline from your previous stroke.  on CT the same old stroke was found .  You were started on a stronger blood thinner called Brilinta .  Please take all medication as perscribed.  Please seek medical attention if severe headache ,dizziness ,increased weakness or change in vision.  Please folow up with Dr Greenfield 7/19/21.  Please continue with rehab for restoration of strength and ability .

## 2021-07-14 NOTE — H&P ADULT - NSHPPHYSICALEXAM_GEN_ALL_CORE
PHYSICAL EXAMINATION   VItals: T(C): 36.2 (07-14-21 @ 13:33), Max: 36.6 (07-13-21 @ 21:13)  HR: 92 (07-14-21 @ 13:33) (67 - 92)  BP: 133/67 (07-14-21 @ 13:33) (116/62 - 143/67)  RR: 18 (07-14-21 @ 13:33) (17 - 18)  SpO2: 99% (07-14-21 @ 04:06) (97% - 100%)    General: [ x ] NAD, Resting Comfortable,   [   ] other:                                HEENT: [ x ] NC/AT, EOMI, PERRL , Normal Conjunctivae,   [   ] other:  Cardio: [ x ] RRR, no murmer,   [   ] other:                              Pulm: [ x ] No Respiratory Distress,  Lungs CTAB,   [   ] other:                       Abdomen: [ x ]ND/NT, Soft,   [   ] other:    : [ x ] NO RUFFIN CATHETER, [   ] RUFFIN CATHETER- no meatal tear, no discharge, [   ] other:                                            MSK: [   ] No joint swelling, Full ROM,   [ x ] other: limited ROM in LUE and LLE                                          Ext: [ x ]No C/C/E, No calf tenderness,   [   ]other:    Skin: [ x ]intact,   [   ] other:                                                                   Neurological Examination:  Cognitive: [ x ] AAO x 3,   [    ]  other:                                                                      Attention:  [ x ] intact,   [    ]  other:                            Memory: [ x ] intact,    [    ]  other:     Mood/Affect: [ x ] wnl,    [    ]  other:                                                                             Communication: [    ]Fluent, no dysarthria, following commands:  [ x ] other: dysarthria, dysphonia,    CN II - XII:  [    ] intact,  [ x ] other: CN7 deficits                                                                                         Motor:   RIGHT UE: [ x ] WNL,  [   ] other: 5/5   LEFT    UE: [   ] WNL,  [   ] other: limited active ROM upon shoulder flexion, arm flexion and extension, however 4-/5 strength in partial ROM  RIGHT LE: [ x ] WNL,  [   ] other: 5/5  LEFT    LE: [   ] WNL,  [   ] other: limited in dorsiflexion AROM, plantarflexion AROM, however 4-/5 strength in partial ROM    Tone: [ x ] wnl,   [    ]  other:  DTRs: [ x ]symmetric, [   ] other:  Coordination:   [ x ] intact,   [    ] other:                                                                           Sensory: [ x ] Intact to light touch,   [    ] other: PHYSICAL EXAMINATION   VItals: T(C): 36.2 (07-14-21 @ 13:33), Max: 36.6 (07-13-21 @ 21:13)  HR: 92 (07-14-21 @ 13:33) (67 - 92)  BP: 133/67 (07-14-21 @ 13:33) (116/62 - 143/67)  RR: 18 (07-14-21 @ 13:33) (17 - 18)  SpO2: 99% (07-14-21 @ 04:06) (97% - 100%)    General: [ x ] NAD, Resting Comfortable,   [   ] other:                                HEENT: [ x ] NC/AT, EOMI, PERRL , Normal Conjunctivae,   [   ] other:  Cardio: [ x ] RRR, no murmer,   [   ] other:                              Pulm: [ x ] No Respiratory Distress,  Lungs CTAB,   [   ] other:                       Abdomen: [ x ]ND/NT, Soft,   [   ] other:    : [ x ] NO RUFFIN CATHETER, [   ] RUFFIN CATHETER- no meatal tear, no discharge, [   ] other:                                            MSK: [   ] No joint swelling, Full ROM,   [ x ] other: limited ROM in LUE and LLE                                          Ext: [ x ]No C/C/E, No calf tenderness,   [   ]other:    Skin: [ x ]intact,   [   ] other:                                                                   Neurological Examination:  Cognitive: [ x ] AAO x 3,   [    ]  other:                                                                      Attention:  [ x ] intact,   [    ]  other:                            Memory: [ x ] intact,    [    ]  other:     Mood/Affect: [ x ] wnl,    [    ]  other:                                                                             Communication: [    ]Fluent, no dysarthria, following commands:  [ x ] other: dysarthria, dysphonia,    CN II - XII:  [    ] intact,  [ x ] other: CN7 deficits - mild left facial droop with mild dyarthria                                                                                        Motor:   RIGHT UE: [ x ] WNL,  [   ] other: 5/5   LEFT    UE: [   ] WNL,  [   ] other: shoulder flexion delayed, slow, however 4-/5 strength in partial ROM  RIGHT LE: [ x ] WNL,  [   ] other: 5/5  LEFT    LE: [   ] WNL,  [   ] other: limited in dorsiflexion AROM, plantarflexion AROM, however 4-/5 strength in partial ROM    Tone: [ x ] wnl,   [    ]  other:  DTRs: [ x ]symmetric, [   ] other:  Coordination:   [  ] intact,   [  x  ] other:     decreased KUE                                                                       Sensory: [ x ] Intact to light touch,   [    ] other:

## 2021-07-14 NOTE — H&P ADULT - NSHPLABSRESULTS_GEN_ALL_CORE
14.2   7.87  )-----------( 296      ( 14 Jul 2021 05:28 )             42.5     07-14    142  |  108  |  17  ----------------------------<  149<H>  4.3   |  23  |  1.1    Ca    9.0      14 Jul 2021 05:28  Mg     1.8     07-14    TPro  6.4  /  Alb  4.0  /  TBili  1.6<H>  /  DBili  x   /  AST  26  /  ALT  23  /  AlkPhos  80  07-14    PT/INR - ( 12 Jul 2021 14:31 )   PT: 11.80 sec;   INR: 1.03 ratio         PTT - ( 12 Jul 2021 14:31 )  PTT:32.2 sec    POCT Blood Glucose.: 221 mg/dL (07-14-21 @ 08:04)  POCT Blood Glucose.: 114 mg/dL (07-13-21 @ 21:24)  POCT Blood Glucose.: 139 mg/dL (07-13-21 @ 16:19)  POCT Blood Glucose.: 160 mg/dL (07-13-21 @ 11:12)

## 2021-07-14 NOTE — H&P ADULT - HISTORY OF PRESENT ILLNESS
80 year old male with a PmHx of CAD s/p 5 stents (2009), DM, HTN, bladder and prostate CA, stable AAA, recent acute stroke (discharged 7/7/2021) presents to [facility] with left sided upper and lower extremity weakness. 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 Patient had been evaluated by neuroendovascular who recommended no acute intervention. He was discharged for outpatient management. The day after discharge, patient started to feel recurrent left upper extremity weakness. He did not seek medical attention and his weakness continued to worsen through the weekend. Patient decided to come in today for further management as he can hardly move his left arm and his left leg is very weak. Patient does note, however, that his left arm was quite strong intermittently today, but 30 minutes later his weakness returned. Repeat CTH shows same territory infarct. CTA head and neck with redemonstration of moderate stenosis involving the proximal M2 inferior division of the right MCA and severe multifocal stenoses involving the V4 segment of the right vertebral artery due to calcified atherosclerotic plaque. Seen by neurology who suspect fluctuating symptoms may be due to high grade vertebral artery vessel disease. Being admitted for further management. Patient denies any fevers, chills, chest pain, SOB, nausea, vomiting, abdominal pain. MRI was performed that showed the completion of the previous stroke in the right medulla.    PLOF independent with ADLs, RW was used after acute stroke in early July   [facility] on admission: min assist with bed mobility and transfers, ambulates 30 ftx1 with a RW and min assist     The patient was evaluated by a physical medicine and rehabilitation specialist and was found to be a good candidate for acute rehabilitation.   The patient would benefit from 3 hours of interdisciplinary therapy per day.    80 year old male with a PmHx of CAD s/p 5 stents (2009), DM, HTN, bladder and prostate CA, stable AAA, recent acute stroke (discharged 7/7/2021) presents to [facility] with left sided upper and lower extremity weakness. 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 Patient had been evaluated by neuroendovascular who recommended no acute intervention. He was discharged for outpatient management. The day after discharge, patient started to feel recurrent left upper extremity weakness. He did not seek medical attention and his weakness continued to worsen through the weekend. Patient decided to come in today for further management as he can hardly move his left arm and his left leg is very weak. Patient does note, however, that his left arm was quite strong intermittently today, but 30 minutes later his weakness returned. Repeat CTH shows same territory infarct. CTA head and neck with redemonstration of moderate stenosis involving the proximal M2 inferior division of the right MCA and severe multifocal stenoses involving the V4 segment of the right vertebral artery due to calcified atherosclerotic plaque. Seen by neurology who suspect fluctuating symptoms may be due to high grade vertebral artery vessel disease. Being admitted for further management. Patient denies any fevers, chills, chest pain, SOB, nausea, vomiting, abdominal pain. MRI was performed that showed the completion of the previous stroke in the right medulla.    PLOF independent with ADLs, and with RW which was used after acute stroke in early July   [facility] on admission: min assist with bed mobility and transfers, ambulates 30 ftx1 with a RW and min assist     The patient was evaluated by a physical medicine and rehabilitation specialist and was found to be a good candidate for acute rehabilitation.   The patient would benefit from 3 hours of interdisciplinary therapy per day.

## 2021-07-14 NOTE — DISCHARGE NOTE NURSING/CASE MANAGEMENT/SOCIAL WORK - PATIENT PORTAL LINK FT
You can access the FollowMyHealth Patient Portal offered by [facility] by registering at the following website: [email or web address]. By joining [employer]’s FollowMyHealth portal, you will also be able to view your health information using other applications (apps) compatible with our system.

## 2021-07-14 NOTE — CHART NOTE - NSCHARTNOTEFT_GEN_A_CORE
Patient seen and full consult to follow  Fluctuating symptoms likely related to high grade vertebral artery vessel disease and likely artery-artery embolism.  Given progression of disease would switch from aspirin and plavix to Brilinta    Plan  1. Stop plavix   2. Add Brilinta 90mg BID first dose tonight   3. Continue aspirin today and then can stop as well  4. Continue high intensity statin  5. Continue glycemic control  6. Keep -160  7. PT/OT/Rehab evaluation  8. Swallow evaluation  9. Repeat MRI brain w/o JENIFER
Transfer Note    Transfer from: Stroke unit 3E  Transfer to:  Rehab 4A      HOSPITAL COURSE:Hospital Course : 80 Y M PMHx of  CAD/stents, DM, HTN, bladder and prostate CA, stable AAA, recent acute stroke (discharged 7/7/2021) presents to [facility] with left sided upper and lower extremity weakness. 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 Patient had been evaluated by neuroendovascular who recommended no acute intervention. He was discharged for outpatient management.   The day after discharge, patient started to feel recurrent left upper extremity weakness. He did not seek medical attention and his weakness continued to worsen through the weekend.   Patient decided to come in today for further management as he can hardly move his left arm and his left leg is very weak. Patient does note, however, that his left arm was quite strong intermittently today, but 30 minutes later his weakness returned. Repeat CTH shows same territory infarct. CTA head and neck with redemonstration of moderate stenosis involving the proximal M2 inferior division of the right MCA and severe multifocal stenoses involving the V4 segment of the right vertebral artery due to calcified atherosclerotic plaque. Seen by neurology who suspect fluctuating symptoms may be due to high grade vertebral artery vessel disease. Being admitted for further management. Patient denies any fevers, chills, chest pain, SOB, nausea, vomiting, abdominal pain.     The patient is lying comfortable in bed ,denies pain , headache ,dizziness or vision changes.  Reports after discharge his left arm was stronger , he noticed increased weakness .    No significant change since recent CTA of the head and neck dated 7/4/2021.  Rehab , physiatry .      ASSESSMENT & PLAN:   Continue with rehab   take all medication as perscribed      For Follow-Up: with Dr Greenfield outpatient in 1 week.

## 2021-07-14 NOTE — SWALLOW VFSS/MBS ASSESSMENT ADULT - ORAL PHASE COMMENTS
pt p/w oral impairments characterized by delayed initiation of tongue motion, posterior escape of greater than half the bolus and trace residue lining lingual surface. Bolus head in pyriforms at height of pharyngeal swallow. pt p/w oral impairments characterized by delayed initiation of tongue motion, slow/prolonged chewing/mashing, posterior escape of greater than half the bolus and trace residue lining lingual surface. Bolus head in pyriforms at height of pharyngeal swallow.

## 2021-07-15 LAB
ALBUMIN SERPL ELPH-MCNC: 4.3 G/DL — SIGNIFICANT CHANGE UP (ref 3.5–5.2)
ALP SERPL-CCNC: 92 U/L — SIGNIFICANT CHANGE UP (ref 30–115)
ALT FLD-CCNC: 22 U/L — SIGNIFICANT CHANGE UP (ref 0–41)
ANION GAP SERPL CALC-SCNC: 10 MMOL/L — SIGNIFICANT CHANGE UP (ref 7–14)
AST SERPL-CCNC: 23 U/L — SIGNIFICANT CHANGE UP (ref 0–41)
BILIRUB SERPL-MCNC: 2.1 MG/DL — HIGH (ref 0.2–1.2)
BUN SERPL-MCNC: 16 MG/DL — SIGNIFICANT CHANGE UP (ref 10–20)
CALCIUM SERPL-MCNC: 9.7 MG/DL — SIGNIFICANT CHANGE UP (ref 8.5–10.1)
CHLORIDE SERPL-SCNC: 104 MMOL/L — SIGNIFICANT CHANGE UP (ref 98–110)
CO2 SERPL-SCNC: 23 MMOL/L — SIGNIFICANT CHANGE UP (ref 17–32)
COVID-19 SPIKE DOMAIN AB INTERP: POSITIVE
COVID-19 SPIKE DOMAIN ANTIBODY RESULT: 191 U/ML — HIGH
CREAT SERPL-MCNC: 1 MG/DL — SIGNIFICANT CHANGE UP (ref 0.7–1.5)
GLUCOSE BLDC GLUCOMTR-MCNC: 188 MG/DL — HIGH (ref 70–99)
GLUCOSE BLDC GLUCOMTR-MCNC: 194 MG/DL — HIGH (ref 70–99)
GLUCOSE BLDC GLUCOMTR-MCNC: 203 MG/DL — HIGH (ref 70–99)
GLUCOSE BLDC GLUCOMTR-MCNC: 244 MG/DL — HIGH (ref 70–99)
GLUCOSE SERPL-MCNC: 301 MG/DL — HIGH (ref 70–99)
HCT VFR BLD CALC: 45.4 % — SIGNIFICANT CHANGE UP (ref 42–52)
HGB BLD-MCNC: 15.4 G/DL — SIGNIFICANT CHANGE UP (ref 14–18)
MAGNESIUM SERPL-MCNC: 1.7 MG/DL — LOW (ref 1.8–2.4)
MCHC RBC-ENTMCNC: 29.8 PG — SIGNIFICANT CHANGE UP (ref 27–31)
MCHC RBC-ENTMCNC: 33.9 G/DL — SIGNIFICANT CHANGE UP (ref 32–37)
MCV RBC AUTO: 88 FL — SIGNIFICANT CHANGE UP (ref 80–94)
NRBC # BLD: 0 /100 WBCS — SIGNIFICANT CHANGE UP (ref 0–0)
PLATELET # BLD AUTO: 345 K/UL — SIGNIFICANT CHANGE UP (ref 130–400)
POTASSIUM SERPL-MCNC: 4.1 MMOL/L — SIGNIFICANT CHANGE UP (ref 3.5–5)
POTASSIUM SERPL-SCNC: 4.1 MMOL/L — SIGNIFICANT CHANGE UP (ref 3.5–5)
PROT SERPL-MCNC: 7 G/DL — SIGNIFICANT CHANGE UP (ref 6–8)
RBC # BLD: 5.16 M/UL — SIGNIFICANT CHANGE UP (ref 4.7–6.1)
RBC # FLD: 13.2 % — SIGNIFICANT CHANGE UP (ref 11.5–14.5)
SARS-COV-2 IGG+IGM SERPL QL IA: 191 U/ML — HIGH
SARS-COV-2 IGG+IGM SERPL QL IA: POSITIVE
SODIUM SERPL-SCNC: 137 MMOL/L — SIGNIFICANT CHANGE UP (ref 135–146)
WBC # BLD: 8.84 K/UL — SIGNIFICANT CHANGE UP (ref 4.8–10.8)
WBC # FLD AUTO: 8.84 K/UL — SIGNIFICANT CHANGE UP (ref 4.8–10.8)

## 2021-07-15 RX ADMIN — AMLODIPINE BESYLATE 10 MILLIGRAM(S): 2.5 TABLET ORAL at 06:35

## 2021-07-15 RX ADMIN — ATORVASTATIN CALCIUM 80 MILLIGRAM(S): 80 TABLET, FILM COATED ORAL at 22:06

## 2021-07-15 RX ADMIN — ENOXAPARIN SODIUM 40 MILLIGRAM(S): 100 INJECTION SUBCUTANEOUS at 12:24

## 2021-07-15 RX ADMIN — LOSARTAN POTASSIUM 100 MILLIGRAM(S): 100 TABLET, FILM COATED ORAL at 06:35

## 2021-07-15 RX ADMIN — TICAGRELOR 90 MILLIGRAM(S): 90 TABLET ORAL at 17:23

## 2021-07-15 RX ADMIN — TICAGRELOR 90 MILLIGRAM(S): 90 TABLET ORAL at 06:35

## 2021-07-15 NOTE — PROGRESS NOTE ADULT - ASSESSMENT
Neuropsychology Family Contact Note:     Pertinent Social History:   Pt. 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 Discharge planning initiated.   Neuropsychology Family Contact Note:   Met with pt. and family (wife and son)    Pertinent Social History:   Pt. 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 Discharge planning initiated.

## 2021-07-15 NOTE — PROGRESS NOTE ADULT - SUBJECTIVE AND OBJECTIVE BOX
Patient is a 80y old  Male who presents with a chief complaint of Admitted for R medullary infarct; subsequent left sided weakness, facial droop (14 Jul 2021 14:04)      HPI:  80 year old male with a PmHx of CAD s/p 5 stents (2009), DM, HTN, bladder and prostate CA, stable AAA, recent acute stroke (discharged 7/7/2021) presents to [facility] with left sided upper and lower extremity weakness. 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 Patient had been evaluated by neuroendovascular who recommended no acute intervention. He was discharged for outpatient management. The day after discharge, patient started to feel recurrent left upper extremity weakness. He did not seek medical attention and his weakness continued to worsen through the weekend. Patient decided to come in today for further management as he can hardly move his left arm and his left leg is very weak. Patient does note, however, that his left arm was quite strong intermittently today, but 30 minutes later his weakness returned. Repeat CTH shows same territory infarct. CTA head and neck with redemonstration of moderate stenosis involving the proximal M2 inferior division of the right MCA and severe multifocal stenoses involving the V4 segment of the right vertebral artery due to calcified atherosclerotic plaque. Seen by neurology who suspect fluctuating symptoms may be due to high grade vertebral artery vessel disease. Being admitted for further management. Patient denies any fevers, chills, chest pain, SOB, nausea, vomiting, abdominal pain. MRI was performed that showed the completion of the previous stroke in the right medulla.    PLOF independent with ADLs, and with RW which was used after acute stroke in early July   CLO on admission: min assist with bed mobility and transfers, ambulates 30 ftx1 with a RW and min assist     The patient was evaluated by a physical medicine and rehabilitation specialist and was found to be a good candidate for acute rehabilitation.   The patient would benefit from 3 hours of interdisciplinary therapy per day.         I examined the patient and reviewed the chart. There have been no significant changes since my history and physical except where documented below.    TODAY'S SUBJECTIVE & REVIEW OF SYMPTOMS:    Patient seen and evaluated at bedside with Dr. Carolina. No acute events overnight.      Review of Systems:   Constiutional:    [ x  ] WNL           [   ] poor appetite   [   ] insomnia   [   ] tired   Cardio:                [ x ] WNL           [   ] CP   [   ] TATE   [   ] palpitations               Resp:                   [ x ] WNL           [   ] SOB   [   ] cough   [   ] wheezing   GI:                        [ x ] WNL           [   ] constipation   [   ] diarrhea   [   ] abdominal pain   [   ] nausea   [   ] emesis                                :                      [ x ] WNL           [   ] RUFFIN  [   ] dusuria   [   ] difficulty voiding             Endo:                   [ x ] WNL          [   ] po;yuria   [   ] temperature intolerance                 Skin:                     [ x ] WNL          [   ] pain   [   ] wound   [   ] rash   MSK:                    [ x ] WNL          [   ] muscle pain   [   ] joint pain/ stiffness   [   ] muscle tenderness   [   ] swelling   Neuro:                 [   ] WNL          [   ] HA   [   ] change in vision   [   ] tremor   [   ] weakness   [   ]dysphagia   [ x ] dysphonia, dysarthria, left facial droop, left hemiparesis           Cognitive:           [ x ] WNL           [   ]confusion      Psych:                  [ x ] WNL           [   ] hallucinations   [   ]agitation   [   ] delusion   [   ]depressionPHYSICAL EXAM    Vital Signs Last 24 Hrs  T(C): 35.8 (15 Jul 2021 05:43), Max: 36.4 (14 Jul 2021 10:14)  T(F): 96.5 (15 Jul 2021 05:43), Max: 97.6 (14 Jul 2021 10:14)  HR: 68 (15 Jul 2021 05:43) (61 - 92)  BP: 129/62 (15 Jul 2021 05:43) (129/62 - 151/70)  BP(mean): --  RR: 20 (15 Jul 2021 05:43) (18 - 20)  SpO2: --    Constitutional - [ x  ] NAD, Comfortable        [   ] other:  Chest - [  x  ] CTA     [   ] other:  Cardiovascular - [ x  ] RRR, no murmer     [   ] other:  Abdomen - [ x  ] Soft, NT/ND      [   ] other:        -  [ x ] NOFOLEY CATHETER   [   ] YES  if yes: [   ] NO MEATAL TEAR OR DISCHARGE [   ] other:  Extremities - [ x ] No C/C/E, No calf tenderness       [   ] other:  ROM - [ x  ] WFL     [   ] other:  Neurologic Exam -                 Cognitive - [ x  ]Awake, Alert, AAO to self, place, date, year, situation         [    ] other:      Communication - [   ]Fluent, No dysarthria       [  x ] other: dysarthria, dysphonia      Motor - No focal deficits                    Right UE -  [  x ] WNL      [    ] other:                    Left UE -     [   ] WNL      [ x   ] other: shoulder flexion delayed, slow, however 4-/5 strength in partial ROM                    Right LE -   [  x ] WNL       [    ] other:                    Left LE -      [   ]WNL        [   x ] other: limited in dorsiflexion AROM, plantarflexion AROM, however 4-/5 strength in partial ROM      Sensory - [   ] Intact to LT      [    ] other:          Reflexes - [ x  ] wnl/ symmetric     [   ] other:     Psychiatric - [ x  ]Mood stable, Affect WNL     [   ]other:     Skin - [ x ] intact      [   ] other      acetaminophen   Tablet .. 650 milliGRAM(s) Oral every 6 hours PRN  aluminum hydroxide/magnesium hydroxide/simethicone Suspension 30 milliLiter(s) Oral every 4 hours PRN  amLODIPine   Tablet 10 milliGRAM(s) Oral daily  atorvastatin 80 milliGRAM(s) Oral at bedtime  enoxaparin Injectable 40 milliGRAM(s) SubCutaneous daily  losartan 100 milliGRAM(s) Oral daily  magnesium hydroxide Suspension 30 milliLiter(s) Oral daily PRN  melatonin 5 milliGRAM(s) Oral at bedtime PRN  ticagrelor 90 milliGRAM(s) Oral every 12 hours      RECENT LABS/IMAGING                        14.2   7.87  )-----------( 296      ( 14 Jul 2021 05:28 )             42.5     07-14    142  |  108  |  17  ----------------------------<  149<H>  4.3   |  23  |  1.1    Ca    9.0      14 Jul 2021 05:28  Mg     1.8     07-14    TPro  6.4  /  Alb  4.0  /  TBili  1.6<H>  /  DBili  x   /  AST  26  /  ALT  23  /  AlkPhos  80  07-14

## 2021-07-15 NOTE — PROGRESS NOTE ADULT - ASSESSMENT
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   Admitted for rehab of  R medullary infarct and subsequent left sided weakness and facial droop.    # R medullary infarct  # Left sided weakness #Left sided facial droop, nondominant  - MRI of brain 7/12/21 redemonstrated the punctate infarct in the right ventral medulla, minimally enlarged possibly due to difference in technique compared to the prior exam from 7/5/2021. Slightly increased mild edema. No additional acute infarcts. No intracranial hemorrhage or mass effect. Stable mild chronic microvascular ischemic changes.   - Brilinta 90 mg PO BID and Atorvastatin 80mg daily  - PT/OT/SLT    Comorbidities    # HTN   - BP goal as above (130-160 systolic)   - Losartan 100 mg PO QAM.  - Amlodipine 10 mg      -#Type II DM on glypizide: Hemoglobin A1C 7.8 on 7/5, insulin regimen as needed (goal 140-180)     #Bladder/prostate CA: Outpatient management     #AAA: Stable at this time, Outpatient management    #HFrEF/ history of Systolic CHF: Echo taken 7/6/2021 shows decreased left ventricular systolic function (LVEF 46%); No overt symptoms at this time    - GI/Bowel Mgmt: patient currently has regular BM     - Diet: Dysphagia 3 mechanical soft diet     # Precautions / PROPHYLAXIS:    - Falls  - Ortho: Weight bearing status: WBAT   - DVT prophylaxis: SC heparin       MEDICAL PROGNOSIS: GOOD            REHAB POTENTIAL: GOOD             ESTIMATED DISPOSITION: HOME WITH HOME CARE       [ x ]  The goals of the IRF admission were discussed with the patient and or family member, who agreed             ELOS:  [  x   ] 7-14    [    ]  14-21    [    ]  Other    THERAPY ORDERS and INITIAL INDIVIDUALIZED PLAN OF CARE:  This initial individualized interdisciplinary plan of care, which was established by me (the attending physiatrist), is based on elements from the post admission evaluation. The interdisciplinary therapy program is to be at least 3 hrs a day, at least 5 days per week from from physical, occupational and/ or speech therapies as ordered by me below.      [ x  ] P.T. 90 mins. /day at least 5 out of 7 days:  [  x ] superficial  modalities prn, [ x  ] A/AAROM, [ x  ] PREs, [ x  ] transfer training,            [ x  ] progressive ambulation, [x   ] stairs                                               [ x  ] O.T. 90 mins. /day at least 5 out of 7 days::  [ x  ] modalities prn,  [ x  ]A/AAROM, [ x  ] PREs, [  x ] functional transfer training, [ x  ] ADL training           [  x ] cognitive/ perceptual eval and training, [   ] splint eval                                                  [ x  ] S.L.P:  [  x ] speech eval, [   ] swallow eval     [  x ] Neuropsychology eval     [ x  ] Individualized rec. therapy      RATIONALE FOR INPATIENT ADMISSION - Patient demonstrates the following: (check all that apply)  [X] Medically appropriate for acute rehabilitation admission. Requires interdisiplinary therapy consisting of at least PT and OT, at least 3 hrs. 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   Admitted for rehab of  R medullary infarct and subsequent left sided weakness and facial droop.    # R medullary infarct  # Left sided weakness #Left sided facial droop, nondominant  - MRI of brain 7/12/21 redemonstrated the punctate infarct in the right ventral medulla, minimally enlarged possibly due to difference in technique compared to the prior exam from 7/5/2021. Slightly increased mild edema. No additional acute infarcts. No intracranial hemorrhage or mass effect. Stable mild chronic microvascular ischemic changes.   - Brilinta 90 mg PO BID and Atorvastatin 80mg daily  - PT/OT/SLT    #Dysphagia:   -steven-pharygeal  -Dysphagia 2 diet with nectar-thick liquids and head turn/ chin-tuck and multiple swallows  -Monitor for signs of aspiration.    #Dysarthria:   -speech therapy      Comorbidities    #HTN:  - BP goal as above (130-160 systolic)   - Losartan 100 mg PO QAM.  - Amlodipine 10 mg      -#Type II DM on glypizide: Hemoglobin A1C 7.8 on 7/5, insulin regimen as needed (goal 140-180)     #Bladder/prostate CA: Outpatient management     #AAA: Stable at this time, Outpatient management    #HFrEF/ history of Systolic CHF: Echo taken 7/6/2021 shows decreased left ventricular systolic function (LVEF 46%); No overt symptoms at this time    - GI/Bowel Mgmt: patient currently has regular BM       # Precautions / PROPHYLAXIS:    - Falls  - Ortho: Weight bearing status: WBAT   - DVT prophylaxis: SC heparin

## 2021-07-16 LAB
GLUCOSE BLDC GLUCOMTR-MCNC: 212 MG/DL — HIGH (ref 70–99)
GLUCOSE BLDC GLUCOMTR-MCNC: 237 MG/DL — HIGH (ref 70–99)

## 2021-07-16 RX ADMIN — LOSARTAN POTASSIUM 100 MILLIGRAM(S): 100 TABLET, FILM COATED ORAL at 06:34

## 2021-07-16 RX ADMIN — ATORVASTATIN CALCIUM 80 MILLIGRAM(S): 80 TABLET, FILM COATED ORAL at 21:42

## 2021-07-16 RX ADMIN — AMLODIPINE BESYLATE 10 MILLIGRAM(S): 2.5 TABLET ORAL at 06:34

## 2021-07-16 RX ADMIN — TICAGRELOR 90 MILLIGRAM(S): 90 TABLET ORAL at 06:34

## 2021-07-16 RX ADMIN — TICAGRELOR 90 MILLIGRAM(S): 90 TABLET ORAL at 17:57

## 2021-07-16 RX ADMIN — ENOXAPARIN SODIUM 40 MILLIGRAM(S): 100 INJECTION SUBCUTANEOUS at 12:53

## 2021-07-16 NOTE — PROGRESS NOTE ADULT - ATTENDING COMMENTS
I reviewed the chart and examined the patient with the resident and we discussed the findings and treatment plan. I agree with the findings and treatment plan above, which I modified as indicated. The patient requires 3 hrs a day of acute inpatient rehab.    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   Admitted for rehab of  R medullary infarct and subsequent left sided weakness and facial droop.    # R medullary infarct  # Left sided weakness #Left sided facial droop, nondominant  - MRI of brain 7/12/21 redemonstrated the punctate infarct in the right ventral medulla, minimally enlarged possibly due to difference in technique compared to the prior exam from 7/5/2021. Slightly increased mild edema. No additional acute infarcts. No intracranial hemorrhage or mass effect. Stable mild chronic microvascular ischemic changes.   - Brilinta 90 mg PO BID and Atorvastatin 80mg daily    -Oral-pharyngeal dysphagia/ dysarthria/dysphonia/VC paresis: Dysphagia 2 diet with Nectar-thick liquids and compensatory techniques.    - PT/OT/SLT    Comorbidities    # HTN - avoid tight control given intracranial vertebral stenosis  - BP goal as above (130-160 systolic)   - Losartan 100 mg PO QAM.  - Amlodipine 10 mg      -#Type II DM on Glypizide: Hemoglobin A1C 7.8 on 7/5, insulin regimen as needed (goal 140-180)     #Bladder/prostate CA: Outpatient management     #AAA: Stable at this time, Outpatient management    #HFrEF/ history of Systolic CHF: Echo taken 7/6/2021 shows decreased left ventricular systolic function (LVEF 46%); No overt symptoms at this time    - GI/Bowel Mgmt: patient currently has regular BM       # Precautions / PROPHYLAXIS:    - Falls  - Ortho: Weight bearing status: WBAT   - DVT prophylaxis: SC heparin I reviewed the chart and examined the patient with the resident and we discussed the findings and treatment plan. I agree with the findings and treatment plan above, which I modified as indicated. The patient requires 3 hrs a day of acute inpatient rehab.    80 Y M PMHx of CAD/stents, DM, HTN, bladder and prostate CA, stable AAA, recent  acute medullary stroke (discharged 7/7/2021) presents to [facility] with recurrent and transient left sided upper and lower extremity weakness.   Admitted for rehab of  R medullary infarct and subsequent left sided weakness and facial droop.    # R medullary infarct  # Left sided weakness #Left sided facial droop, nondominant  - MRI of brain 7/12/21 redemonstrated the punctate infarct in the right ventral medulla, minimally enlarged possibly due to difference in technique compared to the prior exam from 7/5/2021. Slightly increased- mild edema. No additional acute infarcts. No intracranial hemorrhage or mass effect. Stable mild chronic microvascular ischemic changes.   - Brilinta 90 mg PO BID and Atorvastatin 80mg daily    -Oral-pharyngeal dysphagia/ dysarthria/dysphonia/VC paresis: Dysphagia 2 diet with Nectar-thick liquids and compensatory techniques.    - Continues to require acute PT/OT/SLT    Comorbidities    # HTN - avoid tight control given intracranial vertebral stenosis  - BP goal as above (130-160 systolic)   - Losartan 100 mg PO QAM.  - Amlodipine 10 mg      -#Type II DM on Glypizide: Hemoglobin A1C 7.8 on 7/5, insulin regimen as needed (goal 140-180)     #Bladder/prostate CA: Outpatient management     #AAA: Stable at this time, Outpatient management    #HFrEF/ history of Systolic CHF: Echo taken 7/6/2021 shows decreased left ventricular systolic function (LVEF 46%); No overt symptoms at this time    - GI/Bowel Mgmt: patient currently has regular BM       # Precautions / PROPHYLAXIS:    - Falls  - Ortho: Weight bearing status: WBAT   - DVT prophylaxis: SC heparin

## 2021-07-16 NOTE — PROGRESS NOTE ADULT - ASSESSMENT
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   Admitted for rehab of  R medullary infarct and subsequent left sided weakness and facial droop.    # R medullary infarct  # Left sided weakness #Left sided facial droop, nondominant  - MRI of brain 7/12/21 redemonstrated the punctate infarct in the right ventral medulla, minimally enlarged possibly due to difference in technique compared to the prior exam from 7/5/2021. Slightly increased mild edema. No additional acute infarcts. No intracranial hemorrhage or mass effect. Stable mild chronic microvascular ischemic changes.   - Brilinta 90 mg PO BID and Atorvastatin 80mg daily  - PT/OT/SLT    #Dysphagia:   -steven-pharygeal  -Dysphagia 2 diet with nectar-thick liquids and head turn/ chin-tuck and multiple swallows  -Monitor for signs of aspiration.    #Dysarthria:   -speech therapy      Comorbidities    #HTN:  - BP goal as above (130-160 systolic)   - Losartan 100 mg PO QAM.  - Amlodipine 10 mg      -#Type II DM on glypizide: Hemoglobin A1C 7.8 on 7/5, insulin regimen as needed (goal 140-180)     #Bladder/prostate CA: Outpatient management     #AAA: Stable at this time, Outpatient management    #HFrEF/ history of Systolic CHF: Echo taken 7/6/2021 shows decreased left ventricular systolic function (LVEF 46%); No overt symptoms at this time    - GI/Bowel Mgmt: patient currently has regular BM       # Precautions / PROPHYLAXIS:    - Falls  - Ortho: Weight bearing status: WBAT   - DVT prophylaxis: SC heparin

## 2021-07-16 NOTE — PROGRESS NOTE ADULT - SUBJECTIVE AND OBJECTIVE BOX
Patient is a 80y old  Male who presents with a chief complaint of Admitted for R medullary infarct; subsequent left sided weakness, facial droop (14 Jul 2021 14:04)      HPI:  80 year old male with a PmHx of CAD s/p 5 stents (2009), DM, HTN, bladder and prostate CA, stable AAA, recent acute stroke (discharged 7/7/2021) presents to [facility] with left sided upper and lower extremity weakness. 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 Patient had been evaluated by neuroendovascular who recommended no acute intervention. He was discharged for outpatient management. The day after discharge, patient started to feel recurrent left upper extremity weakness. He did not seek medical attention and his weakness continued to worsen through the weekend. Patient decided to come in today for further management as he can hardly move his left arm and his left leg is very weak. Patient does note, however, that his left arm was quite strong intermittently today, but 30 minutes later his weakness returned. Repeat CTH shows same territory infarct. CTA head and neck with redemonstration of moderate stenosis involving the proximal M2 inferior division of the right MCA and severe multifocal stenoses involving the V4 segment of the right vertebral artery due to calcified atherosclerotic plaque. Seen by neurology who suspect fluctuating symptoms may be due to high grade vertebral artery vessel disease. Being admitted for further management. Patient denies any fevers, chills, chest pain, SOB, nausea, vomiting, abdominal pain. MRI was performed that showed the completion of the previous stroke in the right medulla.    PLOF independent with ADLs, and with RW which was used after acute stroke in early July   [facility] on admission: min assist with bed mobility and transfers, ambulates 30 ftx1 with a RW and min assist     The patient was evaluated by a physical medicine and rehabilitation specialist and was found to be a good candidate for acute rehabilitation.   The patient would benefit from 3 hours of interdisciplinary therapy per day.     I examined the patient and reviewed the chart. There have been no significant changes since my history and physical except where documented below.    TODAY'S SUBJECTIVE & REVIEW OF SYMPTOMS:  Patient seen and evaluated at bedside with Dr. Carolina. No acute events overnight. Patient feels well.        Review of Systems:   Constiutional:    [ x  ] WNL           [   ] poor appetite   [   ] insomnia   [   ] tired   Cardio:                [ x ] WNL           [   ] CP   [   ] TATE   [   ] palpitations               Resp:                   [ x ] WNL           [   ] SOB   [   ] cough   [   ] wheezing   GI:                        [ x ] WNL           [   ] constipation   [   ] diarrhea   [   ] abdominal pain   [   ] nausea   [   ] emesis                                :                      [ x ] WNL           [   ] RUFFIN  [   ] dusuria   [   ] difficulty voiding             Endo:                   [ x ] WNL          [   ] po;yuria   [   ] temperature intolerance                 Skin:                     [ x ] WNL          [   ] pain   [   ] wound   [   ] rash   MSK:                    [ x ] WNL          [   ] muscle pain   [   ] joint pain/ stiffness   [   ] muscle tenderness   [   ] swelling   Neuro:                 [   ] WNL          [   ] HA   [   ] change in vision   [   ] tremor   [   ] weakness   [   ]dysphagia   [ x ] dysphonia, dysarthria, left facial droop, left hemiparesis           Cognitive:           [ x ] WNL           [   ]confusion      Psych:                  [ x ] WNL           [   ] hallucinations   [   ]agitation   [   ] delusion   [   ]depressionPHYSICAL EXAM    Vital Signs Last 24 Hrs  T(C): 35.6 (16 Jul 2021 05:57), Max: 37.1 (15 Jul 2021 13:04)  T(F): 96 (16 Jul 2021 05:57), Max: 98.7 (15 Jul 2021 13:04)  HR: 73 (16 Jul 2021 05:57) (73 - 104)  BP: 137/66 (16 Jul 2021 05:57) (114/67 - 137/66)  BP(mean): --  RR: 18 (16 Jul 2021 05:57) (18 - 20)  SpO2: --    Constitutional - [ x  ] NAD, Comfortable        [   ] other:  Chest - [  x  ] CTA     [   ] other:  Cardiovascular - [ x  ] RRR, no murmer     [   ] other:  Abdomen - [ x  ] Soft, NT/ND      [   ] other:        -  [ x ] NOFOLEY CATHETER   [   ] YES  if yes: [   ] NO MEATAL TEAR OR DISCHARGE [   ] other:  Extremities - [ x ] No C/C/E, No calf tenderness       [   ] other:  ROM - [ x  ] WFL     [   ] other:  Neurologic Exam -                 Cognitive - [ x  ]Awake, Alert, AAO to self, place, date, year, situation         [    ] other:      Communication - [   ]Fluent, No dysarthria       [  x ] other: dysarthria, dysphonia      Motor - No focal deficits                    Right UE -  [  x ] WNL      [    ] other:                    Left UE -     [   ] WNL      [ x   ] other: shoulder flexion delayed, slow, however 4-/5 strength in partial ROM                    Right LE -   [  x ] WNL       [    ] other:                    Left LE -      [   ]WNL        [   x ] other: limited in dorsiflexion AROM, plantarflexion AROM, however 4-/5 strength in partial ROM      Sensory - [   ] Intact to LT      [    ] other:          Reflexes - [ x  ] wnl/ symmetric     [   ] other:     Psychiatric - [ x  ]Mood stable, Affect WNL     [   ]other:     Skin - [ x ] intact      [   ] other      MEDICATIONS  (STANDING):  amLODIPine   Tablet 10 milliGRAM(s) Oral daily  atorvastatin 80 milliGRAM(s) Oral at bedtime  enoxaparin Injectable 40 milliGRAM(s) SubCutaneous daily  losartan 100 milliGRAM(s) Oral daily  ticagrelor 90 milliGRAM(s) Oral every 12 hours    MEDICATIONS  (PRN):  acetaminophen   Tablet .. 650 milliGRAM(s) Oral every 6 hours PRN Temp greater or equal to 38C (100.4F), Mild Pain (1 - 3)  aluminum hydroxide/magnesium hydroxide/simethicone Suspension 30 milliLiter(s) Oral every 4 hours PRN Dyspepsia  magnesium hydroxide Suspension 30 milliLiter(s) Oral daily PRN Constipation  melatonin 5 milliGRAM(s) Oral at bedtime PRN Insomnia        RECENT LABS/IMAGING                        14.2   7.87  )-----------( 296      ( 14 Jul 2021 05:28 )             42.5     07-14    142  |  108  |  17  ----------------------------<  149<H>  4.3   |  23  |  1.1    Ca    9.0      14 Jul 2021 05:28  Mg     1.8     07-14    TPro  6.4  /  Alb  4.0  /  TBili  1.6<H>  /  DBili  x   /  AST  26  /  ALT  23  /  AlkPhos  80  07-14               Patient is a 80y old  Male who presents with a chief complaint of Admitted for R medullary infarct; subsequent left sided weakness, facial droop (14 Jul 2021 14:04)      HPI:  80 year old male with a PmHx of CAD s/p 5 stents (2009), DM, HTN, bladder and prostate CA, stable AAA, recent acute stroke (discharged 7/7/2021) presents to [facility] with left sided upper and lower extremity weakness. 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 Patient had been evaluated by neuroendovascular who recommended no acute intervention. He was discharged for outpatient management. The day after discharge, patient started to feel recurrent left upper extremity weakness. He did not seek medical attention and his weakness continued to worsen through the weekend. Patient decided to come in today for further management as he can hardly move his left arm and his left leg is very weak. Patient does note, however, that his left arm was quite strong intermittently today, but 30 minutes later his weakness returned. Repeat CTH shows same territory infarct. CTA head and neck with redemonstration of moderate stenosis involving the proximal M2 inferior division of the right MCA and severe multifocal stenoses involving the V4 segment of the right vertebral artery due to calcified atherosclerotic plaque. Seen by neurology who suspect fluctuating symptoms may be due to high grade vertebral artery vessel disease. Being admitted for further management. Patient denies any fevers, chills, chest pain, SOB, nausea, vomiting, abdominal pain. MRI was performed that showed the completion of the previous stroke in the right medulla.    PLOF independent with ADLs, and with RW which was used after acute stroke in early July     The patient was evaluated by a physical medicine and rehabilitation specialist and was found to be a good candidate for acute rehabilitation.   The patient would benefit from 3 hours of interdisciplinary therapy per day.       CLOF: min assist with bed mobility and transfers, ambulates 30 ftx1 with a RW and min assist     TODAY'S SUBJECTIVE & REVIEW OF SYMPTOMS:  Patient seen and evaluated at bedside with Dr. Carolina. No acute events overnight. Patient feels well.        Review of Systems:   Constiutional:    [ x  ] WNL           [   ] poor appetite   [   ] insomnia   [   ] tired   Cardio:                [ x ] WNL           [   ] CP   [   ] TATE   [   ] palpitations               Resp:                   [ x ] WNL           [   ] SOB   [   ] cough   [   ] wheezing   GI:                        [ x ] WNL           [   ] constipation   [   ] diarrhea   [   ] abdominal pain   [   ] nausea   [   ] emesis                                :                      [ x ] WNL           [   ] RUFFIN  [   ] dusuria   [   ] difficulty voiding             Endo:                   [ x ] WNL          [   ] po;yuria   [   ] temperature intolerance                 Skin:                     [ x ] WNL          [   ] pain   [   ] wound   [   ] rash   MSK:                    [ x ] WNL          [   ] muscle pain   [   ] joint pain/ stiffness   [   ] muscle tenderness   [   ] swelling   Neuro:                 [   ] WNL          [   ] HA   [   ] change in vision   [   ] tremor   [   ] weakness   [   ]dysphagia   [ x ] dysphonia, dysarthria, left facial droop, left hemiparesis           Cognitive:           [ x ] WNL           [   ]confusion      Psych:                  [ x ] WNL           [   ] hallucinations   [   ]agitation   [   ] delusion   [   ]depressionPHYSICAL EXAM    Vital Signs Last 24 Hrs  T(C): 35.6 (16 Jul 2021 05:57), Max: 37.1 (15 Jul 2021 13:04)  T(F): 96 (16 Jul 2021 05:57), Max: 98.7 (15 Jul 2021 13:04)  HR: 73 (16 Jul 2021 05:57) (73 - 104)  BP: 137/66 (16 Jul 2021 05:57) (114/67 - 137/66)  BP(mean): --  RR: 18 (16 Jul 2021 05:57) (18 - 20)  SpO2: --    Constitutional - [ x  ] NAD, Comfortable        [   ] other:  Chest - [  x  ] CTA     [   ] other:  Cardiovascular - [ x  ] RRR, no murmer     [   ] other:  Abdomen - [ x  ] Soft, NT/ND      [   ] other:        -  [ x ] NOFOLEY CATHETER   [   ] YES  if yes: [   ] NO MEATAL TEAR OR DISCHARGE [   ] other:  Extremities - [ x ] No C/C/E, No calf tenderness       [   ] other:  ROM - [ x  ] WFL     [   ] other:  Neurologic Exam -                 Cognitive - [ x  ]Awake, Alert, AAO to self, place, date, year, situation         [    ] other:      Communication - [   ]Fluent, No dysarthria       [  x ] other: dysarthria, dysphonia      Motor - No focal deficits                    Right UE -  [  x ] WNL      [    ] other:                    Left UE -     [   ] WNL      [ x   ] other: shoulder flexion delayed, slow, however 4-/5 strength in partial ROM                    Right LE -   [  x ] WNL       [    ] other:                    Left LE -      [   ]WNL        [   x ] other: limited in dorsiflexion AROM, plantarflexion AROM, however 4-/5 strength in partial ROM      Sensory - [   ] Intact to LT      [    ] other:          Reflexes - [ x  ] wnl/ symmetric     [   ] other:     Psychiatric - [ x  ]Mood stable, Affect WNL     [   ]other:     Skin - [ x ] intact      [   ] other      MEDICATIONS  (STANDING):  amLODIPine   Tablet 10 milliGRAM(s) Oral daily  atorvastatin 80 milliGRAM(s) Oral at bedtime  enoxaparin Injectable 40 milliGRAM(s) SubCutaneous daily  losartan 100 milliGRAM(s) Oral daily  ticagrelor 90 milliGRAM(s) Oral every 12 hours    MEDICATIONS  (PRN):  acetaminophen   Tablet .. 650 milliGRAM(s) Oral every 6 hours PRN Temp greater or equal to 38C (100.4F), Mild Pain (1 - 3)  aluminum hydroxide/magnesium hydroxide/simethicone Suspension 30 milliLiter(s) Oral every 4 hours PRN Dyspepsia  magnesium hydroxide Suspension 30 milliLiter(s) Oral daily PRN Constipation  melatonin 5 milliGRAM(s) Oral at bedtime PRN Insomnia        RECENT LABS/IMAGING                        14.2   7.87  )-----------( 296      ( 14 Jul 2021 05:28 )             42.5     07-14    142  |  108  |  17  ----------------------------<  149<H>  4.3   |  23  |  1.1    Ca    9.0      14 Jul 2021 05:28  Mg     1.8     07-14    TPro  6.4  /  Alb  4.0  /  TBili  1.6<H>  /  DBili  x   /  AST  26  /  ALT  23  /  AlkPhos  80  07-14

## 2021-07-17 LAB
GLUCOSE BLDC GLUCOMTR-MCNC: 191 MG/DL — HIGH (ref 70–99)
GLUCOSE BLDC GLUCOMTR-MCNC: 295 MG/DL — HIGH (ref 70–99)

## 2021-07-17 RX ADMIN — ATORVASTATIN CALCIUM 80 MILLIGRAM(S): 80 TABLET, FILM COATED ORAL at 21:39

## 2021-07-17 RX ADMIN — TICAGRELOR 90 MILLIGRAM(S): 90 TABLET ORAL at 16:49

## 2021-07-17 RX ADMIN — TICAGRELOR 90 MILLIGRAM(S): 90 TABLET ORAL at 06:28

## 2021-07-17 RX ADMIN — AMLODIPINE BESYLATE 10 MILLIGRAM(S): 2.5 TABLET ORAL at 06:28

## 2021-07-17 RX ADMIN — LOSARTAN POTASSIUM 100 MILLIGRAM(S): 100 TABLET, FILM COATED ORAL at 06:28

## 2021-07-17 RX ADMIN — ENOXAPARIN SODIUM 40 MILLIGRAM(S): 100 INJECTION SUBCUTANEOUS at 12:36

## 2021-07-17 NOTE — PROGRESS NOTE ADULT - SUBJECTIVE AND OBJECTIVE BOX
T(C): 36.2 (07-17-21 @ 12:36), Max: 36.6 (07-16-21 @ 20:29)  HR: 99 (07-17-21 @ 12:36) (75 - 99)  BP: 121/61 (07-17-21 @ 12:36) (121/61 - 131/71)  RR: 18 (07-17-21 @ 12:36) (18 - 18)  SpO2: 98% (07-17-21 @ 06:36) (98% - 98%)      Patient was stable overnight and expresses no new complaints.     PE:    Alert   LUNGS- clear  COR- RRR  ABD- SOFT, NT  EXTR- w/o edema  NEURO- stable                      Rehab of CVA/left hemiparesis    Continue acute rehab program.

## 2021-07-18 LAB
GLUCOSE BLDC GLUCOMTR-MCNC: 180 MG/DL — HIGH (ref 70–99)
GLUCOSE BLDC GLUCOMTR-MCNC: 239 MG/DL — HIGH (ref 70–99)
GLUCOSE BLDC GLUCOMTR-MCNC: 296 MG/DL — HIGH (ref 70–99)

## 2021-07-18 RX ORDER — SODIUM CHLORIDE 9 MG/ML
1000 INJECTION, SOLUTION INTRAVENOUS
Refills: 0 | Status: DISCONTINUED | OUTPATIENT
Start: 2021-07-18 | End: 2021-07-28

## 2021-07-18 RX ORDER — INSULIN LISPRO 100/ML
VIAL (ML) SUBCUTANEOUS
Refills: 0 | Status: DISCONTINUED | OUTPATIENT
Start: 2021-07-18 | End: 2021-07-19

## 2021-07-18 RX ORDER — DEXTROSE 50 % IN WATER 50 %
15 SYRINGE (ML) INTRAVENOUS ONCE
Refills: 0 | Status: DISCONTINUED | OUTPATIENT
Start: 2021-07-18 | End: 2021-07-28

## 2021-07-18 RX ORDER — DEXTROSE 50 % IN WATER 50 %
12.5 SYRINGE (ML) INTRAVENOUS ONCE
Refills: 0 | Status: DISCONTINUED | OUTPATIENT
Start: 2021-07-18 | End: 2021-07-28

## 2021-07-18 RX ORDER — DEXTROSE 50 % IN WATER 50 %
25 SYRINGE (ML) INTRAVENOUS ONCE
Refills: 0 | Status: DISCONTINUED | OUTPATIENT
Start: 2021-07-18 | End: 2021-07-28

## 2021-07-18 RX ORDER — GLUCAGON INJECTION, SOLUTION 0.5 MG/.1ML
1 INJECTION, SOLUTION SUBCUTANEOUS ONCE
Refills: 0 | Status: DISCONTINUED | OUTPATIENT
Start: 2021-07-18 | End: 2021-07-28

## 2021-07-18 RX ADMIN — ENOXAPARIN SODIUM 40 MILLIGRAM(S): 100 INJECTION SUBCUTANEOUS at 12:34

## 2021-07-18 RX ADMIN — ATORVASTATIN CALCIUM 80 MILLIGRAM(S): 80 TABLET, FILM COATED ORAL at 21:38

## 2021-07-18 RX ADMIN — TICAGRELOR 90 MILLIGRAM(S): 90 TABLET ORAL at 17:51

## 2021-07-18 RX ADMIN — TICAGRELOR 90 MILLIGRAM(S): 90 TABLET ORAL at 06:33

## 2021-07-18 RX ADMIN — AMLODIPINE BESYLATE 10 MILLIGRAM(S): 2.5 TABLET ORAL at 06:33

## 2021-07-18 RX ADMIN — LOSARTAN POTASSIUM 100 MILLIGRAM(S): 100 TABLET, FILM COATED ORAL at 06:33

## 2021-07-18 NOTE — PROGRESS NOTE ADULT - SUBJECTIVE AND OBJECTIVE BOX
T(C): 37.3 (07-18-21 @ 13:05), Max: 37.3 (07-18-21 @ 13:05)  HR: 103 (07-18-21 @ 13:05) (66 - 103)  BP: 127/67 (07-18-21 @ 13:05) (127/66 - 147/70)  RR: 19 (07-18-21 @ 13:05) (18 - 19)  SpO2: --      Patient was stable overnight and expresses no new complaints     PE:    Alert   LUNGS- clear  COR- RRR  ABD- SOFT, NT  EXTR- w/o edema  NEURO- stable                      Rehab of CVA, left hemiparesis    Continue acute rehab program.

## 2021-07-19 ENCOUNTER — APPOINTMENT (OUTPATIENT)
Dept: NEUROLOGY | Facility: CLINIC | Age: 81
End: 2021-07-19

## 2021-07-19 LAB
ALBUMIN SERPL ELPH-MCNC: 4.1 G/DL — SIGNIFICANT CHANGE UP (ref 3.5–5.2)
ALP SERPL-CCNC: 101 U/L — SIGNIFICANT CHANGE UP (ref 30–115)
ALT FLD-CCNC: 25 U/L — SIGNIFICANT CHANGE UP (ref 0–41)
ANION GAP SERPL CALC-SCNC: 11 MMOL/L — SIGNIFICANT CHANGE UP (ref 7–14)
AST SERPL-CCNC: 22 U/L — SIGNIFICANT CHANGE UP (ref 0–41)
BASOPHILS # BLD AUTO: 0.05 K/UL — SIGNIFICANT CHANGE UP (ref 0–0.2)
BASOPHILS NFR BLD AUTO: 0.6 % — SIGNIFICANT CHANGE UP (ref 0–1)
BILIRUB SERPL-MCNC: 1.6 MG/DL — HIGH (ref 0.2–1.2)
BUN SERPL-MCNC: 25 MG/DL — HIGH (ref 10–20)
CALCIUM SERPL-MCNC: 9.5 MG/DL — SIGNIFICANT CHANGE UP (ref 8.5–10.1)
CHLORIDE SERPL-SCNC: 106 MMOL/L — SIGNIFICANT CHANGE UP (ref 98–110)
CO2 SERPL-SCNC: 24 MMOL/L — SIGNIFICANT CHANGE UP (ref 17–32)
CREAT SERPL-MCNC: 1.2 MG/DL — SIGNIFICANT CHANGE UP (ref 0.7–1.5)
EOSINOPHIL # BLD AUTO: 0.12 K/UL — SIGNIFICANT CHANGE UP (ref 0–0.7)
EOSINOPHIL NFR BLD AUTO: 1.4 % — SIGNIFICANT CHANGE UP (ref 0–8)
GLUCOSE BLDC GLUCOMTR-MCNC: 119 MG/DL — HIGH (ref 70–99)
GLUCOSE BLDC GLUCOMTR-MCNC: 219 MG/DL — HIGH (ref 70–99)
GLUCOSE BLDC GLUCOMTR-MCNC: 231 MG/DL — HIGH (ref 70–99)
GLUCOSE BLDC GLUCOMTR-MCNC: 309 MG/DL — HIGH (ref 70–99)
GLUCOSE SERPL-MCNC: 228 MG/DL — HIGH (ref 70–99)
HCT VFR BLD CALC: 42.7 % — SIGNIFICANT CHANGE UP (ref 42–52)
HGB BLD-MCNC: 14.6 G/DL — SIGNIFICANT CHANGE UP (ref 14–18)
IMM GRANULOCYTES NFR BLD AUTO: 0.1 % — SIGNIFICANT CHANGE UP (ref 0.1–0.3)
LYMPHOCYTES # BLD AUTO: 2.19 K/UL — SIGNIFICANT CHANGE UP (ref 1.2–3.4)
LYMPHOCYTES # BLD AUTO: 26 % — SIGNIFICANT CHANGE UP (ref 20.5–51.1)
MAGNESIUM SERPL-MCNC: 2 MG/DL — SIGNIFICANT CHANGE UP (ref 1.8–2.4)
MCHC RBC-ENTMCNC: 30.2 PG — SIGNIFICANT CHANGE UP (ref 27–31)
MCHC RBC-ENTMCNC: 34.2 G/DL — SIGNIFICANT CHANGE UP (ref 32–37)
MCV RBC AUTO: 88.2 FL — SIGNIFICANT CHANGE UP (ref 80–94)
MONOCYTES # BLD AUTO: 0.94 K/UL — HIGH (ref 0.1–0.6)
MONOCYTES NFR BLD AUTO: 11.2 % — HIGH (ref 1.7–9.3)
NEUTROPHILS # BLD AUTO: 5.1 K/UL — SIGNIFICANT CHANGE UP (ref 1.4–6.5)
NEUTROPHILS NFR BLD AUTO: 60.7 % — SIGNIFICANT CHANGE UP (ref 42.2–75.2)
NRBC # BLD: 0 /100 WBCS — SIGNIFICANT CHANGE UP (ref 0–0)
PLATELET # BLD AUTO: 323 K/UL — SIGNIFICANT CHANGE UP (ref 130–400)
POTASSIUM SERPL-MCNC: 4.5 MMOL/L — SIGNIFICANT CHANGE UP (ref 3.5–5)
POTASSIUM SERPL-SCNC: 4.5 MMOL/L — SIGNIFICANT CHANGE UP (ref 3.5–5)
PROT SERPL-MCNC: 6.7 G/DL — SIGNIFICANT CHANGE UP (ref 6–8)
RBC # BLD: 4.84 M/UL — SIGNIFICANT CHANGE UP (ref 4.7–6.1)
RBC # FLD: 13.3 % — SIGNIFICANT CHANGE UP (ref 11.5–14.5)
SODIUM SERPL-SCNC: 141 MMOL/L — SIGNIFICANT CHANGE UP (ref 135–146)
WBC # BLD: 8.41 K/UL — SIGNIFICANT CHANGE UP (ref 4.8–10.8)
WBC # FLD AUTO: 8.41 K/UL — SIGNIFICANT CHANGE UP (ref 4.8–10.8)

## 2021-07-19 RX ORDER — DEXTROSE 50 % IN WATER 50 %
15 SYRINGE (ML) INTRAVENOUS ONCE
Refills: 0 | Status: DISCONTINUED | OUTPATIENT
Start: 2021-07-19 | End: 2021-07-28

## 2021-07-19 RX ORDER — INSULIN LISPRO 100/ML
VIAL (ML) SUBCUTANEOUS
Refills: 0 | Status: DISCONTINUED | OUTPATIENT
Start: 2021-07-19 | End: 2021-07-19

## 2021-07-19 RX ORDER — INSULIN LISPRO 100/ML
VIAL (ML) SUBCUTANEOUS AT BEDTIME
Refills: 0 | Status: DISCONTINUED | OUTPATIENT
Start: 2021-07-19 | End: 2021-07-20

## 2021-07-19 RX ORDER — INSULIN LISPRO 100/ML
VIAL (ML) SUBCUTANEOUS
Refills: 0 | Status: DISCONTINUED | OUTPATIENT
Start: 2021-07-19 | End: 2021-07-28

## 2021-07-19 RX ORDER — AMLODIPINE BESYLATE 2.5 MG/1
5 TABLET ORAL DAILY
Refills: 0 | Status: DISCONTINUED | OUTPATIENT
Start: 2021-07-19 | End: 2021-07-28

## 2021-07-19 RX ORDER — INSULIN GLARGINE 100 [IU]/ML
5 INJECTION, SOLUTION SUBCUTANEOUS AT BEDTIME
Refills: 0 | Status: DISCONTINUED | OUTPATIENT
Start: 2021-07-19 | End: 2021-07-21

## 2021-07-19 RX ORDER — INSULIN LISPRO 100/ML
2 VIAL (ML) SUBCUTANEOUS
Refills: 0 | Status: DISCONTINUED | OUTPATIENT
Start: 2021-07-19 | End: 2021-07-19

## 2021-07-19 RX ADMIN — ENOXAPARIN SODIUM 40 MILLIGRAM(S): 100 INJECTION SUBCUTANEOUS at 12:39

## 2021-07-19 RX ADMIN — TICAGRELOR 90 MILLIGRAM(S): 90 TABLET ORAL at 06:03

## 2021-07-19 RX ADMIN — Medication 2: at 08:06

## 2021-07-19 RX ADMIN — LOSARTAN POTASSIUM 100 MILLIGRAM(S): 100 TABLET, FILM COATED ORAL at 06:03

## 2021-07-19 RX ADMIN — AMLODIPINE BESYLATE 10 MILLIGRAM(S): 2.5 TABLET ORAL at 06:03

## 2021-07-19 RX ADMIN — Medication 4: at 17:07

## 2021-07-19 RX ADMIN — ATORVASTATIN CALCIUM 80 MILLIGRAM(S): 80 TABLET, FILM COATED ORAL at 22:01

## 2021-07-19 RX ADMIN — AMLODIPINE BESYLATE 5 MILLIGRAM(S): 2.5 TABLET ORAL at 17:11

## 2021-07-19 RX ADMIN — Medication 2: at 12:37

## 2021-07-19 RX ADMIN — TICAGRELOR 90 MILLIGRAM(S): 90 TABLET ORAL at 17:10

## 2021-07-19 RX ADMIN — INSULIN GLARGINE 5 UNIT(S): 100 INJECTION, SOLUTION SUBCUTANEOUS at 22:41

## 2021-07-19 NOTE — PROGRESS NOTE ADULT - ASSESSMENT
· Assessment	  80 Y M PMHx of CAD/stents, DM, HTN, bladder and prostate CA, stable AAA, recent  acute medullary stroke (discharged 7/7/2021) presents to [facility] with recurrent and transient left sided upper and lower extremity weakness.   Admitted for rehab of  R medullary infarct and subsequent left sided weakness and facial droop.    # R medullary infarct  # Left sided weakness #Left sided facial droop, nondominant  - MRI of brain 7/12/21 redemonstrated the punctate infarct in the right ventral medulla, minimally enlarged possibly due to difference in technique compared to the prior exam from 7/5/2021. Slightly increased mild edema. No additional acute infarcts. No intracranial hemorrhage or mass effect. Stable mild chronic microvascular ischemic changes.   - Brilinta 90 mg PO BID and Atorvastatin 80mg daily  - PT/OT/SLT    #Dysphagia:   -steven-pharygeal  -Dysphagia 2 diet with nectar-thick liquids and head turn/ chin-tuck and multiple swallows  -Monitor for signs of aspiration.    #Dysarthria:   -speech therapy      Comorbidities    #HTN:  - BP goal as above (130-160 systolic)   - Losartan 100 mg PO QAM.  - Amlodipine 10 mg      -#Type II DM on glypizide: Hemoglobin A1C 7.8 on 7/5, insulin regimen as needed (goal 140-180)     #Bladder/prostate CA: Outpatient management     #AAA: Stable at this time, Outpatient management    #HFrEF/ history of Systolic CHF: Echo taken 7/6/2021 shows decreased left ventricular systolic function (LVEF 46%); No overt symptoms at this time    - GI/Bowel Mgmt: patient currently has regular BM       # Precautions / PROPHYLAXIS:    - Falls  - Ortho: Weight bearing status: WBAT   - DVT prophylaxis: SC heparin        · Assessment	  80 Y M PMHx of CAD/stents, DM, HTN, bladder and prostate CA, stable AAA, recent  acute medullary stroke (discharged 7/7/2021) presents to [facility] with recurrent and transient left sided upper and lower extremity weakness.   Admitted for rehab of  R medullary infarct and subsequent left sided weakness and facial droop.    # R medullary infarct  # Left sided weakness, Left sided facial droop, nondominant  - MRI of brain 7/12/21 redemonstrated the punctate infarct in the right ventral medulla, minimally enlarged possibly due to difference in technique compared to the prior exam from 7/5/2021. Slightly increased mild edema. No additional acute infarcts. No intracranial hemorrhage or mass effect. Stable mild chronic microvascular ischemic changes.   - Brilinta 90 mg PO BID and Atorvastatin 80mg daily  - PT/OT/SLP    #Dysphagia:   -steven-pharygeal  -Dysphagia 2 diet with nectar-thick liquids and head turn/ chin-tuck and multiple swallows  -Monitor for signs of aspiration.    #Dysarthria:   -speech therapy      Comorbidities    #HTN:  - BP goal as above (130-160 systolic) . Currently running 109-126  - Losartan 100 mg PO QAM.  - Decrease  Amlodipine 10 mg to 5 mg      -#Type II DM on glypizide: Hemoglobin A1C 7.8 on 7/5, insulin regimen as needed (goal 140-180)                           - 240. Increase insulin    #Bladder/prostate CA: Outpatient management     #AAA: Stable at this time, Outpatient management    #HFrEF/ history of Systolic CHF: Echo taken 7/6/2021 shows decreased left ventricular systolic function (LVEF 46%); No overt symptoms at this time    - GI/Bowel Mgmt: patient currently has regular BM       # Precautions / PROPHYLAXIS:    - Falls  - Ortho: Weight bearing status: WBAT   - DVT prophylaxis: SC heparin

## 2021-07-19 NOTE — PROGRESS NOTE ADULT - SUBJECTIVE AND OBJECTIVE BOX
Patient is a 80y old  Male who presents with a chief complaint of Admitted for R medullary infarct; subsequent left sided weakness, facial droop (14 Jul 2021 14:04)      HPI:  80 year old male with a PmHx of CAD s/p 5 stents (2009), DM, HTN, bladder and prostate CA, stable AAA, recent acute stroke (discharged 7/7/2021) presents to [facility] with left sided upper and lower extremity weakness. 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 Patient had been evaluated by neuroendovascular who recommended no acute intervention. He was discharged for outpatient management. The day after discharge, patient started to feel recurrent left upper extremity weakness. He did not seek medical attention and his weakness continued to worsen through the weekend. Patient decided to come in today for further management as he can hardly move his left arm and his left leg is very weak. Patient does note, however, that his left arm was quite strong intermittently today, but 30 minutes later his weakness returned. Repeat CTH shows same territory infarct. CTA head and neck with redemonstration of moderate stenosis involving the proximal M2 inferior division of the right MCA and severe multifocal stenoses involving the V4 segment of the right vertebral artery due to calcified atherosclerotic plaque. Seen by neurology who suspect fluctuating symptoms may be due to high grade vertebral artery vessel disease. Being admitted for further management. Patient denies any fevers, chills, chest pain, SOB, nausea, vomiting, abdominal pain. MRI was performed that showed the completion of the previous stroke in the right medulla.    PLOF independent with ADLs, and with RW which was used after acute stroke in early July     The patient was evaluated by a physical medicine and rehabilitation specialist and was found to be a good candidate for acute rehabilitation.   The patient would benefit from 3 hours of interdisciplinary therapy per day.       CLOF: touch assist with bed mobility and transfers, ambulates 125ft with a RW and touch assist.     TODAY'S SUBJECTIVE & REVIEW OF SYMPTOMS:  Patient seen and evaluated at bedside with Dr. Carolina. No acute events overnight. Patient feels well.        Review of Systems:   Constiutional:    [ x  ] WNL           [   ] poor appetite   [   ] insomnia   [   ] tired   Cardio:                [ x ] WNL           [   ] CP   [   ] TATE   [   ] palpitations               Resp:                   [ x ] WNL           [   ] SOB   [   ] cough   [   ] wheezing   GI:                        [ x ] WNL           [   ] constipation   [   ] diarrhea   [   ] abdominal pain   [   ] nausea   [   ] emesis                                :                      [ x ] WNL           [   ] RUFFIN  [   ] dusuria   [   ] difficulty voiding             Endo:                   [ x ] WNL          [   ] po;yuria   [   ] temperature intolerance                 Skin:                     [ x ] WNL          [   ] pain   [   ] wound   [   ] rash   MSK:                    [ x ] WNL          [   ] muscle pain   [   ] joint pain/ stiffness   [   ] muscle tenderness   [   ] swelling   Neuro:                 [   ] WNL          [   ] HA   [   ] change in vision   [   ] tremor   [   ] weakness   [   ]dysphagia   [ x ] dysphonia, dysarthria, left facial droop, left hemiparesis           Cognitive:           [ x ] WNL           [   ]confusion      Psych:                  [ x ] WNL           [   ] hallucinations   [   ]agitation   [   ] delusion   [   ]depressionPHYSICAL EXAM      PHYSICAL EXAM     Vital Signs Last 24 Hrs  T(C): 36.1 (19 Jul 2021 05:57), Max: 37.3 (18 Jul 2021 13:05)  T(F): 97 (19 Jul 2021 05:57), Max: 99.2 (18 Jul 2021 13:05)  HR: 80 (19 Jul 2021 05:57) (80 - 103)  BP: 112/58 (19 Jul 2021 05:57) (106/55 - 127/67)  BP(mean): --  RR: 20 (19 Jul 2021 05:57) (19 - 20)  SpO2: --    Constitutional - [ x  ] NAD, Comfortable        [   ] other:  Chest - [  x  ] CTA     [   ] other:  Cardiovascular - [ x  ] RRR, no murmer     [   ] other:  Abdomen - [ x  ] Soft, NT/ND      [   ] other:        -  [ x ] NOFOLEY CATHETER   [   ] YES  if yes: [   ] NO MEATAL TEAR OR DISCHARGE [   ] other:  Extremities - [ x ] No C/C/E, No calf tenderness       [   ] other:  ROM - [ x  ] WFL     [   ] other:  Neurologic Exam -                 Cognitive - [ x  ]Awake, Alert, AAO to self, place, date, year, situation         [    ] other:      Communication - [   ]Fluent, No dysarthria       [  x ] other: dysarthria, dysphonia      Motor - No focal deficits                    Right UE -  [  x ] WNL      [    ] other:                    Left UE -     [   ] WNL      [ x   ] other: 4-/5 strength, improved ROM                     Right LE -   [  x ] WNL       [    ] other:                    Left LE -      [   ]WNL        [   x ] other:  4/5 strength      Sensory - [   ] Intact to LT      [    ] other:          Reflexes - [ x  ] wnl/ symmetric     [   ] other:     Psychiatric - [ x  ]Mood stable, Affect WNL     [   ]other:     Skin - [ x ] intact      [   ] other    MEDICATIONS  (STANDING):  amLODIPine   Tablet 10 milliGRAM(s) Oral daily  atorvastatin 80 milliGRAM(s) Oral at bedtime  dextrose 40% Gel 15 Gram(s) Oral once  dextrose 5%. 1000 milliLiter(s) (50 mL/Hr) IV Continuous <Continuous>  dextrose 5%. 1000 milliLiter(s) (100 mL/Hr) IV Continuous <Continuous>  dextrose 50% Injectable 25 Gram(s) IV Push once  dextrose 50% Injectable 12.5 Gram(s) IV Push once  dextrose 50% Injectable 25 Gram(s) IV Push once  enoxaparin Injectable 40 milliGRAM(s) SubCutaneous daily  glucagon  Injectable 1 milliGRAM(s) IntraMuscular once  insulin lispro (ADMELOG) corrective regimen sliding scale   SubCutaneous three times a day before meals  losartan 100 milliGRAM(s) Oral daily  ticagrelor 90 milliGRAM(s) Oral every 12 hours    MEDICATIONS  (PRN):  acetaminophen   Tablet .. 650 milliGRAM(s) Oral every 6 hours PRN Temp greater or equal to 38C (100.4F), Mild Pain (1 - 3)  aluminum hydroxide/magnesium hydroxide/simethicone Suspension 30 milliLiter(s) Oral every 4 hours PRN Dyspepsia  magnesium hydroxide Suspension 30 milliLiter(s) Oral daily PRN Constipation  melatonin 5 milliGRAM(s) Oral at bedtime PRN Insomnia        RECENT LABS/IMAGING                        14.6   8.41  )-----------( 323      ( 19 Jul 2021 07:25 )             42.7     07-19    141  |  106  |  25<H>  ----------------------------<  228<H>  4.5   |  24  |  1.2    Ca    9.5      19 Jul 2021 07:25  Mg     2.0     07-19    TPro  6.7  /  Alb  4.1  /  TBili  1.6<H>  /  DBili  x   /  AST  22  /  ALT  25  /  AlkPhos  101  07-19        POCT Blood Glucose.: 219 mg/dL (07-19-21 @ 07:18)  POCT Blood Glucose.: 180 mg/dL (07-18-21 @ 21:34)  POCT Blood Glucose.: 296 mg/dL (07-18-21 @ 16:24)  POCT Blood Glucose.: 239 mg/dL (07-18-21 @ 07:42)  POCT Blood Glucose.: 295 mg/dL (07-17-21 @ 16:23)  POCT Blood Glucose.: 191 mg/dL (07-17-21 @ 07:20)  POCT Blood Glucose.: 212 mg/dL (07-16-21 @ 15:51)  POCT Blood Glucose.: 237 mg/dL (07-16-21 @ 08:00)  POCT Blood Glucose.: 188 mg/dL (07-15-21 @ 21:45)  POCT Blood Glucose.: 203 mg/dL (07-15-21 @ 16:46)  POCT Blood Glucose.: 244 mg/dL (07-15-21 @ 11:30)               Patient is a 80y old  Male who presents with a chief complaint of Admitted for R medullary infarct; subsequent left sided weakness, facial droop (14 Jul 2021 14:04)      HPI:  80 year old male with a PmHx of CAD s/p 5 stents (2009), DM, HTN, bladder and prostate CA, stable AAA, recent acute stroke (discharged 7/7/2021) presents to [facility] with left sided upper and lower extremity weakness. 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 Patient had been evaluated by neuroendovascular who recommended no acute intervention. He was discharged for outpatient management. The day after discharge, patient started to feel recurrent left upper extremity weakness. He did not seek medical attention and his weakness continued to worsen through the weekend. Patient decided to come in today for further management as he can hardly move his left arm and his left leg is very weak. Patient does note, however, that his left arm was quite strong intermittently today, but 30 minutes later his weakness returned. Repeat CTH shows same territory infarct. CTA head and neck with redemonstration of moderate stenosis involving the proximal M2 inferior division of the right MCA and severe multifocal stenoses involving the V4 segment of the right vertebral artery due to calcified atherosclerotic plaque. Seen by neurology who suspect fluctuating symptoms may be due to high grade vertebral artery vessel disease. Being admitted for further management. Patient denies any fevers, chills, chest pain, SOB, nausea, vomiting, abdominal pain. MRI was performed that showed the completion of the previous stroke in the right medulla.    PLOF independent with ADLs, and with RW which was used after acute stroke in early July     The patient was evaluated by a physical medicine and rehabilitation specialist and was found to be a good candidate for acute rehabilitation.   The patient would benefit from 3 hours of interdisciplinary therapy per day.       CLOF: touch assist with bed mobility and transfers, ambulates 125ft with a RW and touch assist.     TODAY'S SUBJECTIVE & REVIEW OF SYMPTOMS:  Patient seen and evaluated at bedside with Dr. Carolina. No acute events overnight. Patient feels well.        Review of Systems:   Constiutional:    [ x  ] WNL           [   ] poor appetite   [   ] insomnia   [   ] tired   Cardio:                [ x ] WNL           [   ] CP   [   ] TATE   [   ] palpitations               Resp:                   [ x ] WNL           [   ] SOB   [   ] cough   [   ] wheezing   GI:                        [ x ] WNL           [   ] constipation   [   ] diarrhea   [   ] abdominal pain   [   ] nausea   [   ] emesis                                :                      [ x ] WNL           [   ] RUFFIN  [   ] dusuria   [   ] difficulty voiding             Endo:                   [ x ] WNL          [   ] po;yuria   [   ] temperature intolerance                 Skin:                     [ x ] WNL          [   ] pain   [   ] wound   [   ] rash   MSK:                    [ x ] WNL          [   ] muscle pain   [   ] joint pain/ stiffness   [   ] muscle tenderness   [   ] swelling   Neuro:                 [   ] WNL          [   ] HA   [   ] change in vision   [   ] tremor   [   ] weakness   [   ]dysphagia   [ x ] dysphonia, dysarthria, left facial droop, left hemiparesis           Cognitive:           [ x ] WNL           [   ]confusion      Psych:                  [ x ] WNL           [   ] hallucinations   [   ]agitation   [   ] delusion   [   ]depressionPHYSICAL EXAM      PHYSICAL EXAM     Vital Signs Last 24 Hrs  T(C): 36.1 (19 Jul 2021 05:57), Max: 37.3 (18 Jul 2021 13:05)  T(F): 97 (19 Jul 2021 05:57), Max: 99.2 (18 Jul 2021 13:05)  HR: 80 (19 Jul 2021 05:57) (80 - 103)  BP: 112/58 (19 Jul 2021 05:57) (106/55 - 127/67)  BP(mean): --  RR: 20 (19 Jul 2021 05:57) (19 - 20)  SpO2: --    Constitutional - [ x  ] NAD, Comfortable        [   ] other:  Chest - [  x  ] CTA     [   ] other:  Cardiovascular - [ x  ] RRR, no murmer     [   ] other:  Abdomen - [ x  ] Soft, NT/ND      [   ] other:        -  [ x ] NOFOLEY CATHETER   [   ] YES  if yes: [   ] NO MEATAL TEAR OR DISCHARGE [   ] other:  Extremities - [ x ] No C/C/E, No calf tenderness       [   ] other:  ROM - [ x  ] WFL     [   ] other:  Neurologic Exam -                 Cognitive - [ x  ]Awake, Alert, AAO to self, place, date, year, situation         [    ] other:      Communication - [   ]Fluent, No dysarthria       [  x ] other: dysarthria, dysphonia      Motor - No focal deficits                    Right UE -  [  x ] WNL      [    ] other:                    Left UE -     [   ] WNL      [ x   ] other: 4-/5 strength, improved ROM                     Right LE -   [  x ] WNL       [    ] other:                    Left LE -      [   ]WNL        [   x ] other:  4/5 strength      Sensory - [   ] Intact to LT      [    ] other:          Reflexes - [ x  ] wnl/ symmetric     [   ] other:     Psychiatric - [ x  ]Mood stable, Affect WNL     [   ]other:     Skin - [ x ] intact      [   ] other    MEDICATIONS  (STANDING):  amLODIPine   Tablet 10 milliGRAM(s) Oral daily  atorvastatin 80 milliGRAM(s) Oral at bedtime  dextrose 40% Gel 15 Gram(s) Oral once  dextrose 5%. 1000 milliLiter(s) (50 mL/Hr) IV Continuous <Continuous>  dextrose 5%. 1000 milliLiter(s) (100 mL/Hr) IV Continuous <Continuous>  dextrose 50% Injectable 25 Gram(s) IV Push once  dextrose 50% Injectable 12.5 Gram(s) IV Push once  dextrose 50% Injectable 25 Gram(s) IV Push once  enoxaparin Injectable 40 milliGRAM(s) SubCutaneous daily  glucagon  Injectable 1 milliGRAM(s) IntraMuscular once  insulin lispro (ADMELOG) corrective regimen sliding scale   SubCutaneous three times a day before meals  losartan 100 milliGRAM(s) Oral daily  ticagrelor 90 milliGRAM(s) Oral every 12 hours    MEDICATIONS  (PRN):  acetaminophen   Tablet .. 650 milliGRAM(s) Oral every 6 hours PRN Temp greater or equal to 38C (100.4F), Mild Pain (1 - 3)  aluminum hydroxide/magnesium hydroxide/simethicone Suspension 30 milliLiter(s) Oral every 4 hours PRN Dyspepsia  magnesium hydroxide Suspension 30 milliLiter(s) Oral daily PRN Constipation  melatonin 5 milliGRAM(s) Oral at bedtime PRN Insomnia        RECENT LABS/IMAGING                        14.6   8.41  )-----------( 323      ( 19 Jul 2021 07:25 )             42.7     07-19    141  |  106  |  25<H>  ----------------------------<  228<H>  4.5   |  24  |  1.2    Ca    9.5      19 Jul 2021 07:25  Mg     2.0     07-19    TPro  6.7  /  Alb  4.1  /  TBili  1.6<H>  /  DBili  x   /  AST  22  /  ALT  25  /  AlkPhos  101  07-19        POCT Blood Glucose.: 219 mg/dL (07-19-21 @ 07:18)  POCT Blood Glucose.: 180 mg/dL (07-18-21 @ 21:34)  POCT Blood Glucose.: 296 mg/dL (07-18-21 @ 16:24)  POCT Blood Glucose.: 239 mg/dL (07-18-21 @ 07:42)  POCT Blood Glucose.: 295 mg/dL (07-17-21 @ 16:23)  POCT Blood Glucose.: 191 mg/dL (07-17-21 @ 07:20)  POCT Blood Glucose.: 212 mg/dL (07-16-21 @ 15:51)  POCT Blood Glucose.: 237 mg/dL (07-16-21 @ 08:00)  POCT Blood Glucose.: 188 mg/dL (07-15-21 @ 21:45)  POCT Blood Glucose.: 203 mg/dL (07-15-21 @ 16:46)  POCT Blood Glucose.: 244 mg/dL (07-15-21 @ 11:30)

## 2021-07-19 NOTE — PROGRESS NOTE ADULT - ATTENDING COMMENTS
I reviewed the chart and examined the patient with the resident and we discussed the findings and treatment plan. I agree with the findings and treatment plan above, which I modified as indicated. The patient requires 3 hrs a day of acute inpatient rehab.    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   Admitted for rehab of  R medullary infarct and subsequent left sided weakness and facial droop.    # R medullary infarct  # Left sided weakness #Left sided facial droop, nondominant  - MRI of brain 7/12/21 redemonstrated the punctate infarct in the right ventral medulla, minimally enlarged possibly due to difference in technique compared to the prior exam from 7/5/2021. Slightly increased- mild edema. No additional acute infarcts. No intracranial hemorrhage or mass effect. Stable mild chronic microvascular ischemic changes.   - Brilinta 90 mg PO BID and Atorvastatin 80mg daily    -Oral-pharyngeal dysphagia/ dysarthria/dysphonia/VC paresis: Dysphagia 2 diet with Nectar-thick liquids and compensatory techniques.    - Continues to require acute PT/OT/SLT    Comorbidities    # HTN - avoid tight control given intracranial vertebral stenosis  - BP goal as above (130-160 systolic)   - Losartan 100 mg PO QAM.  - Amlodipine 10 mg      -#Type II DM on Glypizide: Hemoglobin A1C 7.8 on 7/5, insulin regimen as needed (goal 140-180)     #Bladder/prostate CA: Outpatient management     #AAA: Stable at this time, Outpatient management    #HFrEF/ history of Systolic CHF: Echo taken 7/6/2021 shows decreased left ventricular systolic function (LVEF 46%); No overt symptoms at this time    - GI/Bowel Mgmt: patient currently has regular BM       # Precautions / PROPHYLAXIS:    - Falls  - Ortho: Weight bearing status: WBAT   - DVT prophylaxis: SC heparin I reviewed the chart and examined the patient with the resident and we discussed the findings and treatment plan. I agree with the findings and treatment plan above, which I modified as indicated. The patient requires 3 hrs a day of acute inpatient rehab.    80 Y M PMHx of CAD/stents, DM, HTN, bladder and prostate CA, stable AAA, recent  acute medullary stroke (discharged 7/7/2021) presents to [facility] with recurrent and transient left sided upper and lower extremity weakness.   Admitted for rehab of  R medullary infarct and subsequent left sided weakness and facial droop.    # R medullary infarct  # Left sided weakness #Left sided facial droop, nondominant  - MRI of brain 7/12/21 redemonstrated the punctate infarct in the right ventral medulla, minimally enlarged possibly due to difference in technique compared to the prior exam from 7/5/2021. Slightly increased- mild edema. No additional acute infarcts. No intracranial hemorrhage or mass effect. Stable mild chronic microvascular ischemic changes.   - Brilinta 90 mg PO BID and Atorvastatin 80mg daily    -Oral-pharyngeal dysphagia/ dysarthria/dysphonia/VC paresis: Dysphagia 2 diet with Nectar-thick liquids and compensatory techniques.    - Continues to require acute PT/OT/SLT    Comorbidities    # HTN - avoid tight control given intracranial vertebral stenosis. BP syst is 107-126.   - BP goal as above (130-160 systolic)   - Losartan 100 mg PO QAM.  - Amlodipine- decrease from 10 mg to 5 mg and monitor      -#Type II DM on Glypizide: Hemoglobin A1C 7.8 on 7/5, insulin regimen as needed (goal 140-180).   Running high 180-240. Increase Insulin.    #Bladder/prostate CA: Outpatient management     #AAA: Stable at this time, Outpatient management    #HFrEF/ history of Systolic CHF: Echo taken 7/6/2021 shows decreased left ventricular systolic function (LVEF 46%); No overt symptoms at this time    - GI/Bowel Mgmt: patient currently has regular BM       # Precautions / PROPHYLAXIS:    - Falls  - Ortho: Weight bearing status: WBAT   - DVT prophylaxis: SC heparin

## 2021-07-20 LAB
GLUCOSE BLDC GLUCOMTR-MCNC: 188 MG/DL — HIGH (ref 70–99)
GLUCOSE BLDC GLUCOMTR-MCNC: 252 MG/DL — HIGH (ref 70–99)
GLUCOSE BLDC GLUCOMTR-MCNC: 297 MG/DL — HIGH (ref 70–99)
GLUCOSE BLDC GLUCOMTR-MCNC: 300 MG/DL — HIGH (ref 70–99)

## 2021-07-20 RX ADMIN — ENOXAPARIN SODIUM 40 MILLIGRAM(S): 100 INJECTION SUBCUTANEOUS at 12:15

## 2021-07-20 RX ADMIN — Medication 3: at 16:37

## 2021-07-20 RX ADMIN — LOSARTAN POTASSIUM 100 MILLIGRAM(S): 100 TABLET, FILM COATED ORAL at 06:22

## 2021-07-20 RX ADMIN — TICAGRELOR 90 MILLIGRAM(S): 90 TABLET ORAL at 06:22

## 2021-07-20 RX ADMIN — Medication 1: at 12:13

## 2021-07-20 RX ADMIN — Medication 1: at 07:50

## 2021-07-20 RX ADMIN — AMLODIPINE BESYLATE 5 MILLIGRAM(S): 2.5 TABLET ORAL at 06:22

## 2021-07-20 RX ADMIN — INSULIN GLARGINE 5 UNIT(S): 100 INJECTION, SOLUTION SUBCUTANEOUS at 21:26

## 2021-07-20 RX ADMIN — TICAGRELOR 90 MILLIGRAM(S): 90 TABLET ORAL at 17:29

## 2021-07-20 RX ADMIN — ATORVASTATIN CALCIUM 80 MILLIGRAM(S): 80 TABLET, FILM COATED ORAL at 21:26

## 2021-07-20 NOTE — PROGRESS NOTE ADULT - ATTENDING COMMENTS
I reviewed the chart and examined the patient with the resident and we discussed the findings and treatment plan. I agree with the findings and treatment plan above, which I modified as indicated. The patient requires 3 hrs a day of acute inpatient rehab.    80 Y M PMHx of CAD/stents, DM, HTN, bladder and prostate CA, stable AAA, recent  acute medullary stroke (discharged 7/7/2021) presents to [facility] with recurrent and transient left sided upper and lower extremity weakness.   Admitted for rehab of  R medullary infarct and subsequent left sided weakness and facial droop.    # R medullary infarct  # Left sided weakness #Left sided facial droop, nondominant  - MRI of brain 7/12/21 redemonstrated the punctate infarct in the right ventral medulla, minimally enlarged possibly due to difference in technique compared to the prior exam from 7/5/2021. Slightly increased- mild edema. No additional acute infarcts. No intracranial hemorrhage or mass effect. Stable mild chronic microvascular ischemic changes.   - Brilinta 90 mg PO BID and Atorvastatin 80mg daily    -Oral-pharyngeal dysphagia/ dysarthria/dysphonia/VC paresis: Dysphagia 2 diet with Nectar-thick liquids and compensatory techniques.    - Continues to require acute PT/OT/SLT    Comorbidities    # HTN - avoid tight control given intracranial vertebral stenosis. BP syst is 107-126.   - BP goal as above (130-160 systolic)   - Losartan 100 mg PO QAM.  - Amlodipine- decrease from 10 mg to 5 mg and monitor      -#Type II DM on Glypizide: Hemoglobin A1C 7.8 on 7/5, insulin regimen as needed (goal 140-180).   Running high 180-240. Increase Insulin.    #Bladder/prostate CA: Outpatient management     #AAA: Stable at this time, Outpatient management    #HFrEF/ history of Systolic CHF: Echo taken 7/6/2021 shows decreased left ventricular systolic function (LVEF 46%); No overt symptoms at this time    - GI/Bowel Mgmt: patient currently has regular BM       # Precautions / PROPHYLAXIS:    - Falls  - Ortho: Weight bearing status: WBAT   - DVT prophylaxis: SC heparin I reviewed the chart and examined the patient with the resident and we discussed the findings and treatment plan. I agree with the findings and treatment plan above, which I modified as indicated. The patient requires 3 hrs a day of acute inpatient rehab.    80 Y M PMHx of CAD/stents, DM, HTN, bladder and prostate CA, stable AAA, recent  acute medullary stroke (discharged 7/7/2021) presents to [facility] with recurrent and transient left sided upper and lower extremity weakness.   Admitted for rehab of  R medullary infarct and subsequent left sided weakness and facial droop.    # R medullary infarct  # Left sided weakness #Left sided facial droop, nondominant  - MRI of brain 7/12/21 redemonstrated the punctate infarct in the right ventral medulla, minimally enlarged possibly due to difference in technique compared to the prior exam from 7/5/2021. Slightly increased- mild edema. No additional acute infarcts. No intracranial hemorrhage or mass effect. Stable mild chronic microvascular ischemic changes.   - Brilinta 90 mg PO BID and Atorvastatin 80mg daily    Ambulating with touch assistance with walker    -Oral-pharyngeal dysphagia/ dysarthria/dysphonia/VC paresis: Doing well with Dysphagia 2 diet with Nectar-thick liquids and compensatory techniques.    - Continues to require acute PT/OT/SLT    Comorbidities    # HTN - avoid tight control given intracranial vertebral stenosis. BP syst is 107-126.   - BP goal is above (130-160 systolic)   - Losartan 100 mg PO QAM.  - Amlodipine- decrease from 10 mg to 5 mg and monitor      -#Type II DM on Glypizide: Hemoglobin A1C 7.8 on 7/5, insulin regimen as needed (goal 140-180).   Running high. Increased Insulin. Monitor    #Bladder/prostate CA: Outpatient management     #AAA: Stable at this time, Outpatient management    #HFrEF/ history of Systolic CHF: Echo taken 7/6/2021 shows decreased left ventricular systolic function (LVEF 46%); No overt symptoms at this time    - GI/Bowel Mgmt: patient currently has regular BM       # Precautions / PROPHYLAXIS:    - Falls  - Ortho: Weight bearing status: WBAT   - DVT prophylaxis: SC heparin

## 2021-07-20 NOTE — PROGRESS NOTE ADULT - ASSESSMENT
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   Admitted for rehab of  R medullary infarct and subsequent left sided weakness and facial droop.    # R medullary infarct  # Left sided weakness, Left sided facial droop, nondominant  - MRI of brain 7/12/21 redemonstrated the punctate infarct in the right ventral medulla, minimally enlarged possibly due to difference in technique compared to the prior exam from 7/5/2021. Slightly increased mild edema. No additional acute infarcts. No intracranial hemorrhage or mass effect. Stable mild chronic microvascular ischemic changes.   - Brilinta 90 mg PO BID and Atorvastatin 80mg daily  - PT/OT/SLP    #Dysphagia:   - Oropharygeal  - Dysphagia 2 diet with nectar-thick liquids and head turn/ chin-tuck and multiple swallows  - Monitor for signs of aspiration.    #Dysarthria:   - Speech therapy      Comorbidities    #HTN:  - BP goal as above (130-160 systolic) . Currently running 109-126  - Losartan 100 mg PO QAM.  - Decrease  Amlodipine 10 mg to 5 mg      -#Type II DM on glypizide: Hemoglobin A1C 7.8 on 7/5, insulin regimen as needed (goal 140-180)                           - 240. 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   Admitted for rehab of  R medullary infarct and subsequent left sided weakness and facial droop.    # R medullary infarct  # Left sided weakness, Left sided facial droop, nondominant  - MRI of brain 7/12/21 redemonstrated the punctate infarct in the right ventral medulla, minimally enlarged possibly due to difference in technique compared to the prior exam from 7/5/2021. Slightly increased mild edema. No additional acute infarcts. No intracranial hemorrhage or mass effect. Stable mild chronic microvascular ischemic changes.   - Brilinta 90 mg PO BID and Atorvastatin 80mg daily  - PT/OT/SLP    #Dysphagia:   - Oropharygeal  - Dysphagia 2 diet with nectar-thick liquids and head turn/ chin-tuck and multiple swallows  - Monitor for signs of aspiration.    #Dysarthria:   - Speech therapy      Comorbidities    #HTN:  - BP goal as above (130-160 systolic) . Currently running 109-126  - Losartan 100 mg PO QAM.  - Decrease  Amlodipine 10 mg to 5 mg      -#Type II DM on glypizide: Hemoglobin A1C 7.8 on 7/5, insulin regimen as needed (goal 140-180); patient was started on lantus 5u daily and low dose CS    #Bladder/prostate CA: Outpatient management     #AAA: Stable at this time, Outpatient management    #HFrEF/ history of Systolic CHF: Echo taken 7/6/2021 shows decreased left ventricular systolic function (LVEF 46%); No overt symptoms at this time    - GI/Bowel Mgmt: patient currently has regular BM       # Precautions / PROPHYLAXIS:    - Falls  - Ortho: Weight bearing status: WBAT   - DVT prophylaxis: SC heparin        · Assessment	  80 Y M PMHx of CAD/stents, DM, HTN, bladder and prostate CA, stable AAA, 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   Admitted for rehab of  R medullary infarct and subsequent left sided weakness and facial droop.    # R medullary infarct  # Left sided weakness, Left sided facial droop, nondominant  - MRI of brain 7/12/21 redemonstrated the punctate infarct in the right ventral medulla, minimally enlarged possibly due to difference in technique compared to the prior exam from 7/5/2021. Slightly increased mild edema. No additional acute infarcts. No intracranial hemorrhage or mass effect. Stable mild chronic microvascular ischemic changes.   - Brilinta 90 mg PO BID and Atorvastatin 80mg daily  - PT/OT/SLP    #Dysphagia:   - Oropharygeal  - Dysphagia 2 diet with nectar-thick liquids and head turn/ chin-tuck and multiple swallows  - Monitor for signs of aspiration. Doing well.    #Dysarthria:   - Speech therapy      Comorbidities    #HTN:  - BP goal as above (130-160 systolic) . Currently running low  - Losartan 100 mg PO QAM.  - Decrease  Amlodipine 10 mg to 5 mg and monitor      -#Type II DM on glypizide: Hemoglobin A1C 7.8 on 7/5, insulin regimen as needed (goal 140-180); patient was started on lantus 5u daily and low dose CS. Monitor    #Bladder/prostate CA: Outpatient management     #AAA: Stable at this time, Outpatient management    #HFrEF/ history of Systolic CHF: Echo taken 7/6/2021 shows decreased left ventricular systolic function (LVEF 46%); No overt symptoms at this time    - GI/Bowel Mgmt: patient currently has regular BM       # Precautions / PROPHYLAXIS:    - Falls  - Ortho: Weight bearing status: WBAT   - DVT prophylaxis: SC heparin

## 2021-07-20 NOTE — PROGRESS NOTE ADULT - SUBJECTIVE AND OBJECTIVE BOX
Patient is a 80y old  Male who presents with a chief complaint of Admitted for R medullary infarct; subsequent left sided weakness, facial droop (14 Jul 2021 14:04)      HPI:  80 year old male with a PmHx of CAD s/p 5 stents (2009), DM, HTN, bladder and prostate CA, stable AAA, recent acute stroke (discharged 7/7/2021) presents to [facility] with left sided upper and lower extremity weakness. 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 Patient had been evaluated by neuroendovascular who recommended no acute intervention. He was discharged for outpatient management. The day after discharge, patient started to feel recurrent left upper extremity weakness. He did not seek medical attention and his weakness continued to worsen through the weekend. Patient decided to come in today for further management as he can hardly move his left arm and his left leg is very weak. Patient does note, however, that his left arm was quite strong intermittently today, but 30 minutes later his weakness returned. Repeat CTH shows same territory infarct. CTA head and neck with redemonstration of moderate stenosis involving the proximal M2 inferior division of the right MCA and severe multifocal stenoses involving the V4 segment of the right vertebral artery due to calcified atherosclerotic plaque. Seen by neurology who suspect fluctuating symptoms may be due to high grade vertebral artery vessel disease. Being admitted for further management. Patient denies any fevers, chills, chest pain, SOB, nausea, vomiting, abdominal pain. MRI was performed that showed the completion of the previous stroke in the right medulla.    PLOF independent with ADLs, and with RW which was used after acute stroke in early July     The patient was evaluated by a physical medicine and rehabilitation specialist and was found to be a good candidate for acute rehabilitation.   The patient would benefit from 3 hours of interdisciplinary therapy per day.       CLOF: touch assist with bed mobility and transfers, ambulates 100 ft with a RW and touch assist.     TODAY'S SUBJECTIVE & REVIEW OF SYMPTOMS:  Patient seen and evaluated at bedside with Dr. Carolina. No acute events overnight. Patient feels well.        Review of Systems:   Constiutional:    [ x  ] WNL           [   ] poor appetite   [   ] insomnia   [   ] tired   Cardio:                [ x ] WNL           [   ] CP   [   ] TATE   [   ] palpitations               Resp:                   [ x ] WNL           [   ] SOB   [   ] cough   [   ] wheezing   GI:                        [ x ] WNL           [   ] constipation   [   ] diarrhea   [   ] abdominal pain   [   ] nausea   [   ] emesis                                :                      [ x ] WNL           [   ] RUFFIN  [   ] dusuria   [   ] difficulty voiding             Endo:                   [ x ] WNL          [   ] po;yuria   [   ] temperature intolerance                 Skin:                     [ x ] WNL          [   ] pain   [   ] wound   [   ] rash   MSK:                    [ x ] WNL          [   ] muscle pain   [   ] joint pain/ stiffness   [   ] muscle tenderness   [   ] swelling   Neuro:                 [   ] WNL          [   ] HA   [   ] change in vision   [   ] tremor   [   ] weakness   [   ]dysphagia   [ x ] dysphonia, dysarthria, left facial droop, left hemiparesis           Cognitive:           [ x ] WNL           [   ]confusion      Psych:                  [ x ] WNL           [   ] hallucinations   [   ]agitation   [   ] delusion   [   ]depressionPHYSICAL EXAM      PHYSICAL EXAM     Vital Signs Last 24 Hrs  T(C): 36 (20 Jul 2021 05:16), Max: 36.9 (19 Jul 2021 20:18)  T(F): 96.8 (20 Jul 2021 05:16), Max: 98.4 (19 Jul 2021 20:18)  HR: 73 (20 Jul 2021 05:16) (73 - 82)  BP: 117/56 (20 Jul 2021 05:16) (114/62 - 137/75)  BP(mean): --  RR: 20 (20 Jul 2021 05:16) (20 - 20)  SpO2: --    Constitutional - [ x  ] NAD, Comfortable        [   ] other:  Chest - [  x  ] CTA     [   ] other:  Cardiovascular - [ x  ] RRR, no murmer     [   ] other:  Abdomen - [ x  ] Soft, NT/ND      [   ] other:        -  [ x ] NOFOLEY CATHETER   [   ] YES  if yes: [   ] NO MEATAL TEAR OR DISCHARGE [   ] other:  Extremities - [ x ] No C/C/E, No calf tenderness       [   ] other:  ROM - [ x  ] WFL     [   ] other:  Neurologic Exam -                 Cognitive - [ x  ]Awake, Alert, AAO to self, place, date, year, situation         [    ] other:      Communication - [   ]Fluent, No dysarthria       [  x ] other: dysarthria, dysphonia      Motor - No focal deficits                    Right UE -  [  x ] WNL      [    ] other:                    Left UE -     [   ] WNL      [ x   ] other: 4-/5 strength, improved ROM                     Right LE -   [  x ] WNL       [    ] other:                    Left LE -      [   ]WNL        [   x ] other:  4/5 strength      Sensory - [   ] Intact to LT      [    ] other:          Reflexes - [ x  ] wnl/ symmetric     [   ] other:     Psychiatric - [ x  ]Mood stable, Affect WNL     [   ]other:     Skin - [ x ] intact      [   ] other    MEDICATIONS  (STANDING):  amLODIPine   Tablet 5 milliGRAM(s) Oral daily  atorvastatin 80 milliGRAM(s) Oral at bedtime  dextrose 40% Gel 15 Gram(s) Oral once  dextrose 40% Gel 15 Gram(s) Oral once  dextrose 5%. 1000 milliLiter(s) (50 mL/Hr) IV Continuous <Continuous>  dextrose 5%. 1000 milliLiter(s) (100 mL/Hr) IV Continuous <Continuous>  dextrose 50% Injectable 25 Gram(s) IV Push once  dextrose 50% Injectable 12.5 Gram(s) IV Push once  dextrose 50% Injectable 25 Gram(s) IV Push once  enoxaparin Injectable 40 milliGRAM(s) SubCutaneous daily  glucagon  Injectable 1 milliGRAM(s) IntraMuscular once  insulin glargine Injectable (LANTUS) 5 Unit(s) SubCutaneous at bedtime  insulin lispro (ADMELOG) corrective regimen sliding scale   SubCutaneous three times a day before meals  insulin lispro (ADMELOG) corrective regimen sliding scale   SubCutaneous at bedtime  losartan 100 milliGRAM(s) Oral daily  ticagrelor 90 milliGRAM(s) Oral every 12 hours    MEDICATIONS  (PRN):  acetaminophen   Tablet .. 650 milliGRAM(s) Oral every 6 hours PRN Temp greater or equal to 38C (100.4F), Mild Pain (1 - 3)  aluminum hydroxide/magnesium hydroxide/simethicone Suspension 30 milliLiter(s) Oral every 4 hours PRN Dyspepsia  magnesium hydroxide Suspension 30 milliLiter(s) Oral daily PRN Constipation  melatonin 5 milliGRAM(s) Oral at bedtime PRN Insomnia        RECENT LABS/IMAGING                        14.6   8.41  )-----------( 323      ( 19 Jul 2021 07:25 )             42.7     07-19    141  |  106  |  25<H>  ----------------------------<  228<H>  4.5   |  24  |  1.2    Ca    9.5      19 Jul 2021 07:25  Mg     2.0     07-19    TPro  6.7  /  Alb  4.1  /  TBili  1.6<H>  /  DBili  x   /  AST  22  /  ALT  25  /  AlkPhos  101  07-19        POCT Blood Glucose.: 219 mg/dL (07-19-21 @ 07:18)  POCT Blood Glucose.: 180 mg/dL (07-18-21 @ 21:34)  POCT Blood Glucose.: 296 mg/dL (07-18-21 @ 16:24)  POCT Blood Glucose.: 239 mg/dL (07-18-21 @ 07:42)  POCT Blood Glucose.: 295 mg/dL (07-17-21 @ 16:23)  POCT Blood Glucose.: 191 mg/dL (07-17-21 @ 07:20)  POCT Blood Glucose.: 212 mg/dL (07-16-21 @ 15:51)  POCT Blood Glucose.: 237 mg/dL (07-16-21 @ 08:00)  POCT Blood Glucose.: 188 mg/dL (07-15-21 @ 21:45)  POCT Blood Glucose.: 203 mg/dL (07-15-21 @ 16:46)  POCT Blood Glucose.: 244 mg/dL (07-15-21 @ 11:30)               Patient is a 80y old  Male who presents with a chief complaint of Admitted for R medullary infarct; subsequent left sided weakness, facial droop (14 Jul 2021 14:04)      HPI:  80 year old male with a PmHx of CAD s/p 5 stents (2009), DM, HTN, bladder and prostate CA, stable AAA, recent acute stroke (discharged 7/7/2021) presents to [facility] with left sided upper and lower extremity weakness. 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 Patient had been evaluated by neuroendovascular who recommended no acute intervention. He was discharged for outpatient management. The day after discharge, patient started to feel recurrent left upper extremity weakness. He did not seek medical attention and his weakness continued to worsen through the weekend. Patient decided to come in today for further management as he can hardly move his left arm and his left leg is very weak. Patient does note, however, that his left arm was quite strong intermittently today, but 30 minutes later his weakness returned. Repeat CTH shows same territory infarct. CTA head and neck with redemonstration of moderate stenosis involving the proximal M2 inferior division of the right MCA and severe multifocal stenoses involving the V4 segment of the right vertebral artery due to calcified atherosclerotic plaque. Seen by neurology who suspect fluctuating symptoms may be due to high grade vertebral artery vessel disease. Being admitted for further management. Patient denies any fevers, chills, chest pain, SOB, nausea, vomiting, abdominal pain. MRI was performed that showed the completion of the previous stroke in the right medulla.    PLOF independent with ADLs, and with RW which was used after acute stroke in early July     The patient was evaluated by a physical medicine and rehabilitation specialist and was found to be a good candidate for acute rehabilitation.   The patient would benefit from 3 hours of interdisciplinary therapy per day.       CLOF: touch assist with bed mobility and transfers, ambulates 100 ft with a RW and touch assist.     TODAY'S SUBJECTIVE & REVIEW OF SYMPTOMS:  Patient seen and evaluated at bedside with Dr. Carolina. No acute events overnight. Patient feels well.        Review of Systems:   Constiutional:    [ x  ] WNL           [   ] poor appetite   [   ] insomnia   [   ] tired   Cardio:                [ x ] WNL           [   ] CP   [   ] TATE   [   ] palpitations               Resp:                   [ x ] WNL           [   ] SOB   [   ] cough   [   ] wheezing   GI:                        [ x ] WNL           [   ] constipation   [   ] diarrhea   [   ] abdominal pain   [   ] nausea   [   ] emesis                                :                      [ x ] WNL           [   ] RUFFIN  [   ] dusuria   [   ] difficulty voiding             Endo:                   [ x ] WNL          [   ] po;yuria   [   ] temperature intolerance                 Skin:                     [ x ] WNL          [   ] pain   [   ] wound   [   ] rash   MSK:                    [ x ] WNL          [   ] muscle pain   [   ] joint pain/ stiffness   [   ] muscle tenderness   [   ] swelling   Neuro:                 [   ] WNL          [   ] HA   [   ] change in vision   [   ] tremor   [   ] weakness   [   ]dysphagia   [ x ] dysphonia, dysarthria, left facial droop, left hemiparesis           Cognitive:           [ x ] WNL           [   ]confusion      Psych:                  [ x ] WNL           [   ] hallucinations   [   ]agitation   [   ] delusion   [   ]depressionPHYSICAL EXAM      PHYSICAL EXAM     Vital Signs Last 24 Hrs  T(C): 36 (20 Jul 2021 05:16), Max: 36.9 (19 Jul 2021 20:18)  T(F): 96.8 (20 Jul 2021 05:16), Max: 98.4 (19 Jul 2021 20:18)  HR: 73 (20 Jul 2021 05:16) (73 - 82)  BP: 117/56 (20 Jul 2021 05:16) (114/62 - 137/75)  BP(mean): --  RR: 20 (20 Jul 2021 05:16) (20 - 20)  SpO2: --    Constitutional - [ x  ] NAD, Comfortable        [   ] other:  Chest - [  x  ] CTA     [   ] other:  Cardiovascular - [ x  ] RRR, no murmer     [   ] other:  Abdomen - [ x  ] Soft, NT/ND      [   ] other:        -  [ x ] NOFOLEY CATHETER   [   ] YES  if yes: [   ] NO MEATAL TEAR OR DISCHARGE [   ] other:  Extremities - [ x ] No C/C/E, No calf tenderness       [   ] other:  ROM - [ x  ] WFL     [   ] other:  Neurologic Exam -                 Cognitive - [ x  ]Awake, Alert, AAO to self, place, date, year, situation         [    ] other:      Communication - [   ]Fluent, No dysarthria       [  x ] other: dysarthria, dysphonia      Motor - No focal deficits                    Right UE -  [  x ] WNL      [    ] other:                    Left UE -     [   ] WNL      [ x   ] other: 4-/5 strength, improved ROM                     Right LE -   [  x ] WNL       [    ] other:                    Left LE -      [   ]WNL        [   x ] other:  4/5 strength      Sensory - [   ] Intact to LT      [    ] other:          Reflexes - [ x  ] wnl/ symmetric     [   ] other:     Psychiatric - [ x  ]Mood stable, Affect WNL     [   ]other:     Skin - [ x ] intact      [   ] other    MEDICATIONS  (STANDING):  amLODIPine   Tablet 5 milliGRAM(s) Oral daily  atorvastatin 80 milliGRAM(s) Oral at bedtime  dextrose 40% Gel 15 Gram(s) Oral once  dextrose 40% Gel 15 Gram(s) Oral once  dextrose 5%. 1000 milliLiter(s) (50 mL/Hr) IV Continuous <Continuous>  dextrose 5%. 1000 milliLiter(s) (100 mL/Hr) IV Continuous <Continuous>  dextrose 50% Injectable 25 Gram(s) IV Push once  dextrose 50% Injectable 12.5 Gram(s) IV Push once  dextrose 50% Injectable 25 Gram(s) IV Push once  enoxaparin Injectable 40 milliGRAM(s) SubCutaneous daily  glucagon  Injectable 1 milliGRAM(s) IntraMuscular once  insulin glargine Injectable (LANTUS) 5 Unit(s) SubCutaneous at bedtime  insulin lispro (ADMELOG) corrective regimen sliding scale   SubCutaneous three times a day before meals  insulin lispro (ADMELOG) corrective regimen sliding scale   SubCutaneous at bedtime  losartan 100 milliGRAM(s) Oral daily  ticagrelor 90 milliGRAM(s) Oral every 12 hours    MEDICATIONS  (PRN):  acetaminophen   Tablet .. 650 milliGRAM(s) Oral every 6 hours PRN Temp greater or equal to 38C (100.4F), Mild Pain (1 - 3)  aluminum hydroxide/magnesium hydroxide/simethicone Suspension 30 milliLiter(s) Oral every 4 hours PRN Dyspepsia  magnesium hydroxide Suspension 30 milliLiter(s) Oral daily PRN Constipation  melatonin 5 milliGRAM(s) Oral at bedtime PRN Insomnia        RECENT LABS/IMAGING                        14.6   8.41  )-----------( 323      ( 19 Jul 2021 07:25 )             42.7     07-19    141  |  106  |  25<H>  ----------------------------<  228<H>  4.5   |  24  |  1.2    Ca    9.5      19 Jul 2021 07:25  Mg     2.0     07-19    TPro  6.7  /  Alb  4.1  /  TBili  1.6<H>  /  DBili  x   /  AST  22  /  ALT  25  /  AlkPhos  101  07-19    CAPILLARY BLOOD GLUCOSE      POCT Blood Glucose.: 297 mg/dL (20 Jul 2021 12:13)  POCT Blood Glucose.: 188 mg/dL (20 Jul 2021 07:21)  POCT Blood Glucose.: 119 mg/dL (19 Jul 2021 21:07)  POCT Blood Glucose.: 309 mg/dL (19 Jul 2021 16:02)

## 2021-07-21 LAB
GLUCOSE BLDC GLUCOMTR-MCNC: 162 MG/DL — HIGH (ref 70–99)
GLUCOSE BLDC GLUCOMTR-MCNC: 178 MG/DL — HIGH (ref 70–99)
GLUCOSE BLDC GLUCOMTR-MCNC: 200 MG/DL — HIGH (ref 70–99)
GLUCOSE BLDC GLUCOMTR-MCNC: 393 MG/DL — HIGH (ref 70–99)

## 2021-07-21 RX ORDER — INSULIN GLARGINE 100 [IU]/ML
8 INJECTION, SOLUTION SUBCUTANEOUS AT BEDTIME
Refills: 0 | Status: DISCONTINUED | OUTPATIENT
Start: 2021-07-21 | End: 2021-07-21

## 2021-07-21 RX ORDER — PANTOPRAZOLE SODIUM 20 MG/1
40 TABLET, DELAYED RELEASE ORAL
Refills: 0 | Status: DISCONTINUED | OUTPATIENT
Start: 2021-07-21 | End: 2021-07-28

## 2021-07-21 RX ORDER — INSULIN GLARGINE 100 [IU]/ML
7 INJECTION, SOLUTION SUBCUTANEOUS EVERY MORNING
Refills: 0 | Status: DISCONTINUED | OUTPATIENT
Start: 2021-07-21 | End: 2021-07-21

## 2021-07-21 RX ORDER — INSULIN LISPRO 100/ML
2 VIAL (ML) SUBCUTANEOUS
Refills: 0 | Status: DISCONTINUED | OUTPATIENT
Start: 2021-07-21 | End: 2021-07-23

## 2021-07-21 RX ORDER — INSULIN GLARGINE 100 [IU]/ML
7 INJECTION, SOLUTION SUBCUTANEOUS AT BEDTIME
Refills: 0 | Status: DISCONTINUED | OUTPATIENT
Start: 2021-07-21 | End: 2021-07-23

## 2021-07-21 RX ADMIN — ATORVASTATIN CALCIUM 80 MILLIGRAM(S): 80 TABLET, FILM COATED ORAL at 21:33

## 2021-07-21 RX ADMIN — Medication 1: at 07:49

## 2021-07-21 RX ADMIN — TICAGRELOR 90 MILLIGRAM(S): 90 TABLET ORAL at 17:50

## 2021-07-21 RX ADMIN — TICAGRELOR 90 MILLIGRAM(S): 90 TABLET ORAL at 06:26

## 2021-07-21 RX ADMIN — AMLODIPINE BESYLATE 5 MILLIGRAM(S): 2.5 TABLET ORAL at 06:26

## 2021-07-21 RX ADMIN — ENOXAPARIN SODIUM 40 MILLIGRAM(S): 100 INJECTION SUBCUTANEOUS at 12:31

## 2021-07-21 RX ADMIN — INSULIN GLARGINE 7 UNIT(S): 100 INJECTION, SOLUTION SUBCUTANEOUS at 21:33

## 2021-07-21 RX ADMIN — Medication 5: at 12:29

## 2021-07-21 RX ADMIN — Medication 2 UNIT(S): at 17:09

## 2021-07-21 RX ADMIN — Medication 1: at 17:09

## 2021-07-21 RX ADMIN — LOSARTAN POTASSIUM 100 MILLIGRAM(S): 100 TABLET, FILM COATED ORAL at 06:26

## 2021-07-21 NOTE — PROGRESS NOTE ADULT - ATTENDING COMMENTS
I reviewed the chart and examined the patient with the resident and we discussed the findings and treatment plan. I agree with the findings and treatment plan above, which I modified as indicated. The patient requires 3 hrs a day of acute inpatient rehab.    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   Admitted for rehab of  R medullary infarct and subsequent left sided weakness and facial droop.    # R medullary infarct  # Left sided weakness #Left sided facial droop, nondominant  - MRI of brain 7/12/21 redemonstrated the punctate infarct in the right ventral medulla, minimally enlarged possibly due to difference in technique compared to the prior exam from 7/5/2021. Slightly increased- mild edema. No additional acute infarcts. No intracranial hemorrhage or mass effect. Stable mild chronic microvascular ischemic changes.   - Brilinta 90 mg PO BID and Atorvastatin 80mg daily    Ambulating with touch assistance with walker    -Oral-pharyngeal dysphagia/ dysarthria/dysphonia/VC paresis: Doing well with Dysphagia 2 diet with Nectar-thick liquids and compensatory techniques.    - Continues to require acute PT/OT/SLT    Comorbidities    # HTN - avoid tight control given intracranial vertebral stenosis. BP syst is 107-126.   - BP goal is above (130-160 systolic)   - Losartan 100 mg PO QAM.  - Amlodipine- decrease from 10 mg to 5 mg and monitor      -#Type II DM on Glypizide: Hemoglobin A1C 7.8 on 7/5, insulin regimen as needed (goal 140-180).   Running high. Increased Insulin. Monitor    #Bladder/prostate CA: Outpatient management     #AAA: Stable at this time, Outpatient management    #HFrEF/ history of Systolic CHF: Echo taken 7/6/2021 shows decreased left ventricular systolic function (LVEF 46%); No overt symptoms at this time    - GI/Bowel Mgmt: patient currently has regular BM       # Precautions / PROPHYLAXIS:    - Falls  - Ortho: Weight bearing status: WBAT   - DVT prophylaxis: SC heparin I reviewed the chart and examined the patient with the resident and we discussed the findings and treatment plan. I agree with the findings and treatment plan above, which I modified as indicated. The patient requires 3 hrs a day of acute inpatient rehab.    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   Admitted for rehab of  R medullary infarct and subsequent left sided weakness and facial droop.    # R medullary infarct  # Left sided weakness #Left sided facial droop, nondominant  - MRI of brain 7/12/21 redemonstrated the punctate infarct in the right ventral medulla, minimally enlarged possibly due to difference in technique compared to the prior exam from 7/5/2021. Slightly increased- mild edema. No additional acute infarcts. No intracranial hemorrhage or mass effect. Stable mild chronic microvascular ischemic changes.   - Brilinta 90 mg PO BID and Atorvastatin 80mg daily    Ambulating with touch assistance with walker    - Continues to require acute PT/OT/SLT    Comorbidities    # HTN - avoid tight control given intracranial vertebral stenosis. BP syst is 107-126.   - BP goal is above (130-160 systolic)   - Losartan 100 mg PO QAM.  - Amlodipine- decrease from 10 mg to 5 mg and monitor    -#Type II DM on glypizide: Hemoglobin A1C 7.8 on 7/5, insulin regimen as needed (goal 140-180); patient was started on lantus 5u daily and low dose CS. Monitor. Uncontrolled hypergycemia. Increase Lantus to 10 units    #Dysphagia:   - Oropharygeal  - Had FEES this morning. Dysphagia 2 diet with nectar-thick liquids and head turn/ chin-tuck and multiple swallows upgraded to Dysphagia 2 diet (for lost dentures), with thin liquids. FEES also showed some VC erythema, possibly 2ndary to reflux. Will add PPI for 4-6 weeks.  - Monitor for signs of aspiration. Doing well.      #Bladder/prostate CA: Outpatient management     #AAA: Stable at this time, Outpatient management    #HFrEF/ history of Systolic CHF: Echo taken 7/6/2021 shows decreased left ventricular systolic function (LVEF 46%); No overt symptoms at this time    - GI/Bowel Mgmt: patient currently has regular BM       # Precautions / PROPHYLAXIS:    - Falls  - Ortho: Weight bearing status: WBAT   - DVT prophylaxis: SC heparin

## 2021-07-21 NOTE — PROGRESS NOTE ADULT - ASSESSMENT
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   Admitted for rehab of  R medullary infarct and subsequent left sided weakness and facial droop.    # R medullary infarct  # Left sided weakness, Left sided facial droop, nondominant  - MRI of brain 7/12/21 redemonstrated the punctate infarct in the right ventral medulla, minimally enlarged possibly due to difference in technique compared to the prior exam from 7/5/2021. Slightly increased mild edema. No additional acute infarcts. No intracranial hemorrhage or mass effect. Stable mild chronic microvascular ischemic changes.   - Brilinta 90 mg PO BID and Atorvastatin 80mg daily  - PT/OT/SLP    #Dysphagia:   - Oropharygeal  - Dysphagia 2 diet with nectar-thick liquids and head turn/ chin-tuck and multiple swallows  - Monitor for signs of aspiration. Doing well.    #Dysarthria:   - Speech therapy      Comorbidities    #HTN:  - BP goal as above (130-160 systolic) . Currently running low  - Losartan 100 mg PO QAM.  - Decrease  Amlodipine 10 mg to 5 mg and monitor      -#Type II DM on glypizide: Hemoglobin A1C 7.8 on 7/5, insulin regimen as needed (goal 140-180); patient was started on lantus 5u daily and low dose CS. 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   Admitted for rehab of  R medullary infarct and subsequent left sided weakness and facial droop.    # R medullary infarct  # Left sided weakness, Left sided facial droop, nondominant  - MRI of brain 7/12/21 redemonstrated the punctate infarct in the right ventral medulla, minimally enlarged possibly due to difference in technique compared to the prior exam from 7/5/2021. Slightly increased mild edema. No additional acute infarcts. No intracranial hemorrhage or mass effect. Stable mild chronic microvascular ischemic changes.   - Brilinta 90 mg PO BID and Atorvastatin 80mg daily  - PT/OT/SLP    #Dysphagia:   - Oropharygeal  - Had FEES this morning. Dysphagia 2 diet with nectar-thick liquids and head turn/ chin-tuck and multiple swallows upgraded to Dysphagia 2 diet (for lost dentures), with thin liquids. FEES also showed some VC erythema, possibly 2ndary to reflux. Will add PPI for 4-6 weeks.  - Monitor for signs of aspiration. Doing well.    #Dysarthria:   - Speech therapy. Improving      Comorbidities    #HTN:  - BP goal as above (130-160 systolic) . Currently running low  - Losartan 100 mg PO QAM.  - Decreased  Amlodipine 10 mg to 5 mg and monitor. Doing well.      -#Type II DM on glypizide: Hemoglobin A1C 7.8 on 7/5, insulin regimen as needed (goal 140-180); patient was started on lantus 5u daily and low dose CS. Monitor. Uncontrolled hypergycemia. 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   Admitted for rehab of  R medullary infarct and subsequent left sided weakness and facial droop.    # R medullary infarct  # Left sided weakness, Left sided facial droop, nondominant  - MRI of brain 7/12/21 redemonstrated the punctate infarct in the right ventral medulla, minimally enlarged possibly due to difference in technique compared to the prior exam from 7/5/2021. Slightly increased mild edema. No additional acute infarcts. No intracranial hemorrhage or mass effect. Stable mild chronic microvascular ischemic changes.   - Brilinta 90 mg PO BID and Atorvastatin 80mg daily  - PT/OT/SLP    #Dysphagia:   - Oropharygeal  - Had FEES this morning. Dysphagia 2 diet with nectar-thick liquids and head turn/ chin-tuck and multiple swallows upgraded to Dysphagia 2 diet (for lost dentures), with thin liquids. FEES also showed some VC erythema, possibly 2ndary to reflux. Will add PPI for 4-6 weeks.  - Monitor for signs of aspiration. Doing well.    #Dysarthria:   - Speech therapy. Improving      Comorbidities    #HTN:  - BP goal as above (130-160 systolic) . Currently running low  - Losartan 100 mg PO QAM.  - Decreased  Amlodipine 10 mg to 5 mg and monitor. Doing well.      -#Type II DM on glypizide: Hemoglobin A1C 7.8 on 7/5, insulin regimen as needed (goal 140-180); patient's lantus was increased to 7 u and was started on lispro 2 u with meals     #Bladder/prostate CA: Outpatient management     #AAA: Stable at this time, Outpatient management    #HFrEF/ history of Systolic CHF: Echo taken 7/6/2021 shows decreased left ventricular systolic function (LVEF 46%); No overt symptoms at this time    - GI/Bowel Mgmt: patient currently has regular BM       # Precautions / PROPHYLAXIS:    - Falls  - Ortho: Weight bearing status: WBAT   - DVT prophylaxis: SC heparin

## 2021-07-21 NOTE — PROGRESS NOTE ADULT - SUBJECTIVE AND OBJECTIVE BOX
Patient is a 80y old  Male who presents with a chief complaint of Admitted for R medullary infarct; subsequent left sided weakness, facial droop (14 Jul 2021 14:04)      HPI:  80 year old male with a PmHx of CAD s/p 5 stents (2009), DM, HTN, bladder and prostate CA, stable AAA, recent acute stroke (discharged 7/7/2021) presents to [facility] with left sided upper and lower extremity weakness. 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 Patient had been evaluated by neuroendovascular who recommended no acute intervention. He was discharged for outpatient management. The day after discharge, patient started to feel recurrent left upper extremity weakness. He did not seek medical attention and his weakness continued to worsen through the weekend. Patient decided to come in today for further management as he can hardly move his left arm and his left leg is very weak. Patient does note, however, that his left arm was quite strong intermittently today, but 30 minutes later his weakness returned. Repeat CTH shows same territory infarct. CTA head and neck with redemonstration of moderate stenosis involving the proximal M2 inferior division of the right MCA and severe multifocal stenoses involving the V4 segment of the right vertebral artery due to calcified atherosclerotic plaque. Seen by neurology who suspect fluctuating symptoms may be due to high grade vertebral artery vessel disease. Being admitted for further management. Patient denies any fevers, chills, chest pain, SOB, nausea, vomiting, abdominal pain. MRI was performed that showed the completion of the previous stroke in the right medulla.    PLOF independent with ADLs, and with RW which was used after acute stroke in early July     The patient was evaluated by a physical medicine and rehabilitation specialist and was found to be a good candidate for acute rehabilitation.   The patient would benefit from 3 hours of interdisciplinary therapy per day.       CLOF: supervision with bed mobility and touch assist with transfers, ambulates 100 ft with a RW and touch assist.     TODAY'S SUBJECTIVE & REVIEW OF SYMPTOMS:  Patient seen and evaluated at bedside with Dr. Carolina. No acute events overnight. Patient feels well.        Review of Systems:   Constiutional:    [ x  ] WNL           [   ] poor appetite   [   ] insomnia   [   ] tired   Cardio:                [ x ] WNL           [   ] CP   [   ] TATE   [   ] palpitations               Resp:                   [ x ] WNL           [   ] SOB   [   ] cough   [   ] wheezing   GI:                        [ x ] WNL           [   ] constipation   [   ] diarrhea   [   ] abdominal pain   [   ] nausea   [   ] emesis                                :                      [ x ] WNL           [   ] RUFFIN  [   ] dusuria   [   ] difficulty voiding             Endo:                   [ x ] WNL          [   ] po;yuria   [   ] temperature intolerance                 Skin:                     [ x ] WNL          [   ] pain   [   ] wound   [   ] rash   MSK:                    [ x ] WNL          [   ] muscle pain   [   ] joint pain/ stiffness   [   ] muscle tenderness   [   ] swelling   Neuro:                 [   ] WNL          [   ] HA   [   ] change in vision   [   ] tremor   [   ] weakness   [   ]dysphagia   [ x ] dysphonia, dysarthria, left facial droop, left hemiparesis           Cognitive:           [ x ] WNL           [   ]confusion      Psych:                  [ x ] WNL           [   ] hallucinations   [   ]agitation   [   ] delusion   [   ]depressionPHYSICAL EXAM      PHYSICAL EXAM     Vital Signs Last 24 Hrs  T(C): 36.1 (21 Jul 2021 05:39), Max: 36.4 (20 Jul 2021 20:15)  T(F): 96.9 (21 Jul 2021 05:39), Max: 97.6 (20 Jul 2021 20:15)  HR: 77 (21 Jul 2021 05:39) (77 - 95)  BP: 153/74 (21 Jul 2021 05:39) (116/58 - 153/74)  BP(mean): --  RR: 20 (21 Jul 2021 05:39) (20 - 20)  SpO2: --    Constitutional - [ x  ] NAD, Comfortable        [   ] other:  Chest - [  x  ] CTA     [   ] other:  Cardiovascular - [ x  ] RRR, no murmer     [   ] other:  Abdomen - [ x  ] Soft, NT/ND      [   ] other:        -  [ x ] NOFOLEY CATHETER   [   ] YES  if yes: [   ] NO MEATAL TEAR OR DISCHARGE [   ] other:  Extremities - [ x ] No C/C/E, No calf tenderness       [   ] other:  ROM - [ x  ] WFL     [   ] other:  Neurologic Exam -                 Cognitive - [ x  ]Awake, Alert, AAO to self, place, date, year, situation         [    ] other:      Communication - [   ]Fluent, No dysarthria       [  x ] other: dysarthria, dysphonia      Motor - No focal deficits                    Right UE -  [  x ] WNL      [    ] other:                    Left UE -     [   ] WNL      [ x   ] other: 4-/5 strength, improved ROM                     Right LE -   [  x ] WNL       [    ] other:                    Left LE -      [   ]WNL        [   x ] other:  4/5 strength      Sensory - [   ] Intact to LT      [    ] other:          Reflexes - [ x  ] wnl/ symmetric     [   ] other:     Psychiatric - [ x  ]Mood stable, Affect WNL     [   ]other:     Skin - [ x ] intact      [   ] other    MEDICATIONS  (STANDING):  amLODIPine   Tablet 5 milliGRAM(s) Oral daily  atorvastatin 80 milliGRAM(s) Oral at bedtime  dextrose 40% Gel 15 Gram(s) Oral once  dextrose 40% Gel 15 Gram(s) Oral once  dextrose 5%. 1000 milliLiter(s) (50 mL/Hr) IV Continuous <Continuous>  dextrose 5%. 1000 milliLiter(s) (100 mL/Hr) IV Continuous <Continuous>  dextrose 50% Injectable 25 Gram(s) IV Push once  dextrose 50% Injectable 12.5 Gram(s) IV Push once  dextrose 50% Injectable 25 Gram(s) IV Push once  enoxaparin Injectable 40 milliGRAM(s) SubCutaneous daily  glucagon  Injectable 1 milliGRAM(s) IntraMuscular once  insulin glargine Injectable (LANTUS) 5 Unit(s) SubCutaneous at bedtime  insulin lispro (ADMELOG) corrective regimen sliding scale   SubCutaneous three times a day before meals  losartan 100 milliGRAM(s) Oral daily  ticagrelor 90 milliGRAM(s) Oral every 12 hours    MEDICATIONS  (PRN):  acetaminophen   Tablet .. 650 milliGRAM(s) Oral every 6 hours PRN Temp greater or equal to 38C (100.4F), Mild Pain (1 - 3)  aluminum hydroxide/magnesium hydroxide/simethicone Suspension 30 milliLiter(s) Oral every 4 hours PRN Dyspepsia  magnesium hydroxide Suspension 30 milliLiter(s) Oral daily PRN Constipation  melatonin 5 milliGRAM(s) Oral at bedtime PRN Insomnia        RECENT LABS/IMAGING                        14.6   8.41  )-----------( 323      ( 19 Jul 2021 07:25 )             42.7     07-19    141  |  106  |  25<H>  ----------------------------<  228<H>  4.5   |  24  |  1.2    Ca    9.5      19 Jul 2021 07:25  Mg     2.0     07-19    TPro  6.7  /  Alb  4.1  /  TBili  1.6<H>  /  DBili  x   /  AST  22  /  ALT  25  /  AlkPhos  101  07-19    CAPILLARY BLOOD GLUCOSE      POCT Blood Glucose.: 297 mg/dL (20 Jul 2021 12:13)  POCT Blood Glucose.: 188 mg/dL (20 Jul 2021 07:21)  POCT Blood Glucose.: 119 mg/dL (19 Jul 2021 21:07)  POCT Blood Glucose.: 309 mg/dL (19 Jul 2021 16:02)                 Patient is a 80y old  Male who presents with a chief complaint of Admitted for R medullary infarct; subsequent left sided weakness, facial droop (14 Jul 2021 14:04)      HPI:  80 year old male with a PmHx of CAD s/p 5 stents (2009), DM, HTN, bladder and prostate CA, stable AAA, recent acute stroke (discharged 7/7/2021) presents to [facility] with left sided upper and lower extremity weakness. 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 Patient had been evaluated by neuroendovascular who recommended no acute intervention. He was discharged for outpatient management. The day after discharge, patient started to feel recurrent left upper extremity weakness. He did not seek medical attention and his weakness continued to worsen through the weekend. Patient decided to come in today for further management as he can hardly move his left arm and his left leg is very weak. Patient does note, however, that his left arm was quite strong intermittently today, but 30 minutes later his weakness returned. Repeat CTH shows same territory infarct. CTA head and neck with redemonstration of moderate stenosis involving the proximal M2 inferior division of the right MCA and severe multifocal stenoses involving the V4 segment of the right vertebral artery due to calcified atherosclerotic plaque. Seen by neurology who suspect fluctuating symptoms may be due to high grade vertebral artery vessel disease. Being admitted for further management. Patient denies any fevers, chills, chest pain, SOB, nausea, vomiting, abdominal pain. MRI was performed that showed the completion of the previous stroke in the right medulla.    PLOF independent with ADLs, and with RW which was used after acute stroke in early July     The patient was evaluated by a physical medicine and rehabilitation specialist and was found to be a good candidate for acute rehabilitation.   The patient would benefit from 3 hours of interdisciplinary therapy per day.       CLOF: supervision with bed mobility and touch assist with transfers, ambulates 100 ft with a RW and touch assist.     TODAY'S SUBJECTIVE & REVIEW OF SYMPTOMS:  Patient seen and evaluated at bedside with Dr. Carolina. No acute events overnight. Patient feels well.        Review of Systems:   Constiutional:    [ x  ] WNL           [   ] poor appetite   [   ] insomnia   [   ] tired   Cardio:                [ x ] WNL           [   ] CP   [   ] TATE   [   ] palpitations               Resp:                   [ x ] WNL           [   ] SOB   [   ] cough   [   ] wheezing   GI:                        [ x ] WNL           [   ] constipation   [   ] diarrhea   [   ] abdominal pain   [   ] nausea   [   ] emesis                                :                      [ x ] WNL           [   ] RUFFIN  [   ] dusuria   [   ] difficulty voiding             Endo:                   [ x ] WNL          [   ] po;yuria   [   ] temperature intolerance                 Skin:                     [ x ] WNL          [   ] pain   [   ] wound   [   ] rash   MSK:                    [ x ] WNL          [   ] muscle pain   [   ] joint pain/ stiffness   [   ] muscle tenderness   [   ] swelling   Neuro:                 [   ] WNL          [   ] HA   [   ] change in vision   [   ] tremor   [   ] weakness   [   ]dysphagia   [ x ] dysphonia, dysarthria, left facial droop, left hemiparesis           Cognitive:           [ x ] WNL           [   ]confusion      Psych:                  [ x ] WNL           [   ] hallucinations   [   ]agitation   [   ] delusion   [   ]depressionPHYSICAL EXAM      PHYSICAL EXAM     Vital Signs Last 24 Hrs  T(C): 36.1 (21 Jul 2021 05:39), Max: 36.4 (20 Jul 2021 20:15)  T(F): 96.9 (21 Jul 2021 05:39), Max: 97.6 (20 Jul 2021 20:15)  HR: 77 (21 Jul 2021 05:39) (77 - 95)  BP: 153/74 (21 Jul 2021 05:39) (116/58 - 153/74)  BP(mean): --  RR: 20 (21 Jul 2021 05:39) (20 - 20)  SpO2: --    Constitutional - [ x  ] NAD, Comfortable        [   ] other:  Chest - [  x  ] CTA     [   ] other:  Cardiovascular - [ x  ] RRR, no murmer     [   ] other:  Abdomen - [ x  ] Soft, NT/ND      [   ] other:        -  [ x ] NOFOLEY CATHETER   [   ] YES  if yes: [   ] NO MEATAL TEAR OR DISCHARGE [   ] other:  Extremities - [ x ] No C/C/E, No calf tenderness       [   ] other:  ROM - [ x  ] WFL     [   ] other:  Neurologic Exam -                 Cognitive - [ x  ]Awake, Alert, AAO to self, place, date, year, situation         [    ] other:      Communication - [   ]Fluent, No dysarthria       [  x ] other: dysarthria, dysphonia      Motor - No focal deficits                    Right UE -  [  x ] WNL      [    ] other:                    Left UE -     [   ] WNL      [ x   ] other: 4-/5 strength, improved ROM                     Right LE -   [  x ] WNL       [    ] other:                    Left LE -      [   ]WNL        [   x ] other:  4/5 strength      Sensory - [   ] Intact to LT      [    ] other:          Reflexes - [ x  ] wnl/ symmetric     [   ] other:     Psychiatric - [ x  ]Mood stable, Affect WNL     [   ]other:     Skin - [ x ] intact      [   ] other    MEDICATIONS  (STANDING):  amLODIPine   Tablet 5 milliGRAM(s) Oral daily  atorvastatin 80 milliGRAM(s) Oral at bedtime  dextrose 40% Gel 15 Gram(s) Oral once  dextrose 40% Gel 15 Gram(s) Oral once  dextrose 5%. 1000 milliLiter(s) (50 mL/Hr) IV Continuous <Continuous>  dextrose 5%. 1000 milliLiter(s) (100 mL/Hr) IV Continuous <Continuous>  dextrose 50% Injectable 25 Gram(s) IV Push once  dextrose 50% Injectable 12.5 Gram(s) IV Push once  dextrose 50% Injectable 25 Gram(s) IV Push once  enoxaparin Injectable 40 milliGRAM(s) SubCutaneous daily  glucagon  Injectable 1 milliGRAM(s) IntraMuscular once  insulin glargine Injectable (LANTUS) 5 Unit(s) SubCutaneous at bedtime  insulin lispro (ADMELOG) corrective regimen sliding scale   SubCutaneous three times a day before meals  losartan 100 milliGRAM(s) Oral daily  ticagrelor 90 milliGRAM(s) Oral every 12 hours    MEDICATIONS  (PRN):  acetaminophen   Tablet .. 650 milliGRAM(s) Oral every 6 hours PRN Temp greater or equal to 38C (100.4F), Mild Pain (1 - 3)  aluminum hydroxide/magnesium hydroxide/simethicone Suspension 30 milliLiter(s) Oral every 4 hours PRN Dyspepsia  magnesium hydroxide Suspension 30 milliLiter(s) Oral daily PRN Constipation  melatonin 5 milliGRAM(s) Oral at bedtime PRN Insomnia        RECENT LABS/IMAGING                        14.6   8.41  )-----------( 323      ( 19 Jul 2021 07:25 )             42.7     07-19    141  |  106  |  25<H>  ----------------------------<  228<H>  4.5   |  24  |  1.2    Ca    9.5      19 Jul 2021 07:25  Mg     2.0     07-19    TPro  6.7  /  Alb  4.1  /  TBili  1.6<H>  /  DBili  x   /  AST  22  /  ALT  25  /  AlkPhos  101  07-19    CAPILLARY BLOOD GLUCOSE    CAPILLARY BLOOD GLUCOSE      POCT Blood Glucose.: 393 mg/dL (21 Jul 2021 12:10)  POCT Blood Glucose.: 200 mg/dL (21 Jul 2021 07:01)  POCT Blood Glucose.: 252 mg/dL (20 Jul 2021 20:46)  POCT Blood Glucose.: 300 mg/dL (20 Jul 2021 16:20)

## 2021-07-22 DIAGNOSIS — C67.9 MALIGNANT NEOPLASM OF BLADDER, UNSPECIFIED: ICD-10-CM

## 2021-07-22 DIAGNOSIS — M19.90 UNSPECIFIED OSTEOARTHRITIS, UNSPECIFIED SITE: ICD-10-CM

## 2021-07-22 DIAGNOSIS — I25.10 ATHEROSCLEROTIC HEART DISEASE OF NATIVE CORONARY ARTERY WITHOUT ANGINA PECTORIS: ICD-10-CM

## 2021-07-22 DIAGNOSIS — C61 MALIGNANT NEOPLASM OF PROSTATE: ICD-10-CM

## 2021-07-22 DIAGNOSIS — E11.22 TYPE 2 DIABETES MELLITUS WITH DIABETIC CHRONIC KIDNEY DISEASE: ICD-10-CM

## 2021-07-22 DIAGNOSIS — I50.20 UNSPECIFIED SYSTOLIC (CONGESTIVE) HEART FAILURE: ICD-10-CM

## 2021-07-22 DIAGNOSIS — I13.0 HYPERTENSIVE HEART AND CHRONIC KIDNEY DISEASE WITH HEART FAILURE AND STAGE 1 THROUGH STAGE 4 CHRONIC KIDNEY DISEASE, OR UNSPECIFIED CHRONIC KIDNEY DISEASE: ICD-10-CM

## 2021-07-22 DIAGNOSIS — I65.01 OCCLUSION AND STENOSIS OF RIGHT VERTEBRAL ARTERY: ICD-10-CM

## 2021-07-22 DIAGNOSIS — I69.354 HEMIPLEGIA AND HEMIPARESIS FOLLOWING CEREBRAL INFARCTION AFFECTING LEFT NON-DOMINANT SIDE: ICD-10-CM

## 2021-07-22 DIAGNOSIS — N18.30 CHRONIC KIDNEY DISEASE, STAGE 3 UNSPECIFIED: ICD-10-CM

## 2021-07-22 DIAGNOSIS — Z95.5 PRESENCE OF CORONARY ANGIOPLASTY IMPLANT AND GRAFT: ICD-10-CM

## 2021-07-22 LAB
GLUCOSE BLDC GLUCOMTR-MCNC: 136 MG/DL — HIGH (ref 70–99)
GLUCOSE BLDC GLUCOMTR-MCNC: 160 MG/DL — HIGH (ref 70–99)
GLUCOSE BLDC GLUCOMTR-MCNC: 231 MG/DL — HIGH (ref 70–99)
GLUCOSE BLDC GLUCOMTR-MCNC: 244 MG/DL — HIGH (ref 70–99)

## 2021-07-22 RX ADMIN — Medication 2: at 16:41

## 2021-07-22 RX ADMIN — INSULIN GLARGINE 7 UNIT(S): 100 INJECTION, SOLUTION SUBCUTANEOUS at 21:39

## 2021-07-22 RX ADMIN — Medication 2: at 12:10

## 2021-07-22 RX ADMIN — ATORVASTATIN CALCIUM 80 MILLIGRAM(S): 80 TABLET, FILM COATED ORAL at 21:29

## 2021-07-22 RX ADMIN — Medication 2 UNIT(S): at 12:09

## 2021-07-22 RX ADMIN — TICAGRELOR 90 MILLIGRAM(S): 90 TABLET ORAL at 06:02

## 2021-07-22 RX ADMIN — Medication 2 UNIT(S): at 07:17

## 2021-07-22 RX ADMIN — Medication 1: at 07:21

## 2021-07-22 RX ADMIN — AMLODIPINE BESYLATE 5 MILLIGRAM(S): 2.5 TABLET ORAL at 06:01

## 2021-07-22 RX ADMIN — Medication 2 UNIT(S): at 16:41

## 2021-07-22 RX ADMIN — PANTOPRAZOLE SODIUM 40 MILLIGRAM(S): 20 TABLET, DELAYED RELEASE ORAL at 12:11

## 2021-07-22 RX ADMIN — TICAGRELOR 90 MILLIGRAM(S): 90 TABLET ORAL at 17:25

## 2021-07-22 RX ADMIN — LOSARTAN POTASSIUM 100 MILLIGRAM(S): 100 TABLET, FILM COATED ORAL at 06:01

## 2021-07-22 RX ADMIN — ENOXAPARIN SODIUM 40 MILLIGRAM(S): 100 INJECTION SUBCUTANEOUS at 12:11

## 2021-07-22 NOTE — PROGRESS NOTE ADULT - SUBJECTIVE AND OBJECTIVE BOX
Patient is a 80y old  Male who presents with a chief complaint of Admitted for R medullary infarct; subsequent left sided weakness, facial droop (14 Jul 2021 14:04)      HPI:  80 year old male with a PmHx of CAD s/p 5 stents (2009), DM, HTN, bladder and prostate CA, stable AAA, recent acute stroke (discharged 7/7/2021) presents to [facility] with left sided upper and lower extremity weakness. 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 Patient had been evaluated by neuroendovascular who recommended no acute intervention. He was discharged for outpatient management. The day after discharge, patient started to feel recurrent left upper extremity weakness. He did not seek medical attention and his weakness continued to worsen through the weekend. Patient decided to come in today for further management as he can hardly move his left arm and his left leg is very weak. Patient does note, however, that his left arm was quite strong intermittently today, but 30 minutes later his weakness returned. Repeat CTH shows same territory infarct. CTA head and neck with redemonstration of moderate stenosis involving the proximal M2 inferior division of the right MCA and severe multifocal stenoses involving the V4 segment of the right vertebral artery due to calcified atherosclerotic plaque. Seen by neurology who suspect fluctuating symptoms may be due to high grade vertebral artery vessel disease. Being admitted for further management. Patient denies any fevers, chills, chest pain, SOB, nausea, vomiting, abdominal pain. MRI was performed that showed the completion of the previous stroke in the right medulla.    PLOF independent with ADLs, and with RW which was used after acute stroke in early July     The patient was evaluated by a physical medicine and rehabilitation specialist and was found to be a good candidate for acute rehabilitation.   The patient would benefit from 3 hours of interdisciplinary therapy per day.       CLOF: supervision with bed mobility and touch assist with transfers, ambulates 125 ft with a RW and touch assist.     TODAY'S SUBJECTIVE & REVIEW OF SYMPTOMS:  Patient seen and evaluated at bedside with Dr. Carolina. No acute events overnight. Patient feels well.        Review of Systems:   Constiutional:    [ x  ] WNL           [   ] poor appetite   [   ] insomnia   [   ] tired   Cardio:                [ x ] WNL           [   ] CP   [   ] TATE   [   ] palpitations               Resp:                   [ x ] WNL           [   ] SOB   [   ] cough   [   ] wheezing   GI:                        [ x ] WNL           [   ] constipation   [   ] diarrhea   [   ] abdominal pain   [   ] nausea   [   ] emesis                                :                      [ x ] WNL           [   ] RUFFIN  [   ] dusuria   [   ] difficulty voiding             Endo:                   [ x ] WNL          [   ] po;yuria   [   ] temperature intolerance                 Skin:                     [ x ] WNL          [   ] pain   [   ] wound   [   ] rash   MSK:                    [ x ] WNL          [   ] muscle pain   [   ] joint pain/ stiffness   [   ] muscle tenderness   [   ] swelling   Neuro:                 [   ] WNL          [   ] HA   [   ] change in vision   [   ] tremor   [   ] weakness   [   ]dysphagia   [ x ] dysphonia, dysarthria, left facial droop, left hemiparesis           Cognitive:           [ x ] WNL           [   ]confusion      Psych:                  [ x ] WNL           [   ] hallucinations   [   ]agitation   [   ] delusion   [   ]depressionPHYSICAL EXAM      PHYSICAL EXAM     Vital Signs Last 24 Hrs  T(C): 36.3 (22 Jul 2021 05:28), Max: 36.3 (22 Jul 2021 05:28)  T(F): 97.3 (22 Jul 2021 05:28), Max: 97.3 (22 Jul 2021 05:28)  HR: 70 (22 Jul 2021 05:28) (70 - 88)  BP: 142/72 (22 Jul 2021 05:28) (122/60 - 142/72)  BP(mean): --  RR: 20 (22 Jul 2021 05:28) (18 - 20)  SpO2: 98% (22 Jul 2021 01:43) (98% - 98%)    Constitutional - [ x  ] NAD, Comfortable        [   ] other:  Chest - [  x  ] CTA     [   ] other:  Cardiovascular - [ x  ] RRR, no murmer     [   ] other:  Abdomen - [ x  ] Soft, NT/ND      [   ] other:        -  [ x ] NOFOLEY CATHETER   [   ] YES  if yes: [   ] NO MEATAL TEAR OR DISCHARGE [   ] other:  Extremities - [ x ] No C/C/E, No calf tenderness       [   ] other:  ROM - [ x  ] WFL     [   ] other:  Neurologic Exam -                 Cognitive - [ x  ]Awake, Alert, AAO to self, place, date, year, situation         [    ] other:      Communication - [   ]Fluent, No dysarthria       [  x ] other: dysarthria, dysphonia      Motor - No focal deficits                    Right UE -  [  x ] WNL      [    ] other:                    Left UE -     [   ] WNL      [ x   ] other: 4-/5 strength, improved ROM                     Right LE -   [  x ] WNL       [    ] other:                    Left LE -      [   ]WNL        [   x ] other:  4/5 strength      Sensory - [   ] Intact to LT      [    ] other:          Reflexes - [ x  ] wnl/ symmetric     [   ] other:     Psychiatric - [ x  ]Mood stable, Affect WNL     [   ]other:     Skin - [ x ] intact      [   ] other    MEDICATIONS  (STANDING):  amLODIPine   Tablet 5 milliGRAM(s) Oral daily  atorvastatin 80 milliGRAM(s) Oral at bedtime  dextrose 40% Gel 15 Gram(s) Oral once  dextrose 40% Gel 15 Gram(s) Oral once  dextrose 5%. 1000 milliLiter(s) (100 mL/Hr) IV Continuous <Continuous>  dextrose 5%. 1000 milliLiter(s) (50 mL/Hr) IV Continuous <Continuous>  dextrose 50% Injectable 25 Gram(s) IV Push once  dextrose 50% Injectable 12.5 Gram(s) IV Push once  dextrose 50% Injectable 25 Gram(s) IV Push once  enoxaparin Injectable 40 milliGRAM(s) SubCutaneous daily  glucagon  Injectable 1 milliGRAM(s) IntraMuscular once  insulin glargine Injectable (LANTUS) 7 Unit(s) SubCutaneous at bedtime  insulin lispro (ADMELOG) corrective regimen sliding scale   SubCutaneous three times a day before meals  insulin lispro Injectable (ADMELOG) 2 Unit(s) SubCutaneous three times a day before meals  losartan 100 milliGRAM(s) Oral daily  pantoprazole    Tablet 40 milliGRAM(s) Oral before breakfast  ticagrelor 90 milliGRAM(s) Oral every 12 hours    MEDICATIONS  (PRN):  acetaminophen   Tablet .. 650 milliGRAM(s) Oral every 6 hours PRN Temp greater or equal to 38C (100.4F), Mild Pain (1 - 3)  aluminum hydroxide/magnesium hydroxide/simethicone Suspension 30 milliLiter(s) Oral every 4 hours PRN Dyspepsia  magnesium hydroxide Suspension 30 milliLiter(s) Oral daily PRN Constipation  melatonin 5 milliGRAM(s) Oral at bedtime PRN Insomnia        RECENT LABS/IMAGING                        14.6   8.41  )-----------( 323      ( 19 Jul 2021 07:25 )             42.7     07-19    141  |  106  |  25<H>  ----------------------------<  228<H>  4.5   |  24  |  1.2    Ca    9.5      19 Jul 2021 07:25  Mg     2.0     07-19    TPro  6.7  /  Alb  4.1  /  TBili  1.6<H>  /  DBili  x   /  AST  22  /  ALT  25  /  AlkPhos  101  07-19    CAPILLARY BLOOD GLUCOSE    CAPILLARY BLOOD GLUCOSE      POCT Blood Glucose.: 393 mg/dL (21 Jul 2021 12:10)  POCT Blood Glucose.: 200 mg/dL (21 Jul 2021 07:01)  POCT Blood Glucose.: 252 mg/dL (20 Jul 2021 20:46)  POCT Blood Glucose.: 300 mg/dL (20 Jul 2021 16:20)                 Patient is a 80y old  Male who presents with a chief complaint of Admitted for R medullary infarct; subsequent left sided weakness, facial droop (14 Jul 2021 14:04)      HPI:  80 year old male with a PmHx of CAD s/p 5 stents (2009), DM, HTN, bladder and prostate CA, stable AAA, recent acute stroke (discharged 7/7/2021) presents to [facility] with left sided upper and lower extremity weakness. 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 Patient had been evaluated by neuroendovascular who recommended no acute intervention. He was discharged for outpatient management. The day after discharge, patient started to feel recurrent left upper extremity weakness. He did not seek medical attention and his weakness continued to worsen through the weekend. Patient decided to come in today for further management as he can hardly move his left arm and his left leg is very weak. Patient does note, however, that his left arm was quite strong intermittently today, but 30 minutes later his weakness returned. Repeat CTH shows same territory infarct. CTA head and neck with redemonstration of moderate stenosis involving the proximal M2 inferior division of the right MCA and severe multifocal stenoses involving the V4 segment of the right vertebral artery due to calcified atherosclerotic plaque. Seen by neurology who suspect fluctuating symptoms may be due to high grade vertebral artery vessel disease. Being admitted for further management. Patient denies any fevers, chills, chest pain, SOB, nausea, vomiting, abdominal pain. MRI was performed that showed the completion of the previous stroke in the right medulla.    PLOF independent with ADLs, and with RW which was used after acute stroke in early July     The patient was evaluated by a physical medicine and rehabilitation specialist and was found to be a good candidate for acute rehabilitation.   The patient would benefit from 3 hours of interdisciplinary therapy per day.       CLOF: supervision with bed mobility and touch assist with transfers, ambulates 125 ft with a RW and touch assist.     TODAY'S SUBJECTIVE & REVIEW OF SYMPTOMS:  Patient seen and evaluated at bedside with Dr. Carolina. No acute events overnight. Patient feels well.        Review of Systems:   Constiutional:    [ x  ] WNL           [   ] poor appetite   [   ] insomnia   [   ] tired   Cardio:                [ x ] WNL           [   ] CP   [   ] TATE   [   ] palpitations               Resp:                   [ x ] WNL           [   ] SOB   [   ] cough   [   ] wheezing   GI:                        [ x ] WNL           [   ] constipation   [   ] diarrhea   [   ] abdominal pain   [   ] nausea   [   ] emesis                                :                      [ x ] WNL           [   ] RUFFIN  [   ] dusuria   [   ] difficulty voiding             Endo:                   [ x ] WNL          [   ] po;yuria   [   ] temperature intolerance                 Skin:                     [ x ] WNL          [   ] pain   [   ] wound   [   ] rash   MSK:                    [ x ] WNL          [   ] muscle pain   [   ] joint pain/ stiffness   [   ] muscle tenderness   [   ] swelling   Neuro:                 [   ] WNL          [   ] HA   [   ] change in vision   [   ] tremor   [   ] weakness   [   ]dysphagia   [ x ] dysphonia, dysarthria, left facial droop, left hemiparesis           Cognitive:           [ x ] WNL           [   ]confusion      Psych:                  [ x ] WNL           [   ] hallucinations   [   ]agitation   [   ] delusion   [   ]depressionPHYSICAL EXAM      PHYSICAL EXAM     Vital Signs Last 24 Hrs  T(C): 36.3 (22 Jul 2021 05:28), Max: 36.3 (22 Jul 2021 05:28)  T(F): 97.3 (22 Jul 2021 05:28), Max: 97.3 (22 Jul 2021 05:28)  HR: 70 (22 Jul 2021 05:28) (70 - 88)  BP: 142/72 (22 Jul 2021 05:28) (122/60 - 142/72)  BP(mean): --  RR: 20 (22 Jul 2021 05:28) (18 - 20)  SpO2: 98% (22 Jul 2021 01:43) (98% - 98%)    Constitutional - [ x  ] NAD, Comfortable        [   ] other:  Chest - [  x  ] CTA     [   ] other:  Cardiovascular - [ x  ] RRR, no murmer     [   ] other:  Abdomen - [ x  ] Soft, NT/ND      [   ] other:        -  [ x ] NOFOLEY CATHETER   [   ] YES  if yes: [   ] NO MEATAL TEAR OR DISCHARGE [   ] other:  Extremities - [ x ] No C/C/E, No calf tenderness       [   ] other:  ROM - [ x  ] WFL     [   ] other:  Neurologic Exam -                 Cognitive - [ x  ]Awake, Alert, AAO to self, place, date, year, situation         [    ] other:      Communication - [   ]Fluent, No dysarthria       [  x ] other: dysarthria, dysphonia      Motor - No focal deficits                    Right UE -  [  x ] WNL      [    ] other:                    Left UE -     [   ] WNL      [ x   ] other: 4-/5 strength, improved ROM                     Right LE -   [  x ] WNL       [    ] other:                    Left LE -      [   ]WNL        [   x ] other:  4/5 strength      Sensory - [   ] Intact to LT      [    ] other:          Reflexes - [ x  ] wnl/ symmetric     [   ] other:     Psychiatric - [ x  ]Mood stable, Affect WNL     [   ]other:     Skin - [ x ] intact      [   ] other    MEDICATIONS  (STANDING):  amLODIPine   Tablet 5 milliGRAM(s) Oral daily  atorvastatin 80 milliGRAM(s) Oral at bedtime  dextrose 40% Gel 15 Gram(s) Oral once  dextrose 40% Gel 15 Gram(s) Oral once  dextrose 5%. 1000 milliLiter(s) (100 mL/Hr) IV Continuous <Continuous>  dextrose 5%. 1000 milliLiter(s) (50 mL/Hr) IV Continuous <Continuous>  dextrose 50% Injectable 25 Gram(s) IV Push once  dextrose 50% Injectable 12.5 Gram(s) IV Push once  dextrose 50% Injectable 25 Gram(s) IV Push once  enoxaparin Injectable 40 milliGRAM(s) SubCutaneous daily  glucagon  Injectable 1 milliGRAM(s) IntraMuscular once  insulin glargine Injectable (LANTUS) 7 Unit(s) SubCutaneous at bedtime  insulin lispro (ADMELOG) corrective regimen sliding scale   SubCutaneous three times a day before meals  insulin lispro Injectable (ADMELOG) 2 Unit(s) SubCutaneous three times a day before meals  losartan 100 milliGRAM(s) Oral daily  pantoprazole    Tablet 40 milliGRAM(s) Oral before breakfast  ticagrelor 90 milliGRAM(s) Oral every 12 hours    MEDICATIONS  (PRN):  acetaminophen   Tablet .. 650 milliGRAM(s) Oral every 6 hours PRN Temp greater or equal to 38C (100.4F), Mild Pain (1 - 3)  aluminum hydroxide/magnesium hydroxide/simethicone Suspension 30 milliLiter(s) Oral every 4 hours PRN Dyspepsia  magnesium hydroxide Suspension 30 milliLiter(s) Oral daily PRN Constipation  melatonin 5 milliGRAM(s) Oral at bedtime PRN Insomnia    RECENT LABS/IMAGING                        14.6   8.41  )-----------( 323      ( 19 Jul 2021 07:25 )             42.7     07-19    141  |  106  |  25<H>  ----------------------------<  228<H>  4.5   |  24  |  1.2    Ca    9.5      19 Jul 2021 07:25  Mg     2.0     07-19    TPro  6.7  /  Alb  4.1  /  TBili  1.6<H>  /  DBili  x   /  AST  22  /  ALT  25  /  AlkPhos  101  07-19    CAPILLARY BLOOD GLUCOSE      POCT Blood Glucose.: 244 mg/dL (22 Jul 2021 12:04)  POCT Blood Glucose.: 160 mg/dL (22 Jul 2021 06:56)  POCT Blood Glucose.: 178 mg/dL (21 Jul 2021 21:06)  POCT Blood Glucose.: 162 mg/dL (21 Jul 2021 16:32)

## 2021-07-22 NOTE — PROGRESS NOTE ADULT - ASSESSMENT
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   Admitted for rehab of  R medullary infarct and subsequent left sided weakness and facial droop.    # R medullary infarct  # Left sided weakness, Left sided facial droop, nondominant  - MRI of brain 7/12/21 redemonstrated the punctate infarct in the right ventral medulla, minimally enlarged possibly due to difference in technique compared to the prior exam from 7/5/2021. Slightly increased mild edema. No additional acute infarcts. No intracranial hemorrhage or mass effect. Stable mild chronic microvascular ischemic changes.   - Brilinta 90 mg PO BID and Atorvastatin 80mg daily  - PT/OT/SLP    #Dysphagia:   - Oropharygeal  - Had FEES this morning. Dysphagia 2 diet with nectar-thick liquids and head turn/ chin-tuck and multiple swallows upgraded to Dysphagia 2 diet (for lost dentures), with thin liquids. FEES also showed some VC erythema, possibly 2ndary to reflux. Will add PPI for 4-6 weeks.  - Monitor for signs of aspiration. Doing well.    #Dysarthria:   - Speech therapy. Improving    Comorbidities    #HTN:  - BP goal as above (130-160 systolic) . Currently running low  - Losartan 100 mg PO QAM.  - Decreased  Amlodipine 10 mg to 5 mg and monitor. Doing well.      #Type II DM on glypizide: Hemoglobin A1C 7.8 on 7/5, insulin regimen as needed (goal 140-180); patient's lantus was increased to 7 u and was started on lispro 2 u with meals     #Bladder/prostate CA: Outpatient management     #AAA: Stable at this time, Outpatient management    #HFrEF/ history of Systolic CHF: Echo taken 7/6/2021 shows decreased left ventricular systolic function (LVEF 46%); No overt symptoms at this time    - GI/Bowel Mgmt: patient currently has regular BM       # Precautions / PROPHYLAXIS:    - Falls  - Ortho: Weight bearing status: WBAT   - DVT prophylaxis: SC heparin        	  80 Y M PMHx of CAD/stents, DM, HTN, bladder and prostate CA, stable AAA, recent  acute medullary stroke (discharged 7/7/2021) presents to [facility] with recurrent and transient left sided upper and lower extremity weakness.   Admitted for rehab of  R medullary infarct and subsequent left sided weakness and facial droop.    # R medullary infarct  # Left sided weakness, Left sided facial droop, nondominant  - MRI of brain 7/12/21 redemonstrated the punctate infarct in the right ventral medulla, minimally enlarged possibly due to difference in technique compared to the prior exam from 7/5/2021. Slightly increased mild edema. No additional acute infarcts. No intracranial hemorrhage or mass effect. Stable mild chronic microvascular ischemic changes.   - Brilinta 90 mg PO BID and Atorvastatin 80mg daily  - PT/OT/SLP    #Dysphagia:   - Oropharygeal  - Had FEES. Dysphagia 2 diet with nectar-thick liquids and head turn/ chin-tuck and multiple swallows upgraded to Dysphagia 2 diet (for lost dentures), with thin liquids. FEES also showed some VC erythema, possibly 2ndary to reflux. Will add PPI for 4-6 weeks.  - Monitor for signs of aspiration. Doing well.    #Dysarthria:   - Speech therapy. Improving    Comorbidities    #HTN:  - BP goal as above (130-160 systolic) . Currently running low  - Losartan 100 mg PO QAM.  - Decreased  Amlodipine 10 mg to 5 mg and monitor. Doing well.      #Type II DM on glypizide: Hemoglobin A1C 7.8 on 7/5, insulin regimen as needed (goal 140-180); patient's lantus was increased to 7 u and was started on lispro 2 u with meals with improving FS glucose.  - Monitor and adjust    #Bladder/prostate CA: Outpatient management     #AAA: Stable at this time, Outpatient management    #HFrEF/ history of Systolic CHF: Echo taken 7/6/2021 shows decreased left ventricular systolic function (LVEF 46%); No overt symptoms at this time    - GI/Bowel Mgmt: patient currently has regular BM       # Precautions / PROPHYLAXIS:    - Falls  - Ortho: Weight bearing status: WBAT   - DVT prophylaxis: SC heparin

## 2021-07-22 NOTE — PROGRESS NOTE ADULT - ATTENDING COMMENTS
I reviewed the chart and examined the patient with the resident and we discussed the findings and treatment plan. I agree with the findings and treatment plan above, which I modified as indicated. The patient requires 3 hrs a day of acute inpatient rehab.    80 Y M PMHx of CAD/stents, DM, HTN, bladder and prostate CA, stable AAA, recent  acute medullary stroke (discharged 7/7/2021) presents to [facility] with recurrent and transient left sided upper and lower extremity weakness.   Admitted for rehab of  R medullary infarct and subsequent left sided weakness and facial droop.    # R medullary infarct  # Left sided weakness #Left sided facial droop, nondominant  - MRI of brain 7/12/21 redemonstrated the punctate infarct in the right ventral medulla, minimally enlarged possibly due to difference in technique compared to the prior exam from 7/5/2021. Slightly increased- mild edema. No additional acute infarcts. No intracranial hemorrhage or mass effect. Stable mild chronic microvascular ischemic changes.   - Brilinta 90 mg PO BID and Atorvastatin 80mg daily    Ambulating with touch assistance with walker    - Continues to require acute PT/OT/SLT    Comorbidities    # HTN - avoid tight control given intracranial vertebral stenosis. BP syst is 107-126.   - BP goal is above (130-160 systolic)   - Losartan 100 mg PO QAM.  - Amlodipine- decrease from 10 mg to 5 mg and monitor    -#Type II DM on glypizide: Hemoglobin A1C 7.8 on 7/5, insulin regimen as needed (goal 140-180); patient was started on lantus 5u daily and low dose CS. Monitor. Uncontrolled hypergycemia. Increase Lantus to 10 units    #Dysphagia:   - Oropharygeal  - Had FEES this morning. Dysphagia 2 diet with nectar-thick liquids and head turn/ chin-tuck and multiple swallows upgraded to Dysphagia 2 diet (for lost dentures), with thin liquids. FEES also showed some VC erythema, possibly 2ndary to reflux. Will add PPI for 4-6 weeks.  - Monitor for signs of aspiration. Doing well.      #Bladder/prostate CA: Outpatient management     #AAA: Stable at this time, Outpatient management    #HFrEF/ history of Systolic CHF: Echo taken 7/6/2021 shows decreased left ventricular systolic function (LVEF 46%); No overt symptoms at this time    - GI/Bowel Mgmt: patient currently has regular BM       # Precautions / PROPHYLAXIS:    - Falls  - Ortho: Weight bearing status: WBAT   - DVT prophylaxis: SC heparin I reviewed the chart and examined the patient with the resident and we discussed the findings and treatment plan. I agree with the findings and treatment plan above, which I modified as indicated. The patient requires 3 hrs a day of acute inpatient rehab.    80 Y M PMHx of CAD/stents, DM, HTN, bladder and prostate CA, stable AAA, recent  acute medullary stroke (discharged 7/7/2021) presents to [facility] with recurrent left sided upper and lower extremity weakness.   Admitted for rehab of  R medullary infarct and subsequent left sided weakness and facial droop.    # R medullary infarct  # Left sided weakness #Left sided facial droop, nondominant  - MRI of brain 7/12/21 redemonstrated the punctate infarct in the right ventral medulla, minimally enlarged possibly due to difference in technique compared to the prior exam from 7/5/2021. Slightly increased- mild edema. No additional acute infarcts. No intracranial hemorrhage or mass effect. Stable mild chronic microvascular ischemic changes.   - Brilinta 90 mg PO BID and Atorvastatin 80mg daily    Ambulating with touch assistance with walker. Tolerating therapy well. Gradual improvements.   - Continues to require acute PT/OT/SLT    Comorbidities    # HTN - avoid tight control given intracranial vertebral stenosis. BP syst is 107-126.   - BP goal is above (130-160 systolic)   - Losartan 100 mg PO QAM.  - Amlodipine- decrease from 10 mg to 5 mg and monitor. BPs in good range.    -#Type II DM on glypizide: Hemoglobin A1C 7.8 on 7/5, insulin regimen as needed (goal 140-180); patient was started on lantus 5u daily and low dose CS. Monitor. Uncontrolled hypergycemia. Increased Lantus to 7 units and 2 units Lispro w/ meals with improvement.    #Dysphagia:   - Oropharygeal  - Had FEES this morning. Dysphagia 2 diet with nectar-thick liquids and head turn/ chin-tuck and multiple swallows upgraded to Dysphagia 2 diet (for lost dentures), with thin liquids. FEES also showed some VC erythema, possibly 2ndary to reflux. Will add PPI for 4-6 weeks.  - Monitor for signs of aspiration. Doing well.      #Bladder/prostate CA: Outpatient management     #AAA: Stable at this time, Outpatient management    #HFrEF/ history of Systolic CHF: Echo taken 7/6/2021 shows decreased left ventricular systolic function (LVEF 46%); No overt symptoms at this time    - GI/Bowel Mgmt: patient currently has regular BM       # Precautions / PROPHYLAXIS:    - Falls  - Ortho: Weight bearing status: WBAT   - DVT prophylaxis: Lovenox

## 2021-07-23 LAB
GLUCOSE BLDC GLUCOMTR-MCNC: 105 MG/DL — HIGH (ref 70–99)
GLUCOSE BLDC GLUCOMTR-MCNC: 105 MG/DL — HIGH (ref 70–99)
GLUCOSE BLDC GLUCOMTR-MCNC: 168 MG/DL — HIGH (ref 70–99)
GLUCOSE BLDC GLUCOMTR-MCNC: 231 MG/DL — HIGH (ref 70–99)

## 2021-07-23 RX ORDER — INSULIN LISPRO 100/ML
4 VIAL (ML) SUBCUTANEOUS
Refills: 0 | Status: DISCONTINUED | OUTPATIENT
Start: 2021-07-23 | End: 2021-07-24

## 2021-07-23 RX ORDER — INSULIN GLARGINE 100 [IU]/ML
8 INJECTION, SOLUTION SUBCUTANEOUS AT BEDTIME
Refills: 0 | Status: DISCONTINUED | OUTPATIENT
Start: 2021-07-23 | End: 2021-07-28

## 2021-07-23 RX ADMIN — LOSARTAN POTASSIUM 100 MILLIGRAM(S): 100 TABLET, FILM COATED ORAL at 06:09

## 2021-07-23 RX ADMIN — Medication 2 UNIT(S): at 07:47

## 2021-07-23 RX ADMIN — ENOXAPARIN SODIUM 40 MILLIGRAM(S): 100 INJECTION SUBCUTANEOUS at 12:11

## 2021-07-23 RX ADMIN — Medication 4 UNIT(S): at 12:12

## 2021-07-23 RX ADMIN — TICAGRELOR 90 MILLIGRAM(S): 90 TABLET ORAL at 06:09

## 2021-07-23 RX ADMIN — Medication 1: at 07:48

## 2021-07-23 RX ADMIN — TICAGRELOR 90 MILLIGRAM(S): 90 TABLET ORAL at 19:22

## 2021-07-23 RX ADMIN — AMLODIPINE BESYLATE 5 MILLIGRAM(S): 2.5 TABLET ORAL at 06:09

## 2021-07-23 RX ADMIN — INSULIN GLARGINE 8 UNIT(S): 100 INJECTION, SOLUTION SUBCUTANEOUS at 22:12

## 2021-07-23 RX ADMIN — ATORVASTATIN CALCIUM 80 MILLIGRAM(S): 80 TABLET, FILM COATED ORAL at 22:12

## 2021-07-23 RX ADMIN — Medication 4 UNIT(S): at 16:55

## 2021-07-23 RX ADMIN — PANTOPRAZOLE SODIUM 40 MILLIGRAM(S): 20 TABLET, DELAYED RELEASE ORAL at 06:09

## 2021-07-23 RX ADMIN — Medication 2: at 12:12

## 2021-07-23 NOTE — PROGRESS NOTE ADULT - ATTENDING COMMENTS
I reviewed the chart and examined the patient with the resident and we discussed the findings and treatment plan. I agree with the findings and treatment plan above, which I modified as indicated. The patient requires 3 hrs a day of acute inpatient rehab.    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   Admitted for rehab of  R medullary infarct and subsequent left sided weakness and facial droop.    # R medullary infarct  # Left sided weakness #Left sided facial droop, nondominant  - MRI of brain 7/12/21 redemonstrated the punctate infarct in the right ventral medulla, minimally enlarged possibly due to difference in technique compared to the prior exam from 7/5/2021. Slightly increased- mild edema. No additional acute infarcts. No intracranial hemorrhage or mass effect. Stable mild chronic microvascular ischemic changes.   - Brilinta 90 mg PO BID and Atorvastatin 80mg daily    Ambulating with touch assistance with walker. Tolerating therapy well. Gradual improvements.   - Continues to require acute PT/OT/SLT    Comorbidities    # HTN - avoid tight control given intracranial vertebral stenosis. BP syst is 107-126.   - BP goal is above (130-160 systolic)   - Losartan 100 mg PO QAM.  - Amlodipine- decrease from 10 mg to 5 mg and monitor. BPs in good range.    -#Type II DM on glypizide: Hemoglobin A1C 7.8 on 7/5, insulin regimen as needed (goal 140-180); patient was started on lantus 5u daily and low dose CS. Monitor. Uncontrolled hypergycemia. Increased Lantus to 7 units and 2 units Lispro w/ meals with improvement.    #Dysphagia:   - Oropharygeal  - Had FEES this morning. Dysphagia 2 diet with nectar-thick liquids and head turn/ chin-tuck and multiple swallows upgraded to Dysphagia 2 diet (for lost dentures), with thin liquids. FEES also showed some VC erythema, possibly 2ndary to reflux. Will add PPI for 4-6 weeks.  - Monitor for signs of aspiration. Doing well.      #Bladder/prostate CA: Outpatient management     #AAA: Stable at this time, Outpatient management    #HFrEF/ history of Systolic CHF: Echo taken 7/6/2021 shows decreased left ventricular systolic function (LVEF 46%); No overt symptoms at this time    - GI/Bowel Mgmt: patient currently has regular BM       # Precautions / PROPHYLAXIS:    - Falls  - Ortho: Weight bearing status: WBAT   - DVT prophylaxis: Lovenox I reviewed the chart and examined the patient with the resident and we discussed the findings and treatment plan. I agree with the findings and treatment plan above, which I modified as indicated. The patient requires 3 hrs a day of acute inpatient rehab.    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 with recurrent left sided upper and lower extremity weakness.   Admitted for rehab of  R medullary infarct and subsequent left sided weakness and facial droop.    # R medullary infarct  # Left sided weakness #Left sided facial droop, nondominant  - MRI of brain 7/12/21 redemonstrated the punctate infarct in the right ventral medulla, minimally enlarged possibly due to difference in technique compared to the prior exam from 7/5/2021. Slightly increased- mild edema. No additional acute infarcts. No intracranial hemorrhage or mass effect. Stable mild chronic microvascular ischemic changes.   - Brilinta 90 mg PO BID and Atorvastatin 80mg daily    Ambulating with touch assistance with walker. Tolerating therapy well. Gradual improvements.   - Continues to require acute PT/OT/SLT    Comorbidities    # HTN - avoid tight control given intracranial vertebral stenosis. BP syst is 107-126.   - BP goal is above (130-160 systolic)   - Losartan 100 mg PO QAM.  - Amlodipine- decrease from 10 mg to 5 mg and monitor. BPs in good range.    -#Type II DM on glypizide: Hemoglobin A1C 7.8 on 7/5, insulin regimen as needed (goal 140-180); patient was started on lantus 5u daily and low dose CS. Monitor. Uncontrolled hypergycemia. Increased Lantus to 7 units and 2 units Lispro w/ meals with improvement.    #Dysphagia:   - Oropharygeal  - Had FEES. Dysphagia 2 diet with nectar-thick liquids and head turn/ chin-tuck and multiple swallows upgraded to Dysphagia 2 diet (for lost dentures), with thin liquids. FEES also showed some VC erythema, possibly 2ndary to reflux. Will add PPI for 4-6 weeks.  - Now upgraded to Diet, Dysphagia 2 Mechanical Soft-Thin Liquids:   Consistent Carbohydrate {No Snacks}  Prosource Gelatein 20 Sugar Free     Qty per Day:  1 daily     Qty per Day:  consecutive swallow  Supplement Feeding Modality:  Oral  Glucerna Shake Cans or Servings Per Day:  1       Frequency:  Two Times a day (07-21-21 @ 18:29) [Active]    #Bladder/prostate CA: Outpatient management     #AAA: Stable at this time, Outpatient management    #HFrEF/ history of Systolic CHF: Echo taken 7/6/2021 shows decreased left ventricular systolic function (LVEF 46%); No overt symptoms at this time    - GI/Bowel Mgmt: patient currently has regular BM       # Precautions / PROPHYLAXIS:    - Falls  - Ortho: Weight bearing status: WBAT   - DVT prophylaxis: Lovenox

## 2021-07-23 NOTE — PROGRESS NOTE ADULT - ASSESSMENT
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   Admitted for rehab of  R medullary infarct and subsequent left sided weakness and facial droop.    # R medullary infarct  # Left sided weakness, Left sided facial droop, nondominant  - MRI of brain 7/12/21 redemonstrated the punctate infarct in the right ventral medulla, minimally enlarged possibly due to difference in technique compared to the prior exam from 7/5/2021. Slightly increased mild edema. No additional acute infarcts. No intracranial hemorrhage or mass effect. Stable mild chronic microvascular ischemic changes.   - Brilinta 90 mg PO BID and Atorvastatin 80mg daily  - PT/OT/SLP    #Dysphagia:   - Oropharygeal  - Had FEES. Dysphagia 2 diet thin liquids (for lost dentures); FEES also showed some VC erythema, possibly 2ndary to reflux. Will add PPI for 4-6 weeks.  - Monitor for signs of aspiration. Doing well.    #Dysarthria:   - Speech therapy. Improving    Comorbidities    #HTN:  - BP goal as above (130-160 systolic) . Currently running low  - Losartan 100 mg PO QAM.  - Decreased  Amlodipine 10 mg to 5 mg and monitor. Doing well.      #Type II DM on glypizide: Hemoglobin A1C 7.8 on 7/5, insulin regimen as needed (goal 140-180); patient's lantus was increased to 8 u and was started on lispro 4 u with meals with improving FS glucose.  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   Admitted for rehab of  R medullary infarct and subsequent left sided weakness and facial droop.    # R medullary infarct  # Left sided weakness, Left sided facial droop, nondominant  - MRI of brain 7/12/21 redemonstrated the punctate infarct in the right ventral medulla, minimally enlarged possibly due to difference in technique compared to the prior exam from 7/5/2021. Slightly increased mild edema. No additional acute infarcts. No intracranial hemorrhage or mass effect. Stable mild chronic microvascular ischemic changes.   - Brilinta 90 mg PO BID and Atorvastatin 80mg daily  - PT/OT/SLP    #Dysphagia:   - Oropharygeal  - Had FEES. Dysphagia 2 diet thin liquids (for lost dentures); FEES also showed some VC erythema, possibly 2ndary to reflux. Will add PPI for 4-6 weeks.  - Upgraded to regular diet w/ thin liquids    #Dysarthria:   - Speech therapy. Improving    Comorbidities    #HTN:  - BP goal as above (130-160 systolic) .  - Losartan 100 mg PO QAM.  - Decreased  Amlodipine 10 mg to 5 mg and monitor. Doing well.      #Type II DM on glypizide: Hemoglobin A1C 7.8 on 7/5, insulin regimen as needed (goal 140-180); patient's lantus was increased to 8 u and was started on lispro 4 u with meals with improving FS glucose.  - Monitor and adjust    #Bladder/prostate CA: Outpatient management     #AAA: Stable at this time, Outpatient management    #HFrEF/ history of Systolic CHF: Echo taken 7/6/2021 shows decreased left ventricular systolic function (LVEF 46%); No overt symptoms at this time    - GI/Bowel Mgmt: patient currently has regular BM       # Precautions / PROPHYLAXIS:    - Falls  - Ortho: Weight bearing status: WBAT   - DVT prophylaxis: Lovenox

## 2021-07-23 NOTE — PROGRESS NOTE ADULT - SUBJECTIVE AND OBJECTIVE BOX
Patient is a 80y old  Male who presents with a chief complaint of Admitted for R medullary infarct; subsequent left sided weakness, facial droop (14 Jul 2021 14:04)      HPI:  80 year old male with a PmHx of CAD s/p 5 stents (2009), DM, HTN, bladder and prostate CA, stable AAA, recent acute stroke (discharged 7/7/2021) presents to [facility] with left sided upper and lower extremity weakness. 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 Patient had been evaluated by neuroendovascular who recommended no acute intervention. He was discharged for outpatient management. The day after discharge, patient started to feel recurrent left upper extremity weakness. He did not seek medical attention and his weakness continued to worsen through the weekend. Patient decided to come in today for further management as he can hardly move his left arm and his left leg is very weak. Patient does note, however, that his left arm was quite strong intermittently today, but 30 minutes later his weakness returned. Repeat CTH shows same territory infarct. CTA head and neck with redemonstration of moderate stenosis involving the proximal M2 inferior division of the right MCA and severe multifocal stenoses involving the V4 segment of the right vertebral artery due to calcified atherosclerotic plaque. Seen by neurology who suspect fluctuating symptoms may be due to high grade vertebral artery vessel disease. Being admitted for further management. Patient denies any fevers, chills, chest pain, SOB, nausea, vomiting, abdominal pain. MRI was performed that showed the completion of the previous stroke in the right medulla.    PLOF independent with ADLs, and with RW which was used after acute stroke in early July     The patient was evaluated by a physical medicine and rehabilitation specialist and was found to be a good candidate for acute rehabilitation.   The patient would benefit from 3 hours of interdisciplinary therapy per day.       CLOF: supervision with bed mobility and touch assist with transfers, ambulates 125 ft with a RW and touch assist.     TODAY'S SUBJECTIVE & REVIEW OF SYMPTOMS:  Patient seen and evaluated at bedside with Dr. Carolina. No acute events overnight. Patient feels well.        Review of Systems:   Constiutional:    [ x  ] WNL           [   ] poor appetite   [   ] insomnia   [   ] tired   Cardio:                [ x ] WNL           [   ] CP   [   ] TATE   [   ] palpitations               Resp:                   [ x ] WNL           [   ] SOB   [   ] cough   [   ] wheezing   GI:                        [ x ] WNL           [   ] constipation   [   ] diarrhea   [   ] abdominal pain   [   ] nausea   [   ] emesis                                :                      [ x ] WNL           [   ] RUFFIN  [   ] dusuria   [   ] difficulty voiding             Endo:                   [ x ] WNL          [   ] po;yuria   [   ] temperature intolerance                 Skin:                     [ x ] WNL          [   ] pain   [   ] wound   [   ] rash   MSK:                    [ x ] WNL          [   ] muscle pain   [   ] joint pain/ stiffness   [   ] muscle tenderness   [   ] swelling   Neuro:                 [   ] WNL          [   ] HA   [   ] change in vision   [   ] tremor   [   ] weakness   [   ]dysphagia   [ x ] dysphonia, dysarthria, left facial droop, left hemiparesis           Cognitive:           [ x ] WNL           [   ]confusion      Psych:                  [ x ] WNL           [   ] hallucinations   [   ]agitation   [   ] delusion   [   ]depressionPHYSICAL EXAM      PHYSICAL EXAM     Vital Signs Last 24 Hrs  T(C): 36.1 (23 Jul 2021 06:13), Max: 36.3 (22 Jul 2021 21:05)  T(F): 96.9 (23 Jul 2021 06:13), Max: 97.3 (22 Jul 2021 21:05)  HR: 80 (23 Jul 2021 06:13) (78 - 80)  BP: 129/63 (23 Jul 2021 06:13) (108/59 - 135/73)  BP(mean): --  RR: 18 (23 Jul 2021 06:13) (18 - 19)  SpO2: 98% (23 Jul 2021 06:13) (97% - 98%)    Constitutional - [ x  ] NAD, Comfortable        [   ] other:  Chest - [  x  ] CTA     [   ] other:  Cardiovascular - [ x  ] RRR, no murmer     [   ] other:  Abdomen - [ x  ] Soft, NT/ND      [   ] other:        -  [ x ] NOFOLEY CATHETER   [   ] YES  if yes: [   ] NO MEATAL TEAR OR DISCHARGE [   ] other:  Extremities - [ x ] No C/C/E, No calf tenderness       [   ] other:  ROM - [ x  ] WFL     [   ] other:  Neurologic Exam -                 Cognitive - [ x  ]Awake, Alert, AAO to self, place, date, year, situation         [    ] other:      Communication - [   ]Fluent, No dysarthria       [  x ] other: dysarthria, dysphonia      Motor - No focal deficits                    Right UE -  [  x ] WNL      [    ] other:                    Left UE -     [   ] WNL      [ x   ] other: 4-/5 strength, improved ROM                     Right LE -   [  x ] WNL       [    ] other:                    Left LE -      [   ]WNL        [   x ] other:  4/5 strength      Sensory - [   ] Intact to LT      [    ] other:          Reflexes - [ x  ] wnl/ symmetric     [   ] other:     Psychiatric - [ x  ]Mood stable, Affect WNL     [   ]other:     Skin - [ x ] intact      [   ] other    MEDICATIONS  (STANDING):  amLODIPine   Tablet 5 milliGRAM(s) Oral daily  atorvastatin 80 milliGRAM(s) Oral at bedtime  dextrose 40% Gel 15 Gram(s) Oral once  dextrose 40% Gel 15 Gram(s) Oral once  dextrose 5%. 1000 milliLiter(s) (100 mL/Hr) IV Continuous <Continuous>  dextrose 5%. 1000 milliLiter(s) (50 mL/Hr) IV Continuous <Continuous>  dextrose 50% Injectable 25 Gram(s) IV Push once  dextrose 50% Injectable 12.5 Gram(s) IV Push once  dextrose 50% Injectable 25 Gram(s) IV Push once  enoxaparin Injectable 40 milliGRAM(s) SubCutaneous daily  glucagon  Injectable 1 milliGRAM(s) IntraMuscular once  insulin glargine Injectable (LANTUS) 8 Unit(s) SubCutaneous at bedtime  insulin lispro (ADMELOG) corrective regimen sliding scale   SubCutaneous three times a day before meals  insulin lispro Injectable (ADMELOG) 4 Unit(s) SubCutaneous three times a day before meals  losartan 100 milliGRAM(s) Oral daily  pantoprazole    Tablet 40 milliGRAM(s) Oral before breakfast  ticagrelor 90 milliGRAM(s) Oral every 12 hours    MEDICATIONS  (PRN):  acetaminophen   Tablet .. 650 milliGRAM(s) Oral every 6 hours PRN Temp greater or equal to 38C (100.4F), Mild Pain (1 - 3)  aluminum hydroxide/magnesium hydroxide/simethicone Suspension 30 milliLiter(s) Oral every 4 hours PRN Dyspepsia  magnesium hydroxide Suspension 30 milliLiter(s) Oral daily PRN Constipation  melatonin 5 milliGRAM(s) Oral at bedtime PRN Insomnia      RECENT LABS/IMAGING                        14.6   8.41  )-----------( 323      ( 19 Jul 2021 07:25 )             42.7     07-19    141  |  106  |  25<H>  ----------------------------<  228<H>  4.5   |  24  |  1.2    Ca    9.5      19 Jul 2021 07:25  Mg     2.0     07-19    TPro  6.7  /  Alb  4.1  /  TBili  1.6<H>  /  DBili  x   /  AST  22  /  ALT  25  /  AlkPhos  101  07-19    CAPILLARY BLOOD GLUCOSE      POCT Blood Glucose.: 244 mg/dL (22 Jul 2021 12:04)  POCT Blood Glucose.: 160 mg/dL (22 Jul 2021 06:56)  POCT Blood Glucose.: 178 mg/dL (21 Jul 2021 21:06)  POCT Blood Glucose.: 162 mg/dL (21 Jul 2021 16:32)                   Patient is a 80y old  Male who presents with a chief complaint of Admitted for R medullary infarct; subsequent left sided weakness, facial droop (14 Jul 2021 14:04)      HPI:  80 year old male with a PmHx of CAD s/p 5 stents (2009), DM, HTN, bladder and prostate CA, stable AAA, recent acute stroke (discharged 7/7/2021) presents to [facility] with left sided upper and lower extremity weakness. 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 Patient had been evaluated by neuroendovascular who recommended no acute intervention. He was discharged for outpatient management. The day after discharge, patient started to feel recurrent left upper extremity weakness. He did not seek medical attention and his weakness continued to worsen through the weekend. Patient decided to come in today for further management as he can hardly move his left arm and his left leg is very weak. Patient does note, however, that his left arm was quite strong intermittently today, but 30 minutes later his weakness returned. Repeat CTH shows same territory infarct. CTA head and neck with redemonstration of moderate stenosis involving the proximal M2 inferior division of the right MCA and severe multifocal stenoses involving the V4 segment of the right vertebral artery due to calcified atherosclerotic plaque. Seen by neurology who suspect fluctuating symptoms may be due to high grade vertebral artery vessel disease. Being admitted for further management. Patient denies any fevers, chills, chest pain, SOB, nausea, vomiting, abdominal pain. MRI was performed that showed the completion of the previous stroke in the right medulla.    PLOF independent with ADLs, and with RW which was used after acute stroke in early July     The patient was evaluated by a physical medicine and rehabilitation specialist and was found to be a good candidate for acute rehabilitation.   The patient would benefit from 3 hours of interdisciplinary therapy per day.       CLOF: supervision with bed mobility and touch assist with transfers, ambulates 125 ft with a RW and touch assist.     TODAY'S SUBJECTIVE & REVIEW OF SYMPTOMS:  Patient seen and evaluated at bedside with Dr. Carolina. No acute events overnight. Patient feels well.        Review of Systems:   Constiutional:    [ x  ] WNL           [   ] poor appetite   [   ] insomnia   [   ] tired   Cardio:                [ x ] WNL           [   ] CP   [   ] TATE   [   ] palpitations               Resp:                   [ x ] WNL           [   ] SOB   [   ] cough   [   ] wheezing   GI:                        [ x ] WNL           [   ] constipation   [   ] diarrhea   [   ] abdominal pain   [   ] nausea   [   ] emesis                                :                      [ x ] WNL           [   ] RUFFIN  [   ] dusuria   [   ] difficulty voiding             Endo:                   [ x ] WNL          [   ] po;yuria   [   ] temperature intolerance                 Skin:                     [ x ] WNL          [   ] pain   [   ] wound   [   ] rash   MSK:                    [ x ] WNL          [   ] muscle pain   [   ] joint pain/ stiffness   [   ] muscle tenderness   [   ] swelling   Neuro:                 [   ] WNL          [   ] HA   [   ] change in vision   [   ] tremor   [   ] weakness   [   ]dysphagia   [ x ] dysphonia, dysarthria, left facial droop, left hemiparesis           Cognitive:           [ x ] WNL           [   ]confusion      Psych:                  [ x ] WNL           [   ] hallucinations   [   ]agitation   [   ] delusion   [   ]depressionPHYSICAL EXAM      PHYSICAL EXAM     Vital Signs Last 24 Hrs  T(C): 36.1 (23 Jul 2021 06:13), Max: 36.3 (22 Jul 2021 21:05)  T(F): 96.9 (23 Jul 2021 06:13), Max: 97.3 (22 Jul 2021 21:05)  HR: 80 (23 Jul 2021 06:13) (78 - 80)  BP: 129/63 (23 Jul 2021 06:13) (108/59 - 135/73)  BP(mean): --  RR: 18 (23 Jul 2021 06:13) (18 - 19)  SpO2: 98% (23 Jul 2021 06:13) (97% - 98%)    Constitutional - [ x  ] NAD, Comfortable        [   ] other:  Chest - [  x  ] CTA     [   ] other:  Cardiovascular - [ x  ] RRR, no murmer     [   ] other:  Abdomen - [ x  ] Soft, NT/ND      [   ] other:        -  [ x ] NOFOLEY CATHETER   [   ] YES  if yes: [   ] NO MEATAL TEAR OR DISCHARGE [   ] other:  Extremities - [ x ] No C/C/E, No calf tenderness       [   ] other:  ROM - [ x  ] WFL     [   ] other:  Neurologic Exam -                 Cognitive - [ x  ]Awake, Alert, AAO to self, place, date, year, situation         [    ] other:      Communication - [   ]Fluent, No dysarthria       [  x ] other: dysarthria, dysphonia      Motor - No focal deficits                    Right UE -  [  x ] WNL      [    ] other:                    Left UE -     [   ] WNL      [ x   ] other: 4-/5 strength, improved ROM                     Right LE -   [  x ] WNL       [    ] other:                    Left LE -      [   ]WNL        [   x ] other:  4/5 strength      Sensory - [ x  ] Intact to LT      [    ] other:          Reflexes - [ x  ] wnl/ symmetric     [   ] other:     Psychiatric - [ x  ]Mood stable, Affect WNL     [   ]other:     Skin - [ x ] intact      [   ] other    MEDICATIONS  (STANDING):  amLODIPine   Tablet 5 milliGRAM(s) Oral daily  atorvastatin 80 milliGRAM(s) Oral at bedtime  dextrose 40% Gel 15 Gram(s) Oral once  dextrose 40% Gel 15 Gram(s) Oral once  dextrose 5%. 1000 milliLiter(s) (100 mL/Hr) IV Continuous <Continuous>  dextrose 5%. 1000 milliLiter(s) (50 mL/Hr) IV Continuous <Continuous>  dextrose 50% Injectable 25 Gram(s) IV Push once  dextrose 50% Injectable 12.5 Gram(s) IV Push once  dextrose 50% Injectable 25 Gram(s) IV Push once  enoxaparin Injectable 40 milliGRAM(s) SubCutaneous daily  glucagon  Injectable 1 milliGRAM(s) IntraMuscular once  insulin glargine Injectable (LANTUS) 8 Unit(s) SubCutaneous at bedtime  insulin lispro (ADMELOG) corrective regimen sliding scale   SubCutaneous three times a day before meals  insulin lispro Injectable (ADMELOG) 4 Unit(s) SubCutaneous three times a day before meals  losartan 100 milliGRAM(s) Oral daily  pantoprazole    Tablet 40 milliGRAM(s) Oral before breakfast  ticagrelor 90 milliGRAM(s) Oral every 12 hours    MEDICATIONS  (PRN):  acetaminophen   Tablet .. 650 milliGRAM(s) Oral every 6 hours PRN Temp greater or equal to 38C (100.4F), Mild Pain (1 - 3)  aluminum hydroxide/magnesium hydroxide/simethicone Suspension 30 milliLiter(s) Oral every 4 hours PRN Dyspepsia  magnesium hydroxide Suspension 30 milliLiter(s) Oral daily PRN Constipation  melatonin 5 milliGRAM(s) Oral at bedtime PRN Insomnia      RECENT LABS/IMAGING                        14.6   8.41  )-----------( 323      ( 19 Jul 2021 07:25 )             42.7     07-19    141  |  106  |  25<H>  ----------------------------<  228<H>  4.5   |  24  |  1.2    Ca    9.5      19 Jul 2021 07:25  Mg     2.0     07-19    TPro  6.7  /  Alb  4.1  /  TBili  1.6<H>  /  DBili  x   /  AST  22  /  ALT  25  /  AlkPhos  101  07-19    CAPILLARY BLOOD GLUCOSE    CAPILLARY BLOOD GLUCOSE      POCT Blood Glucose.: 231 mg/dL (23 Jul 2021 12:00)  POCT Blood Glucose.: 168 mg/dL (23 Jul 2021 06:53)  POCT Blood Glucose.: 136 mg/dL (22 Jul 2021 21:34)  POCT Blood Glucose.: 231 mg/dL (22 Jul 2021 16:10)

## 2021-07-24 LAB
GLUCOSE BLDC GLUCOMTR-MCNC: 131 MG/DL — HIGH (ref 70–99)
GLUCOSE BLDC GLUCOMTR-MCNC: 143 MG/DL — HIGH (ref 70–99)
GLUCOSE BLDC GLUCOMTR-MCNC: 241 MG/DL — HIGH (ref 70–99)
GLUCOSE BLDC GLUCOMTR-MCNC: 87 MG/DL — SIGNIFICANT CHANGE UP (ref 70–99)

## 2021-07-24 RX ORDER — INSULIN LISPRO 100/ML
4 VIAL (ML) SUBCUTANEOUS
Refills: 0 | Status: DISCONTINUED | OUTPATIENT
Start: 2021-07-25 | End: 2021-07-28

## 2021-07-24 RX ORDER — INSULIN LISPRO 100/ML
4 VIAL (ML) SUBCUTANEOUS
Refills: 0 | Status: DISCONTINUED | OUTPATIENT
Start: 2021-07-24 | End: 2021-07-28

## 2021-07-24 RX ORDER — INSULIN LISPRO 100/ML
6 VIAL (ML) SUBCUTANEOUS
Refills: 0 | Status: DISCONTINUED | OUTPATIENT
Start: 2021-07-25 | End: 2021-07-28

## 2021-07-24 RX ADMIN — TICAGRELOR 90 MILLIGRAM(S): 90 TABLET ORAL at 07:03

## 2021-07-24 RX ADMIN — LOSARTAN POTASSIUM 100 MILLIGRAM(S): 100 TABLET, FILM COATED ORAL at 07:03

## 2021-07-24 RX ADMIN — ATORVASTATIN CALCIUM 80 MILLIGRAM(S): 80 TABLET, FILM COATED ORAL at 21:41

## 2021-07-24 RX ADMIN — Medication 4 UNIT(S): at 12:06

## 2021-07-24 RX ADMIN — Medication 4 UNIT(S): at 07:51

## 2021-07-24 RX ADMIN — Medication 4 UNIT(S): at 16:36

## 2021-07-24 RX ADMIN — TICAGRELOR 90 MILLIGRAM(S): 90 TABLET ORAL at 17:05

## 2021-07-24 RX ADMIN — ENOXAPARIN SODIUM 40 MILLIGRAM(S): 100 INJECTION SUBCUTANEOUS at 12:06

## 2021-07-24 RX ADMIN — Medication 2: at 12:06

## 2021-07-24 RX ADMIN — PANTOPRAZOLE SODIUM 40 MILLIGRAM(S): 20 TABLET, DELAYED RELEASE ORAL at 07:03

## 2021-07-24 RX ADMIN — AMLODIPINE BESYLATE 5 MILLIGRAM(S): 2.5 TABLET ORAL at 07:04

## 2021-07-24 NOTE — PROGRESS NOTE ADULT - SUBJECTIVE AND OBJECTIVE BOX
MEDICATIONS  (STANDING):  amLODIPine   Tablet 5 milliGRAM(s) Oral daily  atorvastatin 80 milliGRAM(s) Oral at bedtime  dextrose 40% Gel 15 Gram(s) Oral once  dextrose 40% Gel 15 Gram(s) Oral once  dextrose 5%. 1000 milliLiter(s) (50 mL/Hr) IV Continuous <Continuous>  dextrose 5%. 1000 milliLiter(s) (100 mL/Hr) IV Continuous <Continuous>  dextrose 50% Injectable 25 Gram(s) IV Push once  dextrose 50% Injectable 12.5 Gram(s) IV Push once  dextrose 50% Injectable 25 Gram(s) IV Push once  enoxaparin Injectable 40 milliGRAM(s) SubCutaneous daily  glucagon  Injectable 1 milliGRAM(s) IntraMuscular once  insulin glargine Injectable (LANTUS) 8 Unit(s) SubCutaneous at bedtime  insulin lispro (ADMELOG) corrective regimen sliding scale   SubCutaneous three times a day before meals  insulin lispro Injectable (ADMELOG) 4 Unit(s) SubCutaneous before dinner  losartan 100 milliGRAM(s) Oral daily  pantoprazole    Tablet 40 milliGRAM(s) Oral before breakfast  ticagrelor 90 milliGRAM(s) Oral every 12 hours    MEDICATIONS  (PRN):  acetaminophen   Tablet .. 650 milliGRAM(s) Oral every 6 hours PRN Temp greater or equal to 38C (100.4F), Mild Pain (1 - 3)  aluminum hydroxide/magnesium hydroxide/simethicone Suspension 30 milliLiter(s) Oral every 4 hours PRN Dyspepsia  magnesium hydroxide Suspension 30 milliLiter(s) Oral daily PRN Constipation  melatonin 5 milliGRAM(s) Oral at bedtime PRN Insomnia      Patient was stable overnight and expresses no new complaints.    T(C): 36.3 (07-24-21 @ 12:28), Max: 36.3 (07-24-21 @ 12:28)  HR: 82 (07-24-21 @ 12:28) (64 - 82)  BP: 137/67 (07-24-21 @ 12:28) (108/51 - 137/67)  RR: 18 (07-24-21 @ 12:28) (18 - 19)  SpO2: 98% (07-24-21 @ 05:48) (98% - 98%)      PE:    Alert   LUNGS- clear  COR- RRR S1S2  ABD- SOFT, NT  EXTR- w/o edema  NEURO- stable                      Rehab for right medullary infarct; subsequent left sided weakness, facial droop    Continue full acute rehab program.

## 2021-07-25 LAB
GLUCOSE BLDC GLUCOMTR-MCNC: 149 MG/DL — HIGH (ref 70–99)
GLUCOSE BLDC GLUCOMTR-MCNC: 151 MG/DL — HIGH (ref 70–99)
GLUCOSE BLDC GLUCOMTR-MCNC: 178 MG/DL — HIGH (ref 70–99)
GLUCOSE BLDC GLUCOMTR-MCNC: 90 MG/DL — SIGNIFICANT CHANGE UP (ref 70–99)

## 2021-07-25 RX ADMIN — TICAGRELOR 90 MILLIGRAM(S): 90 TABLET ORAL at 16:39

## 2021-07-25 RX ADMIN — Medication 4 UNIT(S): at 16:38

## 2021-07-25 RX ADMIN — TICAGRELOR 90 MILLIGRAM(S): 90 TABLET ORAL at 06:26

## 2021-07-25 RX ADMIN — PANTOPRAZOLE SODIUM 40 MILLIGRAM(S): 20 TABLET, DELAYED RELEASE ORAL at 06:26

## 2021-07-25 RX ADMIN — Medication 6 UNIT(S): at 07:35

## 2021-07-25 RX ADMIN — ATORVASTATIN CALCIUM 80 MILLIGRAM(S): 80 TABLET, FILM COATED ORAL at 21:34

## 2021-07-25 RX ADMIN — ENOXAPARIN SODIUM 40 MILLIGRAM(S): 100 INJECTION SUBCUTANEOUS at 11:43

## 2021-07-25 RX ADMIN — AMLODIPINE BESYLATE 5 MILLIGRAM(S): 2.5 TABLET ORAL at 06:26

## 2021-07-25 RX ADMIN — Medication 1: at 11:43

## 2021-07-25 RX ADMIN — Medication 4 UNIT(S): at 11:43

## 2021-07-25 RX ADMIN — Medication 1: at 16:37

## 2021-07-25 NOTE — PROGRESS NOTE ADULT - SUBJECTIVE AND OBJECTIVE BOX
MEDICATIONS  (STANDING):  amLODIPine   Tablet 5 milliGRAM(s) Oral daily  atorvastatin 80 milliGRAM(s) Oral at bedtime  dextrose 40% Gel 15 Gram(s) Oral once  dextrose 40% Gel 15 Gram(s) Oral once  dextrose 5%. 1000 milliLiter(s) (50 mL/Hr) IV Continuous <Continuous>  dextrose 5%. 1000 milliLiter(s) (100 mL/Hr) IV Continuous <Continuous>  dextrose 50% Injectable 25 Gram(s) IV Push once  dextrose 50% Injectable 12.5 Gram(s) IV Push once  dextrose 50% Injectable 25 Gram(s) IV Push once  enoxaparin Injectable 40 milliGRAM(s) SubCutaneous daily  glucagon  Injectable 1 milliGRAM(s) IntraMuscular once  insulin glargine Injectable (LANTUS) 8 Unit(s) SubCutaneous at bedtime  insulin lispro (ADMELOG) corrective regimen sliding scale   SubCutaneous three times a day before meals  insulin lispro Injectable (ADMELOG) 6 Unit(s) SubCutaneous before breakfast  insulin lispro Injectable (ADMELOG) 4 Unit(s) SubCutaneous before lunch  insulin lispro Injectable (ADMELOG) 4 Unit(s) SubCutaneous before dinner  losartan 100 milliGRAM(s) Oral daily  pantoprazole    Tablet 40 milliGRAM(s) Oral before breakfast  ticagrelor 90 milliGRAM(s) Oral every 12 hours    MEDICATIONS  (PRN):  acetaminophen   Tablet .. 650 milliGRAM(s) Oral every 6 hours PRN Temp greater or equal to 38C (100.4F), Mild Pain (1 - 3)  aluminum hydroxide/magnesium hydroxide/simethicone Suspension 30 milliLiter(s) Oral every 4 hours PRN Dyspepsia  magnesium hydroxide Suspension 30 milliLiter(s) Oral daily PRN Constipation  melatonin 5 milliGRAM(s) Oral at bedtime PRN Insomnia      Patient was stable overnight and expresses no new complaints.    T(C): 36.1 (07-25-21 @ 05:12), Max: 36.3 (07-24-21 @ 12:28)  HR: 66 (07-25-21 @ 05:12) (66 - 82)  BP: 130/60 (07-25-21 @ 05:12) (130/60 - 144/65)  RR: 18 (07-24-21 @ 20:24) (18 - 18)  SpO2: --      PE:    Alert   LUNGS- clear  COR- RRR S1S2  ABD- SOFT, NT  EXTR- w/o edema  NEURO- stable                      Rehab for right medullary infarct; subsequent left sided weakness, facial droop    Continue full acute rehab program.

## 2021-07-26 LAB
ALBUMIN SERPL ELPH-MCNC: 4.3 G/DL — SIGNIFICANT CHANGE UP (ref 3.5–5.2)
ALP SERPL-CCNC: 105 U/L — SIGNIFICANT CHANGE UP (ref 30–115)
ALT FLD-CCNC: 35 U/L — SIGNIFICANT CHANGE UP (ref 0–41)
ANION GAP SERPL CALC-SCNC: 16 MMOL/L — HIGH (ref 7–14)
AST SERPL-CCNC: 34 U/L — SIGNIFICANT CHANGE UP (ref 0–41)
BASOPHILS # BLD AUTO: 0.05 K/UL — SIGNIFICANT CHANGE UP (ref 0–0.2)
BASOPHILS NFR BLD AUTO: 0.8 % — SIGNIFICANT CHANGE UP (ref 0–1)
BILIRUB SERPL-MCNC: 1.4 MG/DL — HIGH (ref 0.2–1.2)
BUN SERPL-MCNC: 20 MG/DL — SIGNIFICANT CHANGE UP (ref 10–20)
CALCIUM SERPL-MCNC: 9.7 MG/DL — SIGNIFICANT CHANGE UP (ref 8.5–10.1)
CHLORIDE SERPL-SCNC: 103 MMOL/L — SIGNIFICANT CHANGE UP (ref 98–110)
CO2 SERPL-SCNC: 23 MMOL/L — SIGNIFICANT CHANGE UP (ref 17–32)
CREAT SERPL-MCNC: 1.3 MG/DL — SIGNIFICANT CHANGE UP (ref 0.7–1.5)
EOSINOPHIL # BLD AUTO: 0.11 K/UL — SIGNIFICANT CHANGE UP (ref 0–0.7)
EOSINOPHIL NFR BLD AUTO: 1.7 % — SIGNIFICANT CHANGE UP (ref 0–8)
GLUCOSE BLDC GLUCOMTR-MCNC: 129 MG/DL — HIGH (ref 70–99)
GLUCOSE BLDC GLUCOMTR-MCNC: 145 MG/DL — HIGH (ref 70–99)
GLUCOSE BLDC GLUCOMTR-MCNC: 153 MG/DL — HIGH (ref 70–99)
GLUCOSE BLDC GLUCOMTR-MCNC: 202 MG/DL — HIGH (ref 70–99)
GLUCOSE SERPL-MCNC: 163 MG/DL — HIGH (ref 70–99)
HCT VFR BLD CALC: 45.5 % — SIGNIFICANT CHANGE UP (ref 42–52)
HGB BLD-MCNC: 14.6 G/DL — SIGNIFICANT CHANGE UP (ref 14–18)
IMM GRANULOCYTES NFR BLD AUTO: 0.2 % — SIGNIFICANT CHANGE UP (ref 0.1–0.3)
LYMPHOCYTES # BLD AUTO: 2.1 K/UL — SIGNIFICANT CHANGE UP (ref 1.2–3.4)
LYMPHOCYTES # BLD AUTO: 32.6 % — SIGNIFICANT CHANGE UP (ref 20.5–51.1)
MAGNESIUM SERPL-MCNC: 1.9 MG/DL — SIGNIFICANT CHANGE UP (ref 1.8–2.4)
MCHC RBC-ENTMCNC: 28.4 PG — SIGNIFICANT CHANGE UP (ref 27–31)
MCHC RBC-ENTMCNC: 32.1 G/DL — SIGNIFICANT CHANGE UP (ref 32–37)
MCV RBC AUTO: 88.5 FL — SIGNIFICANT CHANGE UP (ref 80–94)
MONOCYTES # BLD AUTO: 0.72 K/UL — HIGH (ref 0.1–0.6)
MONOCYTES NFR BLD AUTO: 11.2 % — HIGH (ref 1.7–9.3)
NEUTROPHILS # BLD AUTO: 3.45 K/UL — SIGNIFICANT CHANGE UP (ref 1.4–6.5)
NEUTROPHILS NFR BLD AUTO: 53.5 % — SIGNIFICANT CHANGE UP (ref 42.2–75.2)
NRBC # BLD: 0 /100 WBCS — SIGNIFICANT CHANGE UP (ref 0–0)
PLATELET # BLD AUTO: 349 K/UL — SIGNIFICANT CHANGE UP (ref 130–400)
POTASSIUM SERPL-MCNC: 4.5 MMOL/L — SIGNIFICANT CHANGE UP (ref 3.5–5)
POTASSIUM SERPL-SCNC: 4.5 MMOL/L — SIGNIFICANT CHANGE UP (ref 3.5–5)
PROT SERPL-MCNC: 7.2 G/DL — SIGNIFICANT CHANGE UP (ref 6–8)
RBC # BLD: 5.14 M/UL — SIGNIFICANT CHANGE UP (ref 4.7–6.1)
RBC # FLD: 13 % — SIGNIFICANT CHANGE UP (ref 11.5–14.5)
SODIUM SERPL-SCNC: 142 MMOL/L — SIGNIFICANT CHANGE UP (ref 135–146)
WBC # BLD: 6.44 K/UL — SIGNIFICANT CHANGE UP (ref 4.8–10.8)
WBC # FLD AUTO: 6.44 K/UL — SIGNIFICANT CHANGE UP (ref 4.8–10.8)

## 2021-07-26 RX ADMIN — PANTOPRAZOLE SODIUM 40 MILLIGRAM(S): 20 TABLET, DELAYED RELEASE ORAL at 06:29

## 2021-07-26 RX ADMIN — ATORVASTATIN CALCIUM 80 MILLIGRAM(S): 80 TABLET, FILM COATED ORAL at 21:56

## 2021-07-26 RX ADMIN — Medication 2: at 12:17

## 2021-07-26 RX ADMIN — TICAGRELOR 90 MILLIGRAM(S): 90 TABLET ORAL at 17:34

## 2021-07-26 RX ADMIN — Medication 1: at 08:14

## 2021-07-26 RX ADMIN — AMLODIPINE BESYLATE 5 MILLIGRAM(S): 2.5 TABLET ORAL at 06:28

## 2021-07-26 RX ADMIN — INSULIN GLARGINE 8 UNIT(S): 100 INJECTION, SOLUTION SUBCUTANEOUS at 21:56

## 2021-07-26 RX ADMIN — ENOXAPARIN SODIUM 40 MILLIGRAM(S): 100 INJECTION SUBCUTANEOUS at 12:18

## 2021-07-26 RX ADMIN — Medication 4 UNIT(S): at 12:17

## 2021-07-26 RX ADMIN — Medication 6 UNIT(S): at 08:13

## 2021-07-26 RX ADMIN — TICAGRELOR 90 MILLIGRAM(S): 90 TABLET ORAL at 06:28

## 2021-07-26 NOTE — PROGRESS NOTE ADULT - SUBJECTIVE AND OBJECTIVE BOX
Patient is a 80y old  Male who presents with a chief complaint of Admitted for R medullary infarct; subsequent left sided weakness, facial droop (14 Jul 2021 14:04)      HPI:  80 year old male with a PmHx of CAD s/p 5 stents (2009), DM, HTN, bladder and prostate CA, stable AAA, recent acute stroke (discharged 7/7/2021) presents to [facility] with left sided upper and lower extremity weakness. 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 Patient had been evaluated by neuroendovascular who recommended no acute intervention. He was discharged for outpatient management. The day after discharge, patient started to feel recurrent left upper extremity weakness. He did not seek medical attention and his weakness continued to worsen through the weekend. Patient decided to come in today for further management as he can hardly move his left arm and his left leg is very weak. Patient does note, however, that his left arm was quite strong intermittently today, but 30 minutes later his weakness returned. Repeat CTH shows same territory infarct. CTA head and neck with redemonstration of moderate stenosis involving the proximal M2 inferior division of the right MCA and severe multifocal stenoses involving the V4 segment of the right vertebral artery due to calcified atherosclerotic plaque. Seen by neurology who suspect fluctuating symptoms may be due to high grade vertebral artery vessel disease. Being admitted for further management. Patient denies any fevers, chills, chest pain, SOB, nausea, vomiting, abdominal pain. MRI was performed that showed the completion of the previous stroke in the right medulla.    PLOF independent with ADLs, and with RW which was used after acute stroke in early July     The patient was evaluated by a physical medicine and rehabilitation specialist and was found to be a good candidate for acute rehabilitation.   The patient would benefit from 3 hours of interdisciplinary therapy per day.       CLOF: supervision with bed mobility and touch assist with transfers, ambulates 125 ft with a RW and touch assist.     TODAY'S SUBJECTIVE & REVIEW OF SYMPTOMS:  Patient seen and evaluated at bedside with Dr. Carolina. No acute events overnight. Patient feels well.        Review of Systems:   Constiutional:    [ x  ] WNL           [   ] poor appetite   [   ] insomnia   [   ] tired   Cardio:                [ x ] WNL           [   ] CP   [   ] TATE   [   ] palpitations               Resp:                   [ x ] WNL           [   ] SOB   [   ] cough   [   ] wheezing   GI:                        [ x ] WNL           [   ] constipation   [   ] diarrhea   [   ] abdominal pain   [   ] nausea   [   ] emesis                                :                      [ x ] WNL           [   ] RUFFIN  [   ] dusuria   [   ] difficulty voiding             Endo:                   [ x ] WNL          [   ] po;yuria   [   ] temperature intolerance                 Skin:                     [ x ] WNL          [   ] pain   [   ] wound   [   ] rash   MSK:                    [ x ] WNL          [   ] muscle pain   [   ] joint pain/ stiffness   [   ] muscle tenderness   [   ] swelling   Neuro:                 [   ] WNL          [   ] HA   [   ] change in vision   [   ] tremor   [   ] weakness   [   ]dysphagia   [ x ] dysphonia, dysarthria, left facial droop, left hemiparesis           Cognitive:           [ x ] WNL           [   ]confusion      Psych:                  [ x ] WNL           [   ] hallucinations   [   ]agitation   [   ] delusion   [   ]depressionPHYSICAL EXAM      PHYSICAL EXAM     Vital Signs Last 24 Hrs  T(C): 35.8 (26 Jul 2021 05:45), Max: 36.3 (25 Jul 2021 20:22)  T(F): 96.4 (26 Jul 2021 05:45), Max: 97.4 (25 Jul 2021 20:22)  HR: 69 (26 Jul 2021 05:45) (66 - 80)  BP: 119/59 (26 Jul 2021 05:45) (119/59 - 151/69)  BP(mean): --  RR: 19 (26 Jul 2021 05:45) (18 - 20)  SpO2: --    Constitutional - [ x  ] NAD, Comfortable        [   ] other:  Chest - [  x  ] CTA     [   ] other:  Cardiovascular - [ x  ] RRR, no murmer     [   ] other:  Abdomen - [ x  ] Soft, NT/ND      [   ] other:        -  [ x ] NOFOLEY CATHETER   [   ] YES  if yes: [   ] NO MEATAL TEAR OR DISCHARGE [   ] other:  Extremities - [ x ] No C/C/E, No calf tenderness       [   ] other:  ROM - [ x  ] WFL     [   ] other:  Neurologic Exam -                 Cognitive - [ x  ]Awake, Alert, AAO to self, place, date, year, situation         [    ] other:      Communication - [   ]Fluent, No dysarthria       [  x ] other: dysarthria, dysphonia      Motor - No focal deficits                    Right UE -  [  x ] WNL      [    ] other:                    Left UE -     [   ] WNL      [ x   ] other: 4-/5 strength, improved ROM                     Right LE -   [  x ] WNL       [    ] other:                    Left LE -      [   ]WNL        [   x ] other:  4/5 strength      Sensory - [ x  ] Intact to LT      [    ] other:          Reflexes - [ x  ] wnl/ symmetric     [   ] other:     Psychiatric - [ x  ]Mood stable, Affect WNL     [   ]other:     Skin - [ x ] intact      [   ] other    MEDICATIONS  (STANDING):  amLODIPine   Tablet 5 milliGRAM(s) Oral daily  atorvastatin 80 milliGRAM(s) Oral at bedtime  dextrose 40% Gel 15 Gram(s) Oral once  dextrose 40% Gel 15 Gram(s) Oral once  dextrose 5%. 1000 milliLiter(s) (100 mL/Hr) IV Continuous <Continuous>  dextrose 5%. 1000 milliLiter(s) (50 mL/Hr) IV Continuous <Continuous>  dextrose 50% Injectable 25 Gram(s) IV Push once  dextrose 50% Injectable 12.5 Gram(s) IV Push once  dextrose 50% Injectable 25 Gram(s) IV Push once  enoxaparin Injectable 40 milliGRAM(s) SubCutaneous daily  glucagon  Injectable 1 milliGRAM(s) IntraMuscular once  insulin glargine Injectable (LANTUS) 8 Unit(s) SubCutaneous at bedtime  insulin lispro (ADMELOG) corrective regimen sliding scale   SubCutaneous three times a day before meals  insulin lispro Injectable (ADMELOG) 4 Unit(s) SubCutaneous three times a day before meals  losartan 100 milliGRAM(s) Oral daily  pantoprazole    Tablet 40 milliGRAM(s) Oral before breakfast  ticagrelor 90 milliGRAM(s) Oral every 12 hours    MEDICATIONS  (PRN):  acetaminophen   Tablet .. 650 milliGRAM(s) Oral every 6 hours PRN Temp greater or equal to 38C (100.4F), Mild Pain (1 - 3)  aluminum hydroxide/magnesium hydroxide/simethicone Suspension 30 milliLiter(s) Oral every 4 hours PRN Dyspepsia  magnesium hydroxide Suspension 30 milliLiter(s) Oral daily PRN Constipation  melatonin 5 milliGRAM(s) Oral at bedtime PRN Insomnia      RECENT LABS/IMAGING                                        POCT Blood Glucose.: 90 mg/dL (07-25-21 @ 20:49)  POCT Blood Glucose.: 151 mg/dL (07-25-21 @ 16:04)  POCT Blood Glucose.: 178 mg/dL (07-25-21 @ 11:26)  POCT Blood Glucose.: 149 mg/dL (07-25-21 @ 07:25)  POCT Blood Glucose.: 87 mg/dL (07-24-21 @ 20:48)  POCT Blood Glucose.: 143 mg/dL (07-24-21 @ 16:06)  POCT Blood Glucose.: 241 mg/dL (07-24-21 @ 11:59)  POCT Blood Glucose.: 131 mg/dL (07-24-21 @ 07:12)  POCT Blood Glucose.: 105 mg/dL (07-23-21 @ 21:43)  POCT Blood Glucose.: 105 mg/dL (07-23-21 @ 16:17)  POCT Blood Glucose.: 231 mg/dL (07-23-21 @ 12:00)  POCT Blood Glucose.: 168 mg/dL (07-23-21 @ 06:53)                       Patient is a 80y old  Male who presents with a chief complaint of Admitted for R medullary infarct; subsequent left sided weakness, facial droop (14 Jul 2021 14:04)      HPI:  80 year old male with a PmHx of CAD s/p 5 stents (2009), DM, HTN, bladder and prostate CA, stable AAA, recent acute stroke (discharged 7/7/2021) presents to [facility] with left sided upper and lower extremity weakness. 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 Patient had been evaluated by neuroendovascular who recommended no acute intervention. He was discharged for outpatient management. The day after discharge, patient started to feel recurrent left upper extremity weakness. He did not seek medical attention and his weakness continued to worsen through the weekend. Patient decided to come in today for further management as he can hardly move his left arm and his left leg is very weak. Patient does note, however, that his left arm was quite strong intermittently today, but 30 minutes later his weakness returned. Repeat CTH shows same territory infarct. CTA head and neck with redemonstration of moderate stenosis involving the proximal M2 inferior division of the right MCA and severe multifocal stenoses involving the V4 segment of the right vertebral artery due to calcified atherosclerotic plaque. Seen by neurology who suspect fluctuating symptoms may be due to high grade vertebral artery vessel disease. Being admitted for further management. Patient denies any fevers, chills, chest pain, SOB, nausea, vomiting, abdominal pain. MRI was performed that showed the completion of the previous stroke in the right medulla.    PLOF independent with ADLs, and with RW which was used after acute stroke in early July     The patient was evaluated by a physical medicine and rehabilitation specialist and was found to be a good candidate for acute rehabilitation.   The patient would benefit from 3 hours of interdisciplinary therapy per day.       CLOF: supervision with bed mobility and touch assist with transfers, ambulates 125 ft with a RW and touch assist.     TODAY'S SUBJECTIVE & REVIEW OF SYMPTOMS:  Patient seen and evaluated at bedside with Dr. Carolina. No acute events overnight. Patient feels well.        Review of Systems:   Constiutional:    [ x  ] WNL           [   ] poor appetite   [   ] insomnia   [   ] tired   Cardio:                [ x ] WNL           [   ] CP   [   ] TATE   [   ] palpitations               Resp:                   [ x ] WNL           [   ] SOB   [   ] cough   [   ] wheezing   GI:                        [ x ] WNL           [   ] constipation   [   ] diarrhea   [   ] abdominal pain   [   ] nausea   [   ] emesis                                :                      [ x ] WNL           [   ] RUFFIN  [   ] dusuria   [   ] difficulty voiding             Endo:                   [ x ] WNL          [   ] po;yuria   [   ] temperature intolerance                 Skin:                     [ x ] WNL          [   ] pain   [   ] wound   [   ] rash   MSK:                    [ x ] WNL          [   ] muscle pain   [   ] joint pain/ stiffness   [   ] muscle tenderness   [   ] swelling   Neuro:                 [   ] WNL          [   ] HA   [   ] change in vision   [   ] tremor   [   ] weakness   [   ]dysphagia   [ x ] dysphonia, dysarthria, left facial droop, left hemiparesis           Cognitive:           [ x ] WNL           [   ]confusion      Psych:                  [ x ] WNL           [   ] hallucinations   [   ]agitation   [   ] delusion   [   ]depressionPHYSICAL EXAM      PHYSICAL EXAM     Vital Signs Last 24 Hrs  T(C): 35.8 (26 Jul 2021 05:45), Max: 36.3 (25 Jul 2021 20:22)  T(F): 96.4 (26 Jul 2021 05:45), Max: 97.4 (25 Jul 2021 20:22)  HR: 69 (26 Jul 2021 05:45) (66 - 80)  BP: 119/59 (26 Jul 2021 05:45) (119/59 - 151/69)  BP(mean): --  RR: 19 (26 Jul 2021 05:45) (18 - 20)  SpO2: --    Constitutional - [ x  ] NAD, Comfortable        [   ] other:  Chest - [  x  ] CTA     [   ] other:  Cardiovascular - [ x  ] RRR, no murmer     [   ] other:  Abdomen - [ x  ] Soft, NT/ND      [   ] other:        -  [ x ] NOFOLEY CATHETER   [   ] YES  if yes: [   ] NO MEATAL TEAR OR DISCHARGE [   ] other:  Extremities - [ x ] No C/C/E, No calf tenderness       [   ] other:  ROM - [ x  ] WFL     [   ] other:  Neurologic Exam -                 Cognitive - [ x  ]Awake, Alert, AAO to self, place, date, year, situation         [    ] other:      Communication - [   ]Fluent, No dysarthria       [  x ] other: dysarthria, dysphonia      Motor - No focal deficits                    Right UE -  [  x ] WNL      [    ] other:                    Left UE -     [   ] WNL      [ x   ] other: 4-/5 strength, improved ROM                     Right LE -   [  x ] WNL       [    ] other:                    Left LE -      [   ]WNL        [   x ] other:  4+/5 strength      Sensory - [ x  ] Intact to LT      [    ] other:          Reflexes - [ x  ] wnl/ symmetric     [   ] other:     Psychiatric - [ x  ]Mood stable, Affect WNL     [   ]other:     Skin - [ x ] intact      [   ] other    MEDICATIONS  (STANDING):  amLODIPine   Tablet 5 milliGRAM(s) Oral daily  atorvastatin 80 milliGRAM(s) Oral at bedtime  dextrose 40% Gel 15 Gram(s) Oral once  dextrose 40% Gel 15 Gram(s) Oral once  dextrose 5%. 1000 milliLiter(s) (100 mL/Hr) IV Continuous <Continuous>  dextrose 5%. 1000 milliLiter(s) (50 mL/Hr) IV Continuous <Continuous>  dextrose 50% Injectable 25 Gram(s) IV Push once  dextrose 50% Injectable 12.5 Gram(s) IV Push once  dextrose 50% Injectable 25 Gram(s) IV Push once  enoxaparin Injectable 40 milliGRAM(s) SubCutaneous daily  glucagon  Injectable 1 milliGRAM(s) IntraMuscular once  insulin glargine Injectable (LANTUS) 8 Unit(s) SubCutaneous at bedtime  insulin lispro (ADMELOG) corrective regimen sliding scale   SubCutaneous three times a day before meals  insulin lispro Injectable (ADMELOG) 4 Unit(s) SubCutaneous three times a day before meals  losartan 100 milliGRAM(s) Oral daily  pantoprazole    Tablet 40 milliGRAM(s) Oral before breakfast  ticagrelor 90 milliGRAM(s) Oral every 12 hours    MEDICATIONS  (PRN):  acetaminophen   Tablet .. 650 milliGRAM(s) Oral every 6 hours PRN Temp greater or equal to 38C (100.4F), Mild Pain (1 - 3)  aluminum hydroxide/magnesium hydroxide/simethicone Suspension 30 milliLiter(s) Oral every 4 hours PRN Dyspepsia  magnesium hydroxide Suspension 30 milliLiter(s) Oral daily PRN Constipation  melatonin 5 milliGRAM(s) Oral at bedtime PRN Insomnia      RECENT LABS/IMAGING                                      14.6   6.44  )-----------( 349      ( 26 Jul 2021 07:54 )             45.5     07-26    142  |  103  |  20  ----------------------------<  163<H>  4.5   |  23  |  1.3    Ca    9.7      26 Jul 2021 07:54  Mg     1.9     07-26    TPro  7.2  /  Alb  4.3  /  TBili  1.4<H>  /  DBili  x   /  AST  34  /  ALT  35  /  AlkPhos  105  07-26        POCT Blood Glucose.: 145 mg/dL (07-26-21 @ 16:36)  POCT Blood Glucose.: 202 mg/dL (07-26-21 @ 12:02)  POCT Blood Glucose.: 153 mg/dL (07-26-21 @ 07:51)  POCT Blood Glucose.: 90 mg/dL (07-25-21 @ 20:49)  POCT Blood Glucose.: 151 mg/dL (07-25-21 @ 16:04)  POCT Blood Glucose.: 178 mg/dL (07-25-21 @ 11:26)  POCT Blood Glucose.: 149 mg/dL (07-25-21 @ 07:25)  POCT Blood Glucose.: 87 mg/dL (07-24-21 @ 20:48)  POCT Blood Glucose.: 143 mg/dL (07-24-21 @ 16:06)  POCT Blood Glucose.: 241 mg/dL (07-24-21 @ 11:59)  POCT Blood Glucose.: 131 mg/dL (07-24-21 @ 07:12)  POCT Blood Glucose.: 105 mg/dL (07-23-21 @ 21:43)

## 2021-07-26 NOTE — PROGRESS NOTE ADULT - ATTENDING COMMENTS
I reviewed the chart and examined the patient with the resident and we discussed the findings and treatment plan. I agree with the findings and treatment plan above, which I modified as indicated. The patient requires 3 hrs a day of acute inpatient rehab.    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   Admitted for rehab of  R medullary infarct and subsequent left sided weakness and facial droop.    # R medullary infarct  # Left sided weakness #Left sided facial droop, nondominant  - MRI of brain 7/12/21 redemonstrated the punctate infarct in the right ventral medulla, minimally enlarged possibly due to difference in technique compared to the prior exam from 7/5/2021. Slightly increased- mild edema. No additional acute infarcts. No intracranial hemorrhage or mass effect. Stable mild chronic microvascular ischemic changes.   - Brilinta 90 mg PO BID and Atorvastatin 80mg daily    Ambulating with touch assistance with walker. Tolerating therapy well. Gradual improvements.   - Continues to require acute PT/OT/SLT    Comorbidities    # HTN - avoid tight control given intracranial vertebral stenosis. BP syst is 107-126.   - BP goal is above (130-160 systolic)   - Losartan 100 mg PO QAM.  - Amlodipine- decrease from 10 mg to 5 mg and monitor. BPs in good range.    -#Type II DM on glypizide: Hemoglobin A1C 7.8 on 7/5, insulin regimen as needed (goal 140-180); patient was started on lantus 5u daily and low dose CS. Monitor. Uncontrolled hypergycemia. Increased Lantus to 7 units and 2 units Lispro w/ meals with improvement.    #Dysphagia:   - Oropharygeal  - Had FEES. Dysphagia 2 diet with nectar-thick liquids and head turn/ chin-tuck and multiple swallows upgraded to Dysphagia 2 diet (for lost dentures), with thin liquids. FEES also showed some VC erythema, possibly 2ndary to reflux. Will add PPI for 4-6 weeks.  - Now upgraded to Diet, Dysphagia 2 Mechanical Soft-Thin Liquids:   Consistent Carbohydrate {No Snacks}  Prosource Gelatein 20 Sugar Free     Qty per Day:  1 daily     Qty per Day:  consecutive swallow  Supplement Feeding Modality:  Oral  Glucerna Shake Cans or Servings Per Day:  1       Frequency:  Two Times a day (07-21-21 @ 18:29) [Active]    #Bladder/prostate CA: Outpatient management     #AAA: Stable at this time, Outpatient management    #HFrEF/ history of Systolic CHF: Echo taken 7/6/2021 shows decreased left ventricular systolic function (LVEF 46%); No overt symptoms at this time    - GI/Bowel Mgmt: patient currently has regular BM       # Precautions / PROPHYLAXIS:    - Falls  - Ortho: Weight bearing status: WBAT   - DVT prophylaxis: Lovenox I reviewed the chart and examined the patient with the resident and we discussed the findings and treatment plan. I agree with the findings and treatment plan above, which I modified as indicated. The patient requires 3 hrs a day of acute inpatient rehab.    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   Admitted for rehab of  R medullary infarct and subsequent left sided weakness and facial droop.    # R medullary infarct  - Left sided weakness #Left sided facial droop, nondominant  - MRI of brain 7/12/21 redemonstrated the punctate infarct in the right ventral medulla, minimally enlarged possibly due to difference in technique compared to the prior exam from 7/5/2021. Slightly increased- mild edema. No additional acute infarcts. No intracranial hemorrhage or mass effect. Stable mild chronic microvascular ischemic changes.   - Brilinta 90 mg PO BID and Atorvastatin 80mg daily    Ambulating with touch assistance with walker. Tolerating therapy well. Gradual improvements.   - Continues to require acute PT/OT/SLT    Comorbidities    # HTN - avoid tight control given intracranial vertebral stenosis. BP syst is 107-126.   - BP goal is (130-160 systolic)   - Losartan 100 mg PO QAM.  - Amlodipine- decrease from 10 mg to 5 mg and monitor. BPs in good range.    -#Type II DM on glipizide: Hemoglobin A1C 7.8 on 7/5, insulin regimen as needed (goal 140-180); patient was started on lantus 5u daily and low dose CS. Monitor. Better controlled    #Dysphagia:   - Oropharygeal  - Had FEES. Dysphagia 2 diet with nectar-thick liquids and head turn/ chin-tuck and multiple swallows upgraded to Dysphagia 2 diet (for lost dentures), with thin liquids. FEES also showed some VC erythema, possibly 2ndary to reflux. Added PPI for 4-6 weeks.  - Now upgraded to Diet, Dysphagia 2 Mechanical Soft-Thin Liquids:   Consistent Carbohydrate {No Snacks}  Prosource Gelatein 20 Sugar Free     Qty per Day:  1 daily     Qty per Day:  consecutive swallow  Supplement Feeding Modality:  Oral  Glucerna Shake Cans or Servings Per Day:  1       Frequency:  Two Times a day (07-21-21 @ 18:29) [Active]    #Bladder/prostate CA: Outpatient management     #AAA: Stable at this time, Outpatient management    #HFrEF/ history of Systolic CHF: Echo taken 7/6/2021 shows decreased left ventricular systolic function (LVEF 46%); No overt symptoms at this time    - GI/Bowel Mgmt: patient currently has regular BM       # Precautions / PROPHYLAXIS:    - Falls  - Ortho: Weight bearing status: WBAT   - DVT prophylaxis: Lovenox

## 2021-07-26 NOTE — PROGRESS NOTE ADULT - ASSESSMENT
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   Admitted for rehab of  R medullary infarct and subsequent left sided weakness and facial droop.    # R medullary infarct  # Left sided weakness, Left sided facial droop, nondominant  - MRI of brain 7/12/21 redemonstrated the punctate infarct in the right ventral medulla, minimally enlarged possibly due to difference in technique compared to the prior exam from 7/5/2021. Slightly increased mild edema. No additional acute infarcts. No intracranial hemorrhage or mass effect. Stable mild chronic microvascular ischemic changes.   - Brilinta 90 mg PO BID and Atorvastatin 80mg daily  - PT/OT/SLP    #Dysphagia:   - Oropharygeal  - Had FEES. Dysphagia 2 diet thin liquids (for lost dentures); FEES also showed some VC erythema, possibly 2ndary to reflux. Will add PPI for 4-6 weeks.  - Upgraded to regular diet w/ thin liquids    #Dysarthria:   - Speech therapy. Improving    Comorbidities    #HTN:  - BP goal as above (130-160 systolic) .  - Losartan 100 mg PO QAM.  - Decreased  Amlodipine 10 mg to 5 mg and monitor. Doing well.      #Type II DM on glypizide: Hemoglobin A1C 7.8 on 7/5, insulin regimen as needed (goal 140-180); patient's lantus was increased to 8 u and was started on lispro 4 u with meals with improving FS glucose.  - Monitor and adjust    #Bladder/prostate CA: Outpatient management     #AAA: Stable at this time, Outpatient management    #HFrEF/ history of Systolic CHF: Echo taken 7/6/2021 shows decreased left ventricular systolic function (LVEF 46%); No overt symptoms at this time    - GI/Bowel Mgmt: patient currently has regular BM       # Precautions / PROPHYLAXIS:    - Falls  - Ortho: Weight bearing status: WBAT   - DVT prophylaxis: Lovenox

## 2021-07-27 LAB
GLUCOSE BLDC GLUCOMTR-MCNC: 130 MG/DL — HIGH (ref 70–99)
GLUCOSE BLDC GLUCOMTR-MCNC: 147 MG/DL — HIGH (ref 70–99)
GLUCOSE BLDC GLUCOMTR-MCNC: 153 MG/DL — HIGH (ref 70–99)
GLUCOSE BLDC GLUCOMTR-MCNC: 156 MG/DL — HIGH (ref 70–99)

## 2021-07-27 RX ADMIN — ATORVASTATIN CALCIUM 80 MILLIGRAM(S): 80 TABLET, FILM COATED ORAL at 22:19

## 2021-07-27 RX ADMIN — Medication 4 UNIT(S): at 16:44

## 2021-07-27 RX ADMIN — Medication 1: at 07:39

## 2021-07-27 RX ADMIN — Medication 6 UNIT(S): at 07:39

## 2021-07-27 RX ADMIN — INSULIN GLARGINE 8 UNIT(S): 100 INJECTION, SOLUTION SUBCUTANEOUS at 22:19

## 2021-07-27 RX ADMIN — AMLODIPINE BESYLATE 5 MILLIGRAM(S): 2.5 TABLET ORAL at 05:53

## 2021-07-27 RX ADMIN — Medication 4 UNIT(S): at 12:33

## 2021-07-27 RX ADMIN — ENOXAPARIN SODIUM 40 MILLIGRAM(S): 100 INJECTION SUBCUTANEOUS at 12:33

## 2021-07-27 RX ADMIN — Medication 1: at 12:33

## 2021-07-27 RX ADMIN — TICAGRELOR 90 MILLIGRAM(S): 90 TABLET ORAL at 17:17

## 2021-07-27 RX ADMIN — PANTOPRAZOLE SODIUM 40 MILLIGRAM(S): 20 TABLET, DELAYED RELEASE ORAL at 06:27

## 2021-07-27 RX ADMIN — LOSARTAN POTASSIUM 100 MILLIGRAM(S): 100 TABLET, FILM COATED ORAL at 05:53

## 2021-07-27 RX ADMIN — TICAGRELOR 90 MILLIGRAM(S): 90 TABLET ORAL at 05:53

## 2021-07-27 NOTE — CONSULT NOTE ADULT - SUBJECTIVE AND OBJECTIVE BOX
Podiatry Consult Note    Subjective:    WILFREDO RIVAS is a 80y year old Male seen at bedside with a chief complaint of  painful thickened, dystrophic, ingrowing and long toenails digits 1-5 bilaterally  and preventative foot examination. Patient is medically managed  by Medicine/Hospitalists.  Patient denies any history of trauma to both feet. Patient has no other pedal complaints.  Patient is experiencing pain while standing, walking and in shoe gear.     PMH: DM (diabetes mellitus)    HTN (hypertension)    OA (osteoarthritis)    Cancer    CAD (coronary artery disease)    AAA (abdominal aortic aneurysm)    Abnormal chest xray    History of medical problems    Bladder cancer    Prostate cancer    Basal cell carcinoma     PAST MEDICAL & SURGICAL HISTORY:  DM (diabetes mellitus)    HTN (hypertension)    OA (osteoarthritis)    Cancer  prostate    CAD (coronary artery disease)  s/p 5 stents  2009    AAA (abdominal aortic aneurysm)  monitored by dr barker  stable x 10 yrs    Abnormal chest xray  monitored by fire dept    History of medical problems  hypercholesteremia, b/l inguinal hernias, pvd, popliteal artery aneruysm, b/l catararacts.    Bladder cancer    Prostate cancer    Basal cell carcinoma    History of surgery  s/p radiation and seed implants 2000    H/O hernia repair  x&#x27;s 2 2019 b/l inguinal    H/O heart surgery  5 stents placed 2009    H/O cataract extraction  b/l with iol&#x27;s      PSH:History of surgery    H/O hernia repair    H/O heart surgery    H/O cataract extraction      Allergies:No Known Allergies      Labs:                        14.6   6.44  )-----------( 349      ( 26 Jul 2021 07:54 )             45.5       WBC Trend  6.44 Date (07-26 @ 07:54)  8.41 Date (07-19 @ 07:25)  8.84 Date (07-15 @ 04:30)  7.87 Date (07-14 @ 05:28)  7.36 Date (07-13 @ 05:33)  8.16 Date (07-12 @ 14:31)  7.88 Date (07-07 @ 06:59)  7.62 Date (07-06 @ 06:18)  6.44 Date (07-05 @ 05:59)  7.16 Date (07-04 @ 15:50)      Chem  07-26    142  |  103  |  20  ----------------------------<  163<H>  4.5   |  23  |  1.3    Ca    9.7      26 Jul 2021 07:54  Mg     1.9     07-26    TPro  7.2  /  Alb  4.3  /  TBili  1.4<H>  /  DBili  x   /  AST  34  /  ALT  35  /  AlkPhos  105  07-26      T(F): 98.1 (07-27-21 @ 05:51), Max: 98.1 (07-27-21 @ 05:51)  HR: 65 (07-27-21 @ 05:51) (65 - 80)  BP: 116/58 (07-27-21 @ 05:51) (116/58 - 122/62)  RR: 18 (07-27-21 @ 05:51) (18 - 18)  SpO2: --  Wt(kg): --    Objective:   DERM:  Skin warm, dry and supple bilateral.  No open lesions or inter-digital macerations noted bilateral.   Toenails 1-5 Right and Left feet thickened, elongated, discolored, and dystrophic with subungual debris. There is pain upon palpation of all fungal and ingrowing nails 1-5 bilaterally.   VASC: Dorsalis Pedis and Posterior Tibial pulses 1/4.  Capillary re-fill time less than 3 seconds to digits   NEURO: Protective sensation intact to the level of the digits bilateral.  MSK: Muscle strength intact to all major muscle groups bilateral. No structural abnormality, bilaterally    Assessment:   Bilateral dystrophic toenails consistent with  Onychomycosis.   Pain from Elongated nails, ingrowing, incurvated,  and dystrophic nails upon palpation of nails.  Xerosis of bilateral plantar feet.     Plan:   Discussed diagnosis and treatment with patient  Aseptic debridement and curettage of all fungal and ingrowing nails 1-5 bilateral with sterile nail nipper.  Discussed importance of daily foot examinations, drying of feet after bathing and proper shoe gear.  Patient Would Benefit From Periodic Debridements; Can Follow Up as Outpatient w/ Dr. Castellanos Post Discharge   Discussed Plan w/ Attending;     Spectra;     Podiatry Consult Note    Subjective:    WILFREDO RIVAS is a 80y year old Male seen at bedside with a chief complaint of  painful thickened, dystrophic, ingrowing and long toenails digits 1-5 bilaterally  and preventative foot examination. Patient is medically managed  by Medicine/Hospitalists.  Patient denies any history of trauma to both feet. Patient has no other pedal complaints.  Patient is experiencing pain while standing, walking and in shoe gear.     PMH: DM (diabetes mellitus)    HTN (hypertension)    OA (osteoarthritis)    Cancer    CAD (coronary artery disease)    AAA (abdominal aortic aneurysm)    Abnormal chest xray    History of medical problems    Bladder cancer    Prostate cancer    Basal cell carcinoma     PAST MEDICAL & SURGICAL HISTORY:  DM (diabetes mellitus)    HTN (hypertension)    OA (osteoarthritis)    Cancer  prostate    CAD (coronary artery disease)  s/p 5 stents  2009    AAA (abdominal aortic aneurysm)  monitored by dr barker  stable x 10 yrs    Abnormal chest xray  monitored by fire dept    History of medical problems  hypercholesteremia, b/l inguinal hernias, pvd, popliteal artery aneruysm, b/l catararacts.    Bladder cancer    Prostate cancer    Basal cell carcinoma    History of surgery  s/p radiation and seed implants 2000    H/O hernia repair  x&#x27;s 2 2019 b/l inguinal    H/O heart surgery  5 stents placed 2009    H/O cataract extraction  b/l with iol&#x27;s      PSH:History of surgery    H/O hernia repair    H/O heart surgery    H/O cataract extraction      Allergies:No Known Allergies      Labs:                        14.6   6.44  )-----------( 349      ( 26 Jul 2021 07:54 )             45.5       WBC Trend  6.44 Date (07-26 @ 07:54)  8.41 Date (07-19 @ 07:25)  8.84 Date (07-15 @ 04:30)  7.87 Date (07-14 @ 05:28)  7.36 Date (07-13 @ 05:33)  8.16 Date (07-12 @ 14:31)  7.88 Date (07-07 @ 06:59)  7.62 Date (07-06 @ 06:18)  6.44 Date (07-05 @ 05:59)  7.16 Date (07-04 @ 15:50)      Chem  07-26    142  |  103  |  20  ----------------------------<  163<H>  4.5   |  23  |  1.3    Ca    9.7      26 Jul 2021 07:54  Mg     1.9     07-26    TPro  7.2  /  Alb  4.3  /  TBili  1.4<H>  /  DBili  x   /  AST  34  /  ALT  35  /  AlkPhos  105  07-26      T(F): 98.1 (07-27-21 @ 05:51), Max: 98.1 (07-27-21 @ 05:51)  HR: 65 (07-27-21 @ 05:51) (65 - 80)  BP: 116/58 (07-27-21 @ 05:51) (116/58 - 122/62)  RR: 18 (07-27-21 @ 05:51) (18 - 18)  SpO2: --  Wt(kg): --    Objective:   DERM:  Skin warm, dry and supple bilateral.  No open lesions or inter-digital macerations noted bilateral.   Toenails 1-5 Right and Left feet thickened, elongated, discolored, and dystrophic with subungual debris. There is pain upon palpation of all fungal and ingrowing nails 1-5 bilaterally.   VASC: Dorsalis Pedis and Posterior Tibial pulses Diminished.  Capillary re-fill time less than 3 seconds to digits   NEURO: Protective sensation intact to the level of the digits bilateral.  MSK: Muscle strength intact to all major muscle groups bilateral. No structural abnormality, bilaterally    Assessment:   Bilateral dystrophic toenails consistent with  Onychomycosis.   Pain from Elongated nails, ingrowing, incurvated,  and dystrophic nails upon palpation of nails.  Xerosis of bilateral plantar feet.     Plan:   Discussed diagnosis and treatment with patient  Aseptic debridement and curettage of all fungal and ingrowing nails 1-5 bilateral with sterile nail nipper.  Discussed importance of daily foot examinations, drying of feet after bathing and proper shoe gear.  Patient Would Benefit From Periodic Debridements; Can Follow Up as Outpatient w/ Dr. Castellanos Post Discharge   Discussed Plan w/ Attending;     Spectra;

## 2021-07-27 NOTE — PROGRESS NOTE ADULT - ASSESSMENT
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   Admitted for rehab of  R medullary infarct and subsequent left sided weakness and facial droop.    # R medullary infarct  # Left sided weakness, Left sided facial droop, nondominant  - MRI of brain 7/12/21 redemonstrated the punctate infarct in the right ventral medulla, minimally enlarged possibly due to difference in technique compared to the prior exam from 7/5/2021. Slightly increased mild edema. No additional acute infarcts. No intracranial hemorrhage or mass effect. Stable mild chronic microvascular ischemic changes.   - Brilinta 90 mg PO BID and Atorvastatin 80mg daily  - PT/OT/SLP    #Dysphagia:   - Oropharygeal  - Had FEES. Dysphagia 2 diet thin liquids (for lost dentures); FEES also showed some VC erythema, possibly 2ndary to reflux. Will add PPI for 4-6 weeks.  - Upgraded to regular diet w/ thin liquids    #Dysarthria:   - Speech therapy. Improving    Comorbidities    #HTN:  - BP goal as above (130-160 systolic) .  - Losartan 100 mg PO QAM.  - Decreased  Amlodipine 10 mg to 5 mg and monitor. Doing well.      #Type II DM on glypizide: Hemoglobin A1C 7.8 on 7/5, insulin regimen as needed (goal 140-180); patient's lantus was increased to 8 u and was started on lispro 4 u with meals with improving FS glucose.  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   Admitted for rehab of  R medullary infarct and subsequent left sided weakness and facial droop.    # R medullary infarct  # Left sided weakness, Left sided facial droop, nondominant  - MRI of brain 7/12/21 redemonstrated the punctate infarct in the right ventral medulla, minimally enlarged possibly due to difference in technique compared to the prior exam from 7/5/2021. Slightly increased mild edema. No additional acute infarcts. No intracranial hemorrhage or mass effect. Stable mild chronic microvascular ischemic changes.   - Brilinta 90 mg PO BID and Atorvastatin 80mg daily  - PT/OT/SLP    #Dysphagia:   - Oropharygeal  - Had FEES. Dysphagia 2 diet thin liquids (for lost dentures); FEES also showed some VC erythema, possibly 2ndary to reflux. Will add PPI for 4-6 weeks.  - Upgraded to regular diet w/ thin liquids    #Dysarthria:   - Speech therapy. Improving    Comorbidities    #HTN:  - BP goal as above (130-160 systolic) .  - Losartan 100 mg PO QAM.  - Decreased  Amlodipine 10 mg to 5 mg and monitor. Doing well.      #Type II DM on glypizide at home: Hemoglobin A1C 7.8 on 7/5, insulin regimen as needed (goal 140-180); patient's lantus was increased to 8 u and was started on lispro 6/4/4 u with meals with improving FS glucose.  - Monitor and adjust    #Bladder/prostate CA: Outpatient management     #AAA: Stable at this time, Outpatient management    #HFrEF/ history of Systolic CHF: Echo taken 7/6/2021 shows decreased left ventricular systolic function (LVEF 46%); No overt symptoms at this time    - GI/Bowel Mgmt: patient currently has regular BM       # Precautions / PROPHYLAXIS:    - Falls  - Ortho: Weight bearing status: WBAT   - DVT prophylaxis: Lovenox

## 2021-07-27 NOTE — PROGRESS NOTE ADULT - SUBJECTIVE AND OBJECTIVE BOX
Patient is a 80y old  Male who presents with a chief complaint of Admitted for R medullary infarct; subsequent left sided weakness, facial droop (14 Jul 2021 14:04)      HPI:  80 year old male with a PmHx of CAD s/p 5 stents (2009), DM, HTN, bladder and prostate CA, stable AAA, recent acute stroke (discharged 7/7/2021) presents to [facility] with left sided upper and lower extremity weakness. 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 Patient had been evaluated by neuroendovascular who recommended no acute intervention. He was discharged for outpatient management. The day after discharge, patient started to feel recurrent left upper extremity weakness. He did not seek medical attention and his weakness continued to worsen through the weekend. Patient decided to come in today for further management as he can hardly move his left arm and his left leg is very weak. Patient does note, however, that his left arm was quite strong intermittently today, but 30 minutes later his weakness returned. Repeat CTH shows same territory infarct. CTA head and neck with redemonstration of moderate stenosis involving the proximal M2 inferior division of the right MCA and severe multifocal stenoses involving the V4 segment of the right vertebral artery due to calcified atherosclerotic plaque. Seen by neurology who suspect fluctuating symptoms may be due to high grade vertebral artery vessel disease. Being admitted for further management. Patient denies any fevers, chills, chest pain, SOB, nausea, vomiting, abdominal pain. MRI was performed that showed the completion of the previous stroke in the right medulla.    PLOF independent with ADLs, and with RW which was used after acute stroke in early July     The patient was evaluated by a physical medicine and rehabilitation specialist and was found to be a good candidate for acute rehabilitation.   The patient would benefit from 3 hours of interdisciplinary therapy per day.       CLOF: supervision with bed mobility and touch assist with transfers, ambulates 150 ft x 2 with a RW and Supervision     TODAY'S SUBJECTIVE & REVIEW OF SYMPTOMS:  Patient seen and evaluated at bedside with Dr. Carolina. No acute events overnight. Patient feels well.        Review of Systems:   Constiutional:    [ x  ] WNL           [   ] poor appetite   [   ] insomnia   [   ] tired   Cardio:                [ x ] WNL           [   ] CP   [   ] TATE   [   ] palpitations               Resp:                   [ x ] WNL           [   ] SOB   [   ] cough   [   ] wheezing   GI:                        [ x ] WNL           [   ] constipation   [   ] diarrhea   [   ] abdominal pain   [   ] nausea   [   ] emesis                                :                      [ x ] WNL           [   ] RUFFIN  [   ] dusuria   [   ] difficulty voiding             Endo:                   [ x ] WNL          [   ] po;yuria   [   ] temperature intolerance                 Skin:                     [ x ] WNL          [   ] pain   [   ] wound   [   ] rash   MSK:                    [ x ] WNL          [   ] muscle pain   [   ] joint pain/ stiffness   [   ] muscle tenderness   [   ] swelling   Neuro:                 [   ] WNL          [   ] HA   [   ] change in vision   [   ] tremor   [   ] weakness   [   ]dysphagia   [ x ] dysphonia, dysarthria, left facial droop, left hemiparesis           Cognitive:           [ x ] WNL           [   ]confusion      Psych:                  [ x ] WNL           [   ] hallucinations   [   ]agitation   [   ] delusion   [   ]depressionPHYSICAL EXAM      PHYSICAL EXAM   Vital Signs Last 24 Hrs  T(C): 36.7 (27 Jul 2021 05:51), Max: 36.7 (27 Jul 2021 05:51)  T(F): 98.1 (27 Jul 2021 05:51), Max: 98.1 (27 Jul 2021 05:51)  HR: 65 (27 Jul 2021 05:51) (65 - 80)  BP: 116/58 (27 Jul 2021 05:51) (116/58 - 122/62)  BP(mean): --  RR: 18 (27 Jul 2021 05:51) (18 - 18)  SpO2: --    Constitutional - [ x  ] NAD, Comfortable        [   ] other:  Chest - [  x  ] CTA     [   ] other:  Cardiovascular - [ x  ] RRR, no murmer     [   ] other:  Abdomen - [ x  ] Soft, NT/ND      [   ] other:        -  [ x ] NOFOLEY CATHETER   [   ] YES  if yes: [   ] NO MEATAL TEAR OR DISCHARGE [   ] other:  Extremities - [ x ] No C/C/E, No calf tenderness       [   ] other:  ROM - [ x  ] WFL     [   ] other:  Neurologic Exam -                 Cognitive - [ x  ]Awake, Alert, AAO to self, place, date, year, situation         [    ] other:      Communication - [   ]Fluent, No dysarthria       [  x ] other: dysarthria, dysphonia      Motor - No focal deficits                    Right UE -  [  x ] WNL      [    ] other:                    Left UE -     [   ] WNL      [ x   ] other: 4-/5 strength, improved ROM                     Right LE -   [  x ] WNL       [    ] other:                    Left LE -      [   ]WNL        [   x ] other:  4+/5 strength      Sensory - [ x  ] Intact to LT      [    ] other:          Reflexes - [ x  ] wnl/ symmetric     [   ] other:     Psychiatric - [ x  ]Mood stable, Affect WNL     [   ]other:     Skin - [ x ] intact      [   ] other    MEDICATIONS  (STANDING):  amLODIPine   Tablet 5 milliGRAM(s) Oral daily  atorvastatin 80 milliGRAM(s) Oral at bedtime  dextrose 40% Gel 15 Gram(s) Oral once  dextrose 40% Gel 15 Gram(s) Oral once  dextrose 5%. 1000 milliLiter(s) (50 mL/Hr) IV Continuous <Continuous>  dextrose 5%. 1000 milliLiter(s) (100 mL/Hr) IV Continuous <Continuous>  dextrose 50% Injectable 12.5 Gram(s) IV Push once  dextrose 50% Injectable 25 Gram(s) IV Push once  dextrose 50% Injectable 25 Gram(s) IV Push once  enoxaparin Injectable 40 milliGRAM(s) SubCutaneous daily  glucagon  Injectable 1 milliGRAM(s) IntraMuscular once  insulin glargine Injectable (LANTUS) 8 Unit(s) SubCutaneous at bedtime  insulin lispro (ADMELOG) corrective regimen sliding scale   SubCutaneous three times a day before meals  insulin lispro Injectable (ADMELOG) 6 Unit(s) SubCutaneous before breakfast  insulin lispro Injectable (ADMELOG) 4 Unit(s) SubCutaneous before lunch  insulin lispro Injectable (ADMELOG) 4 Unit(s) SubCutaneous before dinner  losartan 100 milliGRAM(s) Oral daily  pantoprazole    Tablet 40 milliGRAM(s) Oral before breakfast  ticagrelor 90 milliGRAM(s) Oral every 12 hours    MEDICATIONS  (PRN):  acetaminophen   Tablet .. 650 milliGRAM(s) Oral every 6 hours PRN Temp greater or equal to 38C (100.4F), Mild Pain (1 - 3)  aluminum hydroxide/magnesium hydroxide/simethicone Suspension 30 milliLiter(s) Oral every 4 hours PRN Dyspepsia  magnesium hydroxide Suspension 30 milliLiter(s) Oral daily PRN Constipation  melatonin 5 milliGRAM(s) Oral at bedtime PRN Insomnia        RECENT LABS/IMAGING                                      14.6   6.44  )-----------( 349      ( 26 Jul 2021 07:54 )             45.5     07-26    142  |  103  |  20  ----------------------------<  163<H>  4.5   |  23  |  1.3    Ca    9.7      26 Jul 2021 07:54  Mg     1.9     07-26    TPro  7.2  /  Alb  4.3  /  TBili  1.4<H>  /  DBili  x   /  AST  34  /  ALT  35  /  AlkPhos  105  07-26        POCT Blood Glucose.: 145 mg/dL (07-26-21 @ 16:36)  POCT Blood Glucose.: 202 mg/dL (07-26-21 @ 12:02)  POCT Blood Glucose.: 153 mg/dL (07-26-21 @ 07:51)  POCT Blood Glucose.: 90 mg/dL (07-25-21 @ 20:49)  POCT Blood Glucose.: 151 mg/dL (07-25-21 @ 16:04)  POCT Blood Glucose.: 178 mg/dL (07-25-21 @ 11:26)  POCT Blood Glucose.: 149 mg/dL (07-25-21 @ 07:25)  POCT Blood Glucose.: 87 mg/dL (07-24-21 @ 20:48)  POCT Blood Glucose.: 143 mg/dL (07-24-21 @ 16:06)  POCT Blood Glucose.: 241 mg/dL (07-24-21 @ 11:59)  POCT Blood Glucose.: 131 mg/dL (07-24-21 @ 07:12)  POCT Blood Glucose.: 105 mg/dL (07-23-21 @ 21:43)                         Patient is a 80y old  Male who presents with a chief complaint of Admitted for R medullary infarct; subsequent left sided weakness, facial droop (14 Jul 2021 14:04)      HPI:  80 year old male with a PmHx of CAD s/p 5 stents (2009), DM, HTN, bladder and prostate CA, stable AAA, recent acute stroke (discharged 7/7/2021) presents to [facility] with left sided upper and lower extremity weakness. 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 Patient had been evaluated by neuroendovascular who recommended no acute intervention. He was discharged for outpatient management. The day after discharge, patient started to feel recurrent left upper extremity weakness. He did not seek medical attention and his weakness continued to worsen through the weekend. Patient decided to come in today for further management as he can hardly move his left arm and his left leg is very weak. Patient does note, however, that his left arm was quite strong intermittently today, but 30 minutes later his weakness returned. Repeat CTH shows same territory infarct. CTA head and neck with redemonstration of moderate stenosis involving the proximal M2 inferior division of the right MCA and severe multifocal stenoses involving the V4 segment of the right vertebral artery due to calcified atherosclerotic plaque. Seen by neurology who suspect fluctuating symptoms may be due to high grade vertebral artery vessel disease. Being admitted for further management. Patient denies any fevers, chills, chest pain, SOB, nausea, vomiting, abdominal pain. MRI was performed that showed the completion of the previous stroke in the right medulla.    PLOF independent with ADLs, and with RW which was used after acute stroke in early July     The patient was evaluated by a physical medicine and rehabilitation specialist and was found to be a good candidate for acute rehabilitation.   The patient would benefit from 3 hours of interdisciplinary therapy per day.       CLOF: supervision with bed mobility and supervision with transfers, ambulates 150 ft x 2 with a RW and Supervision     TODAY'S SUBJECTIVE & REVIEW OF SYMPTOMS:  Patient seen and evaluated at bedside with Dr. Carolina. No acute events overnight. Patient feels well.        Review of Systems:   Constiutional:    [ x  ] WNL           [   ] poor appetite   [   ] insomnia   [   ] tired   Cardio:                [ x ] WNL           [   ] CP   [   ] TATE   [   ] palpitations               Resp:                   [ x ] WNL           [   ] SOB   [   ] cough   [   ] wheezing   GI:                        [ x ] WNL           [   ] constipation   [   ] diarrhea   [   ] abdominal pain   [   ] nausea   [   ] emesis                                :                      [ x ] WNL           [   ] RUFFIN  [   ] dusuria   [   ] difficulty voiding             Endo:                   [ x ] WNL          [   ] po;yuria   [   ] temperature intolerance                 Skin:                     [ x ] WNL          [   ] pain   [   ] wound   [   ] rash   MSK:                    [ x ] WNL          [   ] muscle pain   [   ] joint pain/ stiffness   [   ] muscle tenderness   [   ] swelling   Neuro:                 [   ] WNL          [   ] HA   [   ] change in vision   [   ] tremor   [   ] weakness   [   ]dysphagia   [ x ] dysphonia, dysarthria, left facial droop, left hemiparesis           Cognitive:           [ x ] WNL           [   ]confusion      Psych:                  [ x ] WNL           [   ] hallucinations   [   ]agitation   [   ] delusion   [   ]depressionPHYSICAL EXAM      PHYSICAL EXAM   Vital Signs Last 24 Hrs  T(C): 36.7 (27 Jul 2021 05:51), Max: 36.7 (27 Jul 2021 05:51)  T(F): 98.1 (27 Jul 2021 05:51), Max: 98.1 (27 Jul 2021 05:51)  HR: 65 (27 Jul 2021 05:51) (65 - 80)  BP: 116/58 (27 Jul 2021 05:51) (116/58 - 122/62)  BP(mean): --  RR: 18 (27 Jul 2021 05:51) (18 - 18)  SpO2: --    Constitutional - [ x  ] NAD, Comfortable        [   ] other:  Chest - [  x  ] CTA     [   ] other:  Cardiovascular - [ x  ] RRR, no murmer     [   ] other:  Abdomen - [ x  ] Soft, NT/ND      [   ] other:        -  [ x ] NOFOLEY CATHETER   [   ] YES  if yes: [   ] NO MEATAL TEAR OR DISCHARGE [   ] other:  Extremities - [ x ] No C/C/E, No calf tenderness       [   ] other:  ROM - [ x  ] WFL     [   ] other:  Neurologic Exam -                 Cognitive - [ x  ]Awake, Alert, AAO to self, place, date, year, situation         [    ] other:      Communication - [   ]Fluent, No dysarthria       [  x ] other: dysarthria, dysphonia      Motor - No focal deficits                    Right UE -  [  x ] WNL      [    ] other:                    Left UE -     [   ] WNL      [ x   ] other: 4-/5 strength, improved ROM                     Right LE -   [  x ] WNL       [    ] other:                    Left LE -      [   ]WNL        [   x ] other:  4+/5 strength      Sensory - [ x  ] Intact to LT      [    ] other:          Reflexes - [ x  ] wnl/ symmetric     [   ] other:     Psychiatric - [ x  ]Mood stable, Affect WNL     [   ]other:     Skin - [ x ] intact      [   ] other    MEDICATIONS  (STANDING):  amLODIPine   Tablet 5 milliGRAM(s) Oral daily  atorvastatin 80 milliGRAM(s) Oral at bedtime  dextrose 40% Gel 15 Gram(s) Oral once  dextrose 40% Gel 15 Gram(s) Oral once  dextrose 5%. 1000 milliLiter(s) (50 mL/Hr) IV Continuous <Continuous>  dextrose 5%. 1000 milliLiter(s) (100 mL/Hr) IV Continuous <Continuous>  dextrose 50% Injectable 25 Gram(s) IV Push once  dextrose 50% Injectable 12.5 Gram(s) IV Push once  dextrose 50% Injectable 25 Gram(s) IV Push once  enoxaparin Injectable 40 milliGRAM(s) SubCutaneous daily  glucagon  Injectable 1 milliGRAM(s) IntraMuscular once  insulin glargine Injectable (LANTUS) 8 Unit(s) SubCutaneous at bedtime  insulin lispro (ADMELOG) corrective regimen sliding scale   SubCutaneous three times a day before meals  insulin lispro Injectable (ADMELOG) 6 Unit(s) SubCutaneous before breakfast  insulin lispro Injectable (ADMELOG) 4 Unit(s) SubCutaneous before lunch  insulin lispro Injectable (ADMELOG) 4 Unit(s) SubCutaneous before dinner  losartan 100 milliGRAM(s) Oral daily  pantoprazole    Tablet 40 milliGRAM(s) Oral before breakfast  ticagrelor 90 milliGRAM(s) Oral every 12 hours    MEDICATIONS  (PRN):  acetaminophen   Tablet .. 650 milliGRAM(s) Oral every 6 hours PRN Temp greater or equal to 38C (100.4F), Mild Pain (1 - 3)  aluminum hydroxide/magnesium hydroxide/simethicone Suspension 30 milliLiter(s) Oral every 4 hours PRN Dyspepsia  magnesium hydroxide Suspension 30 milliLiter(s) Oral daily PRN Constipation  melatonin 5 milliGRAM(s) Oral at bedtime PRN Insomnia    RECENT LABS/IMAGING                                      14.6   6.44  )-----------( 349      ( 26 Jul 2021 07:54 )             45.5     07-26    142  |  103  |  20  ----------------------------<  163<H>  4.5   |  23  |  1.3    Ca    9.7      26 Jul 2021 07:54  Mg     1.9     07-26    TPro  7.2  /  Alb  4.3  /  TBili  1.4<H>  /  DBili  x   /  AST  34  /  ALT  35  /  AlkPhos  105  07-26        POCT Blood Glucose.: 145 mg/dL (07-26-21 @ 16:36)  POCT Blood Glucose.: 202 mg/dL (07-26-21 @ 12:02)  POCT Blood Glucose.: 153 mg/dL (07-26-21 @ 07:51)  POCT Blood Glucose.: 90 mg/dL (07-25-21 @ 20:49)  POCT Blood Glucose.: 151 mg/dL (07-25-21 @ 16:04)  POCT Blood Glucose.: 178 mg/dL (07-25-21 @ 11:26)  POCT Blood Glucose.: 149 mg/dL (07-25-21 @ 07:25)  POCT Blood Glucose.: 87 mg/dL (07-24-21 @ 20:48)  POCT Blood Glucose.: 143 mg/dL (07-24-21 @ 16:06)  POCT Blood Glucose.: 241 mg/dL (07-24-21 @ 11:59)  POCT Blood Glucose.: 131 mg/dL (07-24-21 @ 07:12)  POCT Blood Glucose.: 105 mg/dL (07-23-21 @ 21:43)

## 2021-07-27 NOTE — PROGRESS NOTE ADULT - ATTENDING COMMENTS
I reviewed the chart and examined the patient with the resident and we discussed the findings and treatment plan. I agree with the findings and treatment plan above, which I modified as indicated. The patient requires 3 hrs a day of acute inpatient rehab.    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   Admitted for rehab of  R medullary infarct and subsequent left sided weakness and facial droop.    # R medullary infarct  - Left sided weakness #Left sided facial droop, nondominant  - MRI of brain 7/12/21 redemonstrated the punctate infarct in the right ventral medulla, minimally enlarged possibly due to difference in technique compared to the prior exam from 7/5/2021. Slightly increased- mild edema. No additional acute infarcts. No intracranial hemorrhage or mass effect. Stable mild chronic microvascular ischemic changes.   - Brilinta 90 mg PO BID and Atorvastatin 80mg daily    Ambulating with touch assistance with walker. Tolerating therapy well. Gradual improvements.   - Continues to require acute PT/OT/SLT    Comorbidities    # HTN - avoid tight control given intracranial vertebral stenosis. BP syst is 107-126.   - BP goal is (130-160 systolic)   - Losartan 100 mg PO QAM.  - Amlodipine- decrease from 10 mg to 5 mg and monitor. BPs in good range.    -#Type II DM on glipizide: Hemoglobin A1C 7.8 on 7/5, insulin regimen as needed (goal 140-180); patient was started on lantus 5u daily and low dose CS. Monitor. Better controlled    #Dysphagia:   - Oropharygeal  - Had FEES. Dysphagia 2 diet with nectar-thick liquids and head turn/ chin-tuck and multiple swallows upgraded to Dysphagia 2 diet (for lost dentures), with thin liquids. FEES also showed some VC erythema, possibly 2ndary to reflux. Added PPI for 4-6 weeks.  - Now upgraded to Diet, Dysphagia 2 Mechanical Soft-Thin Liquids:   Consistent Carbohydrate {No Snacks}  Prosource Gelatein 20 Sugar Free     Qty per Day:  1 daily     Qty per Day:  consecutive swallow  Supplement Feeding Modality:  Oral  Glucerna Shake Cans or Servings Per Day:  1       Frequency:  Two Times a day (07-21-21 @ 18:29) [Active]    #Bladder/prostate CA: Outpatient management     #AAA: Stable at this time, Outpatient management    #HFrEF/ history of Systolic CHF: Echo taken 7/6/2021 shows decreased left ventricular systolic function (LVEF 46%); No overt symptoms at this time    - GI/Bowel Mgmt: patient currently has regular BM       # Precautions / PROPHYLAXIS:    - Falls  - Ortho: Weight bearing status: WBAT   - DVT prophylaxis: Lovenox I reviewed the chart and examined the patient with the resident and we discussed the findings and treatment plan. I agree with the findings and treatment plan above, which I modified as indicated. The patient requires 3 hrs a day of acute inpatient rehab.    80 Y M PMHx of CAD/stents, DM, HTN, bladder and prostate CA, stable AAA, recent  acute medullary stroke (discharged 7/7/2021) presents to [facility] with recurrent left sided upper and lower extremity weakness.   Admitted for rehab of  R medullary infarct and subsequent left sided weakness and facial droop.    # R medullary infarct  - Left sided weakness #Left sided facial droop, nondominant  - MRI of brain 7/12/21 redemonstrated the punctate infarct in the right ventral medulla, minimally enlarged possibly due to difference in technique compared to the prior exam from 7/5/2021. Slightly increased- mild edema. No additional acute infarcts. No intracranial hemorrhage or mass effect. Stable mild chronic microvascular ischemic changes.   - Brilinta 90 mg PO BID and Atorvastatin 80mg daily    Ambulating with supervision with walker. Tolerating therapy well. Gradual improvements.   - Antic d/c home with family in am.    Comorbidities    # HTN - avoid tight control given intracranial vertebral stenosis. BP syst is 107-126.   - BP goal is (130-160 systolic)   - Losartan 100 mg PO QAM.  - Amlodipine- decrease from 10 mg to 5 mg and monitor. BPs in good range.    -#Type II DM on glipizide: Hemoglobin A1C 7.8 on 7/5, insulin regimen as needed (goal 140-180); On lantus 8u daily and Lispro 6/4/4. Better controlled    #Dysphagia:   - Oropharygeal  - Had FEES. Dysphagia 2 diet with nectar-thick liquids and head turn/ chin-tuck and multiple swallows upgraded to Dysphagia 2 diet (for lost dentures), with thin liquids. FEES also showed some VC erythema, possibly 2ndary to reflux. Added PPI for 4-6 weeks.  - Now upgraded to Diet, Dysphagia 2 Mechanical Soft-Thin Liquids:   Consistent Carbohydrate {No Snacks}  Prosource Gelatein 20 Sugar Free     Qty per Day:  1 daily     Qty per Day:  consecutive swallow  Supplement Feeding Modality:  Oral  Glucerna Shake Cans or Servings Per Day:  1       Frequency:  Two Times a day (07-21-21 @ 18:29) [Active]    #Bladder/prostate CA: Outpatient management     #AAA: Stable at this time, Outpatient management    #HFrEF/ history of Systolic CHF: Echo taken 7/6/2021 shows decreased left ventricular systolic function (LVEF 46%); No overt symptoms at this time    - GI/Bowel Mgmt: patient currently has regular BM       # Precautions / PROPHYLAXIS:    - Falls  - Ortho: Weight bearing status: WBAT   - DVT prophylaxis: Lovenox

## 2021-07-28 ENCOUNTER — TRANSCRIPTION ENCOUNTER (OUTPATIENT)
Age: 81
End: 2021-07-28

## 2021-07-28 VITALS
HEART RATE: 75 BPM | RESPIRATION RATE: 18 BRPM | DIASTOLIC BLOOD PRESSURE: 57 MMHG | SYSTOLIC BLOOD PRESSURE: 124 MMHG | TEMPERATURE: 97 F

## 2021-07-28 LAB
GLUCOSE BLDC GLUCOMTR-MCNC: 142 MG/DL — HIGH (ref 70–99)
GLUCOSE BLDC GLUCOMTR-MCNC: 216 MG/DL — HIGH (ref 70–99)

## 2021-07-28 RX ORDER — LANOLIN ALCOHOL/MO/W.PET/CERES
1 CREAM (GRAM) TOPICAL
Qty: 0 | Refills: 0 | DISCHARGE
Start: 2021-07-28

## 2021-07-28 RX ORDER — LOSARTAN POTASSIUM 100 MG/1
50 TABLET, FILM COATED ORAL DAILY
Refills: 0 | Status: DISCONTINUED | OUTPATIENT
Start: 2021-07-28 | End: 2021-07-28

## 2021-07-28 RX ORDER — ATORVASTATIN CALCIUM 80 MG/1
1 TABLET, FILM COATED ORAL
Qty: 30 | Refills: 0
Start: 2021-07-28 | End: 2021-08-26

## 2021-07-28 RX ORDER — LOSARTAN POTASSIUM 100 MG/1
1 TABLET, FILM COATED ORAL
Qty: 30 | Refills: 0
Start: 2021-07-28 | End: 2021-08-26

## 2021-07-28 RX ORDER — TICAGRELOR 90 MG/1
1 TABLET ORAL
Qty: 60 | Refills: 0
Start: 2021-07-28 | End: 2021-08-26

## 2021-07-28 RX ORDER — ACETAMINOPHEN 500 MG
2 TABLET ORAL
Qty: 0 | Refills: 0 | DISCHARGE
Start: 2021-07-28

## 2021-07-28 RX ADMIN — TICAGRELOR 90 MILLIGRAM(S): 90 TABLET ORAL at 06:20

## 2021-07-28 RX ADMIN — Medication 2: at 12:21

## 2021-07-28 RX ADMIN — ENOXAPARIN SODIUM 40 MILLIGRAM(S): 100 INJECTION SUBCUTANEOUS at 12:22

## 2021-07-28 RX ADMIN — AMLODIPINE BESYLATE 5 MILLIGRAM(S): 2.5 TABLET ORAL at 06:20

## 2021-07-28 RX ADMIN — PANTOPRAZOLE SODIUM 40 MILLIGRAM(S): 20 TABLET, DELAYED RELEASE ORAL at 06:20

## 2021-07-28 RX ADMIN — Medication 4 UNIT(S): at 12:22

## 2021-07-28 RX ADMIN — Medication 6 UNIT(S): at 08:29

## 2021-07-28 NOTE — DISCHARGE NOTE NURSING/CASE MANAGEMENT/SOCIAL WORK - PATIENT PORTAL LINK FT
You can access the FollowMyHealth Patient Portal offered by [facility] by registering at the following website: [email or web address]. By joining [employer]’s FollowMyHealth portal, you will also be able to view your health information using other applications (apps) compatible with our system.

## 2021-07-28 NOTE — PROGRESS NOTE ADULT - SUBJECTIVE AND OBJECTIVE BOX
Patient is a 80y old  Male who presents with a chief complaint of Admitted for R medullary infarct; subsequent left sided weakness, facial droop (14 Jul 2021 14:04)      HPI:  80 year old male with a PmHx of CAD s/p 5 stents (2009), DM, HTN, bladder and prostate CA, stable AAA, recent acute stroke (discharged 7/7/2021) presents to [facility] with left sided upper and lower extremity weakness. 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 Patient had been evaluated by neuroendovascular who recommended no acute intervention. He was discharged for outpatient management. The day after discharge, patient started to feel recurrent left upper extremity weakness. He did not seek medical attention and his weakness continued to worsen through the weekend. Patient decided to come in today for further management as he can hardly move his left arm and his left leg is very weak. Patient does note, however, that his left arm was quite strong intermittently today, but 30 minutes later his weakness returned. Repeat CTH shows same territory infarct. CTA head and neck with redemonstration of moderate stenosis involving the proximal M2 inferior division of the right MCA and severe multifocal stenoses involving the V4 segment of the right vertebral artery due to calcified atherosclerotic plaque. Seen by neurology who suspect fluctuating symptoms may be due to high grade vertebral artery vessel disease. Being admitted for further management. Patient denies any fevers, chills, chest pain, SOB, nausea, vomiting, abdominal pain. MRI was performed that showed the completion of the previous stroke in the right medulla.    PLOF independent with ADLs, and with RW which was used after acute stroke in early July     The patient was evaluated by a physical medicine and rehabilitation specialist and was found to be a good candidate for acute rehabilitation.   The patient would benefit from 3 hours of interdisciplinary therapy per day.       CLOF: independent with bed mobility and supervision with transfers, ambulates 150 ft x 1 with a RW and Supervision     TODAY'S SUBJECTIVE & REVIEW OF SYMPTOMS:  Patient seen and evaluated at bedside with Dr. Carolina. No acute events overnight. Patient feels well.        Review of Systems:   Constiutional:    [ x  ] WNL           [   ] poor appetite   [   ] insomnia   [   ] tired   Cardio:                [ x ] WNL           [   ] CP   [   ] TATE   [   ] palpitations               Resp:                   [ x ] WNL           [   ] SOB   [   ] cough   [   ] wheezing   GI:                        [ x ] WNL           [   ] constipation   [   ] diarrhea   [   ] abdominal pain   [   ] nausea   [   ] emesis                                :                      [ x ] WNL           [   ] RUFFIN  [   ] dusuria   [   ] difficulty voiding             Endo:                   [ x ] WNL          [   ] po;yuria   [   ] temperature intolerance                 Skin:                     [ x ] WNL          [   ] pain   [   ] wound   [   ] rash   MSK:                    [ x ] WNL          [   ] muscle pain   [   ] joint pain/ stiffness   [   ] muscle tenderness   [   ] swelling   Neuro:                 [   ] WNL          [   ] HA   [   ] change in vision   [   ] tremor   [   ] weakness   [   ]dysphagia   [ x ] dysphonia, dysarthria, left facial droop, left hemiparesis           Cognitive:           [ x ] WNL           [   ]confusion      Psych:                  [ x ] WNL           [   ] hallucinations   [   ]agitation   [   ] delusion   [   ]depressionPHYSICAL EXAM      Vital Signs Last 24 Hrs  T(C): 36.1 (28 Jul 2021 05:52), Max: 36.6 (27 Jul 2021 21:00)  T(F): 97 (28 Jul 2021 05:52), Max: 97.8 (27 Jul 2021 21:00)  HR: 75 (28 Jul 2021 05:52) (69 - 75)  BP: 124/57 (28 Jul 2021 05:52) (99/58 - 124/57)  BP(mean): --  RR: 18 (28 Jul 2021 05:52) (18 - 18)  SpO2: --    Constitutional - [ x  ] NAD, Comfortable        [   ] other:  Chest - [  x  ] CTA     [   ] other:  Cardiovascular - [ x  ] RRR, no murmer     [   ] other:  Abdomen - [ x  ] Soft, NT/ND      [   ] other:        -  [ x ] NOFOLEY CATHETER   [   ] YES  if yes: [   ] NO MEATAL TEAR OR DISCHARGE [   ] other:  Extremities - [ x ] No C/C/E, No calf tenderness       [   ] other:  ROM - [ x  ] WFL     [   ] other:  Neurologic Exam -                 Cognitive - [ x  ]Awake, Alert, AAO to self, place, date, year, situation         [    ] other:      Communication - [   ]Fluent, No dysarthria       [  x ] other: dysarthria, dysphonia      Motor - No focal deficits                    Right UE -  [  x ] WNL      [    ] other:                    Left UE -     [   ] WNL      [ x   ] other: 4-/5 strength, improved ROM                     Right LE -   [  x ] WNL       [    ] other:                    Left LE -      [   ]WNL        [   x ] other:  4+/5 strength      Sensory - [ x  ] Intact to LT      [    ] other:          Reflexes - [ x  ] wnl/ symmetric     [   ] other:     Psychiatric - [ x  ]Mood stable, Affect WNL     [   ]other:     Skin - [ x ] intact      [   ] other      MEDICATIONS  (STANDING):  amLODIPine   Tablet 5 milliGRAM(s) Oral daily  atorvastatin 80 milliGRAM(s) Oral at bedtime  dextrose 40% Gel 15 Gram(s) Oral once  dextrose 40% Gel 15 Gram(s) Oral once  dextrose 5%. 1000 milliLiter(s) (50 mL/Hr) IV Continuous <Continuous>  dextrose 5%. 1000 milliLiter(s) (100 mL/Hr) IV Continuous <Continuous>  dextrose 50% Injectable 25 Gram(s) IV Push once  dextrose 50% Injectable 25 Gram(s) IV Push once  dextrose 50% Injectable 12.5 Gram(s) IV Push once  enoxaparin Injectable 40 milliGRAM(s) SubCutaneous daily  glucagon  Injectable 1 milliGRAM(s) IntraMuscular once  insulin glargine Injectable (LANTUS) 8 Unit(s) SubCutaneous at bedtime  insulin lispro (ADMELOG) corrective regimen sliding scale   SubCutaneous three times a day before meals  insulin lispro Injectable (ADMELOG) 6 Unit(s) SubCutaneous before breakfast  insulin lispro Injectable (ADMELOG) 4 Unit(s) SubCutaneous before lunch  insulin lispro Injectable (ADMELOG) 4 Unit(s) SubCutaneous before dinner  losartan 100 milliGRAM(s) Oral daily  pantoprazole    Tablet 40 milliGRAM(s) Oral before breakfast  ticagrelor 90 milliGRAM(s) Oral every 12 hours    MEDICATIONS  (PRN):  acetaminophen   Tablet .. 650 milliGRAM(s) Oral every 6 hours PRN Temp greater or equal to 38C (100.4F), Mild Pain (1 - 3)  aluminum hydroxide/magnesium hydroxide/simethicone Suspension 30 milliLiter(s) Oral every 4 hours PRN Dyspepsia  magnesium hydroxide Suspension 30 milliLiter(s) Oral daily PRN Constipation  melatonin 5 milliGRAM(s) Oral at bedtime PRN Insomnia        RECENT LABS/IMAGING                                      14.6   6.44  )-----------( 349      ( 26 Jul 2021 07:54 )             45.5     07-26    142  |  103  |  20  ----------------------------<  163<H>  4.5   |  23  |  1.3    Ca    9.7      26 Jul 2021 07:54  Mg     1.9     07-26    TPro  7.2  /  Alb  4.3  /  TBili  1.4<H>  /  DBili  x   /  AST  34  /  ALT  35  /  AlkPhos  105  07-26        POCT Blood Glucose.: 145 mg/dL (07-26-21 @ 16:36)  POCT Blood Glucose.: 202 mg/dL (07-26-21 @ 12:02)  POCT Blood Glucose.: 153 mg/dL (07-26-21 @ 07:51)  POCT Blood Glucose.: 90 mg/dL (07-25-21 @ 20:49)  POCT Blood Glucose.: 151 mg/dL (07-25-21 @ 16:04)  POCT Blood Glucose.: 178 mg/dL (07-25-21 @ 11:26)  POCT Blood Glucose.: 149 mg/dL (07-25-21 @ 07:25)  POCT Blood Glucose.: 87 mg/dL (07-24-21 @ 20:48)  POCT Blood Glucose.: 143 mg/dL (07-24-21 @ 16:06)  POCT Blood Glucose.: 241 mg/dL (07-24-21 @ 11:59)  POCT Blood Glucose.: 131 mg/dL (07-24-21 @ 07:12)  POCT Blood Glucose.: 105 mg/dL (07-23-21 @ 21:43)                         Patient is a 80y old  Male who presents with a chief complaint of Admitted for R medullary infarct; subsequent left sided weakness, facial droop (14 Jul 2021 14:04)      HPI:  80 year old male with a PmHx of CAD s/p 5 stents (2009), DM, HTN, bladder and prostate CA, stable AAA, recent acute stroke (discharged 7/7/2021) presents to [facility] with left sided upper and lower extremity weakness. 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 Patient had been evaluated by neuroendovascular who recommended no acute intervention. He was discharged for outpatient management. The day after discharge, patient started to feel recurrent left upper extremity weakness. He did not seek medical attention and his weakness continued to worsen through the weekend. Patient decided to come in today for further management as he can hardly move his left arm and his left leg is very weak. Patient does note, however, that his left arm was quite strong intermittently today, but 30 minutes later his weakness returned. Repeat CTH shows same territory infarct. CTA head and neck with redemonstration of moderate stenosis involving the proximal M2 inferior division of the right MCA and severe multifocal stenoses involving the V4 segment of the right vertebral artery due to calcified atherosclerotic plaque. Seen by neurology who suspect fluctuating symptoms may be due to high grade vertebral artery vessel disease. Being admitted for further management. Patient denies any fevers, chills, chest pain, SOB, nausea, vomiting, abdominal pain. MRI was performed that showed the completion of the previous stroke in the right medulla.    PLOF independent with ADLs, and with RW which was used after acute stroke in early July     The patient was evaluated by a physical medicine and rehabilitation specialist and was found to be a good candidate for acute rehabilitation.   The patient would benefit from 3 hours of interdisciplinary therapy per day.       CLOF: independent with bed mobility and supervision with transfers, ambulates 150 ft x 1 with a RW and Supervision     TODAY'S SUBJECTIVE & REVIEW OF SYMPTOMS:  Patient seen and evaluated at bedside with Dr. Carolina. No acute events overnight. Patient feels well.        Review of Systems:   Constiutional:    [ x  ] WNL           [   ] poor appetite   [   ] insomnia   [   ] tired   Cardio:                [ x ] WNL           [   ] CP   [   ] TATE   [   ] palpitations               Resp:                   [ x ] WNL           [   ] SOB   [   ] cough   [   ] wheezing   GI:                        [ x ] WNL           [   ] constipation   [   ] diarrhea   [   ] abdominal pain   [   ] nausea   [   ] emesis                                :                      [ x ] WNL           [   ] RUFFIN  [   ] dusuria   [   ] difficulty voiding             Endo:                   [ x ] WNL          [   ] po;yuria   [   ] temperature intolerance                 Skin:                     [ x ] WNL          [   ] pain   [   ] wound   [   ] rash   MSK:                    [ x ] WNL          [   ] muscle pain   [   ] joint pain/ stiffness   [   ] muscle tenderness   [   ] swelling   Neuro:                 [   ] WNL          [   ] HA   [   ] change in vision   [   ] tremor   [   ] weakness   [   ]dysphagia   [ x ] mild dysphonia, dysarthria, left facial droop, left hemiparesis           Cognitive:           [ x ] WNL           [   ]confusion      Psych:                  [ x ] WNL           [   ] hallucinations   [   ]agitation   [   ] delusion   [   ]depressionPHYSICAL EXAM      Vital Signs Last 24 Hrs  T(C): 36.1 (28 Jul 2021 05:52), Max: 36.6 (27 Jul 2021 21:00)  T(F): 97 (28 Jul 2021 05:52), Max: 97.8 (27 Jul 2021 21:00)  HR: 75 (28 Jul 2021 05:52) (69 - 75)  BP: 124/57 (28 Jul 2021 05:52) (99/58 - 124/57)  BP(mean): --  RR: 18 (28 Jul 2021 05:52) (18 - 18)  SpO2: --    Constitutional - [ x  ] NAD, Comfortable        [   ] other:  Chest - [  x  ] CTA     [   ] other:  Cardiovascular - [ x  ] RRR, no murmer     [   ] other:  Abdomen - [ x  ] Soft, NT/ND      [   ] other:        -  [ x ] NOFOLEY CATHETER   [   ] YES  if yes: [   ] NO MEATAL TEAR OR DISCHARGE [   ] other:  Extremities - [ x ] No C/C/E, No calf tenderness       [   ] other:  ROM - [ x  ] WFL     [   ] other:  Neurologic Exam -                 Cognitive - [ x  ]Awake, Alert, AAO to self, place, date, year, situation         [    ] other:      Communication - [   ]Fluent, No dysarthria       [  x ] other: dysarthria, dysphonia      Motor - No focal deficits                    Right UE -  [  x ] WNL      [    ] other:                    Left UE -     [   ] WNL      [ x   ] other: 4-/5 strength, improved ROM                     Right LE -   [  x ] WNL       [    ] other:                    Left LE -      [   ]WNL        [   x ] other:  4+/5 strength      Sensory - [ x  ] Intact to LT      [    ] other:          Reflexes - [ x  ] wnl/ symmetric     [   ] other:     Psychiatric - [ x  ]Mood stable, Affect WNL     [   ]other:     Skin - [ x ] intact      [   ] other      MEDICATIONS  (STANDING):  amLODIPine   Tablet 5 milliGRAM(s) Oral daily  atorvastatin 80 milliGRAM(s) Oral at bedtime  dextrose 40% Gel 15 Gram(s) Oral once  dextrose 40% Gel 15 Gram(s) Oral once  dextrose 5%. 1000 milliLiter(s) (50 mL/Hr) IV Continuous <Continuous>  dextrose 5%. 1000 milliLiter(s) (100 mL/Hr) IV Continuous <Continuous>  dextrose 50% Injectable 25 Gram(s) IV Push once  dextrose 50% Injectable 25 Gram(s) IV Push once  dextrose 50% Injectable 12.5 Gram(s) IV Push once  enoxaparin Injectable 40 milliGRAM(s) SubCutaneous daily  glucagon  Injectable 1 milliGRAM(s) IntraMuscular once  insulin glargine Injectable (LANTUS) 8 Unit(s) SubCutaneous at bedtime  insulin lispro (ADMELOG) corrective regimen sliding scale   SubCutaneous three times a day before meals  insulin lispro Injectable (ADMELOG) 6 Unit(s) SubCutaneous before breakfast  insulin lispro Injectable (ADMELOG) 4 Unit(s) SubCutaneous before lunch  insulin lispro Injectable (ADMELOG) 4 Unit(s) SubCutaneous before dinner  losartan 100 milliGRAM(s) Oral daily  pantoprazole    Tablet 40 milliGRAM(s) Oral before breakfast  ticagrelor 90 milliGRAM(s) Oral every 12 hours    MEDICATIONS  (PRN):  acetaminophen   Tablet .. 650 milliGRAM(s) Oral every 6 hours PRN Temp greater or equal to 38C (100.4F), Mild Pain (1 - 3)  aluminum hydroxide/magnesium hydroxide/simethicone Suspension 30 milliLiter(s) Oral every 4 hours PRN Dyspepsia  magnesium hydroxide Suspension 30 milliLiter(s) Oral daily PRN Constipation  melatonin 5 milliGRAM(s) Oral at bedtime PRN Insomnia        RECENT LABS/IMAGING                                      14.6   6.44  )-----------( 349      ( 26 Jul 2021 07:54 )             45.5     07-26    142  |  103  |  20  ----------------------------<  163<H>  4.5   |  23  |  1.3    Ca    9.7      26 Jul 2021 07:54  Mg     1.9     07-26    TPro  7.2  /  Alb  4.3  /  TBili  1.4<H>  /  DBili  x   /  AST  34  /  ALT  35  /  AlkPhos  105  07-26        POCT Blood Glucose.: 145 mg/dL (07-26-21 @ 16:36)  POCT Blood Glucose.: 202 mg/dL (07-26-21 @ 12:02)  POCT Blood Glucose.: 153 mg/dL (07-26-21 @ 07:51)  POCT Blood Glucose.: 90 mg/dL (07-25-21 @ 20:49)  POCT Blood Glucose.: 151 mg/dL (07-25-21 @ 16:04)  POCT Blood Glucose.: 178 mg/dL (07-25-21 @ 11:26)  POCT Blood Glucose.: 149 mg/dL (07-25-21 @ 07:25)  POCT Blood Glucose.: 87 mg/dL (07-24-21 @ 20:48)  POCT Blood Glucose.: 143 mg/dL (07-24-21 @ 16:06)  POCT Blood Glucose.: 241 mg/dL (07-24-21 @ 11:59)  POCT Blood Glucose.: 131 mg/dL (07-24-21 @ 07:12)  POCT Blood Glucose.: 105 mg/dL (07-23-21 @ 21:43)

## 2021-07-28 NOTE — DISCHARGE NOTE PROVIDER - INSTRUCTIONS
dysphagia 2 with nectar thickened liquids  dysphagia 2 with  thin ( regular ) liquids    carb consistent glucerna (protein )shakes 2 per day , Dash -( heart healthy , low sodium )

## 2021-07-28 NOTE — DISCHARGE NOTE PROVIDER - CARE PROVIDER_API CALL
Marlyn Peralta  INTERNAL MEDICINE  [address] [place] [address]  Phone: [phone]  Fax: [phone]  Follow Up Time:     Ike Barrios)  EEGEpilepsy; Neurology  [address] [place] [address], [address]  [place] [address]  Phone: [phone]  Fax: [phone]  Follow Up Time:

## 2021-07-28 NOTE — DISCHARGE NOTE PROVIDER - NSDCMRMEDTOKEN_GEN_ALL_CORE_FT
acetaminophen 325 mg oral tablet: 2 tab(s) orally every 6 hours, As needed, Temp greater or equal to 38C (100.4F), Mild Pain (1 - 3)  atorvastatin 80 mg oral tablet: 1 tab(s) orally once a day (at bedtime)  glipiZIDE 5 mg oral tablet, extended release: 1 tab(s) orally 2 times a day  losartan 50 mg oral tablet: 1 tab(s) orally once a day  melatonin 5 mg oral tablet: 1 tab(s) orally once a day (at bedtime), As needed, Insomnia  ticagrelor 90 mg oral tablet: 1 tab(s) orally every 12 hours

## 2021-07-28 NOTE — DISCHARGE NOTE PROVIDER - NSDCCPCAREPLAN_GEN_ALL_CORE_FT
PRINCIPAL DISCHARGE DIAGNOSIS  Diagnosis: Stroke  Assessment and Plan of Treatment:       SECONDARY DISCHARGE DIAGNOSES  Diagnosis: Hypertension  Assessment and Plan of Treatment:      PRINCIPAL DISCHARGE DIAGNOSIS  Diagnosis: Stroke  Assessment and Plan of Treatment: you were treated for   stroke , please continue meds asa and Brilita ,    (stopped Plavix)  please follow up with dr Murray diallo  or his PA in 2 to 4 weeks .      SECONDARY DISCHARGE DIAGNOSES  Diagnosis: Hypertension  Assessment and Plan of Treatment: meds adjusted, decreased losrtan to 50 mg and amlodipin stopped for now , please follow up wih your pmd/ cardiologist i 2 to 4 weeks  for  further advise    Diagnosis: Diabetes mellitus  Assessment and Plan of Treatment: please continue home med glipizide, at [facility] you were given insulin smaller doses , please continue  low sodium,hearthealthy, carb consistent diet    Diagnosis: Neurogenic dysphagia  Assessment and Plan of Treatment: difficulty with swallowing, please continue soft diet with thickened liquids with nectar consistency ,     PRINCIPAL DISCHARGE DIAGNOSIS  Diagnosis: Stroke  Assessment and Plan of Treatment: you were treated for   stroke , please continue meds asa and Brilita ,    (stopped Plavix)  please follow up with dr Murray diallo  or his PA in 2 to 4 weeks .      SECONDARY DISCHARGE DIAGNOSES  Diagnosis: Hypertension  Assessment and Plan of Treatment: meds adjusted, decreased losrtan to 50 mg and amlodipin stopped for now , please follow up wih your pmd/ cardiologist i 2 to 4 weeks  for  further advise    Diagnosis: Diabetes mellitus  Assessment and Plan of Treatment: please continue home med glipizide, at [facility] you were given insulin smaller doses , please continue  low sodium,hearthealthy, carb consistent diet    Diagnosis: Neurogenic dysphagia  Assessment and Plan of Treatment: difficulty with swallowing, please continue soft diet consistency ,thin liquids , using strategies  advised by speech therapist ,

## 2021-07-28 NOTE — PROGRESS NOTE ADULT - PROVIDER SPECIALTY LIST ADULT
Physiatry
Rehab Medicine
Physiatry
Physiatry
Rehab Medicine
Neuropsychology
Physiatry
Rehab Medicine
Rehab Medicine

## 2021-07-28 NOTE — PROGRESS NOTE ADULT - ATTENDING COMMENTS
I reviewed the chart and examined the patient with the resident and we discussed the findings and treatment plan. I agree with the findings and treatment plan above, which I modified as indicated. The patient requires 3 hrs a day of acute inpatient rehab.    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   Admitted for rehab of  R medullary infarct and subsequent left sided weakness and facial droop.    # R medullary infarct  - Left sided weakness #Left sided facial droop, nondominant  - MRI of brain 7/12/21 redemonstrated the punctate infarct in the right ventral medulla, minimally enlarged possibly due to difference in technique compared to the prior exam from 7/5/2021. Slightly increased- mild edema. No additional acute infarcts. No intracranial hemorrhage or mass effect. Stable mild chronic microvascular ischemic changes.   - Brilinta 90 mg PO BID and Atorvastatin 80mg daily    Ambulating with supervision with walker. Tolerating therapy well. Gradual improvements.   - Antic d/c home with family in am.    Comorbidities    # HTN - avoid tight control given intracranial vertebral stenosis. BP syst is 107-126.   - BP goal is (130-160 systolic)   - Losartan 100 mg PO QAM.  - Amlodipine- decrease from 10 mg to 5 mg and monitor. BPs in good range.    -#Type II DM on glipizide: Hemoglobin A1C 7.8 on 7/5, insulin regimen as needed (goal 140-180); On lantus 8u daily and Lispro 6/4/4. Better controlled    #Dysphagia:   - Oropharygeal  - Had FEES. Dysphagia 2 diet with nectar-thick liquids and head turn/ chin-tuck and multiple swallows upgraded to Dysphagia 2 diet (for lost dentures), with thin liquids. FEES also showed some VC erythema, possibly 2ndary to reflux. Added PPI for 4-6 weeks.  - Now upgraded to Diet, Dysphagia 2 Mechanical Soft-Thin Liquids:   Consistent Carbohydrate {No Snacks}  Prosource Gelatein 20 Sugar Free     Qty per Day:  1 daily     Qty per Day:  consecutive swallow  Supplement Feeding Modality:  Oral  Glucerna Shake Cans or Servings Per Day:  1       Frequency:  Two Times a day (07-21-21 @ 18:29) [Active]    #Bladder/prostate CA: Outpatient management     #AAA: Stable at this time, Outpatient management    #HFrEF/ history of Systolic CHF: Echo taken 7/6/2021 shows decreased left ventricular systolic function (LVEF 46%); No overt symptoms at this time    - GI/Bowel Mgmt: patient currently has regular BM       # Precautions / PROPHYLAXIS:    - Falls  - Ortho: Weight bearing status: WBAT   - DVT prophylaxis: Lovenox I reviewed the chart and examined the patient with the resident and we discussed the findings and treatment plan. I agree with the findings and treatment plan above, which I modified as indicated. The patient requires 3 hrs a day of acute inpatient rehab.    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   Admitted for rehab of  R medullary infarct and subsequent left sided weakness and facial droop.    # R medullary infarct  - Left sided weakness #Left sided facial droop, nondominant  - MRI of brain 7/12/21 redemonstrated the punctate infarct in the right ventral medulla, minimally enlarged possibly due to difference in technique compared to the prior exam from 7/5/2021. Slightly increased- mild edema. No additional acute infarcts. No intracranial hemorrhage or mass effect. Stable mild chronic microvascular ischemic changes.   - Brilinta 90 mg PO BID and Atorvastatin 80mg daily    Ambulating with supervision with walker. Tolerating therapy well. Gradual improvements.   - Antic d/c home with family in am.    Comorbidities    # HTN - avoid tight control given intracranial vertebral stenosis. BP syst was running low.  - BP goal is (130-160 systolic)     - Amlodipine- decrease from 10 mg to 5 mg and monitor. BPs in good range.  - On Losartan 100 mg PO QAM. BP still running low. Will decrease to Losartan 50 mg.   - F/U PMD    -#Type II DM on glipizide: Hemoglobin A1C 7.8 on 7/5, insulin regimen as needed (goal 140-180); On lantus 8u daily and Lispro 6/4/4. Better controlled. Will d/c on home oral meds. F/U with PMD.     #Dysphagia:   - Oropharygeal  - Had FEES. Dysphagia 2 diet with nectar-thick liquids and head turn/ chin-tuck and multiple swallows upgraded to Dysphagia 2 diet (for lost dentures), with thin liquids. FEES also showed some VC erythema, possibly 2ndary to reflux. Added PPI for 4-6 weeks.  - Now upgraded to Diet, Regular-Thin Liquids:   Consistent Carbohydrate {No Snacks}      #Bladder/prostate CA: Outpatient management     #AAA: Stable at this time, Outpatient management    #HFrEF/ history of Systolic CHF: Echo taken 7/6/2021 shows decreased left ventricular systolic function (LVEF 46%); No overt symptoms at this time    D/C home today with outpatient PT and OT. F/U Neuro/ Stroke Clinic 2-3 weeks, F/U PMD 2-3 weeks.   Rehab f/u prn.  Activities as tolerated with RW I reviewed the chart and examined the patient with the resident and we discussed the findings and treatment plan. I agree with the findings and treatment plan above, which I modified as indicated. The patient requires 3 hrs a day of acute inpatient rehab.    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   Admitted for rehab of  R medullary infarct and subsequent left sided weakness and facial droop.    # R medullary infarct  - Left sided weakness #Left sided facial droop, nondominant  - MRI of brain 7/12/21 redemonstrated the punctate infarct in the right ventral medulla, minimally enlarged possibly due to difference in technique compared to the prior exam from 7/5/2021. Slightly increased- mild edema. No additional acute infarcts. No intracranial hemorrhage or mass effect. Stable mild chronic microvascular ischemic changes.   - Brilinta 90 mg PO BID and Atorvastatin 80mg daily    Ambulating with supervision with walker. Tolerating therapy well. Gradual improvements.   - Antic d/c home with family in am.    Comorbidities    # HTN - avoid tight control given intracranial vertebral stenosis. BP syst was running low.  - BP goal is (130-160 systolic)     - Amlodipine- decrease from 10 mg to 5 mg and monitor. BPs in good range.  - On Losartan 100 mg PO QAM.   -BP still running low with Losartan held for 2 days  - Will decrease to Losartan 50 mg and d/c Norvasc.  -F/U with PMD  - F/U PMD    -#Type II DM on glipizide: Hemoglobin A1C 7.8 on 7/5, insulin regimen as needed (goal 140-180); On lantus 8u daily and Lispro 6/4/4. Better controlled. Will d/c on home oral meds. F/U with PMD.     #Dysphagia:   - Oropharygeal  - Had FEES. Dysphagia 2 diet with nectar-thick liquids and head turn/ chin-tuck and multiple swallows upgraded to Dysphagia 2 diet (for lost dentures), with thin liquids. FEES also showed some VC erythema, possibly 2ndary to reflux. Added PPI for 4-6 weeks.  - Now upgraded to Diet, Regular-Thin Liquids:   Consistent Carbohydrate {No Snacks}      #Bladder/prostate CA: Outpatient management     #AAA: Stable at this time, Outpatient management    #HFrEF/ history of Systolic CHF: Echo taken 7/6/2021 shows decreased left ventricular systolic function (LVEF 46%); No overt symptoms at this time    D/C home today with outpatient PT and OT. F/U Neuro/ Stroke Clinic 2-3 weeks, F/U PMD 2-3 weeks.   Rehab f/u prn.  Activities as tolerated with RW

## 2021-07-28 NOTE — DISCHARGE NOTE PROVIDER - HOSPITAL COURSE
Patient is a 80y old  Male who presents with a chief complaint of Admitted for R medullary infarct; subsequent left sided weakness, facial droop (14 Jul 2021 14:04)      HPI:  80 year old male with a PmHx of CAD s/p 5 stents (2009), DM, HTN, bladder and prostate CA, stable AAA, recent acute stroke (discharged 7/7/2021) presents to [facility] with left sided upper and lower extremity weakness. 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 Patient had been evaluated by neuroendovascular who recommended no acute intervention. He was discharged for outpatient management. The day after discharge, patient started to feel recurrent left upper extremity weakness. He did not seek medical attention and his weakness continued to worsen through the weekend. Patient decided to come in today for further management as he can hardly move his left arm and his left leg is very weak. Patient does note, however, that his left arm was quite strong intermittently today, but 30 minutes later his weakness returned. Repeat CTH shows same territory infarct. CTA head and neck with redemonstration of moderate stenosis involving the proximal M2 inferior division of the right MCA and severe multifocal stenoses involving the V4 segment of the right vertebral artery due to calcified atherosclerotic plaque. Seen by neurology who suspect fluctuating symptoms may be due to high grade vertebral artery vessel disease. Patient is on DAPT ( ASA+ Brilinta), Lipitor 80 mg. LDL:152, HBA1c:7.8, Echo shows EF 46%, GIDD, aortic root dilation, no PFO  Patient evaluated by Endovascular, who recommended no acute neuro intervention at this time,   Being admitted for further management. . MRI was performed that showed the completion of the previous stroke in the right medulla.    PLOF independent with ADLs, and with RW which was used after acute stroke in early July     The patient was evaluated by a physical medicine and rehabilitation specialist and was found to be a good candidate for acute rehabilitation.   The patient would benefit from 3 hours of interdisciplinary therapy per day.       CLOF: touch assist with bed mobility and transfers, ambulates 100 ft with a RW and touch assist.       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   Admitted for rehab of  R medullary infarct and subsequent left sided weakness and facial droop.    # R medullary infarct  # Left sided weakness #Left sided facial droop, nondominant  - MRI of brain 7/12/21 redemonstrated the punctate infarct in the right ventral medulla, minimally enlarged possibly due to difference in technique compared to the prior exam from 7/5/2021. Slightly increased- mild edema. No additional acute infarcts. No intracranial hemorrhage or mass effect. Stable mild chronic microvascular ischemic changes.   - Brilinta 90 mg PO BID and Atorvastatin 80mg daily    Ambulating with touch assistance with walker    -Oral-pharyngeal dysphagia/ dysarthria/dysphonia/VC paresis: Doing well with Dysphagia 2 diet with Nectar-thick liquids and compensatory techniques.    - Continues to require acute PT/OT/SLT    Comorbidities    # HTN - avoid tight control given intracranial vertebral stenosis. BP syst is 107-126.    - BP goal is above (130-160 systolic)  was on amlodipine 5 mg and losartan 100 mg , but most of the days losartan was not given since sbp was below 130   - Losartan  decreased to 50 mg and amlodipine discontinued on discharge  and advised pt to follow up with his pmd and monitor         -#Type II DM on Glypizide: Hemoglobin A1C 7.8 on 7/5, insulin regimen as needed (goal 140-180).   Running high. Increased Insulin. Monitor    #Bladder/prostate CA: Outpatient management     #AAA: Stable at this time, Outpatient management    #HFrEF/ history of Systolic CHF: Echo taken 7/6/2021 shows decreased left ventricular systolic function (LVEF 46%); No overt symptoms at this time    - GI/Bowel Mgmt: patient currently has regular BM       # Precautions / PROPHYLAXIS:    - Falls  - Ortho: Weight bearing status: WBAT   - DVT prophylaxis: SC heparin.    upon discharge today pt is advised to follow up with pmd dr Peralta and neuro dr Alexandre in 2 to 4 weeks   Patient is a 80y old  Male who presents with a chief complaint of Admitted for R medullary infarct; subsequent left sided weakness, facial droop (14 Jul 2021 14:04)      HPI:  80 year old male with a PmHx of CAD s/p 5 stents (2009), DM, HTN, bladder and prostate CA, stable AAA, recent acute stroke (discharged 7/7/2021) presents to [facility] with left sided upper and lower extremity weakness. 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 Patient had been evaluated by neuroendovascular who recommended no acute intervention. He was discharged for outpatient management. The day after discharge, patient started to feel recurrent left upper extremity weakness. He did not seek medical attention and his weakness continued to worsen through the weekend. Patient decided to come in today for further management as he can hardly move his left arm and his left leg is very weak. Patient does note, however, that his left arm was quite strong intermittently today, but 30 minutes later his weakness returned. Repeat CTH shows same territory infarct. CTA head and neck with redemonstration of moderate stenosis involving the proximal M2 inferior division of the right MCA and severe multifocal stenoses involving the V4 segment of the right vertebral artery due to calcified atherosclerotic plaque. Seen by neurology who suspect fluctuating symptoms may be due to high grade vertebral artery vessel disease. Patient is on DAPT ( ASA+ Brilinta), Lipitor 80 mg. LDL:152, HBA1c:7.8, Echo shows EF 46%, GIDD, aortic root dilation, no PFO  Patient evaluated by Endovascular, who recommended no acute neuro intervention at this time,   Being admitted for further management. . MRI was performed that showed the completion of the previous stroke in the right medulla.    PLOF independent with ADLs, and with RW which was used after acute stroke in early July     The patient was evaluated by a physical medicine and rehabilitation specialist and was found to be a good candidate for acute rehabilitation.   The patient would benefit from 3 hours of interdisciplinary therapy per day.       CLOF: touch assist with bed mobility and transfers, ambulates 100 ft with a RW and touch assist.       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   Admitted for rehab of  R medullary infarct and subsequent left sided weakness and facial droop.    # R medullary infarct  # Left sided weakness #Left sided facial droop, nondominant  - MRI of brain 7/12/21 redemonstrated the punctate infarct in the right ventral medulla, minimally enlarged possibly due to difference in technique compared to the prior exam from 7/5/2021. Slightly increased- mild edema. No additional acute infarcts. No intracranial hemorrhage or mass effect. Stable mild chronic microvascular ischemic changes.   - Brilinta 90 mg PO BID and Atorvastatin 80mg daily    Ambulating with touch assistance with walker    -Oral-pharyngeal dysphagia/ dysarthria/dysphonia/VC paresis: Doing well with Dysphagia 2 diet with thin liquids .    - Continues to require acute PT/OT/SLT    Comorbidities    # HTN - avoid tight control given intracranial vertebral stenosis. BP syst is 107-126.    - BP goal is above (130-160 systolic)  was on amlodipine 5 mg and losartan 100 mg , but most of the days losartan was not given since sbp was below 130   - Losartan  decreased to 50 mg and amlodipine discontinued on discharge  and advised pt to follow up with his pmd and monitor         -#Type II DM on Glypizide: Hemoglobin A1C 7.8 on 7/5, insulin regimen as needed (goal 140-180).   Running high. Increased Insulin. Monitor    #Bladder/prostate CA: Outpatient management     #AAA: Stable at this time, Outpatient management    #HFrEF/ history of Systolic CHF: Echo taken 7/6/2021 shows decreased left ventricular systolic function (LVEF 46%); No overt symptoms at this time    - GI/Bowel Mgmt: patient currently has regular BM       # Precautions / PROPHYLAXIS:    - Falls  - Ortho: Weight bearing status: WBAT   - DVT prophylaxis: SC heparin.    upon discharge today pt is advised to follow up with pmd dr Peralta and neuro dr Alexandre in 2 to 4 weeks

## 2021-07-28 NOTE — DISCHARGE NOTE PROVIDER - NSDCFUSCHEDAPPT_GEN_ALL_CORE_FT
WILFREDO RIVAS ; 07/30/2021 ; [facility] WILFREDO RIVAS ; 07/30/2021 ; [facility] Karl  WILFREDO RIVAS ; 08/04/2021 ; [facility] WILFREDO RIVAS ; 07/30/2021 ; [facility] [address] [facility] Karl  WILFREDO RIVAS ; 08/03/2021 ; [facility]

## 2021-07-28 NOTE — PROGRESS NOTE ADULT - ASSESSMENT
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   Admitted for rehab of  R medullary infarct and subsequent left sided weakness and facial droop.    # R medullary infarct  # Left sided weakness, Left sided facial droop, nondominant  - MRI of brain 7/12/21 redemonstrated the punctate infarct in the right ventral medulla, minimally enlarged possibly due to difference in technique compared to the prior exam from 7/5/2021. Slightly increased mild edema. No additional acute infarcts. No intracranial hemorrhage or mass effect. Stable mild chronic microvascular ischemic changes.   - Brilinta 90 mg PO BID and Atorvastatin 80mg daily  - PT/OT/SLP    #Dysphagia:   - Oropharygeal  - Had FEES. Dysphagia 2 diet thin liquids (for lost dentures); FEES also showed some VC erythema, possibly 2ndary to reflux. Will add PPI for 4-6 weeks.  - Upgraded to regular diet w/ thin liquids    #Dysarthria:   - Speech therapy. Improving    Comorbidities    #HTN:  - BP goal as above (130-160 systolic) .  - Losartan 100 mg PO QAM.  - Decreased  Amlodipine 10 mg to 5 mg and monitor. Doing well.      #Type II DM on glypizide at home: Hemoglobin A1C 7.8 on 7/5, insulin regimen as needed (goal 140-180); patient's lantus was increased to 8 u and was started on lispro 6/4/4 u with meals with improving FS glucose.  - Monitor and adjust  - podiatry visited on 7/27/21 to evaluate feet; toe nails were clipped; recommended follow up outpatient.     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   Admitted for rehab of  R medullary infarct and subsequent left sided weakness and facial droop.    # R medullary infarct  # Left sided weakness, Left sided facial droop, nondominant  - MRI of brain 7/12/21 redemonstrated the punctate infarct in the right ventral medulla, minimally enlarged possibly due to difference in technique compared to the prior exam from 7/5/2021. Slightly increased mild edema. No additional acute infarcts. No intracranial hemorrhage or mass effect. Stable mild chronic microvascular ischemic changes.   - Brilinta 90 mg PO BID and Atorvastatin 80mg daily.  -Discharge home today  - Outpatient PT/OT     #Dysphagia:   - Oropharygeal  - Had FEES. Dysphagia 2 diet thin liquids (for lost dentures); FEES also showed some VC erythema, possibly 2ndary to reflux. Will add PPI for 4-6 weeks.  - Upgraded to regular diet w/ thin liquids    #Dysarthria:   - s/p Speech therapy. Improving    Comorbidities    #HTN:  - BP goal as above (130-160 systolic) .  - Losartan 100 mg PO QAM.  - Decreased  Amlodipine 10 mg to 5 mg and monitor. Doing well.      #Type II DM on glypizide at home: Hemoglobin A1C 7.8 on 7/5, insulin regimen as needed (goal 140-180); patient's lantus was increased to 8 u and was started on lispro 6/4/4 u with meals with improving FS glucose.  - Will d/c on home oral meds and he should f/u with PMD.  - podiatry visited on 7/27/21 to evaluate feet; toe nails were clipped; recommended follow up outpatient.     #Bladder/prostate CA: Outpatient management     #AAA: Stable at this time, Outpatient management    #HFrEF/ history of Systolic CHF: Echo taken 7/6/2021 shows decreased left ventricular systolic function (LVEF 46%); No overt symptoms at this time

## 2021-07-28 NOTE — PROGRESS NOTE ADULT - REASON FOR ADMISSION
Admitted for R medullary infarct; subsequent left sided weakness, facial droop

## 2021-07-28 NOTE — DISCHARGE NOTE PROVIDER - NSDCQMSTROKERISK_NEU_ALL_CORE
High blood pressure/History of a stroke or TIA Carotid stenosis/Diabetes/High blood pressure/High cholesterol/History of a stroke or TIA

## 2021-07-28 NOTE — PROGRESS NOTE ADULT - NSICDXPILOT_GEN_ALL_CORE
[place]
[FreeTextEntry1] : We began this TELEHEALTH evaluation at 4:53 pm and ended at \par \par Work status: very part time, limited\par Temp impairment: refer to C-4.3 form\par \par Ms. Campbell notes her hands are worse and her ability to do fine movements is more difficult to the point that holding a pen is painful and her handwriting is like chicken scratch. In addition, she fewer periods with any relief at all; she notes "I hurt all the time" and "my neck is worse" .  She has used 9 of the 10 sessions of OT that had been authorized up until the onset of cold weather at the end of 2020 and the pandemic precautions made this unfeasible.  She has not been able to cook for years; all her food is prepared and she has to save her hand functioning for the little bit of writing that she is able to do.  She is able to make the bed, and shower.  She has had a cleaning person who came every two weeks and due to pandemic precautions this went to once monthly.  She is unable to do laundry because sheets and towels are too heavy to handle and hang onto and fold. She continues to note fairly easily dropping and knocking things over.  She can't  tightly which she doesn’t realize at times. Buttoning is hard to perform. \par \par Physical\par C-AROM: Lat Rotn: R = 20 deg, L = 30 deg\par Lat Flex: R = L = 25 deg w/ pt. c/o tightness from neck to ear worse on the L lateral side of neck\par Hands: self palpation to cheeks: warm to touch bilat and symmetrically\par Opposition:: R thumb tip to MCP flexor pad between 2nd and 3rd digits\par L thumb tip: opposes to almost 4th digit MCP flexor pad\par Tendon glides: REBEL: cannot make a full fist on either hand with an approx. 1 inch gap on R and a 1/8 inch gap on L hand\par Pt notes triggering of both thumbs and nearly all fingers as she performs tendon glides\par Resting habitus: r > L most prominently at 3rd digits: flexion contracture\par (note: pt applied hand cream while I was documenting and noted she was unable to hold onto the small cap that closes the hand cream tube such that it fell on the desk)\par \par Assessment\par Symptomatic worsening of neck pain, hand pain, range of motion and deteriorating hand functions and endurance; refer to C-4.2 form for diagnoses\par \par Plan\par 1)  Awaiting lightening of pandemic precautions including vaccination so that pt can start hand therapy with Candida OT at [facility]\par 2)  C-4.2 form to be completed\par 3)  Re-eval in 6 - 8 weeks
[place]

## 2021-07-30 ENCOUNTER — APPOINTMENT (OUTPATIENT)
Dept: UROLOGY | Facility: CLINIC | Age: 81
End: 2021-07-30
Payer: MEDICARE

## 2021-07-30 VITALS — BODY MASS INDEX: 25.9 KG/M2 | WEIGHT: 185 LBS | HEIGHT: 71 IN

## 2021-07-30 PROCEDURE — 52000 CYSTOURETHROSCOPY: CPT

## 2021-08-02 DIAGNOSIS — I69.354 HEMIPLEGIA AND HEMIPARESIS FOLLOWING CEREBRAL INFARCTION AFFECTING LEFT NON-DOMINANT SIDE: ICD-10-CM

## 2021-08-02 DIAGNOSIS — B35.1 TINEA UNGUIUM: ICD-10-CM

## 2021-08-02 DIAGNOSIS — I50.22 CHRONIC SYSTOLIC (CONGESTIVE) HEART FAILURE: ICD-10-CM

## 2021-08-02 DIAGNOSIS — Z85.51 PERSONAL HISTORY OF MALIGNANT NEOPLASM OF BLADDER: ICD-10-CM

## 2021-08-02 DIAGNOSIS — R13.19 OTHER DYSPHAGIA: ICD-10-CM

## 2021-08-02 DIAGNOSIS — E11.9 TYPE 2 DIABETES MELLITUS WITHOUT COMPLICATIONS: ICD-10-CM

## 2021-08-02 DIAGNOSIS — I71.4 ABDOMINAL AORTIC ANEURYSM, WITHOUT RUPTURE: ICD-10-CM

## 2021-08-02 DIAGNOSIS — Z95.5 PRESENCE OF CORONARY ANGIOPLASTY IMPLANT AND GRAFT: ICD-10-CM

## 2021-08-02 DIAGNOSIS — I69.398 OTHER SEQUELAE OF CEREBRAL INFARCTION: ICD-10-CM

## 2021-08-02 DIAGNOSIS — I69.392 FACIAL WEAKNESS FOLLOWING CEREBRAL INFARCTION: ICD-10-CM

## 2021-08-02 DIAGNOSIS — I25.10 ATHEROSCLEROTIC HEART DISEASE OF NATIVE CORONARY ARTERY WITHOUT ANGINA PECTORIS: ICD-10-CM

## 2021-08-02 DIAGNOSIS — R49.0 DYSPHONIA: ICD-10-CM

## 2021-08-02 DIAGNOSIS — M19.90 UNSPECIFIED OSTEOARTHRITIS, UNSPECIFIED SITE: ICD-10-CM

## 2021-08-02 DIAGNOSIS — I11.0 HYPERTENSIVE HEART DISEASE WITH HEART FAILURE: ICD-10-CM

## 2021-08-02 DIAGNOSIS — Z85.828 PERSONAL HISTORY OF OTHER MALIGNANT NEOPLASM OF SKIN: ICD-10-CM

## 2021-08-02 DIAGNOSIS — Z85.46 PERSONAL HISTORY OF MALIGNANT NEOPLASM OF PROSTATE: ICD-10-CM

## 2021-08-02 DIAGNOSIS — I69.322 DYSARTHRIA FOLLOWING CEREBRAL INFARCTION: ICD-10-CM

## 2021-08-02 DIAGNOSIS — L85.3 XEROSIS CUTIS: ICD-10-CM

## 2021-08-02 DIAGNOSIS — J38.00 PARALYSIS OF VOCAL CORDS AND LARYNX, UNSPECIFIED: ICD-10-CM

## 2021-08-02 DIAGNOSIS — I69.391 DYSPHAGIA FOLLOWING CEREBRAL INFARCTION: ICD-10-CM

## 2021-08-02 DIAGNOSIS — Z79.84 LONG TERM (CURRENT) USE OF ORAL HYPOGLYCEMIC DRUGS: ICD-10-CM

## 2021-08-02 DIAGNOSIS — Z91.81 HISTORY OF FALLING: ICD-10-CM

## 2021-08-16 ENCOUNTER — APPOINTMENT (OUTPATIENT)
Dept: NEUROLOGY | Facility: CLINIC | Age: 81
End: 2021-08-16
Payer: MEDICARE

## 2021-08-16 ENCOUNTER — NON-APPOINTMENT (OUTPATIENT)
Age: 81
End: 2021-08-16

## 2021-08-16 VITALS
BODY MASS INDEX: 23.24 KG/M2 | DIASTOLIC BLOOD PRESSURE: 70 MMHG | WEIGHT: 166 LBS | TEMPERATURE: 98 F | SYSTOLIC BLOOD PRESSURE: 148 MMHG | OXYGEN SATURATION: 98 % | HEART RATE: 71 BPM | HEIGHT: 71 IN

## 2021-08-16 PROCEDURE — 99214 OFFICE O/P EST MOD 30 MIN: CPT

## 2021-08-16 NOTE — PHYSICAL EXAM
[FreeTextEntry1] : Focal neurological exam:\par \par MS: Awake, alert, oriented to person, place, situation and time. Normal affect. Follows commands. \par \par Language: Speech is clear, fluent with good repetition & comprehension. No dysarthria. \par \par CNs 2 - 12 intact. EOMI no nystagmus, no diplopia. VFF. No facial asymmetry b/l, full eye closure strength b/l. Hearing grossly normal. Head turning & shoulder shrug intact b/l. Tongue midline, normal movements, no atrophy.\par \par Motor: Normal muscle bulk & tone. No noticeable tremor or seizure. No pronator drift. Muscle strength of b/l UE, LLE 5-/5, RLE 5/5.\par \par Reflexes: DTR of biceps 1+, knees absent  \par \par Sensation: Intact to LT, temperature and vibration b/l throughout\par \par Cortical: No extinction\par \par Coordination: No dysmetria to FTN.\par \par Gait: No postural instability. Slight left hemiparetic gait. Ambulates with cane. \par \par NIHSS 0\par \par \par \par

## 2021-08-16 NOTE — REASON FOR VISIT
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

## 2021-08-16 NOTE — HISTORY OF PRESENT ILLNESS
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  \par \par He presented to the ED on 7/4/21 w/ c/o of left sided weakness.  He was working outdoors the day before and after showering at night, he fell on his left side presumed 2/2 left leg weakness. Overnight, he could not sleep and when his wife saw him, he had Left facial droop. Stroke code called; NIHSS 3 for minor facial and LUE/LLE drift. CTP reported 28 mL penumbra involving the bilateral cerebellar hemispheres. CTA H/N reported Right V4 segment severe stenosis. Stenosis in the right anterior inferior cerebellar artery. Moderate atherosclerotic stenosis in the cavernous and supraclinoid segments of the left ICA. Mild atherosclerotic stenosis in the right supraclinoid ICA. Short segmental stenosis of the proximal inferior division of the right MCA. TTE with bubble reported EF 46% and no PFO. MRI 7/5/21 reported punctate acute infarct in the ventral right medulla. , HBA1c 7.8. He was seen by NI Team on 7/8/21 and recommended medical management. He was discharged on ASA 81, Atorvastatin 80 and Plavix 75. He was back in the ED on 7/12/21 for worsening Left sided weakness. NIHSS 4. He was seen by Dr. Dillard and recommended to switch to Brilinta (Thales trial data) due to progression of disease. Repeat MRI 7/12/21 was unchanged from that on 7/5/21. He was sent to STR on Atorvastatin 80 and Brilinta 90 BID.  \par \par Patient presents to clinic with wife today and states that he feels wells, only slight weakness of LUE with hand . He is still getting PT and OT twice a week. He denies any spontaneous bleeding or bruising. \par

## 2021-08-16 NOTE — DISCUSSION/SUMMARY
[Antithrombotic therapy with ___] : antithrombotic therapy with  [unfilled] [Intensive Blood Pressure Control] : intensive blood pressure control [Lipid Lowering Therapy] : lipid lowering therapy [Patient encouraged to discuss with Primary MD] : I encouraged the patient to discuss these important issues with ~his/her~ primary care doctor [Goals and Counseling] : I have reviewed the goals of stroke risk factor modification. I counseled the patient on measures to reduce stroke risk, including the importance of medication compliance, risk factor control, exercise, healthy diet and avoidance of smoking. I reviewed stroke warning signs and symptoms and appropriate actions to take if such occur. 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 His stroke is associated with Right V4 segment severe stenosis. He is on Atorvastatin 80 and Brilinta 90 BID. He denies any spontaneous bleeding or bruising. Exam is significant for LLE mild weakness 5-/5, but slight hemiparetic gait using cane. He c/o slight decreased  strength of LUE, but no weakness appreciated on exam. \par \par PLAN:\par -C/w Brilinta 90 BID \par -C/w Atorvastatin 80 QD\par -Driving evaluation \par -BP goal between 120/80 and 140/80\par -Improve glycemic control per PMD \par -Keep hydrated with 3L water/day\par -Follow up in 4 months

## 2021-08-19 ENCOUNTER — APPOINTMENT (OUTPATIENT)
Dept: VASCULAR SURGERY | Facility: CLINIC | Age: 81
End: 2021-08-19
Payer: MEDICARE

## 2021-08-19 VITALS
DIASTOLIC BLOOD PRESSURE: 68 MMHG | HEART RATE: 99 BPM | TEMPERATURE: 98 F | HEIGHT: 71 IN | SYSTOLIC BLOOD PRESSURE: 125 MMHG | BODY MASS INDEX: 23.1 KG/M2 | WEIGHT: 165 LBS

## 2021-08-19 PROCEDURE — 93978 VASCULAR STUDY: CPT

## 2021-08-19 PROCEDURE — 93925 LOWER EXTREMITY STUDY: CPT

## 2021-08-19 PROCEDURE — 99214 OFFICE O/P EST MOD 30 MIN: CPT

## 2021-08-19 NOTE — ASSESSMENT
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 \par \par Duplex today showed abdominal aorta 4.1 cm, right common iliac artery 1.9, cm left common iliac artery 1.9 cm, \par Right common femoral artery 1.8 cm, right popliteal artery 1.8 cm, Left common femoral artery 1.7 cm and left popliteal artery 1.5 cm.\par \par No surgical intervention is needed at this time, and I would like to see him back in my office in 6 months time or sooner if any new symptoms develop.

## 2021-08-19 NOTE — DATA REVIEWED
[FreeTextEntry1] : Duplex abdominal aorta 4.1 cm right common iliac artery 1.9 cm left common iliac artery 1.9 cm\par Arterial duplex \par Right common femoral artery 1.8 cm and popliteal artery 1.8 cm\par Left common femoral artery 1.7 cm and left popliteal artery 1.5 cm

## 2021-08-19 NOTE — HISTORY OF PRESENT ILLNESS
[FreeTextEntry1] : 80 year-old gentleman with a known history for a 4 cm abdominal aortic aneurysm and bilateral common femoral popliteal artery aneurysms. Today he presents for followup and offers no new complaints.\par He was admitted to [facility] for stroke in July and I reviewed the CTA of neck that showed patent carotid arteries bilaterally.

## 2021-09-14 NOTE — H&P PST ADULT - PAIN CHRONIC, PROFILE
No chief complaint on file.      HPI  H/o colonpolyps         Problem List:    Patient Active Problem List   Diagnosis   • Low back pain   • Low hemoglobin   • Menopause present   • Osteoporosis   • Thrombocytopenia (CMS/HCC)   • Renal insufficiency   • Pain and swelling of left knee   • Anxiety   • Vitamin D deficiency   • Constipation   • Memory loss   • Incontinence of feces   • Colon cancer screening   • Hx of adenomatous colonic polyps       Medical History:    Past Medical History:   Diagnosis Date   • Arthritis    • Osteoporosis         Social History:    Social History     Socioeconomic History   • Marital status:      Spouse name: Michael J Osgood   • Number of children: 1   • Years of education: Not on file   • Highest education level: Not on file   Tobacco Use   • Smoking status: Never Smoker   • Smokeless tobacco: Never Used   • Tobacco comment: no caffeine   Vaping Use   • Vaping Use: Never used   Substance and Sexual Activity   • Alcohol use: No   • Drug use: No   • Sexual activity: Defer       Family History:   Family History   Problem Relation Age of Onset   • COPD Other    • Heart attack Other    • Stroke Other    • Thrombocytopenia Other    • Diabetes Other    • Brain cancer Other    • COPD Mother    • Heart attack Mother    • Stroke Mother    • Heart disease Father         PARALYSIS, WAIST DOWN   • Thrombocytopenia Sister    • Diabetes Maternal Grandmother    • Cancer Paternal Grandmother         BRAIN   • Breast cancer Paternal Aunt    • Hepatitis Brother    • Prostate cancer Brother    • Colon cancer Paternal Aunt    • Colon polyps Paternal Aunt    • Colon cancer Other         NEPHEW   • Colon polyps Other    • Irritable bowel syndrome Neg Hx    • Ulcerative colitis Neg Hx        Surgical History:   Past Surgical History:   Procedure Laterality Date   • APPENDECTOMY         No current facility-administered medications for this encounter.    Current Outpatient Medications:   •  Ascorbic  Acid (VITAMIN C PO), Take  by mouth., Disp: , Rfl:   •  calcium carbonate (OS-MOLLY) 600 MG tablet, Take 600 mg by mouth Daily., Disp: , Rfl:   •  Cholecalciferol (VITAMIN D3) 50 MCG (2000 UT) tablet, Take 1 tablet by mouth Daily., Disp: , Rfl:   •  erythromycin (ROMYCIN) 5 MG/GM ophthalmic ointment, 1 application Daily As Needed., Disp: , Rfl:   •  NON FORMULARY, As Needed. ASHWAGANDHA , Disp: , Rfl:   •  Polyethyl Glycol-Propyl Glycol (SYSTANE ULTRA OP), Apply 1 drop to eye Daily., Disp: , Rfl:     Allergies:   Allergies   Allergen Reactions   • Celecoxib Other (See Comments)     Made her feel bad        The following portions of the patient's history were reviewed by me and updated as appropriate: review of systems, allergies, current medications, past family history, past medical history, past social history, past surgical history and problem list.    There were no vitals filed for this visit.    PHYSICAL EXAM:    CONSTITUTIONAL:  today's vital signs reviewed by me  GASTROINTESTINAL: abdomen is soft nontender nondistended with normal active bowel sounds, no masses are appreciated    Assessment/ Plan  Surveillance  H/o polyps    colonosocpy    Risks and benefits as well as alternatives to endoscopic evaluation were explained to the patient and they voiced understanding and wish to proceed.  These risks include but are not limited to the risk of bleeding, perforation, adverse reaction to sedation, and missed lesions.  The patient was given the opportunity to ask questions prior to the endoscopic procedure.           no

## 2021-09-28 ENCOUNTER — APPOINTMENT (OUTPATIENT)
Dept: CARDIOLOGY | Facility: CLINIC | Age: 81
End: 2021-09-28
Payer: MEDICARE

## 2021-09-28 VITALS
WEIGHT: 167 LBS | TEMPERATURE: 97.3 F | HEIGHT: 71 IN | SYSTOLIC BLOOD PRESSURE: 136 MMHG | HEART RATE: 98 BPM | DIASTOLIC BLOOD PRESSURE: 60 MMHG | BODY MASS INDEX: 23.38 KG/M2

## 2021-09-28 PROCEDURE — 99214 OFFICE O/P EST MOD 30 MIN: CPT

## 2021-09-28 PROCEDURE — 93000 ELECTROCARDIOGRAM COMPLETE: CPT

## 2021-09-28 NOTE — PHYSICAL EXAM
[Well Developed] : well developed [Well Nourished] : well nourished [No Acute Distress] : no acute distress [Normal Venous Pressure] : normal venous pressure [No Carotid Bruit] : no carotid bruit [Normal S1, S2] : normal S1, S2 [No Murmur] : no murmur [No Rub] : no rub [No Gallop] : no gallop [Clear Lung Fields] : clear lung fields [Good Air Entry] : good air entry [No Respiratory Distress] : no respiratory distress  [Soft] : abdomen soft [Non Tender] : non-tender [No Masses/organomegaly] : no masses/organomegaly [Normal Bowel Sounds] : normal bowel sounds [Normal Gait] : normal gait [No Edema] : no edema [No Cyanosis] : no cyanosis [No Clubbing] : no clubbing [No Varicosities] : no varicosities [No Rash] : no rash [No Skin Lesions] : no skin lesions [Moves all extremities] : moves all extremities [No Focal Deficits] : no focal deficits [Normal Speech] : normal speech [Alert and Oriented] : alert and oriented [Normal memory] : normal memory [General Appearance - Well Developed] : well developed [Normal Appearance] : normal appearance [Well Groomed] : well groomed [General Appearance - Well Nourished] : well nourished [No Deformities] : no deformities [General Appearance - In No Acute Distress] : no acute distress [Normal Conjunctiva] : the conjunctiva exhibited no abnormalities [Eyelids - No Xanthelasma] : the eyelids demonstrated no xanthelasmas [Normal Oral Mucosa] : normal oral mucosa [No Oral Pallor] : no oral pallor [No Oral Cyanosis] : no oral cyanosis [Normal Jugular Venous A Waves Present] : normal jugular venous A waves present [Normal Jugular Venous V Waves Present] : normal jugular venous V waves present [No Jugular Venous Knight A Waves] : no jugular venous knight A waves [Respiration, Rhythm And Depth] : normal respiratory rhythm and effort [Exaggerated Use Of Accessory Muscles For Inspiration] : no accessory muscle use [Auscultation Breath Sounds / Voice Sounds] : lungs were clear to auscultation bilaterally [Heart Rate And Rhythm] : heart rate and rhythm were normal [Heart Sounds] : normal S1 and S2 [Murmurs] : no murmurs present [Abdomen Soft] : soft [Abdomen Tenderness] : non-tender [Abdomen Mass (___ Cm)] : no abdominal mass palpated [Abnormal Walk] : normal gait [Gait - Sufficient For Exercise Testing] : the gait was sufficient for exercise testing [Nail Clubbing] : no clubbing of the fingernails [Cyanosis, Localized] : no localized cyanosis [Petechial Hemorrhages (___cm)] : no petechial hemorrhages [Skin Color & Pigmentation] : normal skin color and pigmentation [] : no rash [No Venous Stasis] : no venous stasis [Skin Lesions] : no skin lesions [No Skin Ulcers] : no skin ulcer [No Xanthoma] : no  xanthoma was observed [Oriented To Time, Place, And Person] : oriented to person, place, and time [Affect] : the affect was normal [Mood] : the mood was normal [No Anxiety] : not feeling anxious

## 2021-09-28 NOTE — REASON FOR VISIT
[Follow-Up - Clinic] : a clinic follow-up of [Coronary Artery Disease] : coronary artery disease [FreeTextEntry1] : cad\par pci of lad and diagonal in 2009\par 2/2006\par echo showed ef of 60%\par stress test 2014 was negative\par class 1 angina \par class 1 sob\par  no chf\par \par no new complaints\par patient may d/c plavix 5-7 days before cystoscopy\par no sob\par no chest pain\par douing well\par stable\par doing well\par \par thallium shows a perfusion defect of the rca\par cath showed mild non obstructive cad inn all arteries\par medical therapy\par patent stents\par \par he is pre-op for hernia repair\par no further testing is necessary and this is low risk procedure\par class1 angina, no chf or malignant ventricular arrythmias\par \par doing well\par no new complaints\par \par patient now scheduled for repeat cystoscopy\par no new cardiac symptoms\par low risk procedure\par \par since last visit, patient developed bladder cancer\par in addition, had a small stroke and in rehab\par no new cardiac symptoms\par no chest pain or sob [FreeTextEntry2] : and cystoscopy for bladder polps

## 2021-09-28 NOTE — ASSESSMENT
[FreeTextEntry1] : cad\par pci of lad/diagonal\par hyperchlesteremia\par cva\par bladder cancer\par

## 2021-10-13 ENCOUNTER — NON-APPOINTMENT (OUTPATIENT)
Age: 81
End: 2021-10-13

## 2021-11-03 ENCOUNTER — APPOINTMENT (OUTPATIENT)
Dept: UROLOGY | Facility: CLINIC | Age: 81
End: 2021-11-03
Payer: MEDICARE

## 2021-11-03 LAB
BILIRUB UR QL STRIP: NORMAL
CLARITY UR: CLEAR
COLLECTION METHOD: NORMAL
GLUCOSE UR-MCNC: 500
HCG UR QL: 0.2 EU/DL
HGB UR QL STRIP.AUTO: NORMAL
KETONES UR-MCNC: NORMAL
LEUKOCYTE ESTERASE UR QL STRIP: NORMAL
NITRITE UR QL STRIP: NORMAL
PH UR STRIP: 6
PROT UR STRIP-MCNC: NORMAL
SP GR UR STRIP: 1.03

## 2021-11-03 PROCEDURE — 52000 CYSTOURETHROSCOPY: CPT

## 2021-11-03 PROCEDURE — 81003 URINALYSIS AUTO W/O SCOPE: CPT | Mod: QW

## 2021-11-08 ENCOUNTER — RX RENEWAL (OUTPATIENT)
Age: 81
End: 2021-11-08

## 2021-11-11 ENCOUNTER — OUTPATIENT (OUTPATIENT)
Dept: OUTPATIENT SERVICES | Facility: HOSPITAL | Age: 81
LOS: 1 days | Discharge: HOME | End: 2021-11-11

## 2021-11-11 DIAGNOSIS — Z98.890 OTHER SPECIFIED POSTPROCEDURAL STATES: Chronic | ICD-10-CM

## 2021-11-11 DIAGNOSIS — I69.354 HEMIPLEGIA AND HEMIPARESIS FOLLOWING CEREBRAL INFARCTION AFFECTING LEFT NON-DOMINANT SIDE: ICD-10-CM

## 2021-11-11 DIAGNOSIS — Z98.49 CATARACT EXTRACTION STATUS, UNSPECIFIED EYE: Chronic | ICD-10-CM

## 2021-12-13 ENCOUNTER — APPOINTMENT (OUTPATIENT)
Dept: NEUROLOGY | Facility: CLINIC | Age: 81
End: 2021-12-13
Payer: MEDICARE

## 2021-12-13 VITALS
HEART RATE: 81 BPM | DIASTOLIC BLOOD PRESSURE: 70 MMHG | WEIGHT: 164 LBS | TEMPERATURE: 98.1 F | OXYGEN SATURATION: 98 % | HEIGHT: 71 IN | SYSTOLIC BLOOD PRESSURE: 137 MMHG | BODY MASS INDEX: 22.96 KG/M2

## 2021-12-13 PROCEDURE — 99213 OFFICE O/P EST LOW 20 MIN: CPT

## 2021-12-13 NOTE — REASON FOR VISIT
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

## 2021-12-13 NOTE — ASSESSMENT
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 His stroke is associated with Right V4 segment severe stenosis and he has multifocal intracranial stenosis of other vessels, was evaluated by NI Team in patient who recommended medical mgmt. Today, patient presents to clinic and states that side effects on Brilinta 60 BID with ASA 81 QD is improved. No spontaneous bleeding or bruising. He is driving without issues, didn't get evaluated. He still uses cane for ambulation. Exam today shows improved strength in the LLE to 5/5, NIHSS 0.\par \par PLAN: \par -Repeat lipid panel and A1c \par -C/w Atorvastatin 80 QD for now\par -C/w PT and OT\par -Keep hydrated with 3L water/day \par -Follow up in 6 month

## 2021-12-13 NOTE — HISTORY OF PRESENT ILLNESS
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 His stroke is associated with Right V4 segment severe stenosis and he has multifocal intracranial stenosis of other vessels, was evaluated by NI Team in patient who recommended medical mgmt. He was last seen in clinic 8/2021 and has LLE mild weakness 5-/5, slight hemiparetic gait using cane. Since, he was unable to tolerate Brilinta 90 BID 2/2 loose stools and was changed to Brilinta 60 BID and ASA 81 QD. Inpatient , HBA1c 7.8. \par \par Today, patient presents to clinic and states that side effects on Brilinta 60 BID with ASA 81 QD is improved. No spontaneous bleeding or bruising. He is driving without issues, didn't get evaluated. He still uses cane for ambulation. Finished PT and OT. \par

## 2021-12-13 NOTE — PHYSICAL EXAM
[FreeTextEntry1] : Focal neurological exam:\par \par MS: Awake, alert, oriented to person, place, situation and time. Normal affect. Follows commands. \par \par Language: Speech is clear, fluent with good repetition & comprehension. No dysarthria. \par \par CNs 2 - 12 intact. EOMI no nystagmus, no diplopia. VFF. No facial asymmetry b/l, full eye closure strength b/l. Hearing grossly normal. Head turning & shoulder shrug intact b/l. Tongue midline, normal movements, no atrophy.\par \par Motor: Normal muscle bulk & tone. No noticeable tremor or seizure. No pronator drift. Muscle strength of b/l UE and b/l LE 5/5. \par \par Reflexes: DTR of biceps 1+, knees absent \par \par Sensation: Intact to LT, temperature and vibration b/l throughout\par \par Cortical: No extinction\par \par Coordination: No dysmetria to FTN.\par \par Gait: No postural instability. Normal stance and tandem gait.\par \par NIHSS 0\par \par \par \par \par

## 2021-12-14 ENCOUNTER — TRANSCRIPTION ENCOUNTER (OUTPATIENT)
Age: 81
End: 2021-12-14

## 2021-12-18 ENCOUNTER — APPOINTMENT (OUTPATIENT)
Age: 81
End: 2021-12-18

## 2021-12-18 ENCOUNTER — OUTPATIENT (OUTPATIENT)
Dept: INPATIENT UNIT | Facility: HOSPITAL | Age: 81
LOS: 1 days | Discharge: HOME | End: 2021-12-18

## 2021-12-18 VITALS
TEMPERATURE: 97 F | OXYGEN SATURATION: 97 % | DIASTOLIC BLOOD PRESSURE: 63 MMHG | SYSTOLIC BLOOD PRESSURE: 139 MMHG | RESPIRATION RATE: 18 BRPM | HEART RATE: 69 BPM

## 2021-12-18 VITALS
RESPIRATION RATE: 18 BRPM | SYSTOLIC BLOOD PRESSURE: 148 MMHG | OXYGEN SATURATION: 96 % | TEMPERATURE: 98 F | HEART RATE: 86 BPM | DIASTOLIC BLOOD PRESSURE: 72 MMHG

## 2021-12-18 DIAGNOSIS — Z98.890 OTHER SPECIFIED POSTPROCEDURAL STATES: Chronic | ICD-10-CM

## 2021-12-18 DIAGNOSIS — Z98.49 CATARACT EXTRACTION STATUS, UNSPECIFIED EYE: Chronic | ICD-10-CM

## 2021-12-18 DIAGNOSIS — U07.1 COVID-19: ICD-10-CM

## 2021-12-18 RX ORDER — SODIUM CHLORIDE 9 MG/ML
250 INJECTION INTRAMUSCULAR; INTRAVENOUS; SUBCUTANEOUS
Refills: 0 | Status: COMPLETED | OUTPATIENT
Start: 2021-12-18 | End: 2021-12-18

## 2021-12-18 RX ADMIN — SODIUM CHLORIDE 310 MILLILITER(S): 9 INJECTION INTRAMUSCULAR; INTRAVENOUS; SUBCUTANEOUS at 11:35

## 2021-12-18 NOTE — MONOCLONAL ANTIBODY INFUSION - ASSESSMENT AND PLAN
80 yo M PMH DM/HTN. Presents with loss of taste + smell x 5 days. Positive COVID PCR x 4 days ago. Received COVID vaccine x 3 doses: March, april, december 2021, Moderna. Denies chest pain, SOB, fever, chills, cough, N/V/D, dizziness, weakness, and any other acute complaints at this time.     Plan:  I have reviewed the Casirivimab/Imdevimab Emergency Use Authorization (EUA) and I have provided the patient or patient's caregiver with the following information:  1. FDA has authorized emergency use of Casirivimab/Imdevimab which is not an FDA approved biological agent.  2. The patient or patient’s caregiver has the option to accept or refuse administration of Casirivimab/Imdevimab.  3. The significant known and potential risks and benefits of Casirivimab/Imdevimab and the extent to which such risks and benefits are unknown.  4. Information on available alternative treatments and risks and benefits of those alternatives.  Informed consent was obtained. Answered all of patient's questions and concerns to their satisfaction. Patient verbalized understanding.  Monitor VS per protocol.  Insert peripheral IV.  Infuse NSS 25/mL IV continuously.  Administer casirivimab/imdevimab(600mg/600mg) IV over 30 minutes   Observe patient for 1 hour post infusion.    Disposition:  Patient tolerated infusion well, denies complaints of chest pain, shortness of breath, dizziness or palpitations. Discharge instructions given and fact sheet included.  Instructed patient to contact the call center or their PMD if they develop side effects at home.  Instructed the patient to call 911 and go to the emergency room if they develop symptoms of a severe allergic reaction. Patient verbalized understanding.  RN educated patient on the proper use of the incentive spirometer and the patient displayed return demonstration.  VSS and RN removed IV successfully.  Patient was discharged home in [facility].    Bautista LYON

## 2021-12-18 NOTE — MONOCLONAL ANTIBODY INFUSION - HOME MEDICATIONS
ticagrelor 90 mg oral tablet , 1 tab(s) orally every 12 hours  atorvastatin 80 mg oral tablet , 1 tab(s) orally once a day (at bedtime)  melatonin 5 mg oral tablet , 1 tab(s) orally once a day (at bedtime), As needed, Insomnia  losartan 50 mg oral tablet , 1 tab(s) orally once a day  acetaminophen 325 mg oral tablet , 2 tab(s) orally every 6 hours, As needed, Temp greater or equal to 38C (100.4F), Mild Pain (1 - 3)  glipiZIDE 5 mg oral tablet, extended release , 1 tab(s) orally 2 times a day

## 2021-12-19 ENCOUNTER — TRANSCRIPTION ENCOUNTER (OUTPATIENT)
Age: 81
End: 2021-12-19

## 2021-12-24 NOTE — PHYSICAL THERAPY INITIAL EVALUATION ADULT - LEVEL OF INDEPENDENCE: STAND/SIT, REHAB EVAL
12/24/21 0839   Signing Clinician's Name / Credentials   Signing clinician's name / credentials Brittany Dressler, PT   Quick Adds   Rehab Discipline PT   Gait Training   Minutes of Treatment (Gait Training) 15   Symptoms Noted During/After Treatment (Gait Training) fatigue   Distance in Feet (Required for LE Total Joints) 300' + 100' no AD Tito with H head turns, otherwise slow, guarded, increased DLStance with WBOS and stepping strategies.    Treatment Detail Pre-gait: Laura bed mob, SBA sit-stands, close SBA-CGA pivots no AD. Edu on RW still recommended outside of PT yet.    Stair, Performance   Symptoms Noted During/After Treatment fatigue   Treatment Detail 12 stairs: 2 > 1 rail close SBA reciprocally > HHA Tito step-to training on assist.    Physical Performance Test or Measurement   Minutes of Treatment 15   Symptoms Noted During/After Treatment fatigue   Treatment Detail PT DGI 15/24 no AD (falls risk < 20).    PT Discharge Planning    PT Discharge Recommendation (DC Rec) home with assist;home with outpatient physical therapy;Transitional Care Facility   PT Rationale for DC Rec Below independent baseline. Has support to return home with consistent RW use and 24hr assist (hands on assist stairs which  can do). Further PT recommended to progress to PLOF (pt prefers home PT).    PT Brief overview of current status  Bed mob ModA, tx CGA, gait no AD Tito LOB on head turns - DGI 15/24 (falls risk < 20).  RN notified of mild nasal discharge given possible CSF leak.    Additional Documentation   PT Plan PT:  Functional balance & vestin progressions (H > V, complaint surfaces, EC, etc).    Total Session Time   Total Session Time (minutes) 30 minutes      minimum assist (75% patients effort)

## 2022-02-01 ENCOUNTER — APPOINTMENT (OUTPATIENT)
Dept: CARDIOLOGY | Facility: CLINIC | Age: 82
End: 2022-02-01
Payer: MEDICARE

## 2022-02-01 ENCOUNTER — RESULT CHARGE (OUTPATIENT)
Age: 82
End: 2022-02-01

## 2022-02-01 VITALS
BODY MASS INDEX: 24.55 KG/M2 | DIASTOLIC BLOOD PRESSURE: 80 MMHG | SYSTOLIC BLOOD PRESSURE: 140 MMHG | HEART RATE: 82 BPM | WEIGHT: 176 LBS

## 2022-02-01 PROCEDURE — 99214 OFFICE O/P EST MOD 30 MIN: CPT

## 2022-02-01 PROCEDURE — 93000 ELECTROCARDIOGRAM COMPLETE: CPT

## 2022-02-01 RX ORDER — GLIPIZIDE 5 MG/1
5 TABLET ORAL TWICE DAILY
Refills: 0 | Status: ACTIVE | COMMUNITY
Start: 2021-05-26

## 2022-02-17 ENCOUNTER — APPOINTMENT (OUTPATIENT)
Dept: VASCULAR SURGERY | Facility: CLINIC | Age: 82
End: 2022-02-17
Payer: MEDICARE

## 2022-02-17 VITALS
BODY MASS INDEX: 23.1 KG/M2 | DIASTOLIC BLOOD PRESSURE: 83 MMHG | SYSTOLIC BLOOD PRESSURE: 134 MMHG | HEIGHT: 71 IN | WEIGHT: 165 LBS

## 2022-02-17 PROCEDURE — 93978 VASCULAR STUDY: CPT

## 2022-02-17 PROCEDURE — 99214 OFFICE O/P EST MOD 30 MIN: CPT

## 2022-02-17 PROCEDURE — 93925 LOWER EXTREMITY STUDY: CPT

## 2022-02-17 NOTE — ASSESSMENT
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 \par \par Duplex today showed abdominal aorta 4.1 cm, right common iliac artery 1.9, cm left common iliac artery 1.9 cm, \par Right common femoral artery 1.8 cm, right popliteal artery 1.8 cm, Left common femoral artery 1.7 cm and left popliteal artery 1.5 cm.\par \par No surgical intervention is needed at this time, and I would like to see him back in my office in 6 months time or sooner if any new symptoms develop.

## 2022-02-17 NOTE — DATA REVIEWED
[FreeTextEntry1] : I performed an arterial duplex which was medically necessary to evaluate for size of the aneurysms.\par \par Duplex abdominal aorta 4.1 cm right common iliac artery 1.9 cm left common iliac artery 1.9 cm\par Arterial duplex:\par Right common femoral artery 1.8 cm and popliteal artery 1.8 cm\par Left common femoral artery 1.7 cm and left popliteal artery 1.5 cm

## 2022-02-17 NOTE — HISTORY OF PRESENT ILLNESS
[FreeTextEntry1] : 81 year-old gentleman with a known history for a 4 cm abdominal aortic aneurysm and bilateral common femoral popliteal artery aneurysms. Today he presents for followup and offers no new complaints.\par

## 2022-04-08 NOTE — H&P PST ADULT - EYES
Recent PHQ 2/9 Score    PHQ 2:  Date Adult PHQ 2 Score Adult PHQ 2 Interpretation   4/8/2022 0 No further screening needed       PHQ 9:      EOMI; PERRL; no drainage or redness

## 2022-04-10 NOTE — H&P PST ADULT - ACTIVITY
4/10/22 4:07 pm  spoke w/ mother informed above RVP and  child  is better instructed to f/u w/ PMD reviewed ED return precautions MPopcun PNP
2 fos

## 2022-05-03 ENCOUNTER — APPOINTMENT (OUTPATIENT)
Dept: UROLOGY | Facility: CLINIC | Age: 82
End: 2022-05-03
Payer: MEDICARE

## 2022-05-03 LAB
BILIRUB UR QL STRIP: NORMAL
COLLECTION METHOD: NORMAL
GLUCOSE UR-MCNC: NORMAL
HCG UR QL: 0.2 EU/DL
HGB UR QL STRIP.AUTO: NORMAL
KETONES UR-MCNC: NORMAL
LEUKOCYTE ESTERASE UR QL STRIP: NORMAL
NITRITE UR QL STRIP: NORMAL
PH UR STRIP: 5.5
PROT UR STRIP-MCNC: NORMAL
SP GR UR STRIP: 1.02

## 2022-05-03 PROCEDURE — 52000 CYSTOURETHROSCOPY: CPT

## 2022-05-03 PROCEDURE — 81003 URINALYSIS AUTO W/O SCOPE: CPT | Mod: QW

## 2022-06-13 ENCOUNTER — APPOINTMENT (OUTPATIENT)
Dept: NEUROLOGY | Facility: CLINIC | Age: 82
End: 2022-06-13
Payer: MEDICARE

## 2022-06-13 VITALS
HEIGHT: 71 IN | WEIGHT: 162 LBS | TEMPERATURE: 98.6 F | DIASTOLIC BLOOD PRESSURE: 66 MMHG | SYSTOLIC BLOOD PRESSURE: 142 MMHG | BODY MASS INDEX: 22.68 KG/M2 | OXYGEN SATURATION: 97 % | HEART RATE: 74 BPM

## 2022-06-13 DIAGNOSIS — M25.559 PAIN IN UNSPECIFIED HIP: ICD-10-CM

## 2022-06-13 DIAGNOSIS — R26.81 UNSTEADINESS ON FEET: ICD-10-CM

## 2022-06-13 PROCEDURE — 99214 OFFICE O/P EST MOD 30 MIN: CPT

## 2022-06-13 NOTE — ASSESSMENT
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 His stroke is associated with Right V4 segment severe stenosis and he has multifocal intracranial stenosis of other vessels (including R AICA, L cav/supraclinoid segments of ICA, proximal inferior R MCA), was evaluated by NI Team at that time who recommended medical mgmt. NIHSS is 0 today and he is doing well with medical mgmt, but needs better glycemic control. He continues on Brilinta 90 BID and get bruises on his arms, thinks likely traumatic but is unsure. He c/o of balance issues and has fallen possibly related to hip issues. \par \par PLAN:  \par -C/w Brilinta 90 BID for now. Check with Stroke Attending regarding necessity for continuation.   \par -Decrease to Lipitor 40 (cut in half) and repeat Lipid panel in 1 mo  \par -Improve glycemic control, A1c goal <7.0   \par -Repeat CTA H \par -F/u with Ortho for R hip \par -OT for gait \par -Keep hydrated with 3L water/day  \par -Follow up in 6 month with Dr. Cook\par \par Aggressive risk factor modification should be continued for secondary stroke prevention, consisting of intensive blood pressure control and cholesterol monitoring. I encouraged the patient to discuss these important issues with her primary care doctor.  \par \par I have reviewed the goals of stroke risk factor modification. I counseled the patient on measures to reduce stroke risk, including the importance of medication compliance, risk factor control, exercise, healthy diet and avoidance of smoking. I reviewed stroke warning signs and symptoms and appropriate actions to take if such occur.\par

## 2022-06-13 NOTE — HISTORY OF PRESENT ILLNESS
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 His stroke is associated with Right V4 segment severe stenosis and he has multifocal intracranial stenosis of other vessels, was evaluated by NI Team in patient who recommended medical mgmt at that time.\par \par Today he states he is still on Brilinta 90 BID and never altered medication bc diarrhea went away and bowel mvts normalized. Denies spontaneous bleeding or bruising, but has been getting traumatic bruises, although sometimes unsure of how he got them, isolated to his arms. He c/o of balance issues and has fallen. Denies back issues, but was told R hip is loose.  \par \par Last LDL 41, A1c 7.7. Trace protein in UA.  \par \par

## 2022-06-13 NOTE — PHYSICAL EXAM
[FreeTextEntry1] : Focal neurological exam:\par \par MS: Awake, alert, oriented to person, place, situation and time. Normal affect. Follows commands. \par \par Language: Speech is clear, fluent with good repetition & comprehension. No dysarthria. \par \par CNs 2 - 12 intact. EOMI no nystagmus, no diplopia. VFF. No facial asymmetry b/l, full eye closure strength b/l. Hearing grossly normal. Head turning & shoulder shrug intact b/l. Tongue midline, normal movements, no atrophy.\par \par Motor: Normal muscle bulk & tone. No noticeable tremor or seizure. No pronator drift. Muscle strength of b/l UE and b/l LE 5/5. \par \par Reflexes: DTR of biceps 1+, knees absent  \par \par Sensation: Intact to LT, temperature and vibration b/l throughout\par \par Cortical: No extinction\par \par Coordination: No dysmetria to FTN.\par \par Gait: No postural instability. Normal stance and tandem gait.\par \par \par \par \par

## 2022-06-16 ENCOUNTER — NON-APPOINTMENT (OUTPATIENT)
Age: 82
End: 2022-06-16

## 2022-06-16 LAB
ALBUMIN SERPL ELPH-MCNC: 4.4 G/DL
ALP BLD-CCNC: 114 U/L
ALT SERPL-CCNC: 14 U/L
ANION GAP SERPL CALC-SCNC: 11 MMOL/L
AST SERPL-CCNC: 16 U/L
BILIRUB SERPL-MCNC: 1.4 MG/DL
BUN SERPL-MCNC: 20 MG/DL
CALCIUM SERPL-MCNC: 9.2 MG/DL
CHLORIDE SERPL-SCNC: 103 MMOL/L
CHOLEST SERPL-MCNC: 100 MG/DL
CO2 SERPL-SCNC: 23 MMOL/L
CREAT SERPL-MCNC: 1 MG/DL
EGFR: 76 ML/MIN/1.73M2
GLUCOSE SERPL-MCNC: 144 MG/DL
HDLC SERPL-MCNC: 49 MG/DL
LDLC SERPL CALC-MCNC: 40 MG/DL
NONHDLC SERPL-MCNC: 51 MG/DL
POTASSIUM SERPL-SCNC: 4.3 MMOL/L
PROT SERPL-MCNC: 6.6 G/DL
SODIUM SERPL-SCNC: 137 MMOL/L
TRIGL SERPL-MCNC: 54 MG/DL

## 2022-07-21 ENCOUNTER — OUTPATIENT (OUTPATIENT)
Dept: OUTPATIENT SERVICES | Facility: HOSPITAL | Age: 82
LOS: 1 days | Discharge: HOME | End: 2022-07-21

## 2022-07-21 DIAGNOSIS — I63.9 CEREBRAL INFARCTION, UNSPECIFIED: ICD-10-CM

## 2022-07-21 DIAGNOSIS — Z98.890 OTHER SPECIFIED POSTPROCEDURAL STATES: Chronic | ICD-10-CM

## 2022-07-21 DIAGNOSIS — Z98.49 CATARACT EXTRACTION STATUS, UNSPECIFIED EYE: Chronic | ICD-10-CM

## 2022-07-21 PROCEDURE — 70496 CT ANGIOGRAPHY HEAD: CPT | Mod: 26

## 2022-07-22 ENCOUNTER — NON-APPOINTMENT (OUTPATIENT)
Age: 82
End: 2022-07-22

## 2022-07-22 LAB
CHOLEST SERPL-MCNC: 109 MG/DL
HDLC SERPL-MCNC: 47 MG/DL
LDLC SERPL CALC-MCNC: 49 MG/DL
NONHDLC SERPL-MCNC: 62 MG/DL
TRIGL SERPL-MCNC: 63 MG/DL

## 2022-07-26 ENCOUNTER — APPOINTMENT (OUTPATIENT)
Dept: CARDIOLOGY | Facility: CLINIC | Age: 82
End: 2022-07-26

## 2022-07-26 ENCOUNTER — RESULT CHARGE (OUTPATIENT)
Age: 82
End: 2022-07-26

## 2022-07-26 VITALS
HEART RATE: 59 BPM | WEIGHT: 168 LBS | BODY MASS INDEX: 23.43 KG/M2 | DIASTOLIC BLOOD PRESSURE: 78 MMHG | SYSTOLIC BLOOD PRESSURE: 130 MMHG

## 2022-07-26 PROCEDURE — 93000 ELECTROCARDIOGRAM COMPLETE: CPT

## 2022-07-26 PROCEDURE — 99214 OFFICE O/P EST MOD 30 MIN: CPT

## 2022-07-26 RX ORDER — TICAGRELOR 90 MG/1
90 TABLET ORAL TWICE DAILY
Qty: 180 | Refills: 2 | Status: DISCONTINUED | COMMUNITY
End: 2022-07-26

## 2022-07-26 RX ORDER — ATORVASTATIN CALCIUM 80 MG/1
80 TABLET, FILM COATED ORAL DAILY
Qty: 30 | Refills: 5 | Status: DISCONTINUED | COMMUNITY
End: 2022-07-26

## 2022-07-26 NOTE — PHYSICAL EXAM
[Well Developed] : well developed [Well Nourished] : well nourished [No Acute Distress] : no acute distress [Normal Venous Pressure] : normal venous pressure [No Carotid Bruit] : no carotid bruit [Normal S1, S2] : normal S1, S2 [No Murmur] : no murmur [No Rub] : no rub [No Gallop] : no gallop [Clear Lung Fields] : clear lung fields [Good Air Entry] : good air entry [No Respiratory Distress] : no respiratory distress  [Soft] : abdomen soft [Non Tender] : non-tender [No Masses/organomegaly] : no masses/organomegaly [Normal Bowel Sounds] : normal bowel sounds [Normal Gait] : normal gait [No Edema] : no edema [No Cyanosis] : no cyanosis [No Clubbing] : no clubbing [No Varicosities] : no varicosities [No Rash] : no rash [No Skin Lesions] : no skin lesions [Moves all extremities] : moves all extremities [No Focal Deficits] : no focal deficits [Normal Speech] : normal speech [Alert and Oriented] : alert and oriented [Normal memory] : normal memory [General Appearance - Well Developed] : well developed [Normal Appearance] : normal appearance [Well Groomed] : well groomed [General Appearance - Well Nourished] : well nourished [No Deformities] : no deformities [General Appearance - In No Acute Distress] : no acute distress [Normal Conjunctiva] : the conjunctiva exhibited no abnormalities [Eyelids - No Xanthelasma] : the eyelids demonstrated no xanthelasmas [Normal Oral Mucosa] : normal oral mucosa [No Oral Pallor] : no oral pallor [No Oral Cyanosis] : no oral cyanosis [Normal Jugular Venous A Waves Present] : normal jugular venous A waves present [Normal Jugular Venous V Waves Present] : normal jugular venous V waves present [No Jugular Venous Knight A Waves] : no jugular venous knight A waves [Respiration, Rhythm And Depth] : normal respiratory rhythm and effort [Exaggerated Use Of Accessory Muscles For Inspiration] : no accessory muscle use [Auscultation Breath Sounds / Voice Sounds] : lungs were clear to auscultation bilaterally [Heart Rate And Rhythm] : heart rate and rhythm were normal [Heart Sounds] : normal S1 and S2 [Murmurs] : no murmurs present [Abdomen Soft] : soft [Abdomen Tenderness] : non-tender [Abdomen Mass (___ Cm)] : no abdominal mass palpated [Abnormal Walk] : normal gait [Gait - Sufficient For Exercise Testing] : the gait was sufficient for exercise testing [Nail Clubbing] : no clubbing of the fingernails [Cyanosis, Localized] : no localized cyanosis [Petechial Hemorrhages (___cm)] : no petechial hemorrhages [Skin Color & Pigmentation] : normal skin color and pigmentation [] : no rash [No Venous Stasis] : no venous stasis [Skin Lesions] : no skin lesions [No Skin Ulcers] : no skin ulcer [No Xanthoma] : no  xanthoma was observed [Oriented To Time, Place, And Person] : oriented to person, place, and time [Mood] : the mood was normal [Affect] : the affect was normal [No Anxiety] : not feeling anxious

## 2022-08-18 ENCOUNTER — APPOINTMENT (OUTPATIENT)
Dept: VASCULAR SURGERY | Facility: CLINIC | Age: 82
End: 2022-08-18

## 2022-08-18 VITALS — WEIGHT: 164 LBS | BODY MASS INDEX: 22.96 KG/M2 | HEIGHT: 71 IN

## 2022-08-18 VITALS — SYSTOLIC BLOOD PRESSURE: 130 MMHG | DIASTOLIC BLOOD PRESSURE: 72 MMHG

## 2022-08-18 PROCEDURE — 93978 VASCULAR STUDY: CPT

## 2022-08-18 PROCEDURE — 99214 OFFICE O/P EST MOD 30 MIN: CPT

## 2022-08-18 PROCEDURE — 93925 LOWER EXTREMITY STUDY: CPT

## 2022-08-18 NOTE — ASSESSMENT
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 \par \par Duplex abdominal aorta 4.3 cm right common iliac artery 1.9 cm left common iliac artery 2.1 cm, Right common femoral artery 1.8 cm and popliteal artery 2.0 cm, Left common femoral artery 1.6 cm and left popliteal artery 1.6 cm\par \par No surgical intervention is needed at this time, and I would like to see him back in my office in 6 months time or sooner if any new symptoms develop.

## 2022-08-18 NOTE — DATA REVIEWED
[FreeTextEntry1] : I performed an arterial duplex which was medically necessary to evaluate for size of the aneurysms.\par \par Duplex abdominal aorta 4.3 cm right common iliac artery 1.9 cm left common iliac artery 2.1 cm\par Arterial duplex:\par Right common femoral artery 1.8 cm and popliteal artery 2.0 cm\par Left common femoral artery 1.6 cm and left popliteal artery 1.6 cm

## 2022-08-24 NOTE — PHYSICAL THERAPY INITIAL EVALUATION ADULT - LEVEL OF INDEPENDENCE: STAND/SIT, REHAB EVAL
----- Message from Zachery Bauer sent at 8/24/2022  9:23 AM CDT -----  Type:  Needs Medical Advice    Who Called: [facility]  Reason:status of order for glucomener, lancets, strips and supporting clinical notes  Would the patient rather a call back or a response via [facility]? call  Best Call Back Number: [phone]/ [place]  Additional Information:fax [phone]     supervision

## 2022-10-25 NOTE — SWALLOW BEDSIDE ASSESSMENT ADULT - ASR SWALLOW ASPIRATION MONITOR
cough/gurgly voice/fever/throat clearing Electrodesiccation And Curettage Text: The wound bed was treated with electrodesiccation and curettage after the biopsy was performed.

## 2022-11-08 ENCOUNTER — APPOINTMENT (OUTPATIENT)
Dept: UROLOGY | Facility: CLINIC | Age: 82
End: 2022-11-08

## 2022-11-08 DIAGNOSIS — Z00.00 ENCOUNTER FOR GENERAL ADULT MEDICAL EXAMINATION W/OUT ABNORMAL FINDINGS: ICD-10-CM

## 2022-11-08 LAB
BILIRUB UR QL STRIP: NORMAL
COLLECTION METHOD: NORMAL
GLUCOSE UR-MCNC: 1000
HCG UR QL: 0.2 EU/DL
HGB UR QL STRIP.AUTO: NORMAL
KETONES UR-MCNC: NORMAL
LEUKOCYTE ESTERASE UR QL STRIP: NORMAL
NITRITE UR QL STRIP: NORMAL
PH UR STRIP: 5.5
PROT UR STRIP-MCNC: NORMAL
SP GR UR STRIP: 1.02

## 2022-11-08 PROCEDURE — 81003 URINALYSIS AUTO W/O SCOPE: CPT | Mod: QW

## 2022-11-08 PROCEDURE — 52000 CYSTOURETHROSCOPY: CPT

## 2023-01-13 NOTE — ED PROVIDER NOTE - PMH
AAA (abdominal aortic aneurysm)  monitored by dr barker  stable x 10 yrs  Abnormal chest xray  monitored by fire dept  CAD (coronary artery disease)  s/p 5 stents  2009  Cancer  prostate  DM (diabetes mellitus)    History of medical problems  hypercholesteremia, b/l inguinal hernias, pvd, popliteal artery aneruysm, b/l catararacts.  HTN (hypertension)    OA (osteoarthritis)     [address] [place] [address]

## 2023-01-24 ENCOUNTER — RESULT CHARGE (OUTPATIENT)
Age: 83
End: 2023-01-24

## 2023-01-24 ENCOUNTER — APPOINTMENT (OUTPATIENT)
Dept: CARDIOLOGY | Facility: CLINIC | Age: 83
End: 2023-01-24
Payer: MEDICARE

## 2023-01-24 VITALS
SYSTOLIC BLOOD PRESSURE: 122 MMHG | HEIGHT: 71 IN | HEART RATE: 95 BPM | WEIGHT: 163 LBS | DIASTOLIC BLOOD PRESSURE: 68 MMHG | BODY MASS INDEX: 22.82 KG/M2

## 2023-01-24 PROCEDURE — 99214 OFFICE O/P EST MOD 30 MIN: CPT

## 2023-01-24 PROCEDURE — 93000 ELECTROCARDIOGRAM COMPLETE: CPT

## 2023-02-16 ENCOUNTER — APPOINTMENT (OUTPATIENT)
Dept: VASCULAR SURGERY | Facility: CLINIC | Age: 83
End: 2023-02-16
Payer: MEDICARE

## 2023-02-16 VITALS — HEIGHT: 71 IN | BODY MASS INDEX: 22.82 KG/M2 | WEIGHT: 163 LBS

## 2023-02-16 PROCEDURE — 99213 OFFICE O/P EST LOW 20 MIN: CPT

## 2023-02-16 PROCEDURE — 93978 VASCULAR STUDY: CPT

## 2023-02-16 PROCEDURE — 93925 LOWER EXTREMITY STUDY: CPT

## 2023-02-16 NOTE — DATA REVIEWED
[FreeTextEntry1] : I performed an arterial duplex which was medically necessary to evaluate for size of the aneurysms.\par \par Duplex showed abdominal aorta 4.4 cm right common iliac artery 1.9 cm left common iliac artery 2.1 cm\par Arterial duplex:\par Right common femoral artery 1.9 cm and popliteal artery 1.6 cm\par Left common femoral artery 1.8 cm and left popliteal artery 2.0 cm

## 2023-02-16 NOTE — HISTORY OF PRESENT ILLNESS
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 Today he presents for followup and offers no new complaints.\par

## 2023-02-16 NOTE — ASSESSMENT
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 \par \par Duplex abdominal aorta 4.4 cm right common iliac artery 1.9 cm left common iliac artery 2.1 cm, Right common femoral artery 1.9 cm and popliteal artery 1.6 cm, Left common femoral artery 1.8 cm and left popliteal artery 2.0 cm.\par \par No surgical intervention is needed at this time, and I would like to see him back in my office in one years time or sooner if any new symptoms develop.

## 2023-03-24 ENCOUNTER — APPOINTMENT (OUTPATIENT)
Dept: NEUROLOGY | Facility: CLINIC | Age: 83
End: 2023-03-24

## 2023-04-05 ENCOUNTER — INPATIENT (INPATIENT)
Facility: HOSPITAL | Age: 83
LOS: 1 days | Discharge: HOME | DRG: 291 | End: 2023-04-07
Attending: INTERNAL MEDICINE | Admitting: INTERNAL MEDICINE
Payer: MEDICARE

## 2023-04-05 VITALS
DIASTOLIC BLOOD PRESSURE: 78 MMHG | TEMPERATURE: 97 F | HEIGHT: 71 IN | HEART RATE: 109 BPM | WEIGHT: 162.92 LBS | OXYGEN SATURATION: 98 % | SYSTOLIC BLOOD PRESSURE: 153 MMHG | RESPIRATION RATE: 16 BRPM

## 2023-04-05 DIAGNOSIS — R06.02 SHORTNESS OF BREATH: ICD-10-CM

## 2023-04-05 DIAGNOSIS — Z98.890 OTHER SPECIFIED POSTPROCEDURAL STATES: Chronic | ICD-10-CM

## 2023-04-05 DIAGNOSIS — Z98.49 CATARACT EXTRACTION STATUS, UNSPECIFIED EYE: Chronic | ICD-10-CM

## 2023-04-05 LAB
ALBUMIN SERPL ELPH-MCNC: 4.1 G/DL — SIGNIFICANT CHANGE UP (ref 3.5–5.2)
ALP SERPL-CCNC: 109 U/L — SIGNIFICANT CHANGE UP (ref 30–115)
ALT FLD-CCNC: 26 U/L — SIGNIFICANT CHANGE UP (ref 0–41)
ANION GAP SERPL CALC-SCNC: 13 MMOL/L — SIGNIFICANT CHANGE UP (ref 7–14)
AST SERPL-CCNC: 33 U/L — SIGNIFICANT CHANGE UP (ref 0–41)
BASOPHILS # BLD AUTO: 0.05 K/UL — SIGNIFICANT CHANGE UP (ref 0–0.2)
BASOPHILS NFR BLD AUTO: 0.7 % — SIGNIFICANT CHANGE UP (ref 0–1)
BILIRUB SERPL-MCNC: 1.7 MG/DL — HIGH (ref 0.2–1.2)
BUN SERPL-MCNC: 17 MG/DL — SIGNIFICANT CHANGE UP (ref 10–20)
CALCIUM SERPL-MCNC: 9.5 MG/DL — SIGNIFICANT CHANGE UP (ref 8.4–10.5)
CHLORIDE SERPL-SCNC: 102 MMOL/L — SIGNIFICANT CHANGE UP (ref 98–110)
CO2 SERPL-SCNC: 21 MMOL/L — SIGNIFICANT CHANGE UP (ref 17–32)
CREAT SERPL-MCNC: 1.1 MG/DL — SIGNIFICANT CHANGE UP (ref 0.7–1.5)
EGFR: 67 ML/MIN/1.73M2 — SIGNIFICANT CHANGE UP
EOSINOPHIL # BLD AUTO: 0.09 K/UL — SIGNIFICANT CHANGE UP (ref 0–0.7)
EOSINOPHIL NFR BLD AUTO: 1.2 % — SIGNIFICANT CHANGE UP (ref 0–8)
GLUCOSE BLDC GLUCOMTR-MCNC: 213 MG/DL — HIGH (ref 70–99)
GLUCOSE BLDC GLUCOMTR-MCNC: 90 MG/DL — SIGNIFICANT CHANGE UP (ref 70–99)
GLUCOSE SERPL-MCNC: 197 MG/DL — HIGH (ref 70–99)
HCT VFR BLD CALC: 36.4 % — LOW (ref 42–52)
HGB BLD-MCNC: 12.1 G/DL — LOW (ref 14–18)
IMM GRANULOCYTES NFR BLD AUTO: 0.1 % — SIGNIFICANT CHANGE UP (ref 0.1–0.3)
LYMPHOCYTES # BLD AUTO: 1.92 K/UL — SIGNIFICANT CHANGE UP (ref 1.2–3.4)
LYMPHOCYTES # BLD AUTO: 26 % — SIGNIFICANT CHANGE UP (ref 20.5–51.1)
MAGNESIUM SERPL-MCNC: 1.9 MG/DL — SIGNIFICANT CHANGE UP (ref 1.8–2.4)
MCHC RBC-ENTMCNC: 29.4 PG — SIGNIFICANT CHANGE UP (ref 27–31)
MCHC RBC-ENTMCNC: 33.2 G/DL — SIGNIFICANT CHANGE UP (ref 32–37)
MCV RBC AUTO: 88.3 FL — SIGNIFICANT CHANGE UP (ref 80–94)
MONOCYTES # BLD AUTO: 0.65 K/UL — HIGH (ref 0.1–0.6)
MONOCYTES NFR BLD AUTO: 8.8 % — SIGNIFICANT CHANGE UP (ref 1.7–9.3)
NEUTROPHILS # BLD AUTO: 4.66 K/UL — SIGNIFICANT CHANGE UP (ref 1.4–6.5)
NEUTROPHILS NFR BLD AUTO: 63.2 % — SIGNIFICANT CHANGE UP (ref 42.2–75.2)
NRBC # BLD: 0 /100 WBCS — SIGNIFICANT CHANGE UP (ref 0–0)
NT-PROBNP SERPL-SCNC: 5117 PG/ML — HIGH (ref 0–300)
PLATELET # BLD AUTO: 225 K/UL — SIGNIFICANT CHANGE UP (ref 130–400)
POTASSIUM SERPL-MCNC: 5.5 MMOL/L — HIGH (ref 3.5–5)
POTASSIUM SERPL-SCNC: 5.5 MMOL/L — HIGH (ref 3.5–5)
PROT SERPL-MCNC: 7 G/DL — SIGNIFICANT CHANGE UP (ref 6–8)
RBC # BLD: 4.12 M/UL — LOW (ref 4.7–6.1)
RBC # FLD: 15.3 % — HIGH (ref 11.5–14.5)
SARS-COV-2 RNA SPEC QL NAA+PROBE: SIGNIFICANT CHANGE UP
SODIUM SERPL-SCNC: 136 MMOL/L — SIGNIFICANT CHANGE UP (ref 135–146)
TROPONIN T SERPL-MCNC: 0.02 NG/ML — HIGH
WBC # BLD: 7.38 K/UL — SIGNIFICANT CHANGE UP (ref 4.8–10.8)
WBC # FLD AUTO: 7.38 K/UL — SIGNIFICANT CHANGE UP (ref 4.8–10.8)

## 2023-04-05 PROCEDURE — 97161 PT EVAL LOW COMPLEX 20 MIN: CPT | Mod: GP

## 2023-04-05 PROCEDURE — 83550 IRON BINDING TEST: CPT

## 2023-04-05 PROCEDURE — 82728 ASSAY OF FERRITIN: CPT

## 2023-04-05 PROCEDURE — 84484 ASSAY OF TROPONIN QUANT: CPT

## 2023-04-05 PROCEDURE — 36415 COLL VENOUS BLD VENIPUNCTURE: CPT

## 2023-04-05 PROCEDURE — 99222 1ST HOSP IP/OBS MODERATE 55: CPT | Mod: GC

## 2023-04-05 PROCEDURE — 93010 ELECTROCARDIOGRAM REPORT: CPT

## 2023-04-05 PROCEDURE — 71045 X-RAY EXAM CHEST 1 VIEW: CPT

## 2023-04-05 PROCEDURE — 80061 LIPID PANEL: CPT

## 2023-04-05 PROCEDURE — 71045 X-RAY EXAM CHEST 1 VIEW: CPT | Mod: 26

## 2023-04-05 PROCEDURE — 85025 COMPLETE CBC W/AUTO DIFF WBC: CPT

## 2023-04-05 PROCEDURE — 93308 TTE F-UP OR LMTD: CPT | Mod: 26

## 2023-04-05 PROCEDURE — 83540 ASSAY OF IRON: CPT

## 2023-04-05 PROCEDURE — 99285 EMERGENCY DEPT VISIT HI MDM: CPT | Mod: FS

## 2023-04-05 PROCEDURE — 84466 ASSAY OF TRANSFERRIN: CPT

## 2023-04-05 PROCEDURE — 82746 ASSAY OF FOLIC ACID SERUM: CPT

## 2023-04-05 PROCEDURE — 71275 CT ANGIOGRAPHY CHEST: CPT | Mod: 26,MA

## 2023-04-05 PROCEDURE — 83036 HEMOGLOBIN GLYCOSYLATED A1C: CPT

## 2023-04-05 PROCEDURE — 82962 GLUCOSE BLOOD TEST: CPT

## 2023-04-05 PROCEDURE — 82607 VITAMIN B-12: CPT

## 2023-04-05 PROCEDURE — 83735 ASSAY OF MAGNESIUM: CPT

## 2023-04-05 PROCEDURE — 76604 US EXAM CHEST: CPT | Mod: 26

## 2023-04-05 PROCEDURE — 82553 CREATINE MB FRACTION: CPT

## 2023-04-05 PROCEDURE — 80053 COMPREHEN METABOLIC PANEL: CPT

## 2023-04-05 PROCEDURE — 93306 TTE W/DOPPLER COMPLETE: CPT

## 2023-04-05 RX ORDER — CLOPIDOGREL BISULFATE 75 MG/1
75 TABLET, FILM COATED ORAL DAILY
Refills: 0 | Status: DISCONTINUED | OUTPATIENT
Start: 2023-04-05 | End: 2023-04-07

## 2023-04-05 RX ORDER — ASPIRIN/CALCIUM CARB/MAGNESIUM 324 MG
81 TABLET ORAL DAILY
Refills: 0 | Status: DISCONTINUED | OUTPATIENT
Start: 2023-04-05 | End: 2023-04-07

## 2023-04-05 RX ORDER — LOSARTAN POTASSIUM 100 MG/1
50 TABLET, FILM COATED ORAL DAILY
Refills: 0 | Status: DISCONTINUED | OUTPATIENT
Start: 2023-04-05 | End: 2023-04-07

## 2023-04-05 RX ORDER — DEXTROSE 50 % IN WATER 50 %
15 SYRINGE (ML) INTRAVENOUS ONCE
Refills: 0 | Status: DISCONTINUED | OUTPATIENT
Start: 2023-04-05 | End: 2023-04-07

## 2023-04-05 RX ORDER — ASPIRIN/CALCIUM CARB/MAGNESIUM 324 MG
325 TABLET ORAL ONCE
Refills: 0 | Status: DISCONTINUED | OUTPATIENT
Start: 2023-04-05 | End: 2023-04-05

## 2023-04-05 RX ORDER — DEXTROSE 50 % IN WATER 50 %
25 SYRINGE (ML) INTRAVENOUS ONCE
Refills: 0 | Status: DISCONTINUED | OUTPATIENT
Start: 2023-04-05 | End: 2023-04-07

## 2023-04-05 RX ORDER — ENOXAPARIN SODIUM 100 MG/ML
40 INJECTION SUBCUTANEOUS EVERY 24 HOURS
Refills: 0 | Status: DISCONTINUED | OUTPATIENT
Start: 2023-04-05 | End: 2023-04-07

## 2023-04-05 RX ORDER — ATORVASTATIN CALCIUM 80 MG/1
40 TABLET, FILM COATED ORAL AT BEDTIME
Refills: 0 | Status: DISCONTINUED | OUTPATIENT
Start: 2023-04-05 | End: 2023-04-07

## 2023-04-05 RX ORDER — SODIUM CHLORIDE 9 MG/ML
1000 INJECTION, SOLUTION INTRAVENOUS
Refills: 0 | Status: DISCONTINUED | OUTPATIENT
Start: 2023-04-05 | End: 2023-04-07

## 2023-04-05 RX ORDER — GLUCAGON INJECTION, SOLUTION 0.5 MG/.1ML
1 INJECTION, SOLUTION SUBCUTANEOUS ONCE
Refills: 0 | Status: DISCONTINUED | OUTPATIENT
Start: 2023-04-05 | End: 2023-04-07

## 2023-04-05 RX ORDER — ACETAMINOPHEN 500 MG
650 TABLET ORAL EVERY 6 HOURS
Refills: 0 | Status: DISCONTINUED | OUTPATIENT
Start: 2023-04-05 | End: 2023-04-07

## 2023-04-05 RX ORDER — FUROSEMIDE 40 MG
40 TABLET ORAL ONCE
Refills: 0 | Status: COMPLETED | OUTPATIENT
Start: 2023-04-05 | End: 2023-04-05

## 2023-04-05 RX ORDER — LANOLIN ALCOHOL/MO/W.PET/CERES
3 CREAM (GRAM) TOPICAL AT BEDTIME
Refills: 0 | Status: DISCONTINUED | OUTPATIENT
Start: 2023-04-05 | End: 2023-04-07

## 2023-04-05 RX ORDER — ONDANSETRON 8 MG/1
4 TABLET, FILM COATED ORAL EVERY 8 HOURS
Refills: 0 | Status: DISCONTINUED | OUTPATIENT
Start: 2023-04-05 | End: 2023-04-07

## 2023-04-05 RX ORDER — INSULIN LISPRO 100/ML
VIAL (ML) SUBCUTANEOUS
Refills: 0 | Status: DISCONTINUED | OUTPATIENT
Start: 2023-04-05 | End: 2023-04-07

## 2023-04-05 RX ORDER — FUROSEMIDE 40 MG
40 TABLET ORAL
Refills: 0 | Status: DISCONTINUED | OUTPATIENT
Start: 2023-04-05 | End: 2023-04-07

## 2023-04-05 RX ORDER — DEXTROSE 50 % IN WATER 50 %
12.5 SYRINGE (ML) INTRAVENOUS ONCE
Refills: 0 | Status: DISCONTINUED | OUTPATIENT
Start: 2023-04-05 | End: 2023-04-07

## 2023-04-05 RX ADMIN — ATORVASTATIN CALCIUM 40 MILLIGRAM(S): 80 TABLET, FILM COATED ORAL at 21:42

## 2023-04-05 RX ADMIN — Medication 4: at 17:36

## 2023-04-05 RX ADMIN — Medication 40 MILLIGRAM(S): at 15:24

## 2023-04-05 NOTE — ED ADULT TRIAGE NOTE - CHIEF COMPLAINT QUOTE
Followed by Dr. Chairez, c/o SOB for the past few days - unable to lay down, had to sit up while sleeping.

## 2023-04-05 NOTE — H&P ADULT - ATTENDING COMMENTS
HPI:  81 y/o M with PMHx of CAD with 5 stents, DM, HTN, bladder ca and prostate CA in remission, AAA, and CVA sent in by cardiologist Dr. Healy for shortness of breath. Patient has been endorsing worsening dyspnea on exertion for 2 weeks and PND for 2 days. He denies any chest pain, palpitation. He denies fever, chills, headache, dizziness, or cough. Denies abdominal pain, n/v/d/c.     ED vitals   T(F): 97 (04-05-23 @ 10:14), Max: 97 (04-05-23 @ 10:14)  HR: 109 (04-05-23 @ 10:14) (109 - 109)  BP: 153/78 (04-05-23 @ 10:14) (153/78 - 153/78)  RR: 16 (04-05-23 @ 10:14) (16 - 16)  SpO2: 98% (04-05-23 @ 10:14) (98% - 98%)      Labs significant for Hb 12.1, HCT 36.4, WBC 7.38, , Trops .02, proBNP 5117,  TPro  7.0  /  Alb  4.1  /  TBili  1.7<H>  /  DBili  x   /  AST  33  /  ALT  26  /  AlkPhos  109  04-05    136  |  102  |  17  ----------------------------<  197<H>  5.5<H>   |  21  |  1.1    CTA: Neg for PE, Moderate right and small left pleural effusions.  Aneurysmal dilatation of the ascending aorta measuring 5 cm and mild dilatation of the thoracic descending aorta to 3.3 cm.  Hypodense right thyroid lobe nodule. If clinically warranted, outpatient        (05 Apr 2023 15:51)  REVIEW OF SYSTEMS: see cc/HPI   CONSTITUTIONAL: No weakness, fevers or chills  EYES/ENT: No visual changes;  No vertigo or throat pain   NECK: No pain or stiffness  RESPIRATORY: No cough, wheezing, hemoptysis; (+) shortness of breath, (+) TATE  CARDIOVASCULAR: No chest pain or palpitations  GASTROINTESTINAL: No abdominal or epigastric pain. No nausea, vomiting, or hematemesis; No diarrhea or constipation. No melena or hematochezia.  GENITOURINARY: No dysuria, frequency or hematuria  NEUROLOGICAL: No numbness or weakness  SKIN: No itching, rashes    Physical Exam:   General: WN/WD NAD  Neurology: A&Ox3, nonfocal, follows commands  Eyes: PERRLA/ EOMI  ENT/Neck: Neck supple, trachea midline, No JVD  Respiratory: B/L decreased breath sounds, No wheezing, rales, rhonchi  CV: Normal rate regular rhythm, S1S2, no murmurs, rubs or gallops  Abdominal: Soft, NT, ND +BS,   Extremities: No edema, + peripheral pulses  Skin: No Rashes, Hematoma, Ecchymosis  Incisions:   Tubes: HPI:  81 y/o M with PMHx of CAD with 5 stents, DM, HTN, bladder ca and prostate CA in remission, AAA, and CVA sent in by cardiologist Dr. Healy for shortness of breath. Patient has been endorsing worsening dyspnea on exertion for 2 weeks and PND for 2 days. He denies any chest pain, palpitation. He denies fever, chills, headache, dizziness, or cough. Denies abdominal pain, n/v/d/c.     ED vitals   T(F): 97 (04-05-23 @ 10:14), Max: 97 (04-05-23 @ 10:14)  HR: 109 (04-05-23 @ 10:14) (109 - 109)  BP: 153/78 (04-05-23 @ 10:14) (153/78 - 153/78)  RR: 16 (04-05-23 @ 10:14) (16 - 16)  SpO2: 98% (04-05-23 @ 10:14) (98% - 98%)      Labs significant for Hb 12.1, HCT 36.4, WBC 7.38, , Trops .02, proBNP 5117,  TPro  7.0  /  Alb  4.1  /  TBili  1.7<H>  /  DBili  x   /  AST  33  /  ALT  26  /  AlkPhos  109  04-05    136  |  102  |  17  ----------------------------<  197<H>  5.5<H>   |  21  |  1.1    CTA: Neg for PE, Moderate right and small left pleural effusions.  Aneurysmal dilatation of the ascending aorta measuring 5 cm and mild dilatation of the thoracic descending aorta to 3.3 cm.  Hypodense right thyroid lobe nodule. If clinically warranted, outpatient        (05 Apr 2023 15:51)  REVIEW OF SYSTEMS: see cc/HPI   CONSTITUTIONAL: No weakness, fevers or chills  EYES/ENT: No visual changes;  No vertigo or throat pain   NECK: No pain or stiffness  RESPIRATORY: No cough, wheezing, hemoptysis; (+) shortness of breath, (+) TATE  CARDIOVASCULAR: No chest pain or palpitations  GASTROINTESTINAL: No abdominal or epigastric pain. No nausea, vomiting, or hematemesis; No diarrhea or constipation. No melena or hematochezia.  GENITOURINARY: No dysuria, frequency or hematuria  NEUROLOGICAL: No numbness or weakness  SKIN: No itching, rashes    Physical Exam:   General: WN/WD NAD  Neurology: A&Ox3, nonfocal, follows commands  Eyes: PERRLA/ EOMI  ENT/Neck: Neck supple, trachea midline, No JVD  Respiratory: B/L decreased breath sounds, No wheezing, rales, rhonchi  CV: Normal rate regular rhythm, S1S2, no murmurs, rubs or gallops  Abdominal: Soft, NT, ND +BS,   Extremities: No edema, + peripheral pulses  Skin: No Rashes, Hematoma, Ecchymosis  Incisions:   Tubes:    A/p  Decompensated HFpEF w/ Bilateral PE ( R >>L)  -IV lasix /Is and Os/ daily weight / fluid restriction 1.2 L / salt restriction   -2D echo   -Cardiology eval   -serial CE and EKGs  -check TSH     H/o CAD   H/o HTN   H/o PVD / CVA   -ASA / Plavix / statin   -repeat K+ level - hemolyzed ( restart ARB if normal)    DM type II   -hold oral hypoglycemic agents     TAA   -Vascular follow up     Anemia -normocytic   -can have w/u as an outpatient     R lobe thyroid nodule   -OP eval w/ Endocrine    DVT prophylaxis     PATIENT SEEN by ATTENDING 4/5/23 ( note revised 4/6)

## 2023-04-05 NOTE — ED PROVIDER NOTE - OBJECTIVE STATEMENT
Pt is a 82 year old male with PMH noted in chart presents to ED sent in by cardiologist Monet, for shortness of breath. Pt states for past 1-2 weeks has been having progressively worsening sob. Pt states feels more sob with minimal exertion, however also feels sob at rest now. Pt also endorses orthopnea. Denies any recent viral URI or current URI symptoms. Denies any fever, chills, bodyaches, chest pain, abdominal pain, NVCD

## 2023-04-05 NOTE — ED PROVIDER NOTE - PHYSICAL EXAMINATION
Physical Exam    Vital Signs: I have reviewed the initial vital signs.  Constitutional: well-nourished, appears stated age, no acute distress  Eyes: Conjunctiva pink, Sclera clear, PERRLA, EOMI.  Cardiovascular: S1 and S2, regular rate, regular rhythm, well-perfused extremities, radial pulses equal and 2+  Respiratory: unlabored respiratory effort, slight crackles at bases, no wheezing noted   Gastrointestinal: soft, non-tender abdomen, no pulsatile mass, normal bowl sounds  Musculoskeletal: supple neck, mild lower extremity edema bilat, no midline tenderness  Integumentary: warm, dry, no rash  Neurologic: awake, alert, cranial nerves II-XII grossly intact, extremities’ motor and sensory functions grossly intact  Psychiatric: appropriate mood, appropriate affect

## 2023-04-05 NOTE — H&P ADULT - ASSESSMENT
81 y/o M with PMHx of CAD with 5 stents, DM, HTN, bladder ca and prostate CA in remission, AAA, and CVA sent in by cardiologist Dr. Healy for shortness of breath. Patient has been endorsing worsening dyspnea on exertion for 2 weeks and PND for 2 days.     #Dyspnea on exertion 2/2 acute CHF exacerbation/Pleural effusion   - CTA: Neg for PE, Moderate right and small left pleural effusions.  - Admit to telemetry  - Echo:  EF 50% 07/2022  - Stress Test: 2014, no Ischemia   - Cardiac Cath: 2009 , PCI of lad/diagonal   - Pro-BNP: 5117  - Troponin: .02, repeat Benja   - Chest X-ray: Pleural effusion   - F/u Cardiology consult   - Strict intake and outputs, daily weights  - Fluid restriction: 1.5L  - Repeat echocardiogram  - c/w Lasix 40mg IV BID   - F/u Pulm consult for pleural effusion     #HTN  #CAD  #Hx of CVA   - c/w Losartan 50mg QD(f/u repeat BMP, If K is elevated hold Losartan)  - c/w Statin, ASA, Plavix     #DM   - on Metformin 1000mg BID and gLIPIZIDE at home   - ISS   - F/u A1C, Lipid panel     #AAA  - CTA shows Aneurysmal dilatation of the ascending aorta measuring 5 cm and mild dilatation of the thoracic descending aorta to 3.3 cm.  - Follows Dr. Teran    #Hypodense right thyroid lobe nodule incidentally found on CTA - OP follow up     #Misc  - DVT prophylaxis: Lovenox  - GI prophylaxis: not indicated   - Diet: DASH  - Activity: IAT, PT eval  - Disposition: admit to tele     Home      83 y/o M with PMHx of CAD with 5 stents, DM, HTN, bladder ca and prostate CA in remission, AAA, and CVA sent in by cardiologist Dr. Healy for shortness of breath. Patient has been endorsing worsening dyspnea on exertion for 2 weeks and PND for 2 days.     #Dyspnea on exertion 2/2 acute CHF exacerbation/Pleural effusion   - CTA: Neg for PE, Moderate right and small left pleural effusions.  - Admit to telemetry  - Echo:  EF 50% 07/2022  - Stress Test: 2014, no Ischemia   - Cardiac Cath: 2009 , PCI of lad/diagonal   - Pro-BNP: 5117  - Troponin: .02, repeat Benja   - Chest X-ray: Pleural effusion   - F/u Cardiology consult   - Strict intake and outputs, daily weights  - Fluid restriction: 1.5L  - Repeat echocardiogram  - c/w Lasix 40mg IV BID   - F/u Pulm consult for pleural effusion     #HTN  #CAD  #Hx of CVA   - c/w Losartan 50mg QD(f/u repeat BMP, If K is elevated hold Losartan)  - c/w Statin, ASA, Plavix     #DM   - on Metformin 1000mg BID and gLIPIZIDE at home   - ISS   - F/u A1C, Lipid panel     #AAA  - CTA shows Aneurysmal dilatation of the ascending aorta measuring 5 cm and mild dilatation of the thoracic descending aorta to 3.3 cm.  - Follows Dr. Teran    #Microcytic anemia   - HB 12.1, MCV 88.3  - F/u Iron panel, B12, Folate     #Hypodense right thyroid lobe nodule incidentally found on CTA - OP follow up     #Misc  - DVT prophylaxis: Lovenox  - GI prophylaxis: not indicated   - Diet: DASH  - Activity: IAT, PT eval  - Disposition: admit to tele     Home meds: ASA 81mg QD, Plavix 75mg QD, Losartan 50mg QD, Atorvastatin 40mg QD, Metformin 1000mg BID, Glipizide 5mg QD

## 2023-04-05 NOTE — ED PROVIDER NOTE - CARE PLAN
Principal Discharge DX:	Shortness of breath  Secondary Diagnosis:	Elevated brain natriuretic peptide (BNP) level  Secondary Diagnosis:	Pleural effusion   1

## 2023-04-05 NOTE — ED PROVIDER NOTE - CLINICAL SUMMARY MEDICAL DECISION MAKING FREE TEXT BOX
82-year-old male past medical history as above presents with shortness of breath for 1 to 2 weeks worse on exertion lower breast no recent URI no fever chills no body aches.  Well appearing, NAD, non toxic. NCAT PERRLA EOMI neck supple non tender normal wob cta bl rrr abdomen s nt nd no rebound no guarding WWPx4 neuro non focal.  Labs and EKG were ordered and reviewed.  Imaging was ordered and reviewed by me.  Appropriate medications for patient's presenting complaints were ordered and effects were reassessed.  Patient's records (prior hospital, ED visit, and/or nursing home notes if available) were reviewed.  Additional history was obtained from EMS, family, and/or PCP (where available).  Escalation to admission/observation was considered.  Patient requires inpatient hospitalization - monitored setting.

## 2023-04-05 NOTE — ED ADULT NURSE NOTE - INTERVENTIONS DEFINITIONS
[place] to call system/Instruct patient to call for assistance/Physically safe environment: no spills, clutter or unnecessary equipment/Stretcher in lowest position, wheels locked, appropriate side rails in place/Monitor gait and stability/Monitor for mental status changes and reorient to person, place, and time/Reinforce activity limits and safety measures with patient and family

## 2023-04-05 NOTE — H&P ADULT - NSHPPHYSICALEXAM_GEN_ALL_CORE
LOS:     VITALS:   T(C): 36.1 (04-05-23 @ 10:14), Max: 36.1 (04-05-23 @ 10:14)  HR: 109 (04-05-23 @ 10:14) (109 - 109)  BP: 153/78 (04-05-23 @ 10:14) (153/78 - 153/78)  RR: 16 (04-05-23 @ 10:14) (16 - 16)  SpO2: 98% (04-05-23 @ 10:14) (98% - 98%)    GENERAL: NAD, lying in bed comfortably  HEAD:  Atraumatic, Normocephalic  EYES: EOMI, PERRLA, conjunctiva and sclera clear  ENT: Moist mucous membranes  NECK: Supple, No JVD  CHEST/LUNG: Clear to auscultation bilaterally; No rales, rhonchi, wheezing, or rubs. Unlabored respirations  HEART: Regular rate and rhythm; No murmurs, rubs, or gallops  ABDOMEN: BSx4; Soft, nontender, nondistended  EXTREMITIES:  2+ Peripheral Pulses, brisk capillary refill. No clubbing, cyanosis, or edema  NERVOUS SYSTEM:  A&Ox3, no focal deficits   SKIN: No rashes or lesions LOS:     VITALS:   T(C): 36.1 (04-05-23 @ 10:14), Max: 36.1 (04-05-23 @ 10:14)  HR: 109 (04-05-23 @ 10:14) (109 - 109)  BP: 153/78 (04-05-23 @ 10:14) (153/78 - 153/78)  RR: 16 (04-05-23 @ 10:14) (16 - 16)  SpO2: 98% (04-05-23 @ 10:14) (98% - 98%)    GENERAL: NAD, lying in bed comfortably  HEAD:  Atraumatic, Normocephalic  EYES: EOMI, PERRLA, conjunctiva and sclera clear  ENT: Moist mucous membranes  NECK: Supple, No JVD  CHEST/LUNG: +Crackles on RLL   HEART: Regular rate and rhythm; No murmurs, rubs, or gallops  ABDOMEN: BSx4; Soft, nontender, nondistended  EXTREMITIES:  2+ Peripheral Pulses, brisk capillary refill. No clubbing, cyanosis, + b/l LE edema  NERVOUS SYSTEM:  A&Ox3, no focal deficits   SKIN: No rashes or lesions

## 2023-04-05 NOTE — H&P ADULT - NSHPLABSRESULTS_GEN_ALL_CORE
< from: CT Angio Chest PE Protocol w/ IV Cont (04.05.23 @ 13:42) >        FINDINGS:    PULMONARY EMBOLUS: No acute pulmonary embolus..    LUNGS/pleura: Moderate right and small left pleural effusions. The   central tracheobronchial tree is patent. No focal consolidation or   pneumothorax. Bibasilar dependent atelectasis.    Heart/great vessels: Heart is normal size without pericardial effusion.   No evidence of right heart strain. Aneurysmal dilatation of the ascending   aorta measuring 5 cm. The aortic arch is normal caliber. There is mild   dilatation of the thoracic descending aorta to 3.3 cm. Atherosclerotic   calcifications of the aorta and and coronary arteries.    MEDIASTINUM/THORACIC NODES: Unremarkable.    VISUALIZED UPPER ABDOMEN: Unremarkable.    BONES/SOFT TISSUES: Hypodense nodule within theright thyroid lobe.   Degenerative changes of the spine and bilateral shoulders.    IMPRESSION: No acute pulmonary embolus.    Moderate right and small left pleural effusions.    Aneurysmal dilatation of the ascending aorta measuring 5 cm and mild   dilatation of the thoracic descending aorta to 3.3 cm.    Hypodense right thyroid lobe nodule. If clinically warranted, outpatient   thyroid sonogram can be obtained for further evaluation.    --- End of Report ---    < end of copied text >

## 2023-04-05 NOTE — ED PROVIDER NOTE - NS ED ATTENDING STATEMENT MOD
This was a shared visit with the NEELA. I reviewed and verified the documentation and independently performed the documented:

## 2023-04-05 NOTE — CONSULT NOTE ADULT - SUBJECTIVE AND OBJECTIVE BOX
Surgeon: Dr. Lynch/Marito/Karine    HISTORY OF PRESENT ILLNESS:  81 y/o M with PMHx of CAD with 5 stents, DM, HTN, bladder ca and prostate CA in remission, AAA, and CVA sent in by cardiologist Dr. Healy for shortness of breath. Patient has been endorsing worsening dyspnea on exertion for 2 weeks and PND for 2 days. He denies any chest pain, palpitation. He denies fever, chills, headache, dizziness, or cough. Denies abdominal pain, n/v/d/c. CT Chest reveals 4.8-5.0 cm Ascending Aortic Aneurysm.    ED vitals   T(F): 97 (04-05-23 @ 10:14), Max: 97 (04-05-23 @ 10:14)  HR: 109 (04-05-23 @ 10:14) (109 - 109)  BP: 153/78 (04-05-23 @ 10:14) (153/78 - 153/78)  RR: 16 (04-05-23 @ 10:14) (16 - 16)  SpO2: 98% (04-05-23 @ 10:14) (98% - 98%)      Labs significant for Hb 12.1, HCT 36.4, WBC 7.38, , Trops .02, proBNP 5117,  TPro  7.0  /  Alb  4.1  /  TBili  1.7<H>  /  DBili  x   /  AST  33  /  ALT  26  /  AlkPhos  109  04-05    136  |  102  |  17  ----------------------------<  197<H>  5.5<H>   |  21  |  1.1    CTA: Neg for PE, Moderate right and small left pleural effusions.  Aneurysmal dilatation of the ascending aorta measuring 5 cm and mild dilatation of the thoracic descending aorta to 3.3 cm.  Hypodense right thyroid lobe nodule. If clinically warranted, outpatient    (05 Apr 2023 15:51)      NYHA functional class    [ ] Class I (no limitation) [ ] Class II (slight limitation) [ ] Class III (marked limitation) [x ] Class IV (symptoms at rest)    PAST MEDICAL & SURGICAL HISTORY:  DM (diabetes mellitus)      HTN (hypertension)      OA (osteoarthritis)      Cancer  prostate      CAD (coronary artery disease)  s/p 5 stents  2009      AAA (abdominal aortic aneurysm)  monitored by dr barker  stable x 10 yrs      Abnormal chest xray  monitored by fire dept      History of medical problems  hypercholesteremia, b/l inguinal hernias, pvd, popliteal artery aneruysm, b/l catararacts.      Bladder cancer      Prostate cancer      Basal cell carcinoma      History of surgery  s/p radiation and seed implants 2000      H/O hernia repair  x's 2 2019 b/l inguinal      H/O heart surgery  5 stents placed 2009      H/O cataract extraction  b/l with iol's          MEDICATIONS  (STANDING):  aspirin  chewable 81 milliGRAM(s) Oral daily  atorvastatin 40 milliGRAM(s) Oral at bedtime  clopidogrel Tablet 75 milliGRAM(s) Oral daily  dextrose 5%. 1000 milliLiter(s) (100 mL/Hr) IV Continuous <Continuous>  dextrose 5%. 1000 milliLiter(s) (50 mL/Hr) IV Continuous <Continuous>  dextrose 50% Injectable 25 Gram(s) IV Push once  dextrose 50% Injectable 12.5 Gram(s) IV Push once  dextrose 50% Injectable 25 Gram(s) IV Push once  enoxaparin Injectable 40 milliGRAM(s) SubCutaneous every 24 hours  furosemide   Injectable 40 milliGRAM(s) IV Push two times a day  glucagon  Injectable 1 milliGRAM(s) IntraMuscular once  insulin lispro (ADMELOG) corrective regimen sliding scale   SubCutaneous three times a day before meals  losartan 50 milliGRAM(s) Oral daily    MEDICATIONS  (PRN):  acetaminophen     Tablet .. 650 milliGRAM(s) Oral every 6 hours PRN Temp greater or equal to 38C (100.4F), Mild Pain (1 - 3)  aluminum hydroxide/magnesium hydroxide/simethicone Suspension 30 milliLiter(s) Oral every 4 hours PRN Dyspepsia  dextrose Oral Gel 15 Gram(s) Oral once PRN Blood Glucose LESS THAN 70 milliGRAM(s)/deciliter  melatonin 3 milliGRAM(s) Oral at bedtime PRN Insomnia  ondansetron Injectable 4 milliGRAM(s) IV Push every 8 hours PRN Nausea and/or Vomiting    Allergies    No Known Allergies        Review of Systems  CONSTITUTIONAL:  Fevers[ ] chills[ ] sweats[ ] fatigue[x ] weight loss[ ] weight gain [ ]            NEGATIVE []   NEURO:  parathesias[ ] seizures [ ]  syncope [ ]  confusion [ ]                                                       NEGATIVE[ X]   EYES: glasses[ ]  blurry vision[ ]  discharge[ ] pain[ ] glaucoma [ ]                                                 NEGATIVE[X ]   ENMT:  difficulty hearing [ ]  vertigo[ ]  dysphagia[ ] epistaxis[ ] recent dental work [ ]           NEGATIVE[ X]   CV:  chest pain[ ] palpitations[ ] TATE [ x] diaphoresis [ ]                                                                  NEGATIVE[ ]   RESPIRATORY:  wheezing[ ] SOB[x ] cough [ ] sputum[ ] hemoptysis[ ]                                          NEGATIVE[ ]   GI:  nausea[ ]  vomiting [ ]  diarrhea[ ] constipation [ ] melena [ ]                                             NEGATIVE[ X]   : hematuria[ ]  dysuria[ ] urgency[ ] incontinence[ ]                                                                   NEGATIVE[ X]   MUSCULOSKELETAL:  arthritis[ ]  joint swelling [ ] muscle weakness [ ] Hx vein stripping [ ]   NEGATIVE[X ]   SKIN/BREAST:  rash[ ] itching [ ]  hair loss[ ] masses[ ]                                                                    NEGATIVE[ X]   PSYCH:  dementia [ ] depression [ ] anxiety[ ]                                                                                     NEGATIVE[X ]   HEME/LYMPH:  bruises easily[ ] enlarged lymph nodes[ ] tender lymph nodes[ ]                     NEGATIVE[ X]   ENDOCRINE:  cold intolerance[ ] heat intolerance[ ] polydipsia[ ]                                                NEGATIVE[ X]     PHYSICAL EXAM  Vital Signs Last 24 Hrs  T(C): 36.8 (05 Apr 2023 16:04), Max: 36.8 (05 Apr 2023 16:04)  T(F): 98.2 (05 Apr 2023 16:04), Max: 98.2 (05 Apr 2023 16:04)  HR: 109 (05 Apr 2023 16:04) (109 - 109)  BP: 157/82 (05 Apr 2023 16:04) (153/78 - 157/82)  RR: 18 (05 Apr 2023 16:04) (16 - 18)  SpO2: 98% (05 Apr 2023 16:04) (98% - 98%)    Parameters below as of 05 Apr 2023 16:04  Patient On (Oxygen Delivery Method): room air      CONSTITUTIONAL:  WNL[x ]   Neuro: WNL[x ] Normal exam oriented to person/place & time with no focal motor or sensory  deficits. Other                     Eyes: WNL[ x]   Normal exam of conjunctiva & lids, pupils equally reactive. Other     ENT: WNL[x]    Normal exam of nasal/oral mucosa with absence of cyanosis. Other  Neck: WNL[ x]  Normal exam of jugular veins, trachea & thyroid. Other  Chest: WNL[ x] Normal lung exam with good air movement absence of wheezes, rales or Ronchi                                                                             CV:  Auscultation: normal [x ] S3[ ] S4[ ] Irregular [ ] Rub[ ] Clicks[ ]    Murmurs none:[x ]systolic [ ]  diastolic [ ] holosystolic [ ]  Carotids: No Bruits[ x] Other                 Abdominal Aorta: normal [ x] nonpalpable[ ]Other                                                                                      GI: WNL[ ] Normal exam of abdomen, liver & spleen with no noted masses or tenderness. Other                                                                                                        Extremities: WNL[ ] Normal no evidence of cyanosis or deformity Edema: none[ x]trace[ ]1+[ ]2+[ ]3+[ ]4+[ ]  Lower Extremity Pulses: Right[x ] Left[x ]Varicosities[ ]  SKIN :WNL[x ] Normal exam to inspection & palpation. Other:                                                          LABS:                        12.1   7.38  )-----------( 225      ( 05 Apr 2023 14:16 )             36.4     04-05    136  |  102  |  17  ----------------------------<  197<H>  5.5<H>   |  21  |  1.1    Ca    9.5      05 Apr 2023 12:15  Mg     1.9     04-05    TPro  7.0  /  Alb  4.1  /  TBili  1.7<H>  /  DBili  x   /  AST  33  /  ALT  26  /  AlkPhos  109  04-05        CARDIAC MARKERS ( 05 Apr 2023 12:15 )  x     / 0.02 ng/mL / x     / x     / x              Covid Results: COVID-19 PCR: NotDetec (04-05-23 @ 11:27)           Assessment/ Plan:  Case discussed with Dr. Devi.  No surgical intervention needed at this time and patient can follow-up with Dr. Devi in [facility] as an outpatient in 3-6 months.  [phone]

## 2023-04-05 NOTE — H&P ADULT - HISTORY OF PRESENT ILLNESS
83 y/o M with PMHx of CAD with stents, DM, HTN, bladder CA, AAA, and CVA sent in by cardiologist Dr. Healy for shortness of breath.     Duplex abdominal aorta 4.4 cm right common iliac artery 1.9 cm left common iliac artery 2.1 cm, Right common femoral artery 1.9 cm and popliteal artery 1.6 cm, Left common femoral artery 1.8 cm and left popliteal artery 2.0 cm.    No surgical intervention is needed at this time, and I would like to see him back in my office in one years time or sooner if any new symptoms develop.     Dr. Teran    [id number], nsr, iwmi   Stress Test: , no Ischemia   Echo:  EF 50% 2022  Cardiac Cath:  , pci of lad/diagonal     ladder cancer  in addition, had a small stroke and in rehab      ED vitals   T(F): 97 (23 @ 10:14), Max: 97 (23 @ 10:14)  HR: 109 (23 @ 10:14) (109 - 109)  BP: 153/78 (23 @ 10:14) (153/78 - 153/78)  RR: 16 (23 @ 10:14) (16 - 16)  SpO2: 98% (23 @ 10:14) (98% - 98%)      Labs significant for Hb 12.1, HCT 36.4, WBC 7.38, , Trops .02, proBNP 5117,  TPro  7.0  /  Alb  4.1  /  TBili  1.7<H>  /  DBili  x   /  AST  33  /  ALT  26  /  AlkPhos  109      136  |  102  |  17  ----------------------------<  197<H>  5.5<H>   |  21  |  1.1    CTA: Neg for PE, Moderate right and small left pleural effusions.  Aneurysmal dilatation of the ascending aorta measuring 5 cm and mild dilatation of the thoracic descending aorta to 3.3 cm.  Hypodense right thyroid lobe nodule. If clinically warranted, outpatient                      83 y/o M with PMHx of CAD with 5 stents, DM, HTN, bladder ca and prostate CA in remission, AAA, and CVA sent in by cardiologist Dr. Healy for shortness of breath. Patient has been endorsing worsening dyspnea on exertion for 2 weeks and PND for 2 days. He denies any chest pain, palpitation. He denies fever, chills, headache, dizziness, or cough. Denies abdominal pain, n/v/d/c.     ED vitals   T(F): 97 (04-05-23 @ 10:14), Max: 97 (04-05-23 @ 10:14)  HR: 109 (04-05-23 @ 10:14) (109 - 109)  BP: 153/78 (04-05-23 @ 10:14) (153/78 - 153/78)  RR: 16 (04-05-23 @ 10:14) (16 - 16)  SpO2: 98% (04-05-23 @ 10:14) (98% - 98%)      Labs significant for Hb 12.1, HCT 36.4, WBC 7.38, , Trops .02, proBNP 5117,  TPro  7.0  /  Alb  4.1  /  TBili  1.7<H>  /  DBili  x   /  AST  33  /  ALT  26  /  AlkPhos  109  04-05    136  |  102  |  17  ----------------------------<  197<H>  5.5<H>   |  21  |  1.1    CTA: Neg for PE, Moderate right and small left pleural effusions.  Aneurysmal dilatation of the ascending aorta measuring 5 cm and mild dilatation of the thoracic descending aorta to 3.3 cm.  Hypodense right thyroid lobe nodule. If clinically warranted, outpatient

## 2023-04-06 LAB
A1C WITH ESTIMATED AVERAGE GLUCOSE RESULT: 7.3 % — HIGH (ref 4–5.6)
ALBUMIN SERPL ELPH-MCNC: 4.2 G/DL — SIGNIFICANT CHANGE UP (ref 3.5–5.2)
ALP SERPL-CCNC: 126 U/L — HIGH (ref 30–115)
ALT FLD-CCNC: 23 U/L — SIGNIFICANT CHANGE UP (ref 0–41)
ANION GAP SERPL CALC-SCNC: 14 MMOL/L — SIGNIFICANT CHANGE UP (ref 7–14)
AST SERPL-CCNC: 25 U/L — SIGNIFICANT CHANGE UP (ref 0–41)
BASOPHILS # BLD AUTO: 0.09 K/UL — SIGNIFICANT CHANGE UP (ref 0–0.2)
BASOPHILS NFR BLD AUTO: 0.9 % — SIGNIFICANT CHANGE UP (ref 0–1)
BILIRUB SERPL-MCNC: 2.1 MG/DL — HIGH (ref 0.2–1.2)
BUN SERPL-MCNC: 16 MG/DL — SIGNIFICANT CHANGE UP (ref 10–20)
CALCIUM SERPL-MCNC: 10.2 MG/DL — SIGNIFICANT CHANGE UP (ref 8.4–10.5)
CHLORIDE SERPL-SCNC: 101 MMOL/L — SIGNIFICANT CHANGE UP (ref 98–110)
CHOLEST SERPL-MCNC: 144 MG/DL — SIGNIFICANT CHANGE UP
CK MB CFR SERPL CALC: 3.9 NG/ML — SIGNIFICANT CHANGE UP (ref 0.6–6.3)
CO2 SERPL-SCNC: 27 MMOL/L — SIGNIFICANT CHANGE UP (ref 17–32)
CREAT SERPL-MCNC: 1.2 MG/DL — SIGNIFICANT CHANGE UP (ref 0.7–1.5)
EGFR: 60 ML/MIN/1.73M2 — SIGNIFICANT CHANGE UP
EOSINOPHIL # BLD AUTO: 0.2 K/UL — SIGNIFICANT CHANGE UP (ref 0–0.7)
EOSINOPHIL NFR BLD AUTO: 2.1 % — SIGNIFICANT CHANGE UP (ref 0–8)
ESTIMATED AVERAGE GLUCOSE: 163 MG/DL — HIGH (ref 68–114)
FERRITIN SERPL-MCNC: 125 NG/ML — SIGNIFICANT CHANGE UP (ref 30–400)
FOLATE SERPL-MCNC: 9.1 NG/ML — SIGNIFICANT CHANGE UP
GLUCOSE BLDC GLUCOMTR-MCNC: 118 MG/DL — HIGH (ref 70–99)
GLUCOSE BLDC GLUCOMTR-MCNC: 138 MG/DL — HIGH (ref 70–99)
GLUCOSE BLDC GLUCOMTR-MCNC: 185 MG/DL — HIGH (ref 70–99)
GLUCOSE BLDC GLUCOMTR-MCNC: 237 MG/DL — HIGH (ref 70–99)
GLUCOSE SERPL-MCNC: 143 MG/DL — HIGH (ref 70–99)
HCT VFR BLD CALC: 43.1 % — SIGNIFICANT CHANGE UP (ref 42–52)
HDLC SERPL-MCNC: 50 MG/DL — SIGNIFICANT CHANGE UP
HGB BLD-MCNC: 14.5 G/DL — SIGNIFICANT CHANGE UP (ref 14–18)
IMM GRANULOCYTES NFR BLD AUTO: 0.2 % — SIGNIFICANT CHANGE UP (ref 0.1–0.3)
IRON SATN MFR SERPL: 20 % — SIGNIFICANT CHANGE UP (ref 15–50)
IRON SATN MFR SERPL: 55 UG/DL — SIGNIFICANT CHANGE UP (ref 35–150)
LIPID PNL WITH DIRECT LDL SERPL: 81 MG/DL — SIGNIFICANT CHANGE UP
LYMPHOCYTES # BLD AUTO: 2.28 K/UL — SIGNIFICANT CHANGE UP (ref 1.2–3.4)
LYMPHOCYTES # BLD AUTO: 23.6 % — SIGNIFICANT CHANGE UP (ref 20.5–51.1)
MAGNESIUM SERPL-MCNC: 1.8 MG/DL — SIGNIFICANT CHANGE UP (ref 1.8–2.4)
MCHC RBC-ENTMCNC: 29.5 PG — SIGNIFICANT CHANGE UP (ref 27–31)
MCHC RBC-ENTMCNC: 33.6 G/DL — SIGNIFICANT CHANGE UP (ref 32–37)
MCV RBC AUTO: 87.6 FL — SIGNIFICANT CHANGE UP (ref 80–94)
MONOCYTES # BLD AUTO: 1.04 K/UL — HIGH (ref 0.1–0.6)
MONOCYTES NFR BLD AUTO: 10.8 % — HIGH (ref 1.7–9.3)
NEUTROPHILS # BLD AUTO: 6.04 K/UL — SIGNIFICANT CHANGE UP (ref 1.4–6.5)
NEUTROPHILS NFR BLD AUTO: 62.4 % — SIGNIFICANT CHANGE UP (ref 42.2–75.2)
NON HDL CHOLESTEROL: 94 MG/DL — SIGNIFICANT CHANGE UP
NRBC # BLD: 0 /100 WBCS — SIGNIFICANT CHANGE UP (ref 0–0)
PLATELET # BLD AUTO: 308 K/UL — SIGNIFICANT CHANGE UP (ref 130–400)
POTASSIUM SERPL-MCNC: 4.7 MMOL/L — SIGNIFICANT CHANGE UP (ref 3.5–5)
POTASSIUM SERPL-SCNC: 4.7 MMOL/L — SIGNIFICANT CHANGE UP (ref 3.5–5)
PROT SERPL-MCNC: 7 G/DL — SIGNIFICANT CHANGE UP (ref 6–8)
RBC # BLD: 4.92 M/UL — SIGNIFICANT CHANGE UP (ref 4.7–6.1)
RBC # FLD: 15.4 % — HIGH (ref 11.5–14.5)
SODIUM SERPL-SCNC: 142 MMOL/L — SIGNIFICANT CHANGE UP (ref 135–146)
TIBC SERPL-MCNC: 273 UG/DL — SIGNIFICANT CHANGE UP (ref 220–430)
TRANSFERRIN SERPL-MCNC: 236 MG/DL — SIGNIFICANT CHANGE UP (ref 200–360)
TRIGL SERPL-MCNC: 65 MG/DL — SIGNIFICANT CHANGE UP
TROPONIN T SERPL-MCNC: 0.03 NG/ML — CRITICAL HIGH
TROPONIN T SERPL-MCNC: 0.04 NG/ML — CRITICAL HIGH
UIBC SERPL-MCNC: 218 UG/DL — SIGNIFICANT CHANGE UP (ref 110–370)
VIT B12 SERPL-MCNC: 437 PG/ML — SIGNIFICANT CHANGE UP (ref 232–1245)
WBC # BLD: 9.67 K/UL — SIGNIFICANT CHANGE UP (ref 4.8–10.8)
WBC # FLD AUTO: 9.67 K/UL — SIGNIFICANT CHANGE UP (ref 4.8–10.8)

## 2023-04-06 PROCEDURE — 93306 TTE W/DOPPLER COMPLETE: CPT | Mod: 26

## 2023-04-06 PROCEDURE — 99232 SBSQ HOSP IP/OBS MODERATE 35: CPT

## 2023-04-06 PROCEDURE — 99222 1ST HOSP IP/OBS MODERATE 55: CPT

## 2023-04-06 PROCEDURE — 99223 1ST HOSP IP/OBS HIGH 75: CPT

## 2023-04-06 RX ADMIN — Medication 4: at 12:21

## 2023-04-06 RX ADMIN — Medication 40 MILLIGRAM(S): at 14:18

## 2023-04-06 RX ADMIN — Medication 40 MILLIGRAM(S): at 05:23

## 2023-04-06 RX ADMIN — Medication 81 MILLIGRAM(S): at 11:03

## 2023-04-06 RX ADMIN — ENOXAPARIN SODIUM 40 MILLIGRAM(S): 100 INJECTION SUBCUTANEOUS at 11:04

## 2023-04-06 RX ADMIN — CLOPIDOGREL BISULFATE 75 MILLIGRAM(S): 75 TABLET, FILM COATED ORAL at 11:03

## 2023-04-06 RX ADMIN — LOSARTAN POTASSIUM 50 MILLIGRAM(S): 100 TABLET, FILM COATED ORAL at 05:23

## 2023-04-06 NOTE — PHYSICAL THERAPY INITIAL EVALUATION ADULT - GENERAL OBSERVATIONS, REHAB EVAL
PT IE 1:30-2pm. Patient left in supine in NAD. +call bell, +bed alarm, +telemetry. Good safety awareness.

## 2023-04-06 NOTE — CONSULT NOTE ADULT - ASSESSMENT
82 yr old male with hx of CAD with 5 stents, DM, HTN, bladder Ca and prostate CA in remission, AAA, and CVA sent in by cardiologist Dr. Healy for shortness of breath. Patient has been endorsing worsening dyspnea on exertion for 2 weeks and PND for 2 days. He denies any chest pain, palpitation. CT Chest reveals 4.8-5.0 cm Ascending Aortic Aneurysm, Moderate right and small left pleural effusions.    Cardiology consulted for CHF    # Acute Decompensated HFrEF  # CAD s/p 5 stents  # AAA (4.8-5.0 cm)  # HTN / DM  # Hx of CVA   - hemodynamically stable  - Trop  - BNP: 5117  - ECHO (07/2022): EF 50%  - CT Chest: No PE; 4.8-5.0 cm Ascending Aortic Aneurysm, Moderate right and small left pleural effusions  - c/w Lasix 40mg IV BID   - c/w ASA, Plavix, Losartan and Statin   - Strict intake and outputs, daily weights  - Fluid restriction: 1.5L  - check echo   - CT Sx consult appreciated: No surgical intervention at this time; follow-up with Dr. Devi as an outpatient in 3-6 months 82 yr old male with hx of CAD with 5 stents, DM, HTN, bladder Ca and prostate CA in remission, AAA, and CVA sent in by cardiologist Dr. Healy for shortness of breath. Patient has been endorsing worsening dyspnea on exertion for 2 weeks and PND for 2 days. He denies any chest pain, palpitation. CT Chest reveals 4.8-5.0 cm Ascending Aortic Aneurysm, Moderate right and small left pleural effusions.    Cardiology consulted for CHF    # Acute Decompensated HFrEF  # CAD s/p 5 stents  # AAA (4.8-5.0 cm)  # HTN / DM  # Hx of CVA   - hemodynamically stable  - Trop  - BNP: 5117  - Echo Complete w/o Contrast w/ Doppler (07.06.21): EF of 46 %; Multiple left ventricular regional wall motion abnormalities   - CT Chest: No PE; 4.8-5.0 cm Ascending Aortic Aneurysm, Moderate right and small left pleural effusions  - c/w Lasix 40mg IV BID   - c/w ASA, Plavix, Losartan and Statin   - Strict intake and outputs, daily weights  - Fluid restriction: 1.5L  - check echo   - CT Sx consult appreciated: No surgical intervention at this time; follow-up with Dr. Devi as an outpatient in 3-6 months 82 yr old male with hx of CAD with 5 stents, DM, HTN, bladder Ca and prostate CA in remission, AAA, and CVA sent in by cardiologist Dr. Healy for shortness of breath. Patient has been endorsing worsening dyspnea on exertion for 2 weeks and PND for 2 days. He denies any chest pain, palpitation. CT Chest reveals 4.8-5.0 cm Ascending Aortic Aneurysm, Moderate right and small left pleural effusions.    Cardiology consulted for CHF    # Acute Decompensated HFrEF  # Type 2 MI  # CAD s/p 5 stents  # AAA (4.8-5.0 cm)  # HTN / DM  # Hx of CVA   - hemodynamically stable  - Trop: 0.02 --> trend till peak   - BNP: 5117  - Echo Complete w/o Contrast w/ Doppler (07.06.21): EF of 46 %; Multiple left ventricular regional wall motion abnormalities   - CT Chest: No PE; 4.8-5.0 cm Ascending Aortic Aneurysm, Moderate right and small left pleural effusions  - c/w Lasix 40mg IV BID   - c/w ASA, Plavix, Losartan and Statin   - Strict intake and outputs, daily weights  - Fluid restriction: 1.5L  - check echo   - CT Sx consult appreciated: No surgical intervention at this time; follow-up with Dr. Devi as an outpatient in 3-6 months

## 2023-04-06 NOTE — PHYSICAL THERAPY INITIAL EVALUATION ADULT - NSPTDISCHREC_GEN_A_CORE
Home. No further therapy indicated. All functional tasks completed independently with safe sequencing using straight cane.

## 2023-04-06 NOTE — CONSULT NOTE ADULT - ASSESSMENT
Impression  B/L Pleural Effusion   HFmEF  Volume overload  Extensive CAD    Recommendations  Negative balance with Lasix IV 40 mg BID  Monitor electrolytes  BNP noted to be elevated, Ejection Fraction is 46 %  On POCUS effusions appear to be small and responding well to Diuresis  No Pulmonary Intervention for now  DVT PPX    Recall if Needed  Impression:     B/L Pleural Effusion   HFmEF exacerbation   Volume overload  Extensive CAD  Shortness of breath    Recommendations:     CT chest noted with bilateral right more than left effusions  POCUS: small effusions bilaterally   BNP noted to be elevated, Ejection Fraction is 46 %; cardiology eval  Patient on room air; no atypical signs or symptoms; no fevers; effusion right more than left   Recommend IV lasix and keep negative balance; daily weights   No indications for thoracentesis given that presentation is typical for CHF exacerbation   Monitor electrolytes; Lokelma for the hyperkalemia   DVT PPX; keep spo2 more than 92%; on room air   Will follow as needed

## 2023-04-06 NOTE — CONSULT NOTE ADULT - SUBJECTIVE AND OBJECTIVE BOX
Patient is a 82y old  Male who presents with a chief complaint of SOB (06 Apr 2023 04:36)      HPI:  83 y/o M with PMHx of CAD with 5 stents, DM, HTN, bladder ca and prostate CA in remission, AAA, and CVA sent in by cardiologist Dr. Healy for shortness of breath. Patient has been endorsing worsening dyspnea on exertion for 2 weeks and PND for 2 days. He denies any chest pain, palpitation. He denies fever, chills, headache, dizziness, or cough. Denies abdominal pain, n/v/d/c.     ED vitals   T(F): 97 (04-05-23 @ 10:14), Max: 97 (04-05-23 @ 10:14)  HR: 109 (04-05-23 @ 10:14) (109 - 109)  BP: 153/78 (04-05-23 @ 10:14) (153/78 - 153/78)  RR: 16 (04-05-23 @ 10:14) (16 - 16)  SpO2: 98% (04-05-23 @ 10:14) (98% - 98%)      Labs significant for Hb 12.1, HCT 36.4, WBC 7.38, , Trops .02, proBNP 5117,  TPro  7.0  /  Alb  4.1  /  TBili  1.7<H>  /  DBili  x   /  AST  33  /  ALT  26  /  AlkPhos  109  04-05    136  |  102  |  17  ----------------------------<  197<H>  5.5<H>   |  21  |  1.1    CTA: Neg for PE, Moderate right and small left pleural effusions.  Aneurysmal dilatation of the ascending aorta measuring 5 cm and mild dilatation of the thoracic descending aorta to 3.3 cm.  Hypodense right thyroid lobe nodule. If clinically warranted, outpatient                      (05 Apr 2023 15:51)      PAST MEDICAL & SURGICAL HISTORY:  DM (diabetes mellitus)      HTN (hypertension)      OA (osteoarthritis)      Cancer  prostate      CAD (coronary artery disease)  s/p 5 stents  2009      AAA (abdominal aortic aneurysm)  monitored by dr barker  stable x 10 yrs      Abnormal chest xray  monitored by fire dept      History of medical problems  hypercholesteremia, b/l inguinal hernias, pvd, popliteal artery aneruysm, b/l catararacts.      Bladder cancer      Prostate cancer      Basal cell carcinoma      History of surgery  s/p radiation and seed implants 2000      H/O hernia repair  x's 2 2019 b/l inguinal      H/O heart surgery  5 stents placed 2009      H/O cataract extraction  b/l with iol's          SOCIAL HX:   Smoking        -ve                    FAMILY HISTORY:  Family history of heart disease (Father)    DM (diabetes mellitus) (Father, Mother)    .  No cardiovascular or pulmonary family history     REVIEW OF SYSTEMS:    All ROS are negative exept per HPI       Allergies    No Known Allergies    Intolerances          PHYSICAL EXAM  Vital Signs Last 24 Hrs  T(C): 35.8 (06 Apr 2023 07:39), Max: 36.8 (05 Apr 2023 16:04)  T(F): 96.4 (06 Apr 2023 07:39), Max: 98.2 (05 Apr 2023 16:04)  HR: 69 (06 Apr 2023 07:39) (69 - 109)  BP: 121/56 (06 Apr 2023 07:39) (121/56 - 157/82)  RR: 18 (06 Apr 2023 07:39) (16 - 18)  SpO2: 99% (06 Apr 2023 07:39) (98% - 99%)    Parameters below as of 06 Apr 2023 07:39  Patient On (Oxygen Delivery Method): room air        CONSTITUTIONAL:  Well nourished.  NAD    ENT:   Airway patent,   No thrush    EYES:   Clear bilaterally,   pupils equal,   round and reactive to light.    CARDIAC:   Normal rate,   regular rhythm.    no edema      RESPIRATORY:   No wheezing   Normal chest expansion  Not tachypneic,  No use of accessory muscles    GASTROINTESTINAL:  Abdomen soft, non-tender,   No guarding,   Positive BS    MUSCULOSKELETAL:   Range of motion is not limited,  No clubbing, cyanosis    NEUROLOGICAL:   Alert and oriented   No motor deficits.           LABS:                          12.1   7.38  )-----------( 225      ( 05 Apr 2023 14:16 )             36.4                                               04-05    136  |  102  |  17  ----------------------------<  197<H>  5.5<H>   |  21  |  1.1    Ca    9.5      05 Apr 2023 12:15  Mg     1.9     04-05    TPro  7.0  /  Alb  4.1  /  TBili  1.7<H>  /  DBili  x   /  AST  33  /  ALT  26  /  AlkPhos  109  04-05                                                 CARDIAC MARKERS ( 05 Apr 2023 12:15 )  x     / 0.02 ng/mL / x     / x     / x                                                LIVER FUNCTIONS - ( 05 Apr 2023 12:15 )  Alb: 4.1 g/dL / Pro: 7.0 g/dL / ALK PHOS: 109 U/L / ALT: 26 U/L / AST: 33 U/L / GGT: x                                                                                                MEDICATIONS  (STANDING):  aspirin  chewable 81 milliGRAM(s) Oral daily  atorvastatin 40 milliGRAM(s) Oral at bedtime  clopidogrel Tablet 75 milliGRAM(s) Oral daily  dextrose 5%. 1000 milliLiter(s) (100 mL/Hr) IV Continuous <Continuous>  dextrose 5%. 1000 milliLiter(s) (50 mL/Hr) IV Continuous <Continuous>  dextrose 50% Injectable 25 Gram(s) IV Push once  dextrose 50% Injectable 12.5 Gram(s) IV Push once  dextrose 50% Injectable 25 Gram(s) IV Push once  enoxaparin Injectable 40 milliGRAM(s) SubCutaneous every 24 hours  furosemide   Injectable 40 milliGRAM(s) IV Push two times a day  glucagon  Injectable 1 milliGRAM(s) IntraMuscular once  insulin lispro (ADMELOG) corrective regimen sliding scale   SubCutaneous three times a day before meals  losartan 50 milliGRAM(s) Oral daily    MEDICATIONS  (PRN):  acetaminophen     Tablet .. 650 milliGRAM(s) Oral every 6 hours PRN Temp greater or equal to 38C (100.4F), Mild Pain (1 - 3)  aluminum hydroxide/magnesium hydroxide/simethicone Suspension 30 milliLiter(s) Oral every 4 hours PRN Dyspepsia  dextrose Oral Gel 15 Gram(s) Oral once PRN Blood Glucose LESS THAN 70 milliGRAM(s)/deciliter  melatonin 3 milliGRAM(s) Oral at bedtime PRN Insomnia  ondansetron Injectable 4 milliGRAM(s) IV Push every 8 hours PRN Nausea and/or Vomiting      X-Rays reviewed:    CXR interpreted by me: B/L Effusions on CT scan Patient is a 82y old  Male who presents with a chief complaint of SOB (06 Apr 2023 04:36)      HPI:  81 y/o M with PMHx of CAD with 5 stents, DM, HTN, bladder ca and prostate CA in remission, AAA, and CVA sent in by cardiologist Dr. Healy for shortness of breath. Patient has been endorsing worsening dyspnea on exertion for 2 weeks and PND for 2 days. He denies any chest pain, palpitation. He denies fever, chills, headache, dizziness, or cough. Denies abdominal pain, n/v/d/c.     ED vitals   T(F): 97 (04-05-23 @ 10:14), Max: 97 (04-05-23 @ 10:14)  HR: 109 (04-05-23 @ 10:14) (109 - 109)  BP: 153/78 (04-05-23 @ 10:14) (153/78 - 153/78)  RR: 16 (04-05-23 @ 10:14) (16 - 16)  SpO2: 98% (04-05-23 @ 10:14) (98% - 98%)      Labs significant for Hb 12.1, HCT 36.4, WBC 7.38, , Trops .02, proBNP 5117,  TPro  7.0  /  Alb  4.1  /  TBili  1.7<H>  /  DBili  x   /  AST  33  /  ALT  26  /  AlkPhos  109  04-05    136  |  102  |  17  ----------------------------<  197<H>  5.5<H>   |  21  |  1.1    CTA: Neg for PE, Moderate right and small left pleural effusions.  Aneurysmal dilatation of the ascending aorta measuring 5 cm and mild dilatation of the thoracic descending aorta to 3.3 cm.  Hypodense right thyroid lobe nodule. If clinically warranted, outpatient                      (05 Apr 2023 15:51)      PAST MEDICAL & SURGICAL HISTORY:  DM (diabetes mellitus)      HTN (hypertension)      OA (osteoarthritis)      Cancer  prostate      CAD (coronary artery disease)  s/p 5 stents  2009      AAA (abdominal aortic aneurysm)  monitored by dr barker  stable x 10 yrs      Abnormal chest xray  monitored by fire dept      History of medical problems  hypercholesteremia, b/l inguinal hernias, pvd, popliteal artery aneruysm, b/l catararacts.      Bladder cancer      Prostate cancer      Basal cell carcinoma      History of surgery  s/p radiation and seed implants 2000      H/O hernia repair  x's 2 2019 b/l inguinal      H/O heart surgery  5 stents placed 2009      H/O cataract extraction  b/l with iol's          SOCIAL HX:   Smoking        -ve                    FAMILY HISTORY:  Family history of heart disease (Father)    DM (diabetes mellitus) (Father, Mother)    .  No cardiovascular or pulmonary family history     REVIEW OF SYSTEMS:    All ROS are negative exept per HPI       Allergies    No Known Allergies    Intolerances          PHYSICAL EXAM  Vital Signs Last 24 Hrs  T(C): 35.8 (06 Apr 2023 07:39), Max: 36.8 (05 Apr 2023 16:04)  T(F): 96.4 (06 Apr 2023 07:39), Max: 98.2 (05 Apr 2023 16:04)  HR: 69 (06 Apr 2023 07:39) (69 - 109)  BP: 121/56 (06 Apr 2023 07:39) (121/56 - 157/82)  RR: 18 (06 Apr 2023 07:39) (16 - 18)  SpO2: 99% (06 Apr 2023 07:39) (98% - 99%)    Parameters below as of 06 Apr 2023 07:39  Patient On (Oxygen Delivery Method): room air        CONSTITUTIONAL:  Well nourished.  NAD    ENT:   Airway patent,   No thrush    EYES:   Clear bilaterally,   pupils equal,   round and reactive to light.    CARDIAC:   Normal rate,   regular rhythm.    no edema      RESPIRATORY:   No wheezing   Normal chest expansion  Not tachypneic,  No use of accessory muscles    GASTROINTESTINAL:  Abdomen soft, non-tender,   No guarding,   Positive BS    MUSCULOSKELETAL:   Range of motion is not limited,  No clubbing, cyanosis    NEUROLOGICAL:   Alert and oriented   No motor deficits.           LABS:                          12.1   7.38  )-----------( 225      ( 05 Apr 2023 14:16 )             36.4                                               04-05    136  |  102  |  17  ----------------------------<  197<H>  5.5<H>   |  21  |  1.1    Ca    9.5      05 Apr 2023 12:15  Mg     1.9     04-05    TPro  7.0  /  Alb  4.1  /  TBili  1.7<H>  /  DBili  x   /  AST  33  /  ALT  26  /  AlkPhos  109  04-05                                                 CARDIAC MARKERS ( 05 Apr 2023 12:15 )  x     / 0.02 ng/mL / x     / x     / x                                                LIVER FUNCTIONS - ( 05 Apr 2023 12:15 )  Alb: 4.1 g/dL / Pro: 7.0 g/dL / ALK PHOS: 109 U/L / ALT: 26 U/L / AST: 33 U/L / GGT: x                                                                                                MEDICATIONS  (STANDING):  aspirin  chewable 81 milliGRAM(s) Oral daily  atorvastatin 40 milliGRAM(s) Oral at bedtime  clopidogrel Tablet 75 milliGRAM(s) Oral daily  dextrose 5%. 1000 milliLiter(s) (100 mL/Hr) IV Continuous <Continuous>  dextrose 5%. 1000 milliLiter(s) (50 mL/Hr) IV Continuous <Continuous>  dextrose 50% Injectable 25 Gram(s) IV Push once  dextrose 50% Injectable 12.5 Gram(s) IV Push once  dextrose 50% Injectable 25 Gram(s) IV Push once  enoxaparin Injectable 40 milliGRAM(s) SubCutaneous every 24 hours  furosemide   Injectable 40 milliGRAM(s) IV Push two times a day  glucagon  Injectable 1 milliGRAM(s) IntraMuscular once  insulin lispro (ADMELOG) corrective regimen sliding scale   SubCutaneous three times a day before meals  losartan 50 milliGRAM(s) Oral daily    MEDICATIONS  (PRN):  acetaminophen     Tablet .. 650 milliGRAM(s) Oral every 6 hours PRN Temp greater or equal to 38C (100.4F), Mild Pain (1 - 3)  aluminum hydroxide/magnesium hydroxide/simethicone Suspension 30 milliLiter(s) Oral every 4 hours PRN Dyspepsia  dextrose Oral Gel 15 Gram(s) Oral once PRN Blood Glucose LESS THAN 70 milliGRAM(s)/deciliter  melatonin 3 milliGRAM(s) Oral at bedtime PRN Insomnia  ondansetron Injectable 4 milliGRAM(s) IV Push every 8 hours PRN Nausea and/or Vomiting      X-Rays reviewed:pending    CXR interpreted by me: B/L Effusions on CT scan

## 2023-04-06 NOTE — PROGRESS NOTE ADULT - ASSESSMENT
83 y/o M with PMHx of CAD with 5 stents, DM, HTN, bladder ca and prostate CA in remission, AAA, and CVA sent in by cardiologist Dr. Healy for shortness of breath. Patient has been endorsing worsening dyspnea on exertion for 2 weeks and PND for 2 days.     #Dyspnea on exertion 2/2 acute HFpEF exacerbation/Pleural effusion   - CTA: Neg for PE, Moderate right and small left pleural effusions.  - Admit to telemetry  - Echo:  EF 50% 07/2022  - Stress Test: 2014, no Ischemia   - Cardiac Cath: 2009 , PCI of lad/diagonal   - Pro-BNP: 5117  - Troponin: .02/003/0.02  - Chest X-ray: Pleural effusion   - cardiology consult apprecied  - Strict intake and outputs, daily weights  - Fluid restriction: 1.5L  - Repeat echocardiogram  - c/w Lasix 40mg IV BID   - F/u Pulm consult for pleural effusion , no intervention     #HTN  #CAD  #Hx of CVA   - c/w Losartan 50mg QD(f/u repeat BMP, If K is elevated hold Losartan)  - c/w Statin, ASA, Plavix     #DM   - on Metformin 1000mg BID and gLIPIZIDE at home   - ISS   - F/u A1C, Lipid panel     #AAA  - CTA shows Aneurysmal dilatation of the ascending aorta measuring 5 cm and mild dilatation of the thoracic descending aorta to 3.3 cm.  - Follows Dr. Teran  - no intervention, follow up outpt    #Microcytic anemia   - HB 12.1, MCV 88.3  - F/u Iron panel, B12, Folate     #Hypodense right thyroid lobe nodule incidentally found on CTA - OP follow up     #Progress Note Handoff  Pending (specify):  clinical improvment   Disposition: home  Decision to admit the pt is based on acuity as above   High risk given above acuity and comorbidities, labs reviewed, dw cardio

## 2023-04-06 NOTE — CONSULT NOTE ADULT - SUBJECTIVE AND OBJECTIVE BOX
Outpt cardiologist: Dr. Adriano Chairez     HPI:  83 y/o M with PMHx of CAD with 5 stents, DM, HTN, bladder ca and prostate CA in remission, AAA, and CVA sent in by cardiologist Dr. Healy for shortness of breath. Patient has been endorsing worsening dyspnea on exertion for 2 weeks and PND for 2 days. He denies any chest pain, palpitation. He denies fever, chills, headache, dizziness, or cough. Denies abdominal pain, n/v/d/c.     ED vitals   T(F): 97 (04-05-23 @ 10:14), Max: 97 (04-05-23 @ 10:14)  HR: 109 (04-05-23 @ 10:14) (109 - 109)  BP: 153/78 (04-05-23 @ 10:14) (153/78 - 153/78)  RR: 16 (04-05-23 @ 10:14) (16 - 16)  SpO2: 98% (04-05-23 @ 10:14) (98% - 98%)      Labs significant for Hb 12.1, HCT 36.4, WBC 7.38, , Trops .02, proBNP 5117,  TPro  7.0  /  Alb  4.1  /  TBili  1.7<H>  /  DBili  x   /  AST  33  /  ALT  26  /  AlkPhos  109  04-05    136  |  102  |  17  ----------------------------<  197<H>  5.5<H>   |  21  |  1.1    CTA: Neg for PE, Moderate right and small left pleural effusions.  Aneurysmal dilatation of the ascending aorta measuring 5 cm and mild dilatation of the thoracic descending aorta to 3.3 cm.  Hypodense right thyroid lobe nodule. If clinically warranted, outpatient       PAST MEDICAL & SURGICAL HISTORY  DM (diabetes mellitus)    HTN (hypertension)    OA (osteoarthritis)    Cancer  prostate    CAD (coronary artery disease)  s/p 5 stents  2009    AAA (abdominal aortic aneurysm)  monitored by dr barker  stable x 10 yrs    Abnormal chest xray  monitored by fire dept    History of medical problems  hypercholesteremia, b/l inguinal hernias, pvd, popliteal artery aneruysm, b/l catararacts.    Bladder cancer    Prostate cancer    Basal cell carcinoma    History of surgery  s/p radiation and seed implants 2000    H/O hernia repair  x's 2 2019 b/l inguinal    H/O heart surgery  5 stents placed 2009    H/O cataract extraction  b/l with iol's        FAMILY HISTORY:  FAMILY HISTORY:  Family history of heart disease (Father)    DM (diabetes mellitus) (Father, Mother)        SOCIAL HISTORY:  Social History:      ALLERGIES:  No Known Allergies      MEDICATIONS:  aspirin  chewable 81 milliGRAM(s) Oral daily  atorvastatin 40 milliGRAM(s) Oral at bedtime  clopidogrel Tablet 75 milliGRAM(s) Oral daily  dextrose 5%. 1000 milliLiter(s) (100 mL/Hr) IV Continuous <Continuous>  dextrose 5%. 1000 milliLiter(s) (50 mL/Hr) IV Continuous <Continuous>  dextrose 50% Injectable 25 Gram(s) IV Push once  dextrose 50% Injectable 12.5 Gram(s) IV Push once  dextrose 50% Injectable 25 Gram(s) IV Push once  enoxaparin Injectable 40 milliGRAM(s) SubCutaneous every 24 hours  furosemide   Injectable 40 milliGRAM(s) IV Push two times a day  glucagon  Injectable 1 milliGRAM(s) IntraMuscular once  insulin lispro (ADMELOG) corrective regimen sliding scale   SubCutaneous three times a day before meals  losartan 50 milliGRAM(s) Oral daily    PRN:  acetaminophen     Tablet .. 650 milliGRAM(s) Oral every 6 hours PRN  aluminum hydroxide/magnesium hydroxide/simethicone Suspension 30 milliLiter(s) Oral every 4 hours PRN  dextrose Oral Gel 15 Gram(s) Oral once PRN  melatonin 3 milliGRAM(s) Oral at bedtime PRN  ondansetron Injectable 4 milliGRAM(s) IV Push every 8 hours PRN      HOME MEDICATIONS:  Home Medications:  glipiZIDE 5 mg oral tablet, extended release: 1 tab(s) orally 2 times a day (05 Apr 2023 16:27)      VITALS:   T(F): 98 (04-05 @ 23:56), Max: 98.2 (04-05 @ 16:04)  HR: 84 (04-05 @ 23:56) (84 - 109)  BP: 126/59 (04-05 @ 23:56) (126/59 - 157/82)  BP(mean): --  RR: 18 (04-05 @ 23:56) (16 - 18)  SpO2: 99% (04-05 @ 23:56) (98% - 99%)    I&O's Summary    05 Apr 2023 07:01  -  06 Apr 2023 04:36  --------------------------------------------------------  IN: 0 mL / OUT: 550 mL / NET: -550 mL        REVIEW OF SYSTEMS:  CONSTITUTIONAL: No weakness, fevers or chills  HEENT: No visual changes, neck/ear pain  RESPIRATORY: No cough, sob  CARDIOVASCULAR: See HPI  GASTROINTESTINAL: No abdominal pain. No nausea, vomiting, diarrhea   GENITOURINARY: No dysuria, frequency or hematuria  NEUROLOGICAL: No new focal deficits  SKIN: No new rashes    PHYSICAL EXAM:  General: Not in distress.  Non-toxic appearing.   HEENT: EOMI  Cardio: regular, S1, S2, no murmur  Pulm: B/L BS.  No wheezing / crackles / rales  Abdomen: Soft, non-tender, non-distended. Normoactive bowel sounds  Extremities: 1+ edema b/l le  Neuro: A&O x3. No focal deficits    LABS:                        12.1   7.38  )-----------( 225      ( 05 Apr 2023 14:16 )             36.4     04-05    136  |  102  |  17  ----------------------------<  197<H>  5.5<H>   |  21  |  1.1    Ca    9.5      05 Apr 2023 12:15  Mg     1.9     04-05    TPro  7.0  /  Alb  4.1  /  TBili  1.7<H>  /  DBili  x   /  AST  33  /  ALT  26  /  AlkPhos  109  04-05      Troponin T, Serum: 0.02 ng/mL *H* (04-05-23 @ 12:15)    CARDIAC MARKERS ( 05 Apr 2023 12:15 )  x     / 0.02 ng/mL / x     / x     / x            Troponin trend:        COVID-19 PCR: NotDetec (05 Apr 2023 11:27)      RADIOLOGY:  -CXR:  -TTE:  -CCTA:  -STRESS TEST:  -CATHETERIZATION:  -OTHER:  ECG:      TELEMETRY EVENTS:   Outpt cardiologist: Dr. Adriano Chairez     HPI:  81 y/o M with PMHx of CAD with 5 stents, DM, HTN, bladder ca and prostate CA in remission, AAA, and CVA sent in by cardiologist Dr. Healy for shortness of breath. Patient has been endorsing worsening dyspnea on exertion for 2 weeks and PND for 2 days. He denies any chest pain, palpitation. He denies fever, chills, headache, dizziness, or cough. Denies abdominal pain, n/v/d/c.     ED vitals   T(F): 97 (04-05-23 @ 10:14), Max: 97 (04-05-23 @ 10:14)  HR: 109 (04-05-23 @ 10:14) (109 - 109)  BP: 153/78 (04-05-23 @ 10:14) (153/78 - 153/78)  RR: 16 (04-05-23 @ 10:14) (16 - 16)  SpO2: 98% (04-05-23 @ 10:14) (98% - 98%)      Labs significant for Hb 12.1, HCT 36.4, WBC 7.38, , Trops .02, proBNP 5117,  TPro  7.0  /  Alb  4.1  /  TBili  1.7<H>  /  DBili  x   /  AST  33  /  ALT  26  /  AlkPhos  109  04-05    136  |  102  |  17  ----------------------------<  197<H>  5.5<H>   |  21  |  1.1    CTA: Neg for PE, Moderate right and small left pleural effusions.  Aneurysmal dilatation of the ascending aorta measuring 5 cm and mild dilatation of the thoracic descending aorta to 3.3 cm.  Hypodense right thyroid lobe nodule. If clinically warranted, outpatient       PAST MEDICAL & SURGICAL HISTORY  DM (diabetes mellitus)    HTN (hypertension)    OA (osteoarthritis)    Cancer  prostate    CAD (coronary artery disease)  s/p 5 stents  2009    AAA (abdominal aortic aneurysm)  monitored by dr barker  stable x 10 yrs    Abnormal chest xray  monitored by fire dept    History of medical problems  hypercholesteremia, b/l inguinal hernias, pvd, popliteal artery aneruysm, b/l catararacts.    Bladder cancer    Prostate cancer    Basal cell carcinoma    History of surgery  s/p radiation and seed implants 2000    H/O hernia repair  x's 2 2019 b/l inguinal    H/O heart surgery  5 stents placed 2009    H/O cataract extraction  b/l with iol's        FAMILY HISTORY:  FAMILY HISTORY:  Family history of heart disease (Father)    DM (diabetes mellitus) (Father, Mother)        SOCIAL HISTORY:  Social History:      ALLERGIES:  No Known Allergies      MEDICATIONS:  aspirin  chewable 81 milliGRAM(s) Oral daily  atorvastatin 40 milliGRAM(s) Oral at bedtime  clopidogrel Tablet 75 milliGRAM(s) Oral daily  dextrose 5%. 1000 milliLiter(s) (100 mL/Hr) IV Continuous <Continuous>  dextrose 5%. 1000 milliLiter(s) (50 mL/Hr) IV Continuous <Continuous>  dextrose 50% Injectable 25 Gram(s) IV Push once  dextrose 50% Injectable 12.5 Gram(s) IV Push once  dextrose 50% Injectable 25 Gram(s) IV Push once  enoxaparin Injectable 40 milliGRAM(s) SubCutaneous every 24 hours  furosemide   Injectable 40 milliGRAM(s) IV Push two times a day  glucagon  Injectable 1 milliGRAM(s) IntraMuscular once  insulin lispro (ADMELOG) corrective regimen sliding scale   SubCutaneous three times a day before meals  losartan 50 milliGRAM(s) Oral daily    PRN:  acetaminophen     Tablet .. 650 milliGRAM(s) Oral every 6 hours PRN  aluminum hydroxide/magnesium hydroxide/simethicone Suspension 30 milliLiter(s) Oral every 4 hours PRN  dextrose Oral Gel 15 Gram(s) Oral once PRN  melatonin 3 milliGRAM(s) Oral at bedtime PRN  ondansetron Injectable 4 milliGRAM(s) IV Push every 8 hours PRN      HOME MEDICATIONS:  Home Medications:  glipiZIDE 5 mg oral tablet, extended release: 1 tab(s) orally 2 times a day (05 Apr 2023 16:27)      VITALS:   T(F): 98 (04-05 @ 23:56), Max: 98.2 (04-05 @ 16:04)  HR: 84 (04-05 @ 23:56) (84 - 109)  BP: 126/59 (04-05 @ 23:56) (126/59 - 157/82)  BP(mean): --  RR: 18 (04-05 @ 23:56) (16 - 18)  SpO2: 99% (04-05 @ 23:56) (98% - 99%)    I&O's Summary    05 Apr 2023 07:01  -  06 Apr 2023 04:36  --------------------------------------------------------  IN: 0 mL / OUT: 550 mL / NET: -550 mL        REVIEW OF SYSTEMS:  CONSTITUTIONAL: No weakness, fevers or chills  HEENT: No visual changes, neck/ear pain  RESPIRATORY: No cough, sob  CARDIOVASCULAR: See HPI  GASTROINTESTINAL: No abdominal pain. No nausea, vomiting, diarrhea   GENITOURINARY: No dysuria, frequency or hematuria  NEUROLOGICAL: No new focal deficits  SKIN: No new rashes    PHYSICAL EXAM:  General: Not in distress.  Non-toxic appearing.   HEENT: EOMI  Cardio: regular, S1, S2, no murmur  Pulm: B/L BS.  No wheezing / crackles / rales  Abdomen: Soft, non-tender, non-distended. Normoactive bowel sounds  Extremities: 1+ edema b/l le  Neuro: A&O x3. No focal deficits    LABS:                        12.1   7.38  )-----------( 225      ( 05 Apr 2023 14:16 )             36.4     04-05    136  |  102  |  17  ----------------------------<  197<H>  5.5<H>   |  21  |  1.1    Ca    9.5      05 Apr 2023 12:15  Mg     1.9     04-05    TPro  7.0  /  Alb  4.1  /  TBili  1.7<H>  /  DBili  x   /  AST  33  /  ALT  26  /  AlkPhos  109  04-05      Troponin T, Serum: 0.02 ng/mL *H* (04-05-23 @ 12:15)    CARDIAC MARKERS ( 05 Apr 2023 12:15 )  x     / 0.02 ng/mL / x     / x     / x          COVID-19 PCR: NotDetec (05 Apr 2023 11:27)      RADIOLOGY:  TTE Echo Complete w/o Contrast w/ Doppler (07.06.21):    1. Mildly decreased global left ventricular systolic function.   2. Multiple left ventricular regional wall motion abnormalities exist. See wall motion findings.   3. LV Ejection Fraction by Garcia's Method with a biplane EF of 46 %.   4. Mild eccentric left ventricular hypertrophy.   5. Mild to moderately increased left ventricular internal cavity size.   6. Spectral Doppler shows impaired relaxation pattern of left ventricular myocardial filling (Grade I diastolic dysfunction).   7. Mild mitral valve regurgitation.   8. Mild tricuspid regurgitation.   9. Mild aortic regurgitation.  10. Mild pulmonic valve regurgitation.  11. The aortic root and ascending aorta are dilated. The aortic root at the level of the Sinuses of Valsalva measures 5.0 cm in diameter. The ascending aorta is 4.0 cm. in diameter.  12. Color flow doppler and intravenous injection of agitated saline demonstrates the presence of an intact intra atrial septum.     Outpt cardiologist: Dr. Adriano Chairez     HPI:  83 y/o M with PMHx of CAD with 5 stents, DM, HTN, bladder ca and prostate CA in remission, AAA, and CVA sent in by cardiologist Dr. Healy for shortness of breath. Patient has been endorsing worsening dyspnea on exertion for 2 weeks and PND for 2 days. He denies any chest pain, palpitation. He denies fever, chills, headache, dizziness, or cough. Denies abdominal pain, n/v/d/c.     ED vitals   T(F): 97 (04-05-23 @ 10:14), Max: 97 (04-05-23 @ 10:14)  HR: 109 (04-05-23 @ 10:14) (109 - 109)  BP: 153/78 (04-05-23 @ 10:14) (153/78 - 153/78)  RR: 16 (04-05-23 @ 10:14) (16 - 16)  SpO2: 98% (04-05-23 @ 10:14) (98% - 98%)      Labs significant for Hb 12.1, HCT 36.4, WBC 7.38, , Trops .02, proBNP 5117,  TPro  7.0  /  Alb  4.1  /  TBili  1.7<H>  /  DBili  x   /  AST  33  /  ALT  26  /  AlkPhos  109  04-05    136  |  102  |  17  ----------------------------<  197<H>  5.5<H>   |  21  |  1.1    CTA: Neg for PE, Moderate right and small left pleural effusions.  Aneurysmal dilatation of the ascending aorta measuring 5 cm and mild dilatation of the thoracic descending aorta to 3.3 cm.  Hypodense right thyroid lobe nodule. If clinically warranted, outpatient       PAST MEDICAL & SURGICAL HISTORY  DM (diabetes mellitus)    HTN (hypertension)    OA (osteoarthritis)    Cancer  prostate    CAD (coronary artery disease)  s/p 5 stents  2009    AAA (abdominal aortic aneurysm)  monitored by dr barker  stable x 10 yrs    Abnormal chest xray  monitored by fire dept    History of medical problems  hypercholesteremia, b/l inguinal hernias, pvd, popliteal artery aneruysm, b/l catararacts.    Bladder cancer    Prostate cancer    Basal cell carcinoma    History of surgery  s/p radiation and seed implants 2000    H/O hernia repair  x's 2 2019 b/l inguinal    H/O heart surgery  5 stents placed 2009    H/O cataract extraction  b/l with iol's        FAMILY HISTORY:  FAMILY HISTORY:  Family history of heart disease (Father)    DM (diabetes mellitus) (Father, Mother)        SOCIAL HISTORY:  Social History: neg x3      ALLERGIES:  No Known Allergies      MEDICATIONS:  aspirin  chewable 81 milliGRAM(s) Oral daily  atorvastatin 40 milliGRAM(s) Oral at bedtime  clopidogrel Tablet 75 milliGRAM(s) Oral daily  dextrose 5%. 1000 milliLiter(s) (100 mL/Hr) IV Continuous <Continuous>  dextrose 5%. 1000 milliLiter(s) (50 mL/Hr) IV Continuous <Continuous>  dextrose 50% Injectable 25 Gram(s) IV Push once  dextrose 50% Injectable 12.5 Gram(s) IV Push once  dextrose 50% Injectable 25 Gram(s) IV Push once  enoxaparin Injectable 40 milliGRAM(s) SubCutaneous every 24 hours  furosemide   Injectable 40 milliGRAM(s) IV Push two times a day  glucagon  Injectable 1 milliGRAM(s) IntraMuscular once  insulin lispro (ADMELOG) corrective regimen sliding scale   SubCutaneous three times a day before meals  losartan 50 milliGRAM(s) Oral daily    PRN:  acetaminophen     Tablet .. 650 milliGRAM(s) Oral every 6 hours PRN  aluminum hydroxide/magnesium hydroxide/simethicone Suspension 30 milliLiter(s) Oral every 4 hours PRN  dextrose Oral Gel 15 Gram(s) Oral once PRN  melatonin 3 milliGRAM(s) Oral at bedtime PRN  ondansetron Injectable 4 milliGRAM(s) IV Push every 8 hours PRN      HOME MEDICATIONS:  Home Medications:  glipiZIDE 5 mg oral tablet, extended release: 1 tab(s) orally 2 times a day (05 Apr 2023 16:27)      VITALS:   T(F): 98 (04-05 @ 23:56), Max: 98.2 (04-05 @ 16:04)  HR: 84 (04-05 @ 23:56) (84 - 109)  BP: 126/59 (04-05 @ 23:56) (126/59 - 157/82)  BP(mean): --  RR: 18 (04-05 @ 23:56) (16 - 18)  SpO2: 99% (04-05 @ 23:56) (98% - 99%)    I&O's Summary    05 Apr 2023 07:01  -  06 Apr 2023 04:36  --------------------------------------------------------  IN: 0 mL / OUT: 550 mL / NET: -550 mL        REVIEW OF SYSTEMS:  CONSTITUTIONAL: No weakness, fevers or chills  HEENT: No visual changes, neck/ear pain  RESPIRATORY: No cough, sob  CARDIOVASCULAR: See HPI  GASTROINTESTINAL: No abdominal pain. No nausea, vomiting, diarrhea   GENITOURINARY: No dysuria, frequency or hematuria  NEUROLOGICAL: No new focal deficits  SKIN: No new rashes    PHYSICAL EXAM:  General: Not in distress.  Non-toxic appearing.   HEENT: EOMI  Cardio: regular, S1, S2, no murmur  Pulm: B/L BS.  No wheezing / crackles / rales  Abdomen: Soft, non-tender, non-distended. Normoactive bowel sounds  Extremities: 1+ edema b/l le  Neuro: A&O x3. No focal deficits    LABS:                        12.1   7.38  )-----------( 225      ( 05 Apr 2023 14:16 )             36.4     04-05    136  |  102  |  17  ----------------------------<  197<H>  5.5<H>   |  21  |  1.1    Ca    9.5      05 Apr 2023 12:15  Mg     1.9     04-05    TPro  7.0  /  Alb  4.1  /  TBili  1.7<H>  /  DBili  x   /  AST  33  /  ALT  26  /  AlkPhos  109  04-05      Troponin T, Serum: 0.02 ng/mL *H* (04-05-23 @ 12:15)    CARDIAC MARKERS ( 05 Apr 2023 12:15 )  x     / 0.02 ng/mL / x     / x     / x          COVID-19 PCR: NotDetec (05 Apr 2023 11:27)      RADIOLOGY:  TTE Echo Complete w/o Contrast w/ Doppler (07.06.21):    1. Mildly decreased global left ventricular systolic function.   2. Multiple left ventricular regional wall motion abnormalities exist. See wall motion findings.   3. LV Ejection Fraction by Garcia's Method with a biplane EF of 46 %.   4. Mild eccentric left ventricular hypertrophy.   5. Mild to moderately increased left ventricular internal cavity size.   6. Spectral Doppler shows impaired relaxation pattern of left ventricular myocardial filling (Grade I diastolic dysfunction).   7. Mild mitral valve regurgitation.   8. Mild tricuspid regurgitation.   9. Mild aortic regurgitation.  10. Mild pulmonic valve regurgitation.  11. The aortic root and ascending aorta are dilated. The aortic root at the level of the Sinuses of Valsalva measures 5.0 cm in diameter. The ascending aorta is 4.0 cm. in diameter.  12. Color flow doppler and intravenous injection of agitated saline demonstrates the presence of an intact intra atrial septum.     Outpt cardiologist: Dr. Adriano Chairez     HPI:  81 y/o M with PMHx of CAD with 5 stents, DM, HTN, bladder ca and prostate CA in remission, AAA, and CVA sent in by cardiologist Dr. Chairez for shortness of breath. Patient has been endorsing worsening dyspnea on exertion for 2 weeks and PND for 2 days. He denies any chest pain, palpitation. He denies fever, chills, headache, dizziness, or cough. Denies abdominal pain, n/v/d/c.     ED vitals   T(F): 97 (04-05-23 @ 10:14), Max: 97 (04-05-23 @ 10:14)  HR: 109 (04-05-23 @ 10:14) (109 - 109)  BP: 153/78 (04-05-23 @ 10:14) (153/78 - 153/78)  RR: 16 (04-05-23 @ 10:14) (16 - 16)  SpO2: 98% (04-05-23 @ 10:14) (98% - 98%)      Labs significant for Hb 12.1, HCT 36.4, WBC 7.38, , Trops .02, proBNP 5117,  TPro  7.0  /  Alb  4.1  /  TBili  1.7<H>  /  DBili  x   /  AST  33  /  ALT  26  /  AlkPhos  109  04-05    136  |  102  |  17  ----------------------------<  197<H>  5.5<H>   |  21  |  1.1    CTA: Neg for PE, Moderate right and small left pleural effusions.  Aneurysmal dilatation of the ascending aorta measuring 5 cm and mild dilatation of the thoracic descending aorta to 3.3 cm.  Hypodense right thyroid lobe nodule. If clinically warranted, outpatient       PAST MEDICAL & SURGICAL HISTORY  DM (diabetes mellitus)    HTN (hypertension)    OA (osteoarthritis)    Cancer  prostate    CAD (coronary artery disease)  s/p 5 stents  2009    AAA (abdominal aortic aneurysm)  monitored by dr barker  stable x 10 yrs    Abnormal chest xray  monitored by fire dept    History of medical problems  hypercholesteremia, b/l inguinal hernias, pvd, popliteal artery aneruysm, b/l catararacts.    Bladder cancer    Prostate cancer    Basal cell carcinoma    History of surgery  s/p radiation and seed implants 2000    H/O hernia repair  x's 2 2019 b/l inguinal    H/O heart surgery  5 stents placed 2009    H/O cataract extraction  b/l with iol's        FAMILY HISTORY:  FAMILY HISTORY:  Family history of heart disease (Father)    DM (diabetes mellitus) (Father, Mother)        SOCIAL HISTORY:  Social History: neg x3      ALLERGIES:  No Known Allergies      MEDICATIONS:  aspirin  chewable 81 milliGRAM(s) Oral daily  atorvastatin 40 milliGRAM(s) Oral at bedtime  clopidogrel Tablet 75 milliGRAM(s) Oral daily  dextrose 5%. 1000 milliLiter(s) (100 mL/Hr) IV Continuous <Continuous>  dextrose 5%. 1000 milliLiter(s) (50 mL/Hr) IV Continuous <Continuous>  dextrose 50% Injectable 25 Gram(s) IV Push once  dextrose 50% Injectable 12.5 Gram(s) IV Push once  dextrose 50% Injectable 25 Gram(s) IV Push once  enoxaparin Injectable 40 milliGRAM(s) SubCutaneous every 24 hours  furosemide   Injectable 40 milliGRAM(s) IV Push two times a day  glucagon  Injectable 1 milliGRAM(s) IntraMuscular once  insulin lispro (ADMELOG) corrective regimen sliding scale   SubCutaneous three times a day before meals  losartan 50 milliGRAM(s) Oral daily    PRN:  acetaminophen     Tablet .. 650 milliGRAM(s) Oral every 6 hours PRN  aluminum hydroxide/magnesium hydroxide/simethicone Suspension 30 milliLiter(s) Oral every 4 hours PRN  dextrose Oral Gel 15 Gram(s) Oral once PRN  melatonin 3 milliGRAM(s) Oral at bedtime PRN  ondansetron Injectable 4 milliGRAM(s) IV Push every 8 hours PRN      HOME MEDICATIONS:  Home Medications:  glipiZIDE 5 mg oral tablet, extended release: 1 tab(s) orally 2 times a day (05 Apr 2023 16:27)      VITALS:   T(F): 98 (04-05 @ 23:56), Max: 98.2 (04-05 @ 16:04)  HR: 84 (04-05 @ 23:56) (84 - 109)  BP: 126/59 (04-05 @ 23:56) (126/59 - 157/82)  BP(mean): --  RR: 18 (04-05 @ 23:56) (16 - 18)  SpO2: 99% (04-05 @ 23:56) (98% - 99%)    I&O's Summary    05 Apr 2023 07:01  -  06 Apr 2023 04:36  --------------------------------------------------------  IN: 0 mL / OUT: 550 mL / NET: -550 mL        REVIEW OF SYSTEMS:  CONSTITUTIONAL: No weakness, fevers or chills  HEENT: No visual changes, neck/ear pain  RESPIRATORY: No cough, sob  CARDIOVASCULAR: See HPI  GASTROINTESTINAL: No abdominal pain. No nausea, vomiting, diarrhea   GENITOURINARY: No dysuria, frequency or hematuria  NEUROLOGICAL: No new focal deficits  SKIN: No new rashes  10 point ROS completed; negative except as stated in HPI    PHYSICAL EXAM:  General: Not in distress.  Non-toxic appearing.   HEENT: EOMI, PERRLA  neck: supple, +JVD  Cardio: regular, S1, S2, no murmur  Pulm: B/L BS.  No wheezing / crackles / rales  Abdomen: Soft, non-tender, non-distended. Normoactive bowel sounds  Extremities: 1+ edema b/l le  Neuro: A&O x3. No focal deficits    LABS:                        12.1   7.38  )-----------( 225      ( 05 Apr 2023 14:16 )             36.4     04-05    136  |  102  |  17  ----------------------------<  197<H>  5.5<H>   |  21  |  1.1    Ca    9.5      05 Apr 2023 12:15  Mg     1.9     04-05    TPro  7.0  /  Alb  4.1  /  TBili  1.7<H>  /  DBili  x   /  AST  33  /  ALT  26  /  AlkPhos  109  04-05      Troponin T, Serum: 0.02 ng/mL *H* (04-05-23 @ 12:15)    CARDIAC MARKERS ( 05 Apr 2023 12:15 )  x     / 0.02 ng/mL / x     / x     / x          COVID-19 PCR: NotDetec (05 Apr 2023 11:27)      RADIOLOGY:  TTE Echo Complete w/o Contrast w/ Doppler (07.06.21):    1. Mildly decreased global left ventricular systolic function.   2. Multiple left ventricular regional wall motion abnormalities exist. See wall motion findings.   3. LV Ejection Fraction by Garcia's Method with a biplane EF of 46 %.   4. Mild eccentric left ventricular hypertrophy.   5. Mild to moderately increased left ventricular internal cavity size.   6. Spectral Doppler shows impaired relaxation pattern of left ventricular myocardial filling (Grade I diastolic dysfunction).   7. Mild mitral valve regurgitation.   8. Mild tricuspid regurgitation.   9. Mild aortic regurgitation.  10. Mild pulmonic valve regurgitation.  11. The aortic root and ascending aorta are dilated. The aortic root at the level of the Sinuses of Valsalva measures 5.0 cm in diameter. The ascending aorta is 4.0 cm. in diameter.  12. Color flow doppler and intravenous injection of agitated saline demonstrates the presence of an intact intra atrial septum.

## 2023-04-06 NOTE — CONSULT NOTE ADULT - ATTENDING COMMENTS
Impression:     B/L Pleural Effusion   HFmEF exacerbation   Volume overload  Extensive CAD    Impression and plan are my own
Briefly, 82 year old man for whom cardiology is consulted for CHF. Patient presented with shortness of breath and is volume overloaded on exam. Would continue IV diuresis. Check echocardiogram. Troponin elevation most likely type II MI, would trend until peak. MOnitor on telemetry.    Thank you for this consult. Please call/MS teams with questions.

## 2023-04-06 NOTE — PATIENT PROFILE ADULT - FALL HARM RISK - HARM RISK INTERVENTIONS

## 2023-04-06 NOTE — PROGRESS NOTE ADULT - SUBJECTIVE AND OBJECTIVE BOX
Patient is a 82y old  Male who presents with a chief complaint of SOB (23)      Pt seen and examined at bedside. No CP or SOB. Pt feeling better.       PAST MEDICAL & SURGICAL HISTORY:  DM (diabetes mellitus)    HTN (hypertension)    OA (osteoarthritis)    Cancer  prostate    CAD (coronary artery disease)  s/p 5 stents      AAA (abdominal aortic aneurysm)  monitored by dr barker  stable x 10 yrs    Abnormal chest xray  monitored by fire dept    History of medical problems  hypercholesteremia, b/l inguinal hernias, pvd, popliteal artery aneruysm, b/l catararacts.    Bladder cancer    Prostate cancer    Basal cell carcinoma    History of surgery  s/p radiation and seed implants     H/O hernia repair  x's 2  b/l inguinal    H/O heart surgery  5 stents placed     H/O cataract extraction  b/l with iol's        VITAL SIGNS (Last 24 hrs):  T(C): 36.7 (23 @ 15:33), Max: 36.7 (23 @ 23:56)  HR: 89 (23 @ 15:33) (69 - 89)  BP: 118/59 (23 @ 15:33) (118/59 - 143/64)  RR: 18 (23 @ 15:33) (18 - 18)  SpO2: 99% (23 @ 15:33) (99% - 99%)  Wt(kg): --  Daily     Daily Weight in k.9 (2023 15:33)    I&O's Summary    2023 07:01  -  2023 07:00  --------------------------------------------------------  IN: 0 mL / OUT: 550 mL / NET: -550 mL        PHYSICAL EXAM:  GENERAL: NAD, well-developed  HEAD:  Atraumatic, Normocephalic  EYES: EOMI, PERRLA, conjunctiva and sclera clear  NECK: Supple, No JVD  CHEST/LUNG: Clear to auscultation bilaterally; No wheeze  HEART: Regular rate and rhythm; No murmurs, rubs, or gallops  ABDOMEN: Soft, Nontender, Nondistended; Bowel sounds present  EXTREMITIES:  2+ Peripheral Pulses, No clubbing, cyanosis, or edema  PSYCH: AAOx3  NEUROLOGY: non-focal  SKIN: No rashes or lesions    Labs Reviewed  Spoke to patient in regards to abnormal labs.    CBC Full  -  ( 2023 08:06 )  WBC Count : 9.67 K/uL  Hemoglobin : 14.5 g/dL  Hematocrit : 43.1 %  Platelet Count - Automated : 308 K/uL  Mean Cell Volume : 87.6 fL  Mean Cell Hemoglobin : 29.5 pg  Mean Cell Hemoglobin Concentration : 33.6 g/dL  Auto Neutrophil # : 6.04 K/uL  Auto Lymphocyte # : 2.28 K/uL  Auto Monocyte # : 1.04 K/uL  Auto Eosinophil # : 0.20 K/uL  Auto Basophil # : 0.09 K/uL  Auto Neutrophil % : 62.4 %  Auto Lymphocyte % : 23.6 %  Auto Monocyte % : 10.8 %  Auto Eosinophil % : 2.1 %  Auto Basophil % : 0.9 %    BMP:     @ 08:06    Blood Urea Nitrogen - 16  Calcium - 10.2  Carbond Dioxide - 27  Chloride - 101  Creatinine - 1.2  Glucose - 143  Potassium - 4.7  Sodium - 142      Hemoglobin A1c -     Urine Culture:        COVID Labs  CRP:      D-Dimer:      Imaging reviewed independently and reviewed read    < from: CT Angio Chest PE Protocol w/ IV Cont (23 @ 13:42) >  IMPRESSION: No acute pulmonary embolus.    Moderate right and small left pleural effusions.    Aneurysmal dilatation of the ascending aorta measuring 5 cm and mild   dilatation of the thoracic descending aorta to 3.3 cm.    Hypodense right thyroid lobe nodule. If clinically warranted, outpatient   thyroid sonogram can be obtained for further evaluation.    < end of copied text >  < from: Xray Chest 1 View- PORTABLE-Urgent (23 @ 12:50) >  IMPRESSION:    Hazy right lung opacity may represent a layering pleural effusion.    < end of copied text >        MEDICATIONS  (STANDING):  aspirin  chewable 81 milliGRAM(s) Oral daily  atorvastatin 40 milliGRAM(s) Oral at bedtime  clopidogrel Tablet 75 milliGRAM(s) Oral daily  dextrose 5%. 1000 milliLiter(s) (100 mL/Hr) IV Continuous <Continuous>  dextrose 5%. 1000 milliLiter(s) (50 mL/Hr) IV Continuous <Continuous>  dextrose 50% Injectable 25 Gram(s) IV Push once  dextrose 50% Injectable 12.5 Gram(s) IV Push once  dextrose 50% Injectable 25 Gram(s) IV Push once  enoxaparin Injectable 40 milliGRAM(s) SubCutaneous every 24 hours  furosemide   Injectable 40 milliGRAM(s) IV Push two times a day  glucagon  Injectable 1 milliGRAM(s) IntraMuscular once  insulin lispro (ADMELOG) corrective regimen sliding scale   SubCutaneous three times a day before meals  losartan 50 milliGRAM(s) Oral daily    MEDICATIONS  (PRN):  acetaminophen     Tablet .. 650 milliGRAM(s) Oral every 6 hours PRN Temp greater or equal to 38C (100.4F), Mild Pain (1 - 3)  aluminum hydroxide/magnesium hydroxide/simethicone Suspension 30 milliLiter(s) Oral every 4 hours PRN Dyspepsia  dextrose Oral Gel 15 Gram(s) Oral once PRN Blood Glucose LESS THAN 70 milliGRAM(s)/deciliter  melatonin 3 milliGRAM(s) Oral at bedtime PRN Insomnia  ondansetron Injectable 4 milliGRAM(s) IV Push every 8 hours PRN Nausea and/or Vomiting

## 2023-04-06 NOTE — PHYSICAL THERAPY INITIAL EVALUATION ADULT - PLANNED THERAPY INTERVENTIONS, PT EVAL
No further therapy indicated. All functional tasks completed independently with safe sequencing using straight cane.

## 2023-04-07 ENCOUNTER — TRANSCRIPTION ENCOUNTER (OUTPATIENT)
Age: 83
End: 2023-04-07

## 2023-04-07 VITALS — WEIGHT: 162.92 LBS

## 2023-04-07 DIAGNOSIS — R79.89 OTHER SPECIFIED ABNORMAL FINDINGS OF BLOOD CHEMISTRY: ICD-10-CM

## 2023-04-07 LAB
ALBUMIN SERPL ELPH-MCNC: 4 G/DL — SIGNIFICANT CHANGE UP (ref 3.5–5.2)
ALP SERPL-CCNC: 114 U/L — SIGNIFICANT CHANGE UP (ref 30–115)
ALT FLD-CCNC: 20 U/L — SIGNIFICANT CHANGE UP (ref 0–41)
ANION GAP SERPL CALC-SCNC: 14 MMOL/L — SIGNIFICANT CHANGE UP (ref 7–14)
AST SERPL-CCNC: 24 U/L — SIGNIFICANT CHANGE UP (ref 0–41)
BASOPHILS # BLD AUTO: 0.09 K/UL — SIGNIFICANT CHANGE UP (ref 0–0.2)
BASOPHILS NFR BLD AUTO: 0.9 % — SIGNIFICANT CHANGE UP (ref 0–1)
BILIRUB SERPL-MCNC: 2.1 MG/DL — HIGH (ref 0.2–1.2)
BUN SERPL-MCNC: 19 MG/DL — SIGNIFICANT CHANGE UP (ref 10–20)
CALCIUM SERPL-MCNC: 9.4 MG/DL — SIGNIFICANT CHANGE UP (ref 8.4–10.5)
CHLORIDE SERPL-SCNC: 99 MMOL/L — SIGNIFICANT CHANGE UP (ref 98–110)
CO2 SERPL-SCNC: 27 MMOL/L — SIGNIFICANT CHANGE UP (ref 17–32)
CREAT SERPL-MCNC: 1.1 MG/DL — SIGNIFICANT CHANGE UP (ref 0.7–1.5)
EGFR: 67 ML/MIN/1.73M2 — SIGNIFICANT CHANGE UP
EOSINOPHIL # BLD AUTO: 0.41 K/UL — SIGNIFICANT CHANGE UP (ref 0–0.7)
EOSINOPHIL NFR BLD AUTO: 4.2 % — SIGNIFICANT CHANGE UP (ref 0–8)
GLUCOSE BLDC GLUCOMTR-MCNC: 145 MG/DL — HIGH (ref 70–99)
GLUCOSE BLDC GLUCOMTR-MCNC: 211 MG/DL — HIGH (ref 70–99)
GLUCOSE BLDC GLUCOMTR-MCNC: 226 MG/DL — HIGH (ref 70–99)
GLUCOSE SERPL-MCNC: 168 MG/DL — HIGH (ref 70–99)
HCT VFR BLD CALC: 43.9 % — SIGNIFICANT CHANGE UP (ref 42–52)
HGB BLD-MCNC: 14.9 G/DL — SIGNIFICANT CHANGE UP (ref 14–18)
IMM GRANULOCYTES NFR BLD AUTO: 0.1 % — SIGNIFICANT CHANGE UP (ref 0.1–0.3)
LYMPHOCYTES # BLD AUTO: 2.64 K/UL — SIGNIFICANT CHANGE UP (ref 1.2–3.4)
LYMPHOCYTES # BLD AUTO: 27.3 % — SIGNIFICANT CHANGE UP (ref 20.5–51.1)
MAGNESIUM SERPL-MCNC: 1.8 MG/DL — SIGNIFICANT CHANGE UP (ref 1.8–2.4)
MCHC RBC-ENTMCNC: 29.3 PG — SIGNIFICANT CHANGE UP (ref 27–31)
MCHC RBC-ENTMCNC: 33.9 G/DL — SIGNIFICANT CHANGE UP (ref 32–37)
MCV RBC AUTO: 86.2 FL — SIGNIFICANT CHANGE UP (ref 80–94)
MONOCYTES # BLD AUTO: 1.21 K/UL — HIGH (ref 0.1–0.6)
MONOCYTES NFR BLD AUTO: 12.5 % — HIGH (ref 1.7–9.3)
NEUTROPHILS # BLD AUTO: 5.32 K/UL — SIGNIFICANT CHANGE UP (ref 1.4–6.5)
NEUTROPHILS NFR BLD AUTO: 55 % — SIGNIFICANT CHANGE UP (ref 42.2–75.2)
NRBC # BLD: 0 /100 WBCS — SIGNIFICANT CHANGE UP (ref 0–0)
PLATELET # BLD AUTO: 329 K/UL — SIGNIFICANT CHANGE UP (ref 130–400)
POTASSIUM SERPL-MCNC: 4.2 MMOL/L — SIGNIFICANT CHANGE UP (ref 3.5–5)
POTASSIUM SERPL-SCNC: 4.2 MMOL/L — SIGNIFICANT CHANGE UP (ref 3.5–5)
PROT SERPL-MCNC: 6.6 G/DL — SIGNIFICANT CHANGE UP (ref 6–8)
RBC # BLD: 5.09 M/UL — SIGNIFICANT CHANGE UP (ref 4.7–6.1)
RBC # FLD: 15.2 % — HIGH (ref 11.5–14.5)
SODIUM SERPL-SCNC: 140 MMOL/L — SIGNIFICANT CHANGE UP (ref 135–146)
WBC # BLD: 9.68 K/UL — SIGNIFICANT CHANGE UP (ref 4.8–10.8)
WBC # FLD AUTO: 9.68 K/UL — SIGNIFICANT CHANGE UP (ref 4.8–10.8)

## 2023-04-07 PROCEDURE — 71045 X-RAY EXAM CHEST 1 VIEW: CPT | Mod: 26

## 2023-04-07 PROCEDURE — 99239 HOSP IP/OBS DSCHRG MGMT >30: CPT

## 2023-04-07 RX ORDER — METOPROLOL TARTRATE 50 MG
1 TABLET ORAL
Qty: 30 | Refills: 2
Start: 2023-04-07

## 2023-04-07 RX ORDER — ATORVASTATIN CALCIUM 80 MG/1
1 TABLET, FILM COATED ORAL
Qty: 0 | Refills: 0 | DISCHARGE
Start: 2023-04-07

## 2023-04-07 RX ORDER — FUROSEMIDE 40 MG
40 TABLET ORAL DAILY
Refills: 0 | Status: DISCONTINUED | OUTPATIENT
Start: 2023-04-07 | End: 2023-04-07

## 2023-04-07 RX ORDER — CLOPIDOGREL BISULFATE 75 MG/1
1 TABLET, FILM COATED ORAL
Qty: 0 | Refills: 0 | DISCHARGE
Start: 2023-04-07

## 2023-04-07 RX ORDER — METOPROLOL TARTRATE 50 MG
25 TABLET ORAL DAILY
Refills: 0 | Status: DISCONTINUED | OUTPATIENT
Start: 2023-04-07 | End: 2023-04-07

## 2023-04-07 RX ORDER — FUROSEMIDE 40 MG
1 TABLET ORAL
Qty: 30 | Refills: 2
Start: 2023-04-07

## 2023-04-07 RX ORDER — ASPIRIN/CALCIUM CARB/MAGNESIUM 324 MG
1 TABLET ORAL
Qty: 0 | Refills: 0 | DISCHARGE
Start: 2023-04-07

## 2023-04-07 RX ADMIN — CLOPIDOGREL BISULFATE 75 MILLIGRAM(S): 75 TABLET, FILM COATED ORAL at 13:41

## 2023-04-07 RX ADMIN — Medication 4: at 13:45

## 2023-04-07 RX ADMIN — ENOXAPARIN SODIUM 40 MILLIGRAM(S): 100 INJECTION SUBCUTANEOUS at 13:40

## 2023-04-07 RX ADMIN — Medication 40 MILLIGRAM(S): at 05:13

## 2023-04-07 RX ADMIN — Medication 81 MILLIGRAM(S): at 13:41

## 2023-04-07 RX ADMIN — LOSARTAN POTASSIUM 50 MILLIGRAM(S): 100 TABLET, FILM COATED ORAL at 05:13

## 2023-04-07 NOTE — DISCHARGE NOTE NURSING/CASE MANAGEMENT/SOCIAL WORK - PATIENT PORTAL LINK FT
You can access the FollowMyHealth Patient Portal offered by [facility] by registering at the following website: [email or web address]. By joining "[employer]"’s FollowMyHealth portal, you will also be able to view your health information using other applications (apps) compatible with our system.

## 2023-04-07 NOTE — DIETITIAN INITIAL EVALUATION ADULT - PERTINENT LABORATORY DATA
04-07    140  |  99  |  19  ----------------------------<  168<H>  4.2   |  27  |  1.1    Ca    9.4      07 Apr 2023 07:24  Mg     1.8     04-07    TPro  6.6  /  Alb  4.0  /  TBili  2.1<H>  /  DBili  x   /  AST  24  /  ALT  20  /  AlkPhos  114  04-07  POCT Blood Glucose.: 211 mg/dL (04-07-23 @ 13:44)  A1C with Estimated Average Glucose Result: 7.3 % (04-06-23 @ 08:06)

## 2023-04-07 NOTE — DISCHARGE NOTE PROVIDER - CARE PROVIDER_API CALL
Adriano Chairez)  Cardiovascular Disease; Internal Medicine; Interventional Cardiology  [address] [place] [address], [address]  [place] [address]  Phone: [phone]  Fax: [phone]  Follow Up Time: 2 weeks    Mannie Devi)  Surgery; Thoracic and Cardiac Surgery  [address] [place] [address], [address]  [place] [address]  Phone: [phone]  Fax: [phone]  Follow Up Time:

## 2023-04-07 NOTE — DIETITIAN NUTRITION RISK NOTIFICATION - TREATMENT: THE FOLLOWING DIET HAS BEEN RECOMMENDED
Diet, DASH/TLC:   Sodium & Cholesterol Restricted  1500mL Fluid Restriction ([id number])     Special Instructions for Nursin milliLiter(s) to 2000 milliLiter(s) fluid restriction (23 @ 16:18) [Active]

## 2023-04-07 NOTE — DISCHARGE NOTE PROVIDER - CARE PROVIDERS DIRECT ADDRESSES
[email or web address]

## 2023-04-07 NOTE — DIETITIAN INITIAL EVALUATION ADULT - NSFNSGIIOFT_GEN_A_CORE
^ weight 4/5/23    weight trends per patient report  96.8kg, 213lb (2000) --> 83.6kg, 184lb (2021) --> 73.9kg, 163lb most recent     previous admission weights (kg):   79.8kg 2/1/22  **overall 23.6% weight loss, gradual weight loss 12% since 2021  time frame >1 year

## 2023-04-07 NOTE — DIETITIAN INITIAL EVALUATION ADULT - NS FNS DIET ORDER
Diet, DASH/TLC:   Sodium & Cholesterol Restricted  1500mL Fluid Restriction ([id number])     Special Instructions for Nursin milliLiter(s) to 2000 milliLiter(s) fluid restriction (23 @ 16:18) [Active]

## 2023-04-07 NOTE — DISCHARGE NOTE PROVIDER - HOSPITAL COURSE
83 y/o M with PMHx of CAD with 5 stents, DM, HTN, bladder ca and prostate CA in remission, AAA, and CVA sent in by cardiologist Dr. Healy for shortness of breath. Patient has been endorsing worsening dyspnea on exertion for 2 weeks and PND for 2 days. He denies any chest pain, palpitation. He denies fever, chills, headache, dizziness, or cough. Denies abdominal pain, n/v/d/c.     ED vitals: T(F): 97, HR: 109, BP: 153/78, RR: 16, SpO2: 98%    Labs significant for Hb 12.1, HCT 36.4, WBC 7.38, , Trops .02, proBNP 5117    CTA: Neg for PE, Moderate right and small left pleural effusions. Aneurysmal dilatation of the ascending aorta measuring 5 cm and mild dilatation of the thoracic descending aorta to 3.3 cm. Hypodense right thyroid lobe nodule. If clinically warranted, outpatient.    #Dyspnea on exertion 2/2 acute HFpEF exacerbation/Pleural effusion   - CTA: Neg for PE, Moderate right and small left pleural effusions.  - Echo:  EF 50% 07/2022,  - Stress Test: 2014, no Ischemia   - Cardiac Cath: 2009 , PCI of lad/diagonal   - Pro-BNP: 5117  - Troponin: .02/003/0.02  - Chest X-ray: Pleural effusion   - repeat Echo 4/6/23: EF 39%   - cardiology consult appreciated: No ischemic work-up while inpatient   - Fluid restriction: 1.5L  - c/w Lasix 40mg IV BID   - Pulm consult for pleural effusion: no intervention     #HTN  #CAD  #Hx of CVA   - c/w Losartan 50mg QD  - c/w Statin, ASA, Plavix     #DM   - on Metformin 1000mg BID and glipizide at home   - ISS   - A1C 7.3  - Lipid panel: TG 65, total cholesterol 144, LDL 81, HDL 50    #AAA  - CTA shows Aneurysmal dilatation of the ascending aorta measuring 5 cm and mild dilatation of the thoracic descending aorta to 3.3 cm.  - Follows Dr. Teran  - no intervention, follow up outpt    #Microcytic anemia   - HB 12.1, MCV 88.3  - Fe panel, folate, B12 WNL     #Hypodense right thyroid lobe nodule incidentally found on CTA   - Outpatient follow up

## 2023-04-07 NOTE — DISCHARGE NOTE PROVIDER - NSDCCPCAREPLAN_GEN_ALL_CORE_FT
PRINCIPAL DISCHARGE DIAGNOSIS  Diagnosis: Shortness of breath  Assessment and Plan of Treatment: Congestive heart failure (CHF) is a chronic condition in which the heart has trouble pumping blood. In some cases of heart failure, fluid may back up into your lungs or you may have swelling (edema) in your lower legs. There are many causes of heart failure including high blood pressure, coronary artery disease, abnormal heart valves, heart muscle disease, lung disease, diabetes, etc. Symptoms include shortness of breath with activity or when lying flat, cough, swelling of the legs, fatigue, or increased urination during the night.      Treatment is aimed at managing the symptoms of heart failure and may include lifestyle changes, medications, or surgical procedures. Take medicines only as directed by your health care provider and do not stop unless instructed to do so. Eat heart-healthy foods with low or no trans/saturated fats, cholesterol and salt. Weigh yourself every day for early recognition of fluid accumulation.     SEEK IMMEDIATE MEDICAL CARE IF YOU HAVE ANY OF THE FOLLOWING SYMPTOMS: shortness of breath, change in mental status, chest pain, lightheadedness/dizziness/fainting, or worsening of symptoms including not being able to conduct normal physical activity.      SECONDARY DISCHARGE DIAGNOSES  Diagnosis: Ascending aortic aneurysm  Assessment and Plan of Treatment: A CT scan showed dilation of the aorta to 5 centimeters in diameter. Please follow-up with Dr. Devi (Cardiothoracic surgery) in 4-6 months    Diagnosis: Thyroid nodule  Assessment and Plan of Treatment: A CT scan incidentally found a nodule in your thyroid gland. Follow-up with your primary care provider for further work-up.

## 2023-04-07 NOTE — DISCHARGE NOTE PROVIDER - NSDCFUADDAPPT_GEN_ALL_CORE_FT
APPTS ARE READY TO BE MADE: [ x] YES    Best Family or Patient Contact (if needed):    Additional Information about above appointments (if needed):    1:   2:   3:     Other comments or requests:    APPTS ARE READY TO BE MADE: [ x] YES    Best Family or Patient Contact (if needed):    Additional Information about above appointments (if needed):    1:   2:   3:     Other comments or requests:   Patient was provided with follow up request details and was advised to call to schedule follow up within specified time frame.

## 2023-04-07 NOTE — DIETITIAN INITIAL EVALUATION ADULT - PERTINENT MEDS FT
MEDICATIONS  (STANDING):  aspirin  chewable 81 milliGRAM(s) Oral daily  atorvastatin 40 milliGRAM(s) Oral at bedtime  clopidogrel Tablet 75 milliGRAM(s) Oral daily  dextrose 5%. 1000 milliLiter(s) (100 mL/Hr) IV Continuous <Continuous>  dextrose 5%. 1000 milliLiter(s) (50 mL/Hr) IV Continuous <Continuous>  dextrose 50% Injectable 25 Gram(s) IV Push once  dextrose 50% Injectable 12.5 Gram(s) IV Push once  dextrose 50% Injectable 25 Gram(s) IV Push once  enoxaparin Injectable 40 milliGRAM(s) SubCutaneous every 24 hours  furosemide    Tablet 40 milliGRAM(s) Oral daily  glucagon  Injectable 1 milliGRAM(s) IntraMuscular once  insulin lispro (ADMELOG) corrective regimen sliding scale   SubCutaneous three times a day before meals  losartan 50 milliGRAM(s) Oral daily  metoprolol succinate ER 25 milliGRAM(s) Oral daily    MEDICATIONS  (PRN):  acetaminophen     Tablet .. 650 milliGRAM(s) Oral every 6 hours PRN Temp greater or equal to 38C (100.4F), Mild Pain (1 - 3)  aluminum hydroxide/magnesium hydroxide/simethicone Suspension 30 milliLiter(s) Oral every 4 hours PRN Dyspepsia  dextrose Oral Gel 15 Gram(s) Oral once PRN Blood Glucose LESS THAN 70 milliGRAM(s)/deciliter  melatonin 3 milliGRAM(s) Oral at bedtime PRN Insomnia  ondansetron Injectable 4 milliGRAM(s) IV Push every 8 hours PRN Nausea and/or Vomiting

## 2023-04-07 NOTE — DISCHARGE NOTE PROVIDER - NSDCFUADDINST_GEN_ALL_CORE_FT
Follow-up with Dr. Chairez. Also, follow-up with Dr. Devi (Cardiothoracic Surgery) in 3-6 months regarding your ascending aortic aneurysm.

## 2023-04-07 NOTE — DISCHARGE NOTE PROVIDER - ATTENDING DISCHARGE PHYSICAL EXAMINATION:
Attending attestation  Attending DC note  Pt seen and examined at bedside. no cp or sob. Cardio outpt  vitals, labs, exam stable  Hospital course as above.  Plan dw pt and agreed to plan  Medically cleared for DC. Med recc completed.  ROSANGELA resident. Spent 32 mins on case

## 2023-04-07 NOTE — DISCHARGE NOTE PROVIDER - DETAILS OF MALNUTRITION DIAGNOSIS/DIAGNOSES
This patient has been assessed with a concern for Malnutrition and was treated during this hospitalization for the following Nutrition diagnosis/diagnoses:     -  04/07/2023: Severe protein-calorie malnutrition

## 2023-04-07 NOTE — DISCHARGE NOTE NURSING/CASE MANAGEMENT/SOCIAL WORK - NSDCPEFALRISK_GEN_ALL_CORE
For information on Fall & Injury Prevention, visit: [email or web address] OR  [email or web address] OR  https://www.cdc.gov/steadi/patient.html

## 2023-04-07 NOTE — DIETITIAN INITIAL EVALUATION ADULT - ADD RECOMMEND
MV with minerals daily     Patient at HIGH nutrition risk   will follow up within 3-5days  Amargo Darnell, RD  5476 or via TEAMS

## 2023-04-07 NOTE — DIETITIAN INITIAL EVALUATION ADULT - PERSON TAUGHT/METHOD
low Sodium education, high calorie snacks/meals, oral supplements   oral education given, patient understanding/verbal instruction/patient instructed

## 2023-04-07 NOTE — DISCHARGE NOTE PROVIDER - NSDCFUSCHEDAPPT_GEN_ALL_CORE_FT
Adriano Chairez  [facility] Physician [facility]  CARDIOLOGY [address] [place] [address]  Scheduled Appointment: 06/27/2023

## 2023-04-07 NOTE — DIETITIAN INITIAL EVALUATION ADULT - ORAL INTAKE PTA/DIET HISTORY
patient reports over the last few years gradual decrease in appetite/ PO intake. Had a stroke ~2 years ago which is when decrease started, since then has had minimal appetite.   suspect meeting </=75% estimated energy needs prior to admission >/=1 month

## 2023-04-07 NOTE — DISCHARGE NOTE PROVIDER - NSDCMRMEDTOKEN_GEN_ALL_CORE_FT
glipiZIDE 5 mg oral tablet, extended release: 1 tab(s) orally 2 times a day  losartan 50 mg oral tablet: 1 tab(s) orally once a day   aspirin 81 mg oral tablet, chewable: 1 tab(s) orally once a day  atorvastatin 40 mg oral tablet: 1 tab(s) orally once a day (at bedtime)  clopidogrel 75 mg oral tablet: 1 tab(s) orally once a day  furosemide 40 mg oral tablet: 1 tab(s) orally once a day  glipiZIDE 5 mg oral tablet, extended release: 1 tab(s) orally 2 times a day  losartan 50 mg oral tablet: 1 tab(s) orally once a day  metoprolol succinate 25 mg oral tablet, extended release: 1 tab(s) orally once a day   aspirin 81 mg oral tablet, chewable: 1 tab(s) orally once a day  atorvastatin 40 mg oral tablet: 1 tab(s) orally once a day (at bedtime)  clopidogrel 75 mg oral tablet: 1 tab(s) orally once a day  furosemide 40 mg oral tablet: 1 tab(s) orally once a day  glipiZIDE 5 mg oral tablet, extended release: 1 tab(s) orally 2 times a day  losartan 50 mg oral tablet: 1 tab(s) orally once a day  metFORMIN 1000 mg oral tablet: 1 orally 2 times a day  metoprolol succinate 25 mg oral tablet, extended release: 1 tab(s) orally once a day

## 2023-04-08 RX ORDER — METFORMIN HYDROCHLORIDE 850 MG/1
1 TABLET ORAL
Qty: 0 | Refills: 0 | DISCHARGE

## 2023-04-08 NOTE — CHART NOTE - NSCHARTNOTEFT_GEN_A_CORE
Pt called for call back. Asked if he continue his metformin. Approved to continue metformin 1000 mg BID  will update on med recc.

## 2023-04-12 DIAGNOSIS — Z79.84 LONG TERM (CURRENT) USE OF ORAL HYPOGLYCEMIC DRUGS: ICD-10-CM

## 2023-04-12 DIAGNOSIS — Z82.49 FAMILY HISTORY OF ISCHEMIC HEART DISEASE AND OTHER DISEASES OF THE CIRCULATORY SYSTEM: ICD-10-CM

## 2023-04-12 DIAGNOSIS — Z98.42 CATARACT EXTRACTION STATUS, LEFT EYE: ICD-10-CM

## 2023-04-12 DIAGNOSIS — I71.21 ANEURYSM OF THE ASCENDING AORTA, WITHOUT RUPTURE: ICD-10-CM

## 2023-04-12 DIAGNOSIS — Z20.822 CONTACT WITH AND (SUSPECTED) EXPOSURE TO COVID-19: ICD-10-CM

## 2023-04-12 DIAGNOSIS — Z85.51 PERSONAL HISTORY OF MALIGNANT NEOPLASM OF BLADDER: ICD-10-CM

## 2023-04-12 DIAGNOSIS — Z85.821 PERSONAL HISTORY OF MERKEL CELL CARCINOMA: ICD-10-CM

## 2023-04-12 DIAGNOSIS — E04.1 NONTOXIC SINGLE THYROID NODULE: ICD-10-CM

## 2023-04-12 DIAGNOSIS — I50.33 ACUTE ON CHRONIC DIASTOLIC (CONGESTIVE) HEART FAILURE: ICD-10-CM

## 2023-04-12 DIAGNOSIS — E43 UNSPECIFIED SEVERE PROTEIN-CALORIE MALNUTRITION: ICD-10-CM

## 2023-04-12 DIAGNOSIS — Z79.02 LONG TERM (CURRENT) USE OF ANTITHROMBOTICS/ANTIPLATELETS: ICD-10-CM

## 2023-04-12 DIAGNOSIS — Z83.3 FAMILY HISTORY OF DIABETES MELLITUS: ICD-10-CM

## 2023-04-12 DIAGNOSIS — Z79.899 OTHER LONG TERM (CURRENT) DRUG THERAPY: ICD-10-CM

## 2023-04-12 DIAGNOSIS — Z95.5 PRESENCE OF CORONARY ANGIOPLASTY IMPLANT AND GRAFT: ICD-10-CM

## 2023-04-12 DIAGNOSIS — Z79.82 LONG TERM (CURRENT) USE OF ASPIRIN: ICD-10-CM

## 2023-04-12 DIAGNOSIS — E78.00 PURE HYPERCHOLESTEROLEMIA, UNSPECIFIED: ICD-10-CM

## 2023-04-12 DIAGNOSIS — I11.0 HYPERTENSIVE HEART DISEASE WITH HEART FAILURE: ICD-10-CM

## 2023-04-12 DIAGNOSIS — Z85.46 PERSONAL HISTORY OF MALIGNANT NEOPLASM OF PROSTATE: ICD-10-CM

## 2023-04-12 DIAGNOSIS — Z98.41 CATARACT EXTRACTION STATUS, RIGHT EYE: ICD-10-CM

## 2023-04-12 DIAGNOSIS — R06.02 SHORTNESS OF BREATH: ICD-10-CM

## 2023-04-12 DIAGNOSIS — E11.9 TYPE 2 DIABETES MELLITUS WITHOUT COMPLICATIONS: ICD-10-CM

## 2023-04-12 DIAGNOSIS — I25.10 ATHEROSCLEROTIC HEART DISEASE OF NATIVE CORONARY ARTERY WITHOUT ANGINA PECTORIS: ICD-10-CM

## 2023-04-12 DIAGNOSIS — Z96.1 PRESENCE OF INTRAOCULAR LENS: ICD-10-CM

## 2023-04-13 ENCOUNTER — APPOINTMENT (OUTPATIENT)
Dept: CARDIOLOGY | Facility: CLINIC | Age: 83
End: 2023-04-13
Payer: MEDICARE

## 2023-04-13 ENCOUNTER — TRANSCRIPTION ENCOUNTER (OUTPATIENT)
Age: 83
End: 2023-04-13

## 2023-04-13 VITALS
BODY MASS INDEX: 21.42 KG/M2 | HEART RATE: 68 BPM | SYSTOLIC BLOOD PRESSURE: 100 MMHG | HEIGHT: 71 IN | WEIGHT: 153 LBS | DIASTOLIC BLOOD PRESSURE: 60 MMHG

## 2023-04-13 PROCEDURE — 93000 ELECTROCARDIOGRAM COMPLETE: CPT

## 2023-04-13 PROCEDURE — 99214 OFFICE O/P EST MOD 30 MIN: CPT

## 2023-04-13 NOTE — ASSESSMENT
[FreeTextEntry1] : Congestive heart failure \par cad\par pci of lad/diagonal\par hyperchlesteremia\par cva\par bladder cancer\par

## 2023-04-13 NOTE — REASON FOR VISIT
[Follow-Up - Clinic] : a clinic follow-up of [Coronary Artery Disease] : coronary artery disease [FreeTextEntry1] : cad\par pci of lad and diagonal in 2009\par 2/2006\par echo showed ef of 60%\par stress test 2014 was negative\par class 1 angina \par class 1 sob\par  no chf\par \par no new complaints\par patient may d/c plavix 5-7 days before cystoscopy\par no sob\par no chest pain\par douing well\par stable\par doing well\par \par thallium shows a perfusion defect of the rca\par cath showed mild non obstructive cad inn all arteries\par medical therapy\par patent stents\par \par he is pre-op for hernia repair\par no further testing is necessary and this is low risk procedure\par class1 angina, no chf or malignant ventricular arrythmias\par \par doing well\par no new complaints\par \par patient now scheduled for repeat cystoscopy\par no new cardiac symptoms\par low risk procedure\par \par since last visit, patient developed bladder cancer\par in addition, had a small stroke and in rehab\par no new cardiac symptoms\par no chest pain or sob\par \par Recent hospitalization and April 2023 for CHF and shortness of breath\par Echocardiogram revealed an ejection fraction of 35%.\par He diuresis well and started on diuretic therapy and beta-blocker\par He has no symptoms at the present time.  He is class II according to the [place] Heart Association in terms of shortness of breath.\par \par EKG is normal sinus rhythm\par  [FreeTextEntry2] : and cystoscopy for bladder polps

## 2023-04-19 ENCOUNTER — TRANSCRIPTION ENCOUNTER (OUTPATIENT)
Age: 83
End: 2023-04-19

## 2023-04-20 ENCOUNTER — APPOINTMENT (OUTPATIENT)
Age: 83
End: 2023-04-20

## 2023-04-21 ENCOUNTER — TRANSCRIPTION ENCOUNTER (OUTPATIENT)
Age: 83
End: 2023-04-21

## 2023-05-01 ENCOUNTER — TRANSCRIPTION ENCOUNTER (OUTPATIENT)
Age: 83
End: 2023-05-01

## 2023-05-10 ENCOUNTER — TRANSCRIPTION ENCOUNTER (OUTPATIENT)
Age: 83
End: 2023-05-10

## 2023-06-14 ENCOUNTER — NON-APPOINTMENT (OUTPATIENT)
Age: 83
End: 2023-06-14

## 2023-06-27 ENCOUNTER — APPOINTMENT (OUTPATIENT)
Dept: CARDIOLOGY | Facility: CLINIC | Age: 83
End: 2023-06-27
Payer: MEDICARE

## 2023-06-27 VITALS
SYSTOLIC BLOOD PRESSURE: 100 MMHG | BODY MASS INDEX: 22.54 KG/M2 | DIASTOLIC BLOOD PRESSURE: 60 MMHG | HEIGHT: 71 IN | HEART RATE: 58 BPM | WEIGHT: 161 LBS

## 2023-06-27 PROCEDURE — 99214 OFFICE O/P EST MOD 30 MIN: CPT

## 2023-06-27 PROCEDURE — 93000 ELECTROCARDIOGRAM COMPLETE: CPT

## 2023-06-27 RX ORDER — ATORVASTATIN CALCIUM 40 MG/1
40 TABLET, FILM COATED ORAL
Refills: 0 | Status: ACTIVE | COMMUNITY

## 2023-06-27 RX ORDER — ATORVASTATIN CALCIUM 80 MG/1
TABLET, FILM COATED ORAL DAILY
Refills: 0 | Status: DISCONTINUED | COMMUNITY
End: 2023-06-27

## 2023-06-27 RX ORDER — LOSARTAN POTASSIUM 50 MG/1
50 TABLET, FILM COATED ORAL DAILY
Qty: 90 | Refills: 2 | Status: ACTIVE | COMMUNITY
Start: 2022-05-24 | End: 1900-01-01

## 2023-06-29 RX ORDER — FUROSEMIDE 40 MG/1
40 TABLET ORAL
Qty: 90 | Refills: 3 | Status: ACTIVE | COMMUNITY
Start: 1900-01-01 | End: 1900-01-01

## 2023-07-19 ENCOUNTER — APPOINTMENT (OUTPATIENT)
Dept: NEUROLOGY | Facility: CLINIC | Age: 83
End: 2023-07-19
Payer: MEDICARE

## 2023-07-19 ENCOUNTER — NON-APPOINTMENT (OUTPATIENT)
Age: 83
End: 2023-07-19

## 2023-07-19 VITALS
WEIGHT: 160 LBS | TEMPERATURE: 97.8 F | BODY MASS INDEX: 22.4 KG/M2 | DIASTOLIC BLOOD PRESSURE: 64 MMHG | SYSTOLIC BLOOD PRESSURE: 143 MMHG | OXYGEN SATURATION: 97 % | HEART RATE: 71 BPM | HEIGHT: 71 IN

## 2023-07-19 PROCEDURE — 99214 OFFICE O/P EST MOD 30 MIN: CPT

## 2023-07-19 NOTE — HISTORY OF PRESENT ILLNESS
[FreeTextEntry1] : Pt is a 81 yo M with PMHx of CAD s/p PCI 2009, HTN, HLD, DM, bladder cancer, who presents for stroke f/u. Pt had a right medullary stroke in 07/2021. He was initially switching from plavix to brilinta, but then switched to ASA/plavix due to diarrhea/difficulty tolerating brilinta. Pt ambulates with a cane, denies falls, able to drive. left hemiataxia has improved significantly. Intermittent diplopia, and occasional difficulty swallowing large pills. Denies new weakness, numbness,speech or vision difficulty.

## 2023-07-19 NOTE — DISCUSSION/SUMMARY
[FreeTextEntry1] : Pt is a 81 yo M with PMHx of CAD s/p PCI 2009, HTN, HLD, DM, bladder cancer, who presents for stroke f/u. 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 Stable on f/u CTA in 07/2022. NIHSS 1 (past pointing on the left), mRS 1.\par - On ASA /plavix\par - Check PRU, if within target range, may d/c ASA\par - Continue atorvastatin 40mg QHS\par - F/u CTA head/neck in 1 yr then f/u

## 2023-07-19 NOTE — PHYSICAL EXAM
[General Appearance - Alert] : alert [General Appearance - In No Acute Distress] : in no acute distress [General Appearance - Well Nourished] : well nourished [General Appearance - Well Developed] : well developed [Oriented To Time, Place, And Person] : oriented to person, place, and time [Affect] : the affect was normal [Person] : oriented to person [Place] : oriented to place [Time] : oriented to time [Fluency] : fluency intact [Comprehension] : comprehension intact [Cranial Nerves Optic (II)] : visual acuity intact bilaterally,  visual fields full to confrontation, pupils equal round and reactive to light [Cranial Nerves Oculomotor (III)] : extraocular motion intact [Cranial Nerves Trigeminal (V)] : facial sensation intact symmetrically [Cranial Nerves Facial (VII)] : face symmetrical [Cranial Nerves Vestibulocochlear (VIII)] : hearing was intact bilaterally [Cranial Nerves Glossopharyngeal (IX)] : tongue and palate midline [Cranial Nerves Accessory (XI - Cranial And Spinal)] : head turning and shoulder shrug symmetric [Motor Tone] : muscle tone was normal in all four extremities [CNS Hypoglossal Tongue Protrusion Deviated To Right] : tongue deviates to the right with protrusion [Motor Strength] : muscle strength was normal in all four extremities [Paresis Pronator Drift Right-Sided] : no pronator drift on the right [Paresis Pronator Drift Left-Sided] : no pronator drift on the left [Sensation Tactile Decrease] : light touch was intact [Past-pointing] : past-pointing was present [2+] : Patella left 2+ [FreeTextEntry4] : mild hypophonia, no dysarthria [FreeTextEntry5] : tongue strength 4/5 right, 5/5 left [FreeTextEntry8] : normal stance and stride while using cane

## 2023-07-19 NOTE — REVIEW OF SYSTEMS
[As Noted in HPI] : as noted in HPI [Eyesight Problems] : eyesight problems [Easy Bleeding] : a tendency for easy bleeding [Easy Bruising] : a tendency for easy bruising [Negative] : Respiratory

## 2023-10-17 ENCOUNTER — APPOINTMENT (OUTPATIENT)
Dept: CARDIOLOGY | Facility: CLINIC | Age: 83
End: 2023-10-17
Payer: MEDICARE

## 2023-10-17 VITALS
SYSTOLIC BLOOD PRESSURE: 116 MMHG | WEIGHT: 160 LBS | BODY MASS INDEX: 22.32 KG/M2 | DIASTOLIC BLOOD PRESSURE: 70 MMHG | HEART RATE: 69 BPM

## 2023-10-17 DIAGNOSIS — I73.9 PERIPHERAL VASCULAR DISEASE, UNSPECIFIED: ICD-10-CM

## 2023-10-17 DIAGNOSIS — E78.00 PURE HYPERCHOLESTEROLEMIA, UNSPECIFIED: ICD-10-CM

## 2023-10-17 PROCEDURE — 93000 ELECTROCARDIOGRAM COMPLETE: CPT

## 2023-10-17 PROCEDURE — 99214 OFFICE O/P EST MOD 30 MIN: CPT

## 2023-10-17 RX ORDER — ASPIRIN 81 MG/1
81 TABLET, CHEWABLE ORAL
Qty: 90 | Refills: 1 | Status: DISCONTINUED | COMMUNITY
Start: 2022-06-16 | End: 2023-10-17

## 2023-11-21 ENCOUNTER — APPOINTMENT (OUTPATIENT)
Dept: UROLOGY | Facility: CLINIC | Age: 83
End: 2023-11-21
Payer: COMMERCIAL

## 2023-11-21 DIAGNOSIS — C67.9 MALIGNANT NEOPLASM OF BLADDER, UNSPECIFIED: ICD-10-CM

## 2023-11-21 LAB
BILIRUB UR QL STRIP: NORMAL
COLLECTION METHOD: NORMAL
GLUCOSE UR-MCNC: NORMAL
HCG UR QL: 0.2 EU/DL
HGB UR QL STRIP.AUTO: NORMAL
KETONES UR-MCNC: NORMAL
LEUKOCYTE ESTERASE UR QL STRIP: NORMAL
NITRITE UR QL STRIP: NORMAL
PH UR STRIP: 5.5
PROT UR STRIP-MCNC: NORMAL
SP GR UR STRIP: 1.01

## 2023-11-21 PROCEDURE — 52000 CYSTOURETHROSCOPY: CPT

## 2023-11-21 PROCEDURE — 81003 URINALYSIS AUTO W/O SCOPE: CPT | Mod: QW

## 2023-12-18 NOTE — PHYSICAL THERAPY INITIAL EVALUATION ADULT - SITTING BALANCE: DYNAMIC
good minus
Patient will improve strength in B LE by 1 grade in 6-8 weeks to improve overall functional mobility including gait, transfers, bed mobility and decrease risk of falls.

## 2024-02-05 NOTE — PATIENT PROFILE ADULT - DO YOU FEEL UNSAFE AT HOME, WORK, OR SCHOOL?
Nurse sent refill request to pcp for review please allow two business days for review.   
Patient is requesting refill. Please advise      levothyroxine (SYNTHROID) 75 MCG tablet    [facility]/pharmacy #[id number] - [place] - [address] - P [phone] - F [phone]  
no

## 2024-02-15 ENCOUNTER — APPOINTMENT (OUTPATIENT)
Dept: VASCULAR SURGERY | Facility: CLINIC | Age: 84
End: 2024-02-15
Payer: MEDICARE

## 2024-02-15 VITALS — WEIGHT: 145 LBS | BODY MASS INDEX: 20.3 KG/M2 | HEIGHT: 71 IN

## 2024-02-15 VITALS — SYSTOLIC BLOOD PRESSURE: 122 MMHG | HEART RATE: 69 BPM | DIASTOLIC BLOOD PRESSURE: 60 MMHG

## 2024-02-15 DIAGNOSIS — I72.4 ANEURYSM OF ARTERY OF LOWER EXTREMITY: ICD-10-CM

## 2024-02-15 DIAGNOSIS — I72.3 ANEURYSM OF ILIAC ARTERY: ICD-10-CM

## 2024-02-15 PROCEDURE — 93978 VASCULAR STUDY: CPT

## 2024-02-15 PROCEDURE — 99213 OFFICE O/P EST LOW 20 MIN: CPT

## 2024-02-15 PROCEDURE — 93925 LOWER EXTREMITY STUDY: CPT

## 2024-02-15 NOTE — HISTORY OF PRESENT ILLNESS
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 Today he presents for follow up and offers no new complaints.

## 2024-02-15 NOTE — ASSESSMENT
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   Duplex abdominal aorta 4.4 cm right common iliac artery 1.8 cm left common iliac artery 1.8 cm, Right common femoral artery 1.5 cm and popliteal artery 1.6 cm, Left common femoral artery 1.6 cm and left popliteal artery 1.5 cm.  No surgical intervention is needed at this time, and I would like to see him back in my office in one years time or sooner if any new symptoms develop.   I, Dr. Gary Teran, personally performed the evaluation and management (E/M) services for this established patient who presents today with (a) new problem(s)/exacerbation of (an) existing condition(s). That E/M includes conducting the clinically appropriate interval history &/or exam, assessing all new/exacerbated conditions, and establishing a new plan of care. Today, my NEELA, Joan Aggarwal PA-C, was here to observe my evaluation and management service for this new problem/exacerbated condition and follow the plan of care established by me going forward.

## 2024-02-15 NOTE — DATA REVIEWED
[FreeTextEntry1] : I performed an arterial duplex which was medically necessary to evaluate for size of the aneurysms.  Duplex showed abdominal aorta 4.4 cm right common iliac artery 1.8 cm left common iliac artery 1.8 cm Arterial duplex: Right common femoral artery 1.5 cm and popliteal artery 1.6 cm Left common femoral artery 1.6 cm and left popliteal artery 1.5 cm

## 2024-02-22 ENCOUNTER — APPOINTMENT (OUTPATIENT)
Dept: CARDIOLOGY | Facility: CLINIC | Age: 84
End: 2024-02-22
Payer: MEDICARE

## 2024-02-22 VITALS
BODY MASS INDEX: 21.98 KG/M2 | HEART RATE: 72 BPM | DIASTOLIC BLOOD PRESSURE: 56 MMHG | SYSTOLIC BLOOD PRESSURE: 110 MMHG | HEIGHT: 71 IN | WEIGHT: 157 LBS

## 2024-02-22 DIAGNOSIS — I10 ESSENTIAL (PRIMARY) HYPERTENSION: ICD-10-CM

## 2024-02-22 DIAGNOSIS — I71.40 ABDOMINAL AORTIC ANEURYSM, WITHOUT RUPTURE, UNSPECIFIED: ICD-10-CM

## 2024-02-22 DIAGNOSIS — I25.10 ATHEROSCLEROTIC HEART DISEASE OF NATIVE CORONARY ARTERY W/OUT ANGINA PECTORIS: ICD-10-CM

## 2024-02-22 PROCEDURE — 99214 OFFICE O/P EST MOD 30 MIN: CPT

## 2024-02-22 PROCEDURE — 93000 ELECTROCARDIOGRAM COMPLETE: CPT

## 2024-02-22 RX ORDER — METFORMIN HYDROCHLORIDE 1000 MG/1
1000 TABLET, COATED ORAL
Qty: 60 | Refills: 3 | Status: DISCONTINUED | COMMUNITY
End: 2024-02-22

## 2024-02-22 NOTE — PHYSICAL EXAM
[Well Developed] : well developed [Well Nourished] : well nourished [No Acute Distress] : no acute distress [No Carotid Bruit] : no carotid bruit [Normal Venous Pressure] : normal venous pressure [No Murmur] : no murmur [Normal S1, S2] : normal S1, S2 [No Rub] : no rub [No Gallop] : no gallop [Clear Lung Fields] : clear lung fields [Soft] : abdomen soft [No Respiratory Distress] : no respiratory distress  [Good Air Entry] : good air entry [Non Tender] : non-tender [No Masses/organomegaly] : no masses/organomegaly [Normal Gait] : normal gait [Normal Bowel Sounds] : normal bowel sounds [No Edema] : no edema [No Cyanosis] : no cyanosis [No Clubbing] : no clubbing [No Varicosities] : no varicosities [No Rash] : no rash [No Skin Lesions] : no skin lesions [Moves all extremities] : moves all extremities [No Focal Deficits] : no focal deficits [Normal Speech] : normal speech [Alert and Oriented] : alert and oriented [Normal memory] : normal memory [General Appearance - Well Developed] : well developed [Normal Appearance] : normal appearance [Well Groomed] : well groomed [General Appearance - Well Nourished] : well nourished [No Deformities] : no deformities [General Appearance - In No Acute Distress] : no acute distress [Normal Conjunctiva] : the conjunctiva exhibited no abnormalities [Eyelids - No Xanthelasma] : the eyelids demonstrated no xanthelasmas [Normal Oral Mucosa] : normal oral mucosa [No Oral Pallor] : no oral pallor [No Oral Cyanosis] : no oral cyanosis [Normal Jugular Venous A Waves Present] : normal jugular venous A waves present [Normal Jugular Venous V Waves Present] : normal jugular venous V waves present [No Jugular Venous Knight A Waves] : no jugular venous knight A waves [Respiration, Rhythm And Depth] : normal respiratory rhythm and effort [Exaggerated Use Of Accessory Muscles For Inspiration] : no accessory muscle use [Heart Rate And Rhythm] : heart rate and rhythm were normal [Auscultation Breath Sounds / Voice Sounds] : lungs were clear to auscultation bilaterally [Heart Sounds] : normal S1 and S2 [Murmurs] : no murmurs present [Abdomen Soft] : soft [Abdomen Tenderness] : non-tender [Abdomen Mass (___ Cm)] : no abdominal mass palpated [Abnormal Walk] : normal gait [Nail Clubbing] : no clubbing of the fingernails [Gait - Sufficient For Exercise Testing] : the gait was sufficient for exercise testing [Cyanosis, Localized] : no localized cyanosis [Petechial Hemorrhages (___cm)] : no petechial hemorrhages [Skin Color & Pigmentation] : normal skin color and pigmentation [No Venous Stasis] : no venous stasis [] : no rash [No Skin Ulcers] : no skin ulcer [Skin Lesions] : no skin lesions [Oriented To Time, Place, And Person] : oriented to person, place, and time [No Xanthoma] : no  xanthoma was observed [Mood] : the mood was normal [Affect] : the affect was normal [No Anxiety] : not feeling anxious

## 2024-02-22 NOTE — REASON FOR VISIT
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 He diuresis well and started on diuretic therapy and beta-blocker He has no symptoms at the present time.  He is class II according to the [place] [employer] in terms of shortness of breath.  EKG is normal sinus rhythm, pv's  [Follow-Up - Clinic] : a clinic follow-up of [Coronary Artery Disease] : coronary artery disease [FreeTextEntry2] : and cystoscopy for bladder polps

## 2024-02-22 NOTE — DISCUSSION/SUMMARY
[FreeTextEntry1] : rv 2 mos medical therapy with diuretics and beta-blocker reviewed meds and dosages with patient

## 2024-03-07 RX ORDER — CLOPIDOGREL BISULFATE 75 MG/1
75 TABLET, FILM COATED ORAL DAILY
Qty: 90 | Refills: 2 | Status: ACTIVE | COMMUNITY
Start: 2022-06-16 | End: 1900-01-01

## 2024-03-21 NOTE — PATIENT PROFILE ADULT - NSASFALLNEEDSASSIST_GEN_A_NUR
What Type Of Note Output Would You Prefer (Optional)?: Standard Output Is This A New Presentation, Or A Follow-Up?: Rash Additional History: Patient said that she was traveling and got rash on her back. Looks like bites. yes

## 2024-06-14 NOTE — OCCUPATIONAL THERAPY INITIAL EVALUATION ADULT - AMBULATORY DEVICES NEEDED
Patient:  MARIA ELENA MAYO    MRN:  [id number]    Mary Request ID:  [id number]    Level of care reserved:  Skilled Nursing Facility    Partner Reserved:  [facility], [place] [phone]    Clinical needs requested:    Geography searched:  15 miles around [id number]    Start of Service:    Request sent:  11:19am EDT on 6/14/2024 by Chuckie Mcdaniel    Partner reserved:  11:52am EDT on 6/14/2024 by Chuckie Mcdaniel    Choice list shared:  11:52am EDT on 6/14/2024 by Chuckie Mcdaniel   no

## 2024-06-19 ENCOUNTER — RESULT REVIEW (OUTPATIENT)
Age: 84
End: 2024-06-19

## 2024-06-19 ENCOUNTER — OUTPATIENT (OUTPATIENT)
Dept: OUTPATIENT SERVICES | Facility: HOSPITAL | Age: 84
LOS: 1 days | End: 2024-06-19
Payer: MEDICARE

## 2024-06-19 DIAGNOSIS — Z98.49 CATARACT EXTRACTION STATUS, UNSPECIFIED EYE: Chronic | ICD-10-CM

## 2024-06-19 DIAGNOSIS — Z00.8 ENCOUNTER FOR OTHER GENERAL EXAMINATION: ICD-10-CM

## 2024-06-19 DIAGNOSIS — Z98.890 OTHER SPECIFIED POSTPROCEDURAL STATES: Chronic | ICD-10-CM

## 2024-06-19 DIAGNOSIS — I63.9 CEREBRAL INFARCTION, UNSPECIFIED: ICD-10-CM

## 2024-06-19 PROCEDURE — 70498 CT ANGIOGRAPHY NECK: CPT | Mod: 26,MH

## 2024-06-19 PROCEDURE — 70498 CT ANGIOGRAPHY NECK: CPT

## 2024-06-19 PROCEDURE — 70496 CT ANGIOGRAPHY HEAD: CPT

## 2024-06-19 PROCEDURE — 70496 CT ANGIOGRAPHY HEAD: CPT | Mod: 26,MH

## 2024-06-20 DIAGNOSIS — I63.9 CEREBRAL INFARCTION, UNSPECIFIED: ICD-10-CM

## 2024-06-21 RX ORDER — METOPROLOL SUCCINATE 25 MG/1
25 TABLET, EXTENDED RELEASE ORAL
Qty: 90 | Refills: 3 | Status: ACTIVE | COMMUNITY
Start: 1900-01-01 | End: 1900-01-01

## 2024-07-01 ENCOUNTER — NON-APPOINTMENT (OUTPATIENT)
Age: 84
End: 2024-07-01

## 2024-07-02 ENCOUNTER — APPOINTMENT (OUTPATIENT)
Dept: NEUROLOGY | Facility: CLINIC | Age: 84
End: 2024-07-02
Payer: MEDICARE

## 2024-07-02 VITALS
HEIGHT: 71 IN | SYSTOLIC BLOOD PRESSURE: 121 MMHG | DIASTOLIC BLOOD PRESSURE: 49 MMHG | WEIGHT: 150 LBS | TEMPERATURE: 98.4 F | HEART RATE: 60 BPM | OXYGEN SATURATION: 98 % | BODY MASS INDEX: 21 KG/M2

## 2024-07-02 DIAGNOSIS — I63.9 CEREBRAL INFARCTION, UNSPECIFIED: ICD-10-CM

## 2024-07-02 DIAGNOSIS — I67.9 CEREBROVASCULAR DISEASE, UNSPECIFIED: ICD-10-CM

## 2024-07-02 PROCEDURE — 99213 OFFICE O/P EST LOW 20 MIN: CPT

## 2024-07-16 ENCOUNTER — NON-APPOINTMENT (OUTPATIENT)
Age: 84
End: 2024-07-16

## 2024-07-16 ENCOUNTER — APPOINTMENT (OUTPATIENT)
Dept: CARDIOLOGY | Facility: CLINIC | Age: 84
End: 2024-07-16
Payer: MEDICARE

## 2024-07-16 VITALS
BODY MASS INDEX: 21 KG/M2 | HEART RATE: 68 BPM | HEIGHT: 71 IN | WEIGHT: 150 LBS | DIASTOLIC BLOOD PRESSURE: 68 MMHG | SYSTOLIC BLOOD PRESSURE: 116 MMHG

## 2024-07-16 DIAGNOSIS — I25.10 ATHEROSCLEROTIC HEART DISEASE OF NATIVE CORONARY ARTERY W/OUT ANGINA PECTORIS: ICD-10-CM

## 2024-07-16 DIAGNOSIS — I10 ESSENTIAL (PRIMARY) HYPERTENSION: ICD-10-CM

## 2024-07-16 DIAGNOSIS — E78.00 PURE HYPERCHOLESTEROLEMIA, UNSPECIFIED: ICD-10-CM

## 2024-07-16 PROCEDURE — 99214 OFFICE O/P EST MOD 30 MIN: CPT

## 2024-07-16 PROCEDURE — 93000 ELECTROCARDIOGRAM COMPLETE: CPT

## 2024-09-26 NOTE — DISCHARGE NOTE PROVIDER - ATTENDING ATTESTATION STATEMENT
I have personally seen and examined the patient. I have collaborated with and supervised the
non-verbal indicators absent (Rating = 0)

## 2024-11-22 ENCOUNTER — APPOINTMENT (OUTPATIENT)
Dept: UROLOGY | Facility: CLINIC | Age: 84
End: 2024-11-22
Payer: COMMERCIAL

## 2024-11-22 VITALS
OXYGEN SATURATION: 96 % | SYSTOLIC BLOOD PRESSURE: 128 MMHG | RESPIRATION RATE: 17 BRPM | HEIGHT: 71 IN | TEMPERATURE: 98 F | BODY MASS INDEX: 21 KG/M2 | WEIGHT: 150 LBS | HEART RATE: 66 BPM | DIASTOLIC BLOOD PRESSURE: 86 MMHG

## 2024-11-22 DIAGNOSIS — C67.9 MALIGNANT NEOPLASM OF BLADDER, UNSPECIFIED: ICD-10-CM

## 2024-11-22 LAB
BILIRUB UR QL STRIP: NORMAL
COLLECTION METHOD: NORMAL
GLUCOSE UR-MCNC: NORMAL
HCG UR QL: 0.2 EU/DL
HGB UR QL STRIP.AUTO: NORMAL
KETONES UR-MCNC: NORMAL
LEUKOCYTE ESTERASE UR QL STRIP: NORMAL
NITRITE UR QL STRIP: NORMAL
PH UR STRIP: 5.5
PROT UR STRIP-MCNC: 100
SP GR UR STRIP: 1.02

## 2024-11-22 PROCEDURE — 81003 URINALYSIS AUTO W/O SCOPE: CPT | Mod: QW

## 2024-11-22 PROCEDURE — 99214 OFFICE O/P EST MOD 30 MIN: CPT

## 2024-11-26 ENCOUNTER — APPOINTMENT (OUTPATIENT)
Dept: CARDIOLOGY | Facility: CLINIC | Age: 84
End: 2024-11-26
Payer: MEDICARE

## 2024-11-26 ENCOUNTER — NON-APPOINTMENT (OUTPATIENT)
Age: 84
End: 2024-11-26

## 2024-11-26 VITALS
HEART RATE: 67 BPM | BODY MASS INDEX: 21.84 KG/M2 | WEIGHT: 156 LBS | HEIGHT: 71 IN | SYSTOLIC BLOOD PRESSURE: 110 MMHG | DIASTOLIC BLOOD PRESSURE: 51 MMHG

## 2024-11-26 DIAGNOSIS — I10 ESSENTIAL (PRIMARY) HYPERTENSION: ICD-10-CM

## 2024-11-26 DIAGNOSIS — I63.9 CEREBRAL INFARCTION, UNSPECIFIED: ICD-10-CM

## 2024-11-26 DIAGNOSIS — I25.10 ATHEROSCLEROTIC HEART DISEASE OF NATIVE CORONARY ARTERY W/OUT ANGINA PECTORIS: ICD-10-CM

## 2024-11-26 PROCEDURE — 93000 ELECTROCARDIOGRAM COMPLETE: CPT

## 2024-11-26 PROCEDURE — 99213 OFFICE O/P EST LOW 20 MIN: CPT

## 2025-03-04 ENCOUNTER — APPOINTMENT (OUTPATIENT)
Dept: CARDIOLOGY | Facility: CLINIC | Age: 85
End: 2025-03-04

## 2025-03-13 NOTE — PROGRESS NOTE ADULT - ATTENDING COMMENTS
Pain persisting in right wrist, chronic 3+ mon will get MRI for further evaluation.    Kishore Krause MD      I reviewed the chart and examined the patient with the resident and we discussed the findings and treatment plan. I agree with the findings and treatment plan above, which I modified as indicated. The patient requires 3 hrs a day of acute inpatient rehab.    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   Admitted for rehab of  R medullary infarct and subsequent left sided weakness and facial droop.    # R medullary infarct  # Left sided weakness #Left sided facial droop, nondominant  - MRI of brain 7/12/21 redemonstrated the punctate infarct in the right ventral medulla, minimally enlarged possibly due to difference in technique compared to the prior exam from 7/5/2021. Slightly increased mild edema. No additional acute infarcts. No intracranial hemorrhage or mass effect. Stable mild chronic microvascular ischemic changes.   - Brilinta 90 mg PO BID and Atorvastatin 80mg daily  - PT/OT/SLT    Comorbidities    # HTN - avoid tight control given intracranial vertebral stenosis  - BP goal as above (130-160 systolic)   - Losartan 100 mg PO QAM.  - Amlodipine 10 mg      -#Type II DM on Glypizide: Hemoglobin A1C 7.8 on 7/5, insulin regimen as needed (goal 140-180)     #Bladder/prostate CA: Outpatient management     #AAA: Stable at this time, Outpatient management    #HFrEF/ history of Systolic CHF: Echo taken 7/6/2021 shows decreased left ventricular systolic function (LVEF 46%); No overt symptoms at this time    - GI/Bowel Mgmt: patient currently has regular BM     - Diet: Dysphagia 3 mechanical soft diet     # Precautions / PROPHYLAXIS:    - Falls  - Ortho: Weight bearing status: WBAT   - DVT prophylaxis: SC heparin I reviewed the chart and examined the patient with the resident and we discussed the findings and treatment plan. I agree with the findings and treatment plan above, which I modified as indicated. The patient requires 3 hrs a day of acute inpatient rehab.    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   Admitted for rehab of  R medullary infarct and subsequent left sided weakness and facial droop.    # R medullary infarct  # Left sided weakness #Left sided facial droop, nondominant  - MRI of brain 7/12/21 redemonstrated the punctate infarct in the right ventral medulla, minimally enlarged possibly due to difference in technique compared to the prior exam from 7/5/2021. Slightly increased mild edema. No additional acute infarcts. No intracranial hemorrhage or mass effect. Stable mild chronic microvascular ischemic changes.   - Brilinta 90 mg PO BID and Atorvastatin 80mg daily    -Oral-pharyngeal dysphagia/ dysarthria/dysphonia/VC paresis: Dysphagia 2 diet with Nectar-thick liquids and compensatory techniques.    - PT/OT/SLT    Comorbidities    # HTN - avoid tight control given intracranial vertebral stenosis  - BP goal as above (130-160 systolic)   - Losartan 100 mg PO QAM.  - Amlodipine 10 mg      -#Type II DM on Glypizide: Hemoglobin A1C 7.8 on 7/5, insulin regimen as needed (goal 140-180)     #Bladder/prostate CA: Outpatient management     #AAA: Stable at this time, Outpatient management    #HFrEF/ history of Systolic CHF: Echo taken 7/6/2021 shows decreased left ventricular systolic function (LVEF 46%); No overt symptoms at this time    - GI/Bowel Mgmt: patient currently has regular BM       # Precautions / PROPHYLAXIS:    - Falls  - Ortho: Weight bearing status: WBAT   - DVT prophylaxis: SC heparin

## 2025-04-10 ENCOUNTER — APPOINTMENT (OUTPATIENT)
Dept: VASCULAR SURGERY | Facility: CLINIC | Age: 85
End: 2025-04-10

## 2025-07-17 ENCOUNTER — APPOINTMENT (OUTPATIENT)
Dept: NEUROLOGY | Facility: CLINIC | Age: 85
End: 2025-07-17